# Patient Record
Sex: MALE | Race: WHITE | NOT HISPANIC OR LATINO | Employment: UNEMPLOYED | URBAN - NONMETROPOLITAN AREA
[De-identification: names, ages, dates, MRNs, and addresses within clinical notes are randomized per-mention and may not be internally consistent; named-entity substitution may affect disease eponyms.]

---

## 2019-12-02 ENCOUNTER — APPOINTMENT (OUTPATIENT)
Dept: GENERAL RADIOLOGY | Facility: HOSPITAL | Age: 49
End: 2019-12-02

## 2019-12-02 ENCOUNTER — HOSPITAL ENCOUNTER (EMERGENCY)
Facility: HOSPITAL | Age: 49
Discharge: HOME | End: 2019-12-02
Attending: EMERGENCY MEDICINE | Admitting: EMERGENCY MEDICINE

## 2019-12-02 VITALS
DIASTOLIC BLOOD PRESSURE: 83 MMHG | SYSTOLIC BLOOD PRESSURE: 130 MMHG | TEMPERATURE: 98.6 F | WEIGHT: 235 LBS | HEART RATE: 82 BPM | HEIGHT: 68 IN | RESPIRATION RATE: 18 BRPM | BODY MASS INDEX: 35.61 KG/M2 | OXYGEN SATURATION: 99 %

## 2019-12-02 DIAGNOSIS — B97.89 VIRAL RESPIRATORY ILLNESS: Primary | ICD-10-CM

## 2019-12-02 DIAGNOSIS — J98.8 VIRAL RESPIRATORY ILLNESS: Primary | ICD-10-CM

## 2019-12-02 LAB
ALBUMIN SERPL-MCNC: 4.1 G/DL (ref 3.5–5.2)
ALBUMIN/GLOB SERPL: 1.2 G/DL
ALP SERPL-CCNC: 61 U/L (ref 39–117)
ALT SERPL W P-5'-P-CCNC: 17 U/L (ref 1–41)
ANION GAP SERPL CALCULATED.3IONS-SCNC: 12.4 MMOL/L (ref 5–15)
AST SERPL-CCNC: 32 U/L (ref 1–40)
BASOPHILS # BLD AUTO: 0.09 10*3/MM3 (ref 0–0.2)
BASOPHILS NFR BLD AUTO: 0.7 % (ref 0–1.5)
BILIRUB SERPL-MCNC: 0.4 MG/DL (ref 0.2–1.2)
BUN BLD-MCNC: 10 MG/DL (ref 6–20)
BUN/CREAT SERPL: 10.8 (ref 7–25)
CALCIUM SPEC-SCNC: 9.3 MG/DL (ref 8.6–10.5)
CHLORIDE SERPL-SCNC: 101 MMOL/L (ref 98–107)
CO2 SERPL-SCNC: 24.6 MMOL/L (ref 22–29)
CREAT BLD-MCNC: 0.93 MG/DL (ref 0.76–1.27)
CRP SERPL-MCNC: 0.54 MG/DL (ref 0–0.5)
DEPRECATED RDW RBC AUTO: 39 FL (ref 37–54)
EOSINOPHIL # BLD AUTO: 0.48 10*3/MM3 (ref 0–0.4)
EOSINOPHIL NFR BLD AUTO: 3.7 % (ref 0.3–6.2)
ERYTHROCYTE [DISTWIDTH] IN BLOOD BY AUTOMATED COUNT: 11.9 % (ref 12.3–15.4)
FLUAV AG NPH QL: NEGATIVE
FLUBV AG NPH QL IA: NEGATIVE
GFR SERPL CREATININE-BSD FRML MDRD: 87 ML/MIN/1.73
GLOBULIN UR ELPH-MCNC: 3.4 GM/DL
GLUCOSE BLD-MCNC: 93 MG/DL (ref 65–99)
HCT VFR BLD AUTO: 45.9 % (ref 37.5–51)
HGB BLD-MCNC: 15 G/DL (ref 13–17.7)
IMM GRANULOCYTES # BLD AUTO: 0.05 10*3/MM3 (ref 0–0.05)
IMM GRANULOCYTES NFR BLD AUTO: 0.4 % (ref 0–0.5)
LYMPHOCYTES # BLD AUTO: 2.57 10*3/MM3 (ref 0.7–3.1)
LYMPHOCYTES NFR BLD AUTO: 19.7 % (ref 19.6–45.3)
MCH RBC QN AUTO: 29.4 PG (ref 26.6–33)
MCHC RBC AUTO-ENTMCNC: 32.7 G/DL (ref 31.5–35.7)
MCV RBC AUTO: 89.8 FL (ref 79–97)
MONOCYTES # BLD AUTO: 0.98 10*3/MM3 (ref 0.1–0.9)
MONOCYTES NFR BLD AUTO: 7.5 % (ref 5–12)
NEUTROPHILS # BLD AUTO: 8.89 10*3/MM3 (ref 1.7–7)
NEUTROPHILS NFR BLD AUTO: 68 % (ref 42.7–76)
NRBC BLD AUTO-RTO: 0 /100 WBC (ref 0–0.2)
PLATELET # BLD AUTO: 358 10*3/MM3 (ref 140–450)
PMV BLD AUTO: 9.7 FL (ref 6–12)
POTASSIUM BLD-SCNC: 4.8 MMOL/L (ref 3.5–5.2)
PROT SERPL-MCNC: 7.5 G/DL (ref 6–8.5)
RBC # BLD AUTO: 5.11 10*6/MM3 (ref 4.14–5.8)
SODIUM BLD-SCNC: 138 MMOL/L (ref 136–145)
WBC NRBC COR # BLD: 13.06 10*3/MM3 (ref 3.4–10.8)

## 2019-12-02 PROCEDURE — 96374 THER/PROPH/DIAG INJ IV PUSH: CPT

## 2019-12-02 PROCEDURE — 94640 AIRWAY INHALATION TREATMENT: CPT

## 2019-12-02 PROCEDURE — 71045 X-RAY EXAM CHEST 1 VIEW: CPT | Performed by: RADIOLOGY

## 2019-12-02 PROCEDURE — 80053 COMPREHEN METABOLIC PANEL: CPT | Performed by: PHYSICIAN ASSISTANT

## 2019-12-02 PROCEDURE — 86140 C-REACTIVE PROTEIN: CPT | Performed by: PHYSICIAN ASSISTANT

## 2019-12-02 PROCEDURE — 87804 INFLUENZA ASSAY W/OPTIC: CPT | Performed by: PHYSICIAN ASSISTANT

## 2019-12-02 PROCEDURE — 99284 EMERGENCY DEPT VISIT MOD MDM: CPT

## 2019-12-02 PROCEDURE — 25010000002 METHYLPREDNISOLONE PER 125 MG: Performed by: PHYSICIAN ASSISTANT

## 2019-12-02 PROCEDURE — 71045 X-RAY EXAM CHEST 1 VIEW: CPT

## 2019-12-02 PROCEDURE — 85025 COMPLETE CBC W/AUTO DIFF WBC: CPT | Performed by: PHYSICIAN ASSISTANT

## 2019-12-02 PROCEDURE — 96375 TX/PRO/DX INJ NEW DRUG ADDON: CPT

## 2019-12-02 PROCEDURE — 25010000002 ORPHENADRINE CITRATE PER 60 MG: Performed by: PHYSICIAN ASSISTANT

## 2019-12-02 PROCEDURE — 94799 UNLISTED PULMONARY SVC/PX: CPT

## 2019-12-02 RX ORDER — ALBUTEROL SULFATE 90 UG/1
2 AEROSOL, METERED RESPIRATORY (INHALATION) ONCE
Status: COMPLETED | OUTPATIENT
Start: 2019-12-02 | End: 2019-12-02

## 2019-12-02 RX ORDER — IPRATROPIUM BROMIDE AND ALBUTEROL SULFATE 2.5; .5 MG/3ML; MG/3ML
3 SOLUTION RESPIRATORY (INHALATION) ONCE
Status: COMPLETED | OUTPATIENT
Start: 2019-12-02 | End: 2019-12-02

## 2019-12-02 RX ORDER — ORPHENADRINE CITRATE 100 MG/1
100 TABLET, EXTENDED RELEASE ORAL 2 TIMES DAILY PRN
Qty: 20 TABLET | Refills: 0 | OUTPATIENT
Start: 2019-12-02 | End: 2020-01-11

## 2019-12-02 RX ORDER — ORPHENADRINE CITRATE 30 MG/ML
60 INJECTION INTRAMUSCULAR; INTRAVENOUS ONCE
Status: COMPLETED | OUTPATIENT
Start: 2019-12-02 | End: 2019-12-02

## 2019-12-02 RX ORDER — METHYLPREDNISOLONE 4 MG/1
TABLET ORAL
Qty: 21 TABLET | Refills: 0 | OUTPATIENT
Start: 2019-12-02 | End: 2020-01-11

## 2019-12-02 RX ORDER — SODIUM CHLORIDE 0.9 % (FLUSH) 0.9 %
10 SYRINGE (ML) INJECTION AS NEEDED
Status: DISCONTINUED | OUTPATIENT
Start: 2019-12-02 | End: 2019-12-02 | Stop reason: HOSPADM

## 2019-12-02 RX ORDER — METHYLPREDNISOLONE SODIUM SUCCINATE 125 MG/2ML
125 INJECTION, POWDER, LYOPHILIZED, FOR SOLUTION INTRAMUSCULAR; INTRAVENOUS ONCE
Status: COMPLETED | OUTPATIENT
Start: 2019-12-02 | End: 2019-12-02

## 2019-12-02 RX ADMIN — ORPHENADRINE CITRATE 60 MG: 30 INJECTION INTRAMUSCULAR; INTRAVENOUS at 17:57

## 2019-12-02 RX ADMIN — METHYLPREDNISOLONE SODIUM SUCCINATE 125 MG: 125 INJECTION, POWDER, FOR SOLUTION INTRAMUSCULAR; INTRAVENOUS at 17:56

## 2019-12-02 RX ADMIN — ALBUTEROL SULFATE 2 PUFF: 90 AEROSOL, METERED RESPIRATORY (INHALATION) at 18:47

## 2019-12-02 RX ADMIN — IPRATROPIUM BROMIDE AND ALBUTEROL SULFATE 3 ML: .5; 3 SOLUTION RESPIRATORY (INHALATION) at 17:30

## 2019-12-02 NOTE — ED NOTES
Patient states he has been sick for 3 to 4 days. Patient states he has an infrequent, productive cough with yellow and green sputum. Patient also states he has lower, right sided rib pain. Patient states his girlfriend was diagnosed with rhino virus and he thinks he may have the same thing.      Rolanda Schaefer, RN  12/02/19 5703

## 2019-12-02 NOTE — DISCHARGE INSTRUCTIONS
Call one of the offices below to establish a primary care provider.  If you are unable to get an appointment and feel it is an emergency and need to be seen immediately please return to the Emergency Department.    Call one of the office below to set up a primary care provider.    Dr. Mark Snow                                                                                                       [address] [facility] [address]  [phone]    Dr. Alvarado, Dr. SUSY Bowers, Dr. VERONICA Bowers ([facility])  [address] [facility] [address]  [phone]    Dr. Diop, Dr. Talamantes, Dr. Calderon ([facility])  [address] [facility] [address]  [phone]    Dr. Heard  [address] [facility] [address]  [phone]    Dr. Rodriguez, Dr. Bauman, Dr. Isaacs, Dr. Hicks ([facility])  [address] [place] [address] [address]  [facility] [address]  [phone]    Dr. Sarah Calhoun  [address] [facility] [place] [address]  [phone]    Dr. Keysha Davis  [address]   [address]  [facility] [address]  [phone]    Dr. Snow  [address] [facility] [address]  [phone]    Dr. Anguiano, Dr. Faye  [address] [facility] [place] [address]  [phone]    Dr. Reyez  [address]                                                              [address]  [facility] [address]  [phone]    Dr. Green  [address] [facility] [id number] [phone]    Dr. Saskia Rizzo  [address]  [address]  [facility] [address]  [phone]

## 2019-12-02 NOTE — ED PROVIDER NOTES
"Subjective     History provided by:  Patient  MANISH   Presenting symptoms: congestion, cough, fever, rhinorrhea and sore throat    Severity:  Moderate  Onset quality:  Sudden  Duration:  4 days  Timing:  Constant  Progression:  Worsening  Chronicity:  New  Relieved by:  Nothing  Ineffective treatments: \"i was taking old abx\"  Risk factors: sick contacts        Review of Systems   Constitutional: Positive for fever.   HENT: Positive for congestion, rhinorrhea and sore throat.    Respiratory: Positive for cough.    Cardiovascular: Negative.  Negative for chest pain.   Gastrointestinal: Negative.  Negative for abdominal pain.   Endocrine: Negative.    Genitourinary: Negative.  Negative for dysuria.   Skin: Negative.    Neurological: Negative.    Psychiatric/Behavioral: Negative.    All other systems reviewed and are negative.      No past medical history on file.    No Known Allergies    No past surgical history on file.    No family history on file.    Social History     Socioeconomic History   • Marital status: Single     Spouse name: Not on file   • Number of children: Not on file   • Years of education: Not on file   • Highest education level: Not on file           Objective   Physical Exam   Constitutional: He is oriented to person, place, and time. He appears well-developed and well-nourished. No distress.   HENT:   Head: Normocephalic and atraumatic.   Right Ear: External ear normal.   Left Ear: External ear normal.   Nose: Nose normal.   Eyes: Conjunctivae are normal.   Neck: Normal range of motion. Neck supple. No JVD present. No tracheal deviation present.   Cardiovascular: Normal rate, regular rhythm and normal heart sounds.   No murmur heard.  Pulmonary/Chest: Effort normal. No respiratory distress. He has wheezes.   Mild upper bilateral wheezes.    Abdominal: Soft. Bowel sounds are normal. There is no tenderness.   Musculoskeletal: Normal range of motion. He exhibits no edema or deformity.   Neurological: He " is alert and oriented to person, place, and time. No cranial nerve deficit.   Skin: Skin is warm and dry. No rash noted. He is not diaphoretic. No erythema. No pallor.   Psychiatric: He has a normal mood and affect. His behavior is normal. Thought content normal.   Nursing note and vitals reviewed.      Procedures           ED Course  ED Course as of Dec 02 1828   Mon Dec 02, 2019   1818 CXR rad interpreted:  No radiographic evidence of acute cardiac or pulmonary  disease.  [RB]      ED Course User Index  [RB] César Enciso PA                  MDM  Number of Diagnoses or Management Options  Viral respiratory illness: new and requires workup     Amount and/or Complexity of Data Reviewed  Clinical lab tests: ordered and reviewed  Tests in the radiology section of CPT®: ordered and reviewed    Risk of Complications, Morbidity, and/or Mortality  Presenting problems: moderate  Diagnostic procedures: moderate  Management options: low    Patient Progress  Patient progress: stable      Final diagnoses:   Viral respiratory illness              César Enciso PA  12/02/19 1825

## 2019-12-03 NOTE — ED NOTES
Discharge instructions reviewed with patient, patient instructed to return to ED if symptoms worsen or if any new problems arise. Patient verbalizes understanding of discharge instructions, patient ambulatory out of ED. No acute distress noted.       Rolanda Schaefer RN  12/02/19 9427

## 2020-01-11 ENCOUNTER — HOSPITAL ENCOUNTER (EMERGENCY)
Facility: HOSPITAL | Age: 50
Discharge: HOME | End: 2020-01-11
Attending: EMERGENCY MEDICINE | Admitting: EMERGENCY MEDICINE

## 2020-01-11 VITALS
HEART RATE: 93 BPM | WEIGHT: 235 LBS | TEMPERATURE: 97.7 F | BODY MASS INDEX: 35.61 KG/M2 | HEIGHT: 68 IN | SYSTOLIC BLOOD PRESSURE: 135 MMHG | DIASTOLIC BLOOD PRESSURE: 94 MMHG | OXYGEN SATURATION: 98 % | RESPIRATION RATE: 18 BRPM

## 2020-01-11 DIAGNOSIS — M62.830 MUSCLE SPASM OF BACK: ICD-10-CM

## 2020-01-11 DIAGNOSIS — M54.50 LOW BACK PAIN WITHOUT SCIATICA, UNSPECIFIED BACK PAIN LATERALITY, UNSPECIFIED CHRONICITY: Primary | ICD-10-CM

## 2020-01-11 LAB
BILIRUB UR QL STRIP: NEGATIVE
CLARITY UR: CLEAR
COLOR UR: YELLOW
GLUCOSE UR STRIP-MCNC: NEGATIVE MG/DL
HGB UR QL STRIP.AUTO: NEGATIVE
KETONES UR QL STRIP: NEGATIVE
LEUKOCYTE ESTERASE UR QL STRIP.AUTO: NEGATIVE
NITRITE UR QL STRIP: NEGATIVE
PH UR STRIP.AUTO: 6.5 [PH] (ref 5–8)
PROT UR QL STRIP: NEGATIVE
SP GR UR STRIP: 1.02 (ref 1–1.03)
UROBILINOGEN UR QL STRIP: NORMAL

## 2020-01-11 PROCEDURE — 96372 THER/PROPH/DIAG INJ SC/IM: CPT

## 2020-01-11 PROCEDURE — 81003 URINALYSIS AUTO W/O SCOPE: CPT | Performed by: PHYSICIAN ASSISTANT

## 2020-01-11 PROCEDURE — 25010000002 ORPHENADRINE CITRATE PER 60 MG: Performed by: PHYSICIAN ASSISTANT

## 2020-01-11 PROCEDURE — 25010000002 KETOROLAC TROMETHAMINE PER 15 MG: Performed by: PHYSICIAN ASSISTANT

## 2020-01-11 PROCEDURE — 99284 EMERGENCY DEPT VISIT MOD MDM: CPT

## 2020-01-11 RX ORDER — DICLOFENAC SODIUM 75 MG/1
75 TABLET, DELAYED RELEASE ORAL 2 TIMES DAILY PRN
Qty: 20 TABLET | Refills: 0 | OUTPATIENT
Start: 2020-01-11 | End: 2021-12-07

## 2020-01-11 RX ORDER — KETOROLAC TROMETHAMINE 30 MG/ML
60 INJECTION, SOLUTION INTRAMUSCULAR; INTRAVENOUS ONCE
Status: COMPLETED | OUTPATIENT
Start: 2020-01-11 | End: 2020-01-11

## 2020-01-11 RX ORDER — ORPHENADRINE CITRATE 30 MG/ML
60 INJECTION INTRAMUSCULAR; INTRAVENOUS ONCE
Status: COMPLETED | OUTPATIENT
Start: 2020-01-11 | End: 2020-01-11

## 2020-01-11 RX ORDER — METHOCARBAMOL 500 MG/1
500 TABLET, FILM COATED ORAL 4 TIMES DAILY PRN
Qty: 30 TABLET | Refills: 0 | Status: SHIPPED | OUTPATIENT
Start: 2020-01-11 | End: 2022-06-15

## 2020-01-11 RX ADMIN — KETOROLAC TROMETHAMINE 60 MG: 30 INJECTION, SOLUTION INTRAMUSCULAR at 11:12

## 2020-01-11 RX ADMIN — ORPHENADRINE CITRATE 60 MG: 30 INJECTION INTRAMUSCULAR; INTRAVENOUS at 11:14

## 2020-01-11 NOTE — ED PROVIDER NOTES
Subjective     History provided by:  Patient   used: No    Back Pain   Location:  Lumbar spine  Quality:  Aching, cramping and stabbing  Radiates to:  Does not radiate  Pain severity:  Moderate  Pain is:  Same all the time  Onset quality:  Gradual  Duration:  1 week  Timing:  Intermittent  Progression:  Waxing and waning  Chronicity:  Recurrent  Context: lifting heavy objects and twisting    Relieved by:  Nothing  Worsened by:  Lying down, twisting, ambulation and standing  Ineffective treatments:  Cold packs, heating pad, muscle relaxants, ibuprofen and being still  Associated symptoms: no abdominal pain, no abdominal swelling, no bladder incontinence, no bowel incontinence, no chest pain, no dysuria, no fever, no headaches, no leg pain, no numbness, no paresthesias, no pelvic pain, no perianal numbness, no tingling, no weakness and no weight loss    Risk factors: obesity and steroid use (last month, 2 shots from PCP)    Risk factors: no hx of cancer, no hx of osteoporosis, no lack of exercise, no recent surgery and no vascular disease        Review of Systems   Constitutional: Negative for fever and weight loss.   Cardiovascular: Negative for chest pain.   Gastrointestinal: Negative for abdominal pain and bowel incontinence.   Genitourinary: Negative for bladder incontinence, dysuria and pelvic pain.   Musculoskeletal: Positive for back pain.   Neurological: Negative for tingling, weakness, numbness, headaches and paresthesias.   All other systems reviewed and are negative.      History reviewed. No pertinent past medical history.    No Known Allergies    History reviewed. No pertinent surgical history.    No family history on file.    Social History     Socioeconomic History   • Marital status: Single     Spouse name: Not on file   • Number of children: Not on file   • Years of education: Not on file   • Highest education level: Not on file           Objective   Physical Exam   Constitutional:  He is oriented to person, place, and time. Vital signs are normal. He appears well-developed and well-nourished.   HENT:   Head: Normocephalic and atraumatic.   Right Ear: External ear normal.   Left Ear: External ear normal.   Nose: Nose normal.   Mouth/Throat: Oropharynx is clear and moist.   Eyes: Pupils are equal, round, and reactive to light. Conjunctivae and EOM are normal.   Neck: Normal range of motion. Neck supple.   Cardiovascular: Normal rate and regular rhythm.   Pulmonary/Chest: Effort normal and breath sounds normal.   Abdominal: Soft. Bowel sounds are normal.   Musculoskeletal:        Lumbar back: He exhibits tenderness, pain and spasm.        Arms:  Neurological: He is alert and oriented to person, place, and time. He has normal strength. No sensory deficit. He displays a negative Romberg sign. GCS eye subscore is 4. GCS verbal subscore is 5. GCS motor subscore is 6.   Reflex Scores:       Patellar reflexes are 2+ on the right side and 2+ on the left side.  Skin: Skin is warm and dry. Capillary refill takes less than 2 seconds.   Nursing note and vitals reviewed.      Procedures           ED Course  ED Course as of Jan 11 1213   Sat Jan 11, 2020   1208 Pt reports improvement in pain after medication.     [MC]      ED Course User Index  [MC] Karol Fletcher PA                                               MDM  Number of Diagnoses or Management Options  Low back pain without sciatica, unspecified back pain laterality, unspecified chronicity: new and requires workup  Muscle spasm of back: new and requires workup     Amount and/or Complexity of Data Reviewed  Clinical lab tests: reviewed and ordered  Tests in the medicine section of CPT®: ordered and reviewed    Risk of Complications, Morbidity, and/or Mortality  Presenting problems: moderate  Diagnostic procedures: moderate  Management options: moderate    Patient Progress  Patient progress: improved      Final diagnoses:   Low back pain without  sciatica, unspecified back pain laterality, unspecified chronicity   Muscle spasm of back            Karol Fletcher PA  01/11/20 5349

## 2020-01-11 NOTE — ED NOTES
I tried to reach out to [employer], but was un able to reach them.  I contacted House [employer] to see if they may cover a cab.  I contacted [employer], they stated it would be $55.  [employer] was notified and declined payment.     Rao Edgar  01/11/20 2747

## 2020-05-14 ENCOUNTER — HOSPITAL ENCOUNTER (OUTPATIENT)
Dept: ULTRASOUND IMAGING | Facility: HOSPITAL | Age: 50
Discharge: HOME | End: 2020-05-14
Admitting: PHYSICIAN ASSISTANT

## 2020-05-14 ENCOUNTER — TRANSCRIBE ORDERS (OUTPATIENT)
Dept: ADMINISTRATIVE | Facility: HOSPITAL | Age: 50
End: 2020-05-14

## 2020-05-14 DIAGNOSIS — R10.811 RIGHT UPPER QUADRANT ABDOMINAL TENDERNESS, REBOUND TENDERNESS PRESENCE NOT SPECIFIED: Primary | ICD-10-CM

## 2020-05-14 DIAGNOSIS — R10.811 RIGHT UPPER QUADRANT ABDOMINAL TENDERNESS, REBOUND TENDERNESS PRESENCE NOT SPECIFIED: ICD-10-CM

## 2020-05-14 PROCEDURE — 76705 ECHO EXAM OF ABDOMEN: CPT | Performed by: RADIOLOGY

## 2020-05-14 PROCEDURE — 76705 ECHO EXAM OF ABDOMEN: CPT

## 2020-11-02 ENCOUNTER — TRANSCRIBE ORDERS (OUTPATIENT)
Dept: ADMINISTRATIVE | Facility: HOSPITAL | Age: 50
End: 2020-11-02

## 2020-11-02 ENCOUNTER — HOSPITAL ENCOUNTER (OUTPATIENT)
Dept: GENERAL RADIOLOGY | Facility: HOSPITAL | Age: 50
Discharge: HOME | End: 2020-11-02
Admitting: NURSE PRACTITIONER

## 2020-11-02 DIAGNOSIS — M54.50 LOW BACK PAIN, UNSPECIFIED BACK PAIN LATERALITY, UNSPECIFIED CHRONICITY, UNSPECIFIED WHETHER SCIATICA PRESENT: ICD-10-CM

## 2020-11-02 DIAGNOSIS — M54.50 LOW BACK PAIN, UNSPECIFIED BACK PAIN LATERALITY, UNSPECIFIED CHRONICITY, UNSPECIFIED WHETHER SCIATICA PRESENT: Primary | ICD-10-CM

## 2020-11-02 PROCEDURE — 72110 X-RAY EXAM L-2 SPINE 4/>VWS: CPT

## 2020-11-02 PROCEDURE — 72110 X-RAY EXAM L-2 SPINE 4/>VWS: CPT | Performed by: RADIOLOGY

## 2021-01-26 ENCOUNTER — TRANSCRIBE ORDERS (OUTPATIENT)
Dept: ADMINISTRATIVE | Facility: HOSPITAL | Age: 51
End: 2021-01-26

## 2021-01-26 DIAGNOSIS — M54.50 ACUTE MIDLINE LOW BACK PAIN, UNSPECIFIED WHETHER SCIATICA PRESENT: Primary | ICD-10-CM

## 2021-02-08 ENCOUNTER — APPOINTMENT (OUTPATIENT)
Dept: MRI IMAGING | Facility: HOSPITAL | Age: 51
End: 2021-02-08

## 2021-09-20 ENCOUNTER — NURSE TRIAGE (OUTPATIENT)
Dept: CALL CENTER | Facility: HOSPITAL | Age: 51
End: 2021-09-20

## 2021-09-20 NOTE — TELEPHONE ENCOUNTER
He is calling about Covid symptoms. He is at home, he has been to grocery store. The symptoms he has- aches, ha, sob, cough that is worse, no energy. He has lung problems, asthma and COPD. These symptoms started on 09/18/2021.Advised to be seen at Urgent Care to be evaluated.     Reason for Disposition  • HIGH RISK for severe COVID complications (e.g., age > 64 years, obesity with BMI > 25, pregnant, chronic lung disease or other chronic medical condition)  (Exception: Already seen by PCP and no new or worsening symptoms.)    Additional Information  • Negative: SEVERE difficulty breathing (e.g., struggling for each breath, speaks in single words)  • Negative: Difficult to awaken or acting confused (e.g., disoriented, slurred speech)  • Negative: Bluish (or gray) lips or face now  • Negative: Shock suspected (e.g., cold/pale/clammy skin, too weak to stand, low BP, rapid pulse)  • Negative: Sounds like a life-threatening emergency to the triager  • Negative: [1] COVID-19 exposure AND [2] no symptoms  • Negative: COVID-19 vaccine reaction suspected (e.g., fever, headache, muscle aches) occurring 1 to 3 days after getting vaccine  • Negative: COVID-19 vaccine, questions about  • Negative: [1] Lives with someone known to have influenza (flu test positive) AND [2] flu-like symptoms (e.g., cough, runny nose, sore throat, SOB; with or without fever)  • Negative: [1] Adult with possible COVID-19 symptoms AND [2] triager concerned about severity of symptoms or other causes  • Negative: COVID-19 and breastfeeding, questions about  • Negative: SEVERE or constant chest pain or pressure (Exception: mild central chest pain, present only when coughing)  • Negative: MODERATE difficulty breathing (e.g., speaks in phrases, SOB even at rest, pulse 100-120)  • Negative: [1] Headache AND [2] stiff neck (can't touch chin to chest)  • Negative: MILD difficulty breathing (e.g., minimal/no SOB at rest, SOB with walking, pulse <100)  •  "Negative: Chest pain or pressure  • Negative: Patient sounds very sick or weak to the triager  • Negative: Fever > 103 F (39.4 C)  • Negative: [1] Fever > 101 F (38.3 C) AND [2] age > 60 years  • Negative: [1] Fever > 100.0 F (37.8 C) AND [2] bedridden (e.g., nursing home patient, CVA, chronic illness, recovering from surgery)  • Negative: [1] HIGH RISK patient AND [2] influenza is widespread in the community AND [3] ONE OR MORE respiratory symptoms: cough, sore throat, runny or stuffy nose  • Negative: [1] HIGH RISK patient AND [2] influenza exposure within the last 7 days AND [3] ONE OR MORE respiratory symptoms: cough, sore throat, runny or stuffy nose  • Negative: [1] COVID-19 infection suspected by caller or triager AND [2] mild symptoms (cough, fever, or others) AND [3] negative COVID-19 rapid test    Answer Assessment - Initial Assessment Questions  1. COVID-19 DIAGNOSIS: \"Who made your Coronavirus (COVID-19) diagnosis?\" \"Was it confirmed by a positive lab test?\" If not diagnosed by a HCP, ask \"Are there lots of cases (community spread) where you live?\" (See public health department website, if unsure)      Not confirmed.   2. COVID-19 EXPOSURE: \"Was there any known exposure to COVID before the symptoms began?\" CDC Definition of close contact: within 6 feet (2 meters) for a total of 15 minutes or more over a 24-hour period.       unknown  3. ONSET: \"When did the COVID-19 symptoms start?\"       09/18/2021  4. WORST SYMPTOM: \"What is your worst symptom?\" (e.g., cough, fever, shortness of breath, muscle aches)      Sob and cough   5. COUGH: \"Do you have a cough?\" If Yes, ask: \"How bad is the cough?\"        Yes annoying   6. FEVER: \"Do you have a fever?\" If Yes, ask: \"What is your temperature, how was it measured, and when did it start?\"      no  7. RESPIRATORY STATUS: \"Describe your breathing?\" (e.g., shortness of breath, wheezing, unable to speak)       Sob at times voice is hoarse.   8. BETTER-SAME-WORSE: \"Are " "you getting better, staying the same or getting worse compared to yesterday?\"  If getting worse, ask, \"In what way?\"      Worse   9. HIGH RISK DISEASE: \"Do you have any chronic medical problems?\" (e.g., asthma, heart or lung disease, weak immune system, obesity, etc.)      Yes- COPD and asthma   10. PREGNANCY: \"Is there any chance you are pregnant?\" \"When was your last menstrual period?\"        n  11. OTHER SYMPTOMS: \"Do you have any other symptoms?\"  (e.g., chills, fatigue, headache, loss of smell or taste, muscle pain, sore throat; new loss of smell or taste especially support the diagnosis of COVID-19)        Aches no energy muscle pain ha very tired.    Protocols used: CORONAVIRUS (COVID-19) DIAGNOSED OR SUSPECTED-ADULT-AH      "

## 2021-10-17 ENCOUNTER — APPOINTMENT (OUTPATIENT)
Dept: GENERAL RADIOLOGY | Facility: HOSPITAL | Age: 51
End: 2021-10-17

## 2021-10-17 ENCOUNTER — HOSPITAL ENCOUNTER (EMERGENCY)
Facility: HOSPITAL | Age: 51
Discharge: HOME | End: 2021-10-17
Attending: EMERGENCY MEDICINE | Admitting: EMERGENCY MEDICINE

## 2021-10-17 VITALS
BODY MASS INDEX: 46.07 KG/M2 | SYSTOLIC BLOOD PRESSURE: 137 MMHG | OXYGEN SATURATION: 95 % | HEART RATE: 93 BPM | RESPIRATION RATE: 20 BRPM | TEMPERATURE: 98.7 F | DIASTOLIC BLOOD PRESSURE: 92 MMHG | WEIGHT: 304 LBS | HEIGHT: 68 IN

## 2021-10-17 DIAGNOSIS — R07.9 CHEST PAIN, UNSPECIFIED TYPE: Primary | ICD-10-CM

## 2021-10-17 DIAGNOSIS — M94.0 COSTOCHONDRITIS: ICD-10-CM

## 2021-10-17 LAB
ALBUMIN SERPL-MCNC: 4.44 G/DL (ref 3.5–5.2)
ALBUMIN/GLOB SERPL: 1.4 G/DL
ALP SERPL-CCNC: 78 U/L (ref 39–117)
ALT SERPL W P-5'-P-CCNC: 16 U/L (ref 1–41)
ANION GAP SERPL CALCULATED.3IONS-SCNC: 10.5 MMOL/L (ref 5–15)
AST SERPL-CCNC: 17 U/L (ref 1–40)
BASOPHILS # BLD AUTO: 0.06 10*3/MM3 (ref 0–0.2)
BASOPHILS NFR BLD AUTO: 0.4 % (ref 0–1.5)
BILIRUB SERPL-MCNC: 0.4 MG/DL (ref 0–1.2)
BUN SERPL-MCNC: 14 MG/DL (ref 6–20)
BUN/CREAT SERPL: 13.7 (ref 7–25)
CALCIUM SPEC-SCNC: 9 MG/DL (ref 8.6–10.5)
CHLORIDE SERPL-SCNC: 103 MMOL/L (ref 98–107)
CO2 SERPL-SCNC: 24.5 MMOL/L (ref 22–29)
CREAT SERPL-MCNC: 1.02 MG/DL (ref 0.76–1.27)
CRP SERPL-MCNC: 2.71 MG/DL (ref 0–0.5)
DEPRECATED RDW RBC AUTO: 39.9 FL (ref 37–54)
EOSINOPHIL # BLD AUTO: 0.31 10*3/MM3 (ref 0–0.4)
EOSINOPHIL NFR BLD AUTO: 2.2 % (ref 0.3–6.2)
ERYTHROCYTE [DISTWIDTH] IN BLOOD BY AUTOMATED COUNT: 12.1 % (ref 12.3–15.4)
FLUAV RNA RESP QL NAA+PROBE: NOT DETECTED
FLUBV RNA RESP QL NAA+PROBE: NOT DETECTED
GFR SERPL CREATININE-BSD FRML MDRD: 77 ML/MIN/1.73
GLOBULIN UR ELPH-MCNC: 3.3 GM/DL
GLUCOSE SERPL-MCNC: 100 MG/DL (ref 65–99)
HCT VFR BLD AUTO: 47.7 % (ref 37.5–51)
HGB BLD-MCNC: 15.3 G/DL (ref 13–17.7)
HOLD SPECIMEN: NORMAL
HOLD SPECIMEN: NORMAL
IMM GRANULOCYTES # BLD AUTO: 0.04 10*3/MM3 (ref 0–0.05)
IMM GRANULOCYTES NFR BLD AUTO: 0.3 % (ref 0–0.5)
LYMPHOCYTES # BLD AUTO: 2.25 10*3/MM3 (ref 0.7–3.1)
LYMPHOCYTES NFR BLD AUTO: 16.1 % (ref 19.6–45.3)
MCH RBC QN AUTO: 28.9 PG (ref 26.6–33)
MCHC RBC AUTO-ENTMCNC: 32.1 G/DL (ref 31.5–35.7)
MCV RBC AUTO: 90 FL (ref 79–97)
MONOCYTES # BLD AUTO: 1.13 10*3/MM3 (ref 0.1–0.9)
MONOCYTES NFR BLD AUTO: 8.1 % (ref 5–12)
NEUTROPHILS NFR BLD AUTO: 10.22 10*3/MM3 (ref 1.7–7)
NEUTROPHILS NFR BLD AUTO: 72.9 % (ref 42.7–76)
NRBC BLD AUTO-RTO: 0 /100 WBC (ref 0–0.2)
PLATELET # BLD AUTO: 353 10*3/MM3 (ref 140–450)
PMV BLD AUTO: 9.1 FL (ref 6–12)
POTASSIUM SERPL-SCNC: 4.4 MMOL/L (ref 3.5–5.2)
PROT SERPL-MCNC: 7.7 G/DL (ref 6–8.5)
QT INTERVAL: 346 MS
QTC INTERVAL: 420 MS
RBC # BLD AUTO: 5.3 10*6/MM3 (ref 4.14–5.8)
SARS-COV-2 RNA RESP QL NAA+PROBE: NOT DETECTED
SODIUM SERPL-SCNC: 138 MMOL/L (ref 136–145)
TROPONIN T SERPL-MCNC: <0.01 NG/ML (ref 0–0.03)
TROPONIN T SERPL-MCNC: <0.01 NG/ML (ref 0–0.03)
WBC # BLD AUTO: 14.01 10*3/MM3 (ref 3.4–10.8)
WHOLE BLOOD HOLD SPECIMEN: NORMAL
WHOLE BLOOD HOLD SPECIMEN: NORMAL

## 2021-10-17 PROCEDURE — 93005 ELECTROCARDIOGRAM TRACING: CPT | Performed by: PHYSICIAN ASSISTANT

## 2021-10-17 PROCEDURE — 71101 X-RAY EXAM UNILAT RIBS/CHEST: CPT

## 2021-10-17 PROCEDURE — 93010 ELECTROCARDIOGRAM REPORT: CPT | Performed by: SPECIALIST

## 2021-10-17 PROCEDURE — 96374 THER/PROPH/DIAG INJ IV PUSH: CPT

## 2021-10-17 PROCEDURE — 87636 SARSCOV2 & INF A&B AMP PRB: CPT | Performed by: PHYSICIAN ASSISTANT

## 2021-10-17 PROCEDURE — 80053 COMPREHEN METABOLIC PANEL: CPT | Performed by: PHYSICIAN ASSISTANT

## 2021-10-17 PROCEDURE — 25010000002 MORPHINE PER 10 MG: Performed by: EMERGENCY MEDICINE

## 2021-10-17 PROCEDURE — 85025 COMPLETE CBC W/AUTO DIFF WBC: CPT | Performed by: PHYSICIAN ASSISTANT

## 2021-10-17 PROCEDURE — 99284 EMERGENCY DEPT VISIT MOD MDM: CPT

## 2021-10-17 PROCEDURE — 84484 ASSAY OF TROPONIN QUANT: CPT | Performed by: PHYSICIAN ASSISTANT

## 2021-10-17 PROCEDURE — 86140 C-REACTIVE PROTEIN: CPT | Performed by: PHYSICIAN ASSISTANT

## 2021-10-17 RX ORDER — ASPIRIN 81 MG/1
324 TABLET, CHEWABLE ORAL ONCE
Status: DISCONTINUED | OUTPATIENT
Start: 2021-10-17 | End: 2021-10-18 | Stop reason: HOSPADM

## 2021-10-17 RX ORDER — SODIUM CHLORIDE 0.9 % (FLUSH) 0.9 %
10 SYRINGE (ML) INJECTION AS NEEDED
Status: DISCONTINUED | OUTPATIENT
Start: 2021-10-17 | End: 2021-10-18 | Stop reason: HOSPADM

## 2021-10-17 RX ORDER — HYDROCODONE BITARTRATE AND ACETAMINOPHEN 7.5; 325 MG/1; MG/1
1 TABLET ORAL ONCE
Status: COMPLETED | OUTPATIENT
Start: 2021-10-17 | End: 2021-10-17

## 2021-10-17 RX ORDER — TRAMADOL HYDROCHLORIDE 50 MG/1
50 TABLET ORAL EVERY 6 HOURS PRN
Qty: 12 TABLET | Refills: 0 | Status: SHIPPED | OUTPATIENT
Start: 2021-10-17 | End: 2022-06-15

## 2021-10-17 RX ADMIN — HYDROCODONE BITARTRATE AND ACETAMINOPHEN 1 TABLET: 7.5; 325 TABLET ORAL at 21:46

## 2021-10-17 RX ADMIN — MORPHINE SULFATE 4 MG: 4 INJECTION, SOLUTION INTRAMUSCULAR; INTRAVENOUS at 18:59

## 2021-10-17 NOTE — ED PROVIDER NOTES
Subjective   51-year-old male with no known past medical history presents to the emergency room with chest pain and shortness of breath.  Patient states he has no cardiac history.  He does state that he is also having right rib pain after an injury that occurred to the other day while reaching into a washing machine.  He is unsure if that is the issue or not.  He denies nausea, vomiting, diaphoresis, or back pain.  Evading factors do include movement.  Denies any alleviating factors.  Denies any other complaints or concerns at this time.      History provided by:  Patient   used: No        Review of Systems   Constitutional: Negative.  Negative for fever.   HENT: Negative.    Respiratory: Positive for shortness of breath.    Cardiovascular: Positive for chest pain.   Gastrointestinal: Negative.  Negative for abdominal pain.   Endocrine: Negative.    Genitourinary: Negative.  Negative for dysuria.   Musculoskeletal:        (+) right rib pain   Skin: Negative.    Neurological: Negative.    Psychiatric/Behavioral: Negative.    All other systems reviewed and are negative.      No past medical history on file.    No Known Allergies    No past surgical history on file.    No family history on file.    Social History     Socioeconomic History   • Marital status: Single           Objective   Physical Exam  Vitals and nursing note reviewed.   Constitutional:       General: He is not in acute distress.     Appearance: He is well-developed. He is not diaphoretic.   HENT:      Head: Normocephalic and atraumatic.      Right Ear: External ear normal.      Left Ear: External ear normal.      Nose: Nose normal.   Eyes:      Conjunctiva/sclera: Conjunctivae normal.      Pupils: Pupils are equal, round, and reactive to light.   Neck:      Vascular: No JVD.      Trachea: No tracheal deviation.   Cardiovascular:      Rate and Rhythm: Normal rate and regular rhythm.      Heart sounds: Normal heart sounds. No murmur  heard.      Pulmonary:      Effort: Pulmonary effort is normal. No respiratory distress.      Breath sounds: Normal breath sounds. No wheezing.   Abdominal:      General: Bowel sounds are normal.      Palpations: Abdomen is soft.      Tenderness: There is no abdominal tenderness.   Musculoskeletal:         General: No deformity. Normal range of motion.      Cervical back: Normal range of motion and neck supple.   Skin:     General: Skin is warm and dry.      Coloration: Skin is not pale.      Findings: No erythema or rash.   Neurological:      Mental Status: He is alert and oriented to person, place, and time.      Cranial Nerves: No cranial nerve deficit.   Psychiatric:         Behavior: Behavior normal.         Thought Content: Thought content normal.         Procedures           ED Course  ED Course as of 10/17/21 2139   Sun Oct 17, 2021   1844 XR Ribs Right With PA Chest [TK]      ED Course User Index  [TK] Emil Torres PA-C                                         HEART Score (for prediction of 6-week risk of major adverse cardiac event) reviewed and/or performed as part of the patient evaluation and treatment planning process.  The result associated with this review/performance is: 2       MDM  Number of Diagnoses or Management Options  Chest pain, unspecified type: new and requires workup  Costochondritis: new and requires workup     Amount and/or Complexity of Data Reviewed  Clinical lab tests: reviewed and ordered  Tests in the radiology section of CPT®: reviewed and ordered  Tests in the medicine section of CPT®: reviewed and ordered    Risk of Complications, Morbidity, and/or Mortality  Presenting problems: moderate  Diagnostic procedures: moderate  Management options: moderate    Patient Progress  Patient progress: stable      Final diagnoses:   Chest pain, unspecified type   Costochondritis       ED Disposition  ED Disposition     ED Disposition Condition Comment    Discharge Stable            Ember Chavarria, APRN  [address] [address]  [phone]    In 2 days           Medication List      No changes were made to your prescriptions during this visit.          Emil Torres PA-C  10/17/21 9702

## 2021-12-06 PROCEDURE — 99283 EMERGENCY DEPT VISIT LOW MDM: CPT

## 2021-12-07 ENCOUNTER — APPOINTMENT (OUTPATIENT)
Dept: CT IMAGING | Facility: HOSPITAL | Age: 51
End: 2021-12-07

## 2021-12-07 ENCOUNTER — HOSPITAL ENCOUNTER (EMERGENCY)
Facility: HOSPITAL | Age: 51
Discharge: HOME | End: 2021-12-07
Attending: EMERGENCY MEDICINE | Admitting: EMERGENCY MEDICINE

## 2021-12-07 VITALS
TEMPERATURE: 98.5 F | OXYGEN SATURATION: 95 % | DIASTOLIC BLOOD PRESSURE: 76 MMHG | BODY MASS INDEX: 45.18 KG/M2 | RESPIRATION RATE: 18 BRPM | HEART RATE: 96 BPM | WEIGHT: 305 LBS | HEIGHT: 69 IN | SYSTOLIC BLOOD PRESSURE: 121 MMHG

## 2021-12-07 DIAGNOSIS — R10.13 EPIGASTRIC PAIN: Primary | ICD-10-CM

## 2021-12-07 LAB
ALBUMIN SERPL-MCNC: 3.97 G/DL (ref 3.5–5.2)
ALBUMIN/GLOB SERPL: 1.3 G/DL
ALP SERPL-CCNC: 72 U/L (ref 39–117)
ALT SERPL W P-5'-P-CCNC: 17 U/L (ref 1–41)
ANION GAP SERPL CALCULATED.3IONS-SCNC: 9.4 MMOL/L (ref 5–15)
AST SERPL-CCNC: 18 U/L (ref 1–40)
BASOPHILS # BLD AUTO: 0.11 10*3/MM3 (ref 0–0.2)
BASOPHILS NFR BLD AUTO: 1 % (ref 0–1.5)
BILIRUB SERPL-MCNC: 0.2 MG/DL (ref 0–1.2)
BILIRUB UR QL STRIP: NEGATIVE
BUN SERPL-MCNC: 14 MG/DL (ref 6–20)
BUN/CREAT SERPL: 13.6 (ref 7–25)
CALCIUM SPEC-SCNC: 8.6 MG/DL (ref 8.6–10.5)
CHLORIDE SERPL-SCNC: 104 MMOL/L (ref 98–107)
CLARITY UR: CLEAR
CO2 SERPL-SCNC: 25.6 MMOL/L (ref 22–29)
COLOR UR: YELLOW
CREAT SERPL-MCNC: 1.03 MG/DL (ref 0.76–1.27)
CRP SERPL-MCNC: 1.21 MG/DL (ref 0–0.5)
DEPRECATED RDW RBC AUTO: 41.2 FL (ref 37–54)
EOSINOPHIL # BLD AUTO: 0.75 10*3/MM3 (ref 0–0.4)
EOSINOPHIL NFR BLD AUTO: 6.6 % (ref 0.3–6.2)
ERYTHROCYTE [DISTWIDTH] IN BLOOD BY AUTOMATED COUNT: 12.4 % (ref 12.3–15.4)
GFR SERPL CREATININE-BSD FRML MDRD: 76 ML/MIN/1.73
GLOBULIN UR ELPH-MCNC: 3 GM/DL
GLUCOSE SERPL-MCNC: 107 MG/DL (ref 65–99)
GLUCOSE UR STRIP-MCNC: NEGATIVE MG/DL
HCT VFR BLD AUTO: 44.3 % (ref 37.5–51)
HGB BLD-MCNC: 14 G/DL (ref 13–17.7)
HGB UR QL STRIP.AUTO: NEGATIVE
HOLD SPECIMEN: NORMAL
HOLD SPECIMEN: NORMAL
IMM GRANULOCYTES # BLD AUTO: 0.04 10*3/MM3 (ref 0–0.05)
IMM GRANULOCYTES NFR BLD AUTO: 0.3 % (ref 0–0.5)
KETONES UR QL STRIP: NEGATIVE
LEUKOCYTE ESTERASE UR QL STRIP.AUTO: NEGATIVE
LIPASE SERPL-CCNC: 30 U/L (ref 13–60)
LYMPHOCYTES # BLD AUTO: 2.81 10*3/MM3 (ref 0.7–3.1)
LYMPHOCYTES NFR BLD AUTO: 24.5 % (ref 19.6–45.3)
MCH RBC QN AUTO: 28.8 PG (ref 26.6–33)
MCHC RBC AUTO-ENTMCNC: 31.6 G/DL (ref 31.5–35.7)
MCV RBC AUTO: 91.2 FL (ref 79–97)
MONOCYTES # BLD AUTO: 0.91 10*3/MM3 (ref 0.1–0.9)
MONOCYTES NFR BLD AUTO: 7.9 % (ref 5–12)
NEUTROPHILS NFR BLD AUTO: 59.7 % (ref 42.7–76)
NEUTROPHILS NFR BLD AUTO: 6.83 10*3/MM3 (ref 1.7–7)
NITRITE UR QL STRIP: NEGATIVE
NRBC BLD AUTO-RTO: 0 /100 WBC (ref 0–0.2)
PH UR STRIP.AUTO: 7.5 [PH] (ref 5–8)
PLATELET # BLD AUTO: 407 10*3/MM3 (ref 140–450)
PMV BLD AUTO: 9.5 FL (ref 6–12)
POTASSIUM SERPL-SCNC: 4.4 MMOL/L (ref 3.5–5.2)
PROT SERPL-MCNC: 7 G/DL (ref 6–8.5)
PROT UR QL STRIP: NEGATIVE
RBC # BLD AUTO: 4.86 10*6/MM3 (ref 4.14–5.8)
SODIUM SERPL-SCNC: 139 MMOL/L (ref 136–145)
SP GR UR STRIP: 1.02 (ref 1–1.03)
UROBILINOGEN UR QL STRIP: NORMAL
WBC NRBC COR # BLD: 11.45 10*3/MM3 (ref 3.4–10.8)
WHOLE BLOOD HOLD SPECIMEN: NORMAL
WHOLE BLOOD HOLD SPECIMEN: NORMAL

## 2021-12-07 PROCEDURE — 80053 COMPREHEN METABOLIC PANEL: CPT | Performed by: PHYSICIAN ASSISTANT

## 2021-12-07 PROCEDURE — 86140 C-REACTIVE PROTEIN: CPT | Performed by: PHYSICIAN ASSISTANT

## 2021-12-07 PROCEDURE — 81003 URINALYSIS AUTO W/O SCOPE: CPT | Performed by: PHYSICIAN ASSISTANT

## 2021-12-07 PROCEDURE — 85025 COMPLETE CBC W/AUTO DIFF WBC: CPT | Performed by: PHYSICIAN ASSISTANT

## 2021-12-07 PROCEDURE — 74176 CT ABD & PELVIS W/O CONTRAST: CPT

## 2021-12-07 PROCEDURE — 83690 ASSAY OF LIPASE: CPT | Performed by: PHYSICIAN ASSISTANT

## 2021-12-07 RX ORDER — KETOROLAC TROMETHAMINE 10 MG/1
10 TABLET, FILM COATED ORAL EVERY 6 HOURS PRN
Qty: 12 TABLET | Refills: 0 | Status: SHIPPED | OUTPATIENT
Start: 2021-12-07 | End: 2022-06-15

## 2021-12-07 RX ORDER — KETOROLAC TROMETHAMINE 10 MG/1
20 TABLET, FILM COATED ORAL ONCE
Status: COMPLETED | OUTPATIENT
Start: 2021-12-07 | End: 2021-12-07

## 2021-12-07 RX ORDER — HYDROCODONE BITARTRATE AND ACETAMINOPHEN 10; 325 MG/1; MG/1
1 TABLET ORAL ONCE
Status: COMPLETED | OUTPATIENT
Start: 2021-12-07 | End: 2021-12-07

## 2021-12-07 RX ADMIN — HYDROCODONE BITARTRATE AND ACETAMINOPHEN 1 TABLET: 10; 325 TABLET ORAL at 01:16

## 2021-12-07 RX ADMIN — KETOROLAC TROMETHAMINE 20 MG: 10 TABLET, FILM COATED ORAL at 02:03

## 2021-12-07 NOTE — ED NOTES
MEDICAL SCREENING:    Reason for Visit: abdominal pain    Patient initially seen in triage.  The patient was advised further evaluation and diagnostic testing will be needed, some of the treatment and testing will be initiated in the lobby in order to begin the process.  The patient will be returned to the waiting area for the time being and possibly be re-assessed by a subsequent ED provider.  The patient will be brought back to the treatment area in as timely manner as possible.         César Enciso II, PA  12/07/21 0010

## 2021-12-07 NOTE — ED PROVIDER NOTES
Subjective   Patient presents to ER with abdominal pain of 5 weeks duration      Abdominal Pain  Pain location:  Generalized  Pain quality: aching and cramping    Pain radiates to:  Does not radiate  Onset quality:  Gradual  Duration:  5 weeks  Progression:  Worsening  Chronicity:  New      Review of Systems   Constitutional: Positive for activity change.   HENT: Negative.    Eyes: Negative.    Respiratory: Negative.    Cardiovascular: Negative.    Gastrointestinal: Positive for abdominal pain.   Endocrine: Negative.    Genitourinary: Negative.    Musculoskeletal: Negative.    Skin: Negative.    Allergic/Immunologic: Negative.    Neurological: Negative.    Hematological: Negative.    Psychiatric/Behavioral: Negative.        No past medical history on file.    No Known Allergies    No past surgical history on file.    No family history on file.    Social History     Socioeconomic History   • Marital status: Single           Objective   Physical Exam  Vitals and nursing note reviewed.   Constitutional:       Appearance: He is well-developed.   HENT:      Head: Normocephalic.      Mouth/Throat:      Mouth: Mucous membranes are moist.   Eyes:      Extraocular Movements: Extraocular movements intact.   Cardiovascular:      Rate and Rhythm: Normal rate.      Heart sounds: Normal heart sounds.   Abdominal:      General: Bowel sounds are normal. There is distension.      Tenderness: There is generalized abdominal tenderness.   Neurological:      General: No focal deficit present.      Mental Status: He is alert.   Psychiatric:         Mood and Affect: Mood normal.         Procedures           ED Course                                                 MDM    Final diagnoses:   Epigastric pain       ED Disposition  ED Disposition     ED Disposition Condition Comment    Discharge Stable           No follow-up provider specified.       Medication List      New Prescriptions    ketorolac 10 MG tablet  Commonly known as:  TORADOL  Take 1 tablet by mouth Every 6 (Six) Hours As Needed for Moderate Pain .        Stop    diclofenac 75 MG EC tablet  Commonly known as: VOLTAREN           Where to Get Your Medications      These medications were sent to [facility] - [place], [place] - [address] - [phone]  - [phone] FX  [address], [facility] [address]    Phone: [phone]   · ketorolac 10 MG tablet          Nito Arroyo MD  12/07/21 0133       Nito Arroyo MD  12/07/21 0135

## 2022-06-15 ENCOUNTER — HOSPITAL ENCOUNTER (EMERGENCY)
Facility: HOSPITAL | Age: 52
Discharge: HOME | End: 2022-06-15
Attending: EMERGENCY MEDICINE | Admitting: EMERGENCY MEDICINE

## 2022-06-15 ENCOUNTER — APPOINTMENT (OUTPATIENT)
Dept: CT IMAGING | Facility: HOSPITAL | Age: 52
End: 2022-06-15

## 2022-06-15 VITALS
DIASTOLIC BLOOD PRESSURE: 83 MMHG | HEIGHT: 69 IN | SYSTOLIC BLOOD PRESSURE: 110 MMHG | WEIGHT: 310 LBS | BODY MASS INDEX: 45.91 KG/M2 | RESPIRATION RATE: 18 BRPM | OXYGEN SATURATION: 96 % | TEMPERATURE: 98.3 F | HEART RATE: 87 BPM

## 2022-06-15 DIAGNOSIS — L03.114 CELLULITIS OF LEFT UPPER EXTREMITY: Primary | ICD-10-CM

## 2022-06-15 LAB
ALBUMIN SERPL-MCNC: 3.86 G/DL (ref 3.5–5.2)
ALBUMIN/GLOB SERPL: 1.1 G/DL
ALP SERPL-CCNC: 83 U/L (ref 39–117)
ALT SERPL W P-5'-P-CCNC: 15 U/L (ref 1–41)
ANION GAP SERPL CALCULATED.3IONS-SCNC: 9.8 MMOL/L (ref 5–15)
AST SERPL-CCNC: 25 U/L (ref 1–40)
BASOPHILS # BLD AUTO: 0.09 10*3/MM3 (ref 0–0.2)
BASOPHILS NFR BLD AUTO: 0.8 % (ref 0–1.5)
BILIRUB SERPL-MCNC: 0.7 MG/DL (ref 0–1.2)
BILIRUB UR QL STRIP: NEGATIVE
BUN SERPL-MCNC: 9 MG/DL (ref 6–20)
BUN/CREAT SERPL: 9.6 (ref 7–25)
CALCIUM SPEC-SCNC: 8.9 MG/DL (ref 8.6–10.5)
CHLORIDE SERPL-SCNC: 103 MMOL/L (ref 98–107)
CLARITY UR: CLEAR
CO2 SERPL-SCNC: 24.2 MMOL/L (ref 22–29)
COLOR UR: YELLOW
CREAT SERPL-MCNC: 0.94 MG/DL (ref 0.76–1.27)
CRP SERPL-MCNC: 5.19 MG/DL (ref 0–0.5)
D-LACTATE SERPL-SCNC: 0.9 MMOL/L (ref 0.5–2)
DEPRECATED RDW RBC AUTO: 44 FL (ref 37–54)
EGFRCR SERPLBLD CKD-EPI 2021: 98.8 ML/MIN/1.73
EOSINOPHIL # BLD AUTO: 0.42 10*3/MM3 (ref 0–0.4)
EOSINOPHIL NFR BLD AUTO: 3.8 % (ref 0.3–6.2)
ERYTHROCYTE [DISTWIDTH] IN BLOOD BY AUTOMATED COUNT: 13.3 % (ref 12.3–15.4)
ERYTHROCYTE [SEDIMENTATION RATE] IN BLOOD: 55 MM/HR (ref 0–15)
GLOBULIN UR ELPH-MCNC: 3.4 GM/DL
GLUCOSE SERPL-MCNC: 99 MG/DL (ref 65–99)
GLUCOSE UR STRIP-MCNC: NEGATIVE MG/DL
HCT VFR BLD AUTO: 43.8 % (ref 37.5–51)
HGB BLD-MCNC: 13.9 G/DL (ref 13–17.7)
HGB UR QL STRIP.AUTO: NEGATIVE
IMM GRANULOCYTES # BLD AUTO: 0.04 10*3/MM3 (ref 0–0.05)
IMM GRANULOCYTES NFR BLD AUTO: 0.4 % (ref 0–0.5)
KETONES UR QL STRIP: NEGATIVE
LEUKOCYTE ESTERASE UR QL STRIP.AUTO: NEGATIVE
LYMPHOCYTES # BLD AUTO: 1.75 10*3/MM3 (ref 0.7–3.1)
LYMPHOCYTES NFR BLD AUTO: 16 % (ref 19.6–45.3)
MCH RBC QN AUTO: 28.8 PG (ref 26.6–33)
MCHC RBC AUTO-ENTMCNC: 31.7 G/DL (ref 31.5–35.7)
MCV RBC AUTO: 90.7 FL (ref 79–97)
MONOCYTES # BLD AUTO: 1.05 10*3/MM3 (ref 0.1–0.9)
MONOCYTES NFR BLD AUTO: 9.6 % (ref 5–12)
NEUTROPHILS NFR BLD AUTO: 69.4 % (ref 42.7–76)
NEUTROPHILS NFR BLD AUTO: 7.57 10*3/MM3 (ref 1.7–7)
NITRITE UR QL STRIP: NEGATIVE
NRBC BLD AUTO-RTO: 0 /100 WBC (ref 0–0.2)
PH UR STRIP.AUTO: 6 [PH] (ref 5–8)
PLATELET # BLD AUTO: 350 10*3/MM3 (ref 140–450)
PMV BLD AUTO: 9.5 FL (ref 6–12)
POTASSIUM SERPL-SCNC: 4.5 MMOL/L (ref 3.5–5.2)
PROT SERPL-MCNC: 7.3 G/DL (ref 6–8.5)
PROT UR QL STRIP: NEGATIVE
RBC # BLD AUTO: 4.83 10*6/MM3 (ref 4.14–5.8)
SODIUM SERPL-SCNC: 137 MMOL/L (ref 136–145)
SP GR UR STRIP: >1.03 (ref 1–1.03)
UROBILINOGEN UR QL STRIP: ABNORMAL
WBC NRBC COR # BLD: 10.92 10*3/MM3 (ref 3.4–10.8)

## 2022-06-15 PROCEDURE — 96365 THER/PROPH/DIAG IV INF INIT: CPT

## 2022-06-15 PROCEDURE — 36415 COLL VENOUS BLD VENIPUNCTURE: CPT

## 2022-06-15 PROCEDURE — 73201 CT UPPER EXTREMITY W/DYE: CPT

## 2022-06-15 PROCEDURE — 96375 TX/PRO/DX INJ NEW DRUG ADDON: CPT

## 2022-06-15 PROCEDURE — 83605 ASSAY OF LACTIC ACID: CPT | Performed by: PHYSICIAN ASSISTANT

## 2022-06-15 PROCEDURE — 81003 URINALYSIS AUTO W/O SCOPE: CPT | Performed by: PHYSICIAN ASSISTANT

## 2022-06-15 PROCEDURE — 96376 TX/PRO/DX INJ SAME DRUG ADON: CPT

## 2022-06-15 PROCEDURE — 25010000002 DALBAVANCIN 500 MG RECONSTITUTED SOLUTION 1 EACH VIAL: Performed by: PHYSICIAN ASSISTANT

## 2022-06-15 PROCEDURE — 85025 COMPLETE CBC W/AUTO DIFF WBC: CPT | Performed by: PHYSICIAN ASSISTANT

## 2022-06-15 PROCEDURE — 80053 COMPREHEN METABOLIC PANEL: CPT | Performed by: PHYSICIAN ASSISTANT

## 2022-06-15 PROCEDURE — 25010000002 IOPAMIDOL 61 % SOLUTION: Performed by: PHYSICIAN ASSISTANT

## 2022-06-15 PROCEDURE — 86140 C-REACTIVE PROTEIN: CPT | Performed by: PHYSICIAN ASSISTANT

## 2022-06-15 PROCEDURE — 85652 RBC SED RATE AUTOMATED: CPT | Performed by: PHYSICIAN ASSISTANT

## 2022-06-15 PROCEDURE — 99283 EMERGENCY DEPT VISIT LOW MDM: CPT

## 2022-06-15 PROCEDURE — 25010000002 MORPHINE PER 10 MG: Performed by: STUDENT IN AN ORGANIZED HEALTH CARE EDUCATION/TRAINING PROGRAM

## 2022-06-15 PROCEDURE — 87040 BLOOD CULTURE FOR BACTERIA: CPT | Performed by: PHYSICIAN ASSISTANT

## 2022-06-15 PROCEDURE — 25010000002 MORPHINE PER 10 MG: Performed by: EMERGENCY MEDICINE

## 2022-06-15 RX ORDER — LISINOPRIL 5 MG/1
5 TABLET ORAL DAILY
COMMUNITY

## 2022-06-15 RX ORDER — LEVOTHYROXINE SODIUM 0.03 MG/1
25 TABLET ORAL DAILY
COMMUNITY

## 2022-06-15 RX ORDER — SODIUM CHLORIDE 0.9 % (FLUSH) 0.9 %
10 SYRINGE (ML) INJECTION AS NEEDED
Status: DISCONTINUED | OUTPATIENT
Start: 2022-06-15 | End: 2022-06-15 | Stop reason: HOSPADM

## 2022-06-15 RX ORDER — ALBUTEROL SULFATE 90 UG/1
2 AEROSOL, METERED RESPIRATORY (INHALATION) EVERY 4 HOURS PRN
COMMUNITY

## 2022-06-15 RX ORDER — MONTELUKAST SODIUM 10 MG/1
10 TABLET ORAL NIGHTLY
COMMUNITY

## 2022-06-15 RX ORDER — PAROXETINE HYDROCHLORIDE 20 MG/1
20 TABLET, FILM COATED ORAL EVERY MORNING
COMMUNITY

## 2022-06-15 RX ORDER — HYDROCHLOROTHIAZIDE 12.5 MG/1
12.5 TABLET ORAL DAILY
COMMUNITY

## 2022-06-15 RX ORDER — DEXTROSE MONOHYDRATE 50 MG/ML
10 INJECTION, SOLUTION INTRAVENOUS CONTINUOUS
Status: DISCONTINUED | OUTPATIENT
Start: 2022-06-15 | End: 2022-06-15 | Stop reason: HOSPADM

## 2022-06-15 RX ADMIN — MORPHINE SULFATE 4 MG: 4 INJECTION, SOLUTION INTRAMUSCULAR; INTRAVENOUS at 20:13

## 2022-06-15 RX ADMIN — DALBAVANCIN 1500 MG: 500 INJECTION, POWDER, FOR SOLUTION INTRAVENOUS at 20:13

## 2022-06-15 RX ADMIN — DEXTROSE MONOHYDRATE 10 ML/HR: 5 INJECTION, SOLUTION INTRAVENOUS at 20:13

## 2022-06-15 RX ADMIN — MORPHINE SULFATE 4 MG: 4 INJECTION, SOLUTION INTRAMUSCULAR; INTRAVENOUS at 16:10

## 2022-06-15 RX ADMIN — IOPAMIDOL 84 ML: 612 INJECTION, SOLUTION INTRAVENOUS at 17:56

## 2022-06-15 NOTE — ED PROVIDER NOTES
Subjective   Patient is a 51-year-old male that presents to the ED with complaints of left upper extremity redness and pain.  He states that it initially began several days ago in the elbow.  He states in the last 2 days it is gets getting progressively worse.  He states the redness and swelling is just extending down the forearm and into the hand.  He denies any recent trauma or injury to the elbow.  He states that it started at the elbow and is very tender in this area.  He states his family doctor started him on clindamycin yesterday.  He is only taken 2 doses but not noticed any significant improvement.  He states there is not been any drainage or any open wound.  He denies chest pain, shortness of breath, fever, chills, nausea, vomiting, headache, dizziness, abdominal pain, dysuria, diarrhea, or constipation.      History provided by:  Patient   used: No        Review of Systems   Constitutional: Negative.  Negative for chills, diaphoresis, fatigue and fever.   HENT: Negative.  Negative for congestion, dental problem, drooling, ear discharge, ear pain, facial swelling, postnasal drip, rhinorrhea, sinus pressure, sinus pain, sneezing, sore throat, tinnitus and trouble swallowing.    Eyes: Negative.  Negative for photophobia, pain, discharge, redness and itching.   Respiratory: Negative.  Negative for cough, shortness of breath and wheezing.    Cardiovascular: Negative.  Negative for chest pain, palpitations and leg swelling.   Gastrointestinal: Negative.  Negative for abdominal distention, abdominal pain, constipation, diarrhea, nausea and vomiting.   Endocrine: Negative.    Genitourinary: Negative.  Negative for difficulty urinating, dysuria, flank pain and frequency.   Musculoskeletal: Positive for arthralgias and joint swelling. Negative for back pain, gait problem, myalgias, neck pain and neck stiffness.   Skin: Negative.    Neurological: Negative.  Negative for dizziness, tremors,  seizures, syncope, facial asymmetry, speech difficulty, weakness, light-headedness, numbness and headaches.   Psychiatric/Behavioral: Negative.  Negative for confusion.   All other systems reviewed and are negative.      No past medical history on file.    No Known Allergies    No past surgical history on file.    No family history on file.    Social History     Socioeconomic History   • Marital status: Single           Objective   Physical Exam  Vitals and nursing note reviewed.   Constitutional:       General: He is not in acute distress.     Appearance: He is well-developed. He is not diaphoretic.   HENT:      Head: Normocephalic and atraumatic.      Right Ear: External ear normal.      Left Ear: External ear normal.      Nose: Nose normal.      Mouth/Throat:      Mouth: Mucous membranes are moist.      Pharynx: Oropharynx is clear.   Eyes:      Extraocular Movements: Extraocular movements intact.      Conjunctiva/sclera: Conjunctivae normal.      Pupils: Pupils are equal, round, and reactive to light.   Neck:      Vascular: No JVD.      Trachea: No tracheal deviation.   Cardiovascular:      Rate and Rhythm: Normal rate and regular rhythm.      Pulses: Normal pulses.      Heart sounds: Normal heart sounds. No murmur heard.  Pulmonary:      Effort: Pulmonary effort is normal. No respiratory distress.      Breath sounds: Normal breath sounds. No wheezing.   Abdominal:      General: Bowel sounds are normal. There is no distension.      Palpations: Abdomen is soft.      Tenderness: There is no abdominal tenderness. There is no guarding or rebound.   Musculoskeletal:         General: No deformity. Normal range of motion.      Right forearm: Normal.      Left forearm: Swelling, edema and tenderness present. No deformity, lacerations or bony tenderness.      Cervical back: Normal range of motion and neck supple.   Skin:     General: Skin is warm and dry.      Coloration: Skin is not pale.      Findings: No erythema or  rash.   Neurological:      General: No focal deficit present.      Mental Status: He is alert and oriented to person, place, and time.      Cranial Nerves: No cranial nerve deficit.   Psychiatric:         Mood and Affect: Mood normal.         Behavior: Behavior normal.         Thought Content: Thought content normal.         Procedures           ED Course  ED Course as of 06/15/22 2135   Wed Seven 15, 2022   1926 CT Upper Extremity Left With Contrast  IMPRESSION:  Impression:      CT of the left upper extremity demonstrating prominent soft tissue swelling and skin thickening at the left elbow medially. There is no convincing evidence of abscess formation at this time. Skin thickening is noted. No periosteal reaction. No lytic or  blastic bone lesions. No air is identified within the soft tissues.        Signer Name: Darrell Almonte MD   Signed: 6/15/2022 7:04 PM []   2133 Discussed plan of care with patient.  Advised to continue with the remainder of his clindamycin.  Advised if symptoms worsen return to the ED.  Advised close follow-up with PCP. []      ED Course User Index  [] Heaven Metzger PA-C                                                 [facility]    Final diagnoses:   Cellulitis of left upper extremity       ED Disposition  ED Disposition     ED Disposition   Discharge    Condition   Stable    Comment   --             Ember Chavarria, APRN  [address] [address]  [phone]    Schedule an appointment as soon as possible for a visit in 1 day           Medication List      No changes were made to your prescriptions during this visit.          Heaven Metzger PA-C  06/15/22 2135

## 2022-06-20 LAB
BACTERIA SPEC AEROBE CULT: NORMAL
BACTERIA SPEC AEROBE CULT: NORMAL

## 2022-07-11 ENCOUNTER — HOSPITAL ENCOUNTER (EMERGENCY)
Facility: HOSPITAL | Age: 52
Discharge: HOME | End: 2022-07-11
Attending: EMERGENCY MEDICINE | Admitting: EMERGENCY MEDICINE

## 2022-07-11 ENCOUNTER — APPOINTMENT (OUTPATIENT)
Dept: GENERAL RADIOLOGY | Facility: HOSPITAL | Age: 52
End: 2022-07-11

## 2022-07-11 ENCOUNTER — APPOINTMENT (OUTPATIENT)
Dept: CT IMAGING | Facility: HOSPITAL | Age: 52
End: 2022-07-11

## 2022-07-11 VITALS
WEIGHT: 310 LBS | HEART RATE: 72 BPM | BODY MASS INDEX: 45.91 KG/M2 | DIASTOLIC BLOOD PRESSURE: 80 MMHG | OXYGEN SATURATION: 97 % | TEMPERATURE: 97.3 F | HEIGHT: 69 IN | RESPIRATION RATE: 16 BRPM | SYSTOLIC BLOOD PRESSURE: 122 MMHG

## 2022-07-11 DIAGNOSIS — R07.9 CHEST PAIN, UNSPECIFIED TYPE: Primary | ICD-10-CM

## 2022-07-11 DIAGNOSIS — J44.9 CHRONIC OBSTRUCTIVE PULMONARY DISEASE, UNSPECIFIED COPD TYPE: ICD-10-CM

## 2022-07-11 DIAGNOSIS — R93.89 ABNORMAL CT OF THE CHEST: ICD-10-CM

## 2022-07-11 LAB
ALBUMIN SERPL-MCNC: 4.2 G/DL (ref 3.5–5.2)
ALBUMIN/GLOB SERPL: 1.4 G/DL
ALP SERPL-CCNC: 72 U/L (ref 39–117)
ALT SERPL W P-5'-P-CCNC: 16 U/L (ref 1–41)
ANION GAP SERPL CALCULATED.3IONS-SCNC: 9.8 MMOL/L (ref 5–15)
AST SERPL-CCNC: 19 U/L (ref 1–40)
BASOPHILS # BLD AUTO: 0.06 10*3/MM3 (ref 0–0.2)
BASOPHILS NFR BLD AUTO: 0.6 % (ref 0–1.5)
BILIRUB SERPL-MCNC: 0.5 MG/DL (ref 0–1.2)
BUN SERPL-MCNC: 18 MG/DL (ref 6–20)
BUN/CREAT SERPL: 18.4 (ref 7–25)
CALCIUM SPEC-SCNC: 9.1 MG/DL (ref 8.6–10.5)
CHLORIDE SERPL-SCNC: 99 MMOL/L (ref 98–107)
CO2 SERPL-SCNC: 25.2 MMOL/L (ref 22–29)
CREAT SERPL-MCNC: 0.98 MG/DL (ref 0.76–1.27)
DEPRECATED RDW RBC AUTO: 44.3 FL (ref 37–54)
EGFRCR SERPLBLD CKD-EPI 2021: 93.4 ML/MIN/1.73
EOSINOPHIL # BLD AUTO: 0.38 10*3/MM3 (ref 0–0.4)
EOSINOPHIL NFR BLD AUTO: 4.1 % (ref 0.3–6.2)
ERYTHROCYTE [DISTWIDTH] IN BLOOD BY AUTOMATED COUNT: 13.3 % (ref 12.3–15.4)
GLOBULIN UR ELPH-MCNC: 3.1 GM/DL
GLUCOSE SERPL-MCNC: 99 MG/DL (ref 65–99)
HCT VFR BLD AUTO: 47.5 % (ref 37.5–51)
HGB BLD-MCNC: 15.1 G/DL (ref 13–17.7)
HOLD SPECIMEN: NORMAL
HOLD SPECIMEN: NORMAL
IMM GRANULOCYTES # BLD AUTO: 0.03 10*3/MM3 (ref 0–0.05)
IMM GRANULOCYTES NFR BLD AUTO: 0.3 % (ref 0–0.5)
LYMPHOCYTES # BLD AUTO: 1.87 10*3/MM3 (ref 0.7–3.1)
LYMPHOCYTES NFR BLD AUTO: 20 % (ref 19.6–45.3)
MAGNESIUM SERPL-MCNC: 2.3 MG/DL (ref 1.6–2.6)
MCH RBC QN AUTO: 28.8 PG (ref 26.6–33)
MCHC RBC AUTO-ENTMCNC: 31.8 G/DL (ref 31.5–35.7)
MCV RBC AUTO: 90.6 FL (ref 79–97)
MONOCYTES # BLD AUTO: 0.77 10*3/MM3 (ref 0.1–0.9)
MONOCYTES NFR BLD AUTO: 8.2 % (ref 5–12)
NEUTROPHILS NFR BLD AUTO: 6.26 10*3/MM3 (ref 1.7–7)
NEUTROPHILS NFR BLD AUTO: 66.8 % (ref 42.7–76)
NRBC BLD AUTO-RTO: 0 /100 WBC (ref 0–0.2)
NT-PROBNP SERPL-MCNC: 51.3 PG/ML (ref 0–900)
PLATELET # BLD AUTO: 326 10*3/MM3 (ref 140–450)
PMV BLD AUTO: 9.2 FL (ref 6–12)
POTASSIUM SERPL-SCNC: 4.5 MMOL/L (ref 3.5–5.2)
PROT SERPL-MCNC: 7.3 G/DL (ref 6–8.5)
QT INTERVAL: 350 MS
QTC INTERVAL: 408 MS
RBC # BLD AUTO: 5.24 10*6/MM3 (ref 4.14–5.8)
SODIUM SERPL-SCNC: 134 MMOL/L (ref 136–145)
TROPONIN T SERPL-MCNC: <0.01 NG/ML (ref 0–0.03)
TROPONIN T SERPL-MCNC: <0.01 NG/ML (ref 0–0.03)
WBC NRBC COR # BLD: 9.37 10*3/MM3 (ref 3.4–10.8)
WHOLE BLOOD HOLD COAG: NORMAL
WHOLE BLOOD HOLD SPECIMEN: NORMAL

## 2022-07-11 PROCEDURE — 84484 ASSAY OF TROPONIN QUANT: CPT | Performed by: STUDENT IN AN ORGANIZED HEALTH CARE EDUCATION/TRAINING PROGRAM

## 2022-07-11 PROCEDURE — 71045 X-RAY EXAM CHEST 1 VIEW: CPT

## 2022-07-11 PROCEDURE — 25010000002 KETOROLAC TROMETHAMINE PER 15 MG: Performed by: EMERGENCY MEDICINE

## 2022-07-11 PROCEDURE — 71045 X-RAY EXAM CHEST 1 VIEW: CPT | Performed by: RADIOLOGY

## 2022-07-11 PROCEDURE — 80053 COMPREHEN METABOLIC PANEL: CPT | Performed by: STUDENT IN AN ORGANIZED HEALTH CARE EDUCATION/TRAINING PROGRAM

## 2022-07-11 PROCEDURE — 99284 EMERGENCY DEPT VISIT MOD MDM: CPT

## 2022-07-11 PROCEDURE — 25010000002 CEFTRIAXONE PER 250 MG: Performed by: EMERGENCY MEDICINE

## 2022-07-11 PROCEDURE — 85025 COMPLETE CBC W/AUTO DIFF WBC: CPT | Performed by: STUDENT IN AN ORGANIZED HEALTH CARE EDUCATION/TRAINING PROGRAM

## 2022-07-11 PROCEDURE — 96365 THER/PROPH/DIAG IV INF INIT: CPT

## 2022-07-11 PROCEDURE — 83735 ASSAY OF MAGNESIUM: CPT | Performed by: EMERGENCY MEDICINE

## 2022-07-11 PROCEDURE — 83880 ASSAY OF NATRIURETIC PEPTIDE: CPT | Performed by: EMERGENCY MEDICINE

## 2022-07-11 PROCEDURE — 96375 TX/PRO/DX INJ NEW DRUG ADDON: CPT

## 2022-07-11 PROCEDURE — 36415 COLL VENOUS BLD VENIPUNCTURE: CPT

## 2022-07-11 PROCEDURE — 25010000002 IOPAMIDOL 61 % SOLUTION: Performed by: EMERGENCY MEDICINE

## 2022-07-11 PROCEDURE — 93010 ELECTROCARDIOGRAM REPORT: CPT | Performed by: INTERNAL MEDICINE

## 2022-07-11 PROCEDURE — 71275 CT ANGIOGRAPHY CHEST: CPT

## 2022-07-11 PROCEDURE — 63710000001 PREDNISONE PER 1 MG: Performed by: EMERGENCY MEDICINE

## 2022-07-11 PROCEDURE — 93005 ELECTROCARDIOGRAM TRACING: CPT | Performed by: EMERGENCY MEDICINE

## 2022-07-11 PROCEDURE — 71275 CT ANGIOGRAPHY CHEST: CPT | Performed by: RADIOLOGY

## 2022-07-11 RX ORDER — HYDROCODONE BITARTRATE AND ACETAMINOPHEN 5; 325 MG/1; MG/1
1 TABLET ORAL ONCE
Status: COMPLETED | OUTPATIENT
Start: 2022-07-11 | End: 2022-07-11

## 2022-07-11 RX ORDER — FAMOTIDINE 20 MG/1
20 TABLET, FILM COATED ORAL 2 TIMES DAILY
Qty: 30 TABLET | Refills: 0 | Status: SHIPPED | OUTPATIENT
Start: 2022-07-11

## 2022-07-11 RX ORDER — PREDNISONE 20 MG/1
60 TABLET ORAL ONCE
Status: COMPLETED | OUTPATIENT
Start: 2022-07-11 | End: 2022-07-11

## 2022-07-11 RX ORDER — NITROGLYCERIN 0.4 MG/1
0.4 TABLET SUBLINGUAL
Status: DISCONTINUED | OUTPATIENT
Start: 2022-07-11 | End: 2022-07-11 | Stop reason: HOSPADM

## 2022-07-11 RX ORDER — KETOROLAC TROMETHAMINE 30 MG/ML
15 INJECTION, SOLUTION INTRAMUSCULAR; INTRAVENOUS ONCE
Status: COMPLETED | OUTPATIENT
Start: 2022-07-11 | End: 2022-07-11

## 2022-07-11 RX ORDER — SODIUM CHLORIDE 0.9 % (FLUSH) 0.9 %
10 SYRINGE (ML) INJECTION AS NEEDED
Status: DISCONTINUED | OUTPATIENT
Start: 2022-07-11 | End: 2022-07-11 | Stop reason: HOSPADM

## 2022-07-11 RX ORDER — DOXYCYCLINE 100 MG/1
100 CAPSULE ORAL 2 TIMES DAILY
Qty: 14 CAPSULE | Refills: 0 | Status: SHIPPED | OUTPATIENT
Start: 2022-07-11

## 2022-07-11 RX ORDER — ASPIRIN 81 MG/1
324 TABLET, CHEWABLE ORAL ONCE
Status: DISCONTINUED | OUTPATIENT
Start: 2022-07-11 | End: 2022-07-11 | Stop reason: HOSPADM

## 2022-07-11 RX ORDER — CEFDINIR 300 MG/1
300 CAPSULE ORAL 2 TIMES DAILY
Qty: 14 CAPSULE | Refills: 0 | Status: SHIPPED | OUTPATIENT
Start: 2022-07-11

## 2022-07-11 RX ORDER — FAMOTIDINE 10 MG/ML
20 INJECTION, SOLUTION INTRAVENOUS ONCE
Status: COMPLETED | OUTPATIENT
Start: 2022-07-11 | End: 2022-07-11

## 2022-07-11 RX ORDER — PREDNISONE 50 MG/1
50 TABLET ORAL DAILY
Qty: 5 TABLET | Refills: 0 | Status: SHIPPED | OUTPATIENT
Start: 2022-07-11

## 2022-07-11 RX ORDER — DOXYCYCLINE 100 MG/1
100 CAPSULE ORAL ONCE
Status: COMPLETED | OUTPATIENT
Start: 2022-07-11 | End: 2022-07-11

## 2022-07-11 RX ADMIN — IOPAMIDOL 87 ML: 612 INJECTION, SOLUTION INTRAVENOUS at 15:59

## 2022-07-11 RX ADMIN — CEFTRIAXONE 1 G: 1 INJECTION, POWDER, FOR SOLUTION INTRAMUSCULAR; INTRAVENOUS at 17:05

## 2022-07-11 RX ADMIN — FAMOTIDINE 20 MG: 10 INJECTION, SOLUTION INTRAVENOUS at 15:16

## 2022-07-11 RX ADMIN — NITROGLYCERIN 0.4 MG: 0.4 TABLET, ORALLY DISINTEGRATING SUBLINGUAL at 15:13

## 2022-07-11 RX ADMIN — KETOROLAC TROMETHAMINE 15 MG: 30 INJECTION, SOLUTION INTRAMUSCULAR; INTRAVENOUS at 17:07

## 2022-07-11 RX ADMIN — HYDROCODONE BITARTRATE AND ACETAMINOPHEN 1 TABLET: 5; 325 TABLET ORAL at 17:06

## 2022-07-11 RX ADMIN — DOXYCYCLINE 100 MG: 100 CAPSULE ORAL at 17:06

## 2022-07-11 RX ADMIN — PREDNISONE 60 MG: 20 TABLET ORAL at 17:06

## 2022-07-11 NOTE — ED PROVIDER NOTES
Subjective   52-year-old male presents complaining of chest pain.  Symptoms have been constant for the last 3 to 4 days.  It is described as a heaviness in the middle of his chest.  He has associated shortness of breath.  Symptoms are nonexertional.  He denies any exacerbating alleviating factors.  He denies fever but does report a chronic cough.  He denies prior episodes similar to this.  He has never been diagnosed with any cardiac disease.  He does smoke and has a family history.      History provided by:  Patient   used: No        Review of Systems   Constitutional: Negative.  Negative for fever.   HENT: Negative.    Eyes: Negative.    Respiratory: Positive for cough (chronic) and shortness of breath.    Cardiovascular: Positive for chest pain.   Gastrointestinal: Negative.  Negative for abdominal pain.   Genitourinary: Negative.  Negative for dysuria.   Musculoskeletal: Negative.    Skin: Negative.    Neurological: Negative.    Psychiatric/Behavioral: Negative.    All other systems reviewed and are negative.      No past medical history on file.    No Known Allergies    No past surgical history on file.    No family history on file.    Social History     Socioeconomic History   • Marital status: Single           Objective   Physical Exam  Vitals and nursing note reviewed.   Constitutional:       General: He is not in acute distress.     Appearance: He is well-developed. He is obese. He is not diaphoretic.   HENT:      Head: Normocephalic and atraumatic.      Right Ear: External ear normal.      Left Ear: External ear normal.      Nose: Nose normal.   Eyes:      Conjunctiva/sclera: Conjunctivae normal.      Pupils: Pupils are equal, round, and reactive to light.   Neck:      Vascular: No JVD.      Trachea: No tracheal deviation.   Cardiovascular:      Rate and Rhythm: Normal rate and regular rhythm.      Heart sounds: Normal heart sounds. No murmur heard.  Pulmonary:      Effort: Pulmonary  effort is normal. No respiratory distress.      Breath sounds: Normal breath sounds. No wheezing.   Abdominal:      General: Bowel sounds are normal.      Palpations: Abdomen is soft.      Tenderness: There is no abdominal tenderness.   Musculoskeletal:         General: No deformity. Normal range of motion.      Cervical back: Normal range of motion and neck supple.   Skin:     General: Skin is warm and dry.      Coloration: Skin is not pale.      Findings: No erythema or rash.   Neurological:      General: No focal deficit present.      Mental Status: He is alert and oriented to person, place, and time.      Cranial Nerves: No cranial nerve deficit.   Psychiatric:         Behavior: Behavior normal.         Thought Content: Thought content normal.         Procedures       CT Angiogram Chest Pulmonary Embolism   Final Result   1.  No pulmonary embolism.   2.  Some nonspecific scarring and atelectasis of the right upper lobe.   Right superior hilar region soft tissue density measuring 2.8 cm versus   lymphadenopathy in this region. Follow-up upon resolution of symptoms is   recommended.       This report was finalized on 7/11/2022 3:55 PM by Dr. Héctor Samano MD.          XR Chest 1 View   Final Result     Increase opacity that is nodular in the right upper lobe that may   represent evolving scarring but further evaluation with CT is   recommended.       This report was finalized on 7/11/2022 2:00 PM by Dr. Héctor Samano MD.                ED Course  ED Course as of 07/11/22 1856   Mon Jul 11, 2022   1316 EKG: Normal sinus rhythm, rate 82, , QRS 92, QTc 408, no STEMI. [DH]      ED Course User Index  [DH] Nilesh De La Cruz MD                      HEART Score: 3                      MDM  Number of Diagnoses or Management Options  Abnormal CT of the chest  Chest pain, unspecified type  Chronic obstructive pulmonary disease, unspecified COPD type (HCC)  Diagnosis management comments: 52-year-old male  presenting complaining of nonspecific chest pain.  No emergent conditions identified.  No evidence for ACS or PE.  I discussed the abnormal CT findings and he will follow-up for repeat imaging.  We will trial a course of steroids and antibiotics.  Patient has also been referred to cardiology and scheduled for a stress test.  Strict return precautions discussed.    HEART = 3       Amount and/or Complexity of Data Reviewed  Clinical lab tests: ordered and reviewed  Tests in the radiology section of CPT®: ordered and reviewed  Independent visualization of images, tracings, or specimens: yes        Final diagnoses:   Chest pain, unspecified type   Abnormal CT of the chest   Chronic obstructive pulmonary disease, unspecified COPD type (HCC)       ED Disposition  ED Disposition     ED Disposition   Discharge    Condition   Stable    Comment   --             Ember Chavarria, APRN  [address] [facility] [address]  [place] [id number]  [phone]    Schedule an appointment as soon as possible for a visit       James Smith MD  [address] [facility] [phone]    Schedule an appointment as soon as possible for a visit            Medication List      New Prescriptions    cefdinir 300 MG capsule  Commonly known as: OMNICEF  Take 1 capsule by mouth 2 (Two) Times a Day.     doxycycline 100 MG capsule  Commonly known as: MONODOX  Take 1 capsule by mouth 2 (Two) Times a Day.     famotidine 20 MG tablet  Commonly known as: PEPCID  Take 1 tablet by mouth 2 (Two) Times a Day.     predniSONE 50 MG tablet  Commonly known as: DELTASONE  Take 1 tablet by mouth Daily.           Where to Get Your Medications      These medications were sent to [place], [place] - [address] [facility] [address]  - [phone]  - [phone]   [address] [facility] [address] [place] [id number]    Phone: [phone]   · cefdinir 300 MG capsule  · doxycycline 100 MG capsule  · famotidine 20 MG tablet  · predniSONE 50 MG tablet          Nilesh De La Cruz  MD Ilya  07/11/22 0217

## 2022-07-11 NOTE — DISCHARGE INSTRUCTIONS
Follow-up with your regular doctor and with cardiology as discussed.  A referral has been sent for a stress test.    You will also need a repeat CT scan in 3-6 months to see if the abnormality on today's CT has improved and to rule out lung cancer.

## 2022-07-13 ENCOUNTER — TRANSCRIBE ORDERS (OUTPATIENT)
Dept: ADMINISTRATIVE | Facility: HOSPITAL | Age: 52
End: 2022-07-13

## 2022-07-13 DIAGNOSIS — R07.9 CHEST PAIN, UNSPECIFIED TYPE: Primary | ICD-10-CM

## 2022-09-22 ENCOUNTER — TRANSCRIBE ORDERS (OUTPATIENT)
Dept: ADMINISTRATIVE | Facility: HOSPITAL | Age: 52
End: 2022-09-22

## 2022-09-22 ENCOUNTER — HOSPITAL ENCOUNTER (OUTPATIENT)
Dept: GENERAL RADIOLOGY | Facility: HOSPITAL | Age: 52
Discharge: HOME | End: 2022-09-22
Admitting: ANESTHESIOLOGY

## 2022-09-22 DIAGNOSIS — M54.00 PANNICULITIS AFFECTING BACK: ICD-10-CM

## 2022-09-22 DIAGNOSIS — M54.00 PANNICULITIS AFFECTING BACK: Primary | ICD-10-CM

## 2022-09-22 PROCEDURE — 72100 X-RAY EXAM L-S SPINE 2/3 VWS: CPT

## 2022-09-22 PROCEDURE — 72100 X-RAY EXAM L-S SPINE 2/3 VWS: CPT | Performed by: RADIOLOGY

## 2023-01-17 ENCOUNTER — HOSPITAL ENCOUNTER (OUTPATIENT)
Dept: GENERAL RADIOLOGY | Facility: HOSPITAL | Age: 53
Discharge: HOME | End: 2023-01-17
Payer: COMMERCIAL

## 2023-01-17 ENCOUNTER — TRANSCRIBE ORDERS (OUTPATIENT)
Dept: ADMINISTRATIVE | Facility: HOSPITAL | Age: 53
End: 2023-01-17
Payer: COMMERCIAL

## 2023-01-17 DIAGNOSIS — M54.50 LOW BACK PAIN, UNSPECIFIED BACK PAIN LATERALITY, UNSPECIFIED CHRONICITY, UNSPECIFIED WHETHER SCIATICA PRESENT: ICD-10-CM

## 2023-01-17 DIAGNOSIS — M54.2 CERVICALGIA: Primary | ICD-10-CM

## 2023-01-17 DIAGNOSIS — M54.2 CERVICALGIA: ICD-10-CM

## 2023-01-17 PROCEDURE — 72050 X-RAY EXAM NECK SPINE 4/5VWS: CPT

## 2023-01-17 PROCEDURE — 72050 X-RAY EXAM NECK SPINE 4/5VWS: CPT | Performed by: RADIOLOGY

## 2023-01-17 PROCEDURE — 72100 X-RAY EXAM L-S SPINE 2/3 VWS: CPT | Performed by: RADIOLOGY

## 2023-01-17 PROCEDURE — 72100 X-RAY EXAM L-S SPINE 2/3 VWS: CPT

## 2023-02-20 ENCOUNTER — HOSPITAL ENCOUNTER (EMERGENCY)
Facility: HOSPITAL | Age: 53
Discharge: HOME | End: 2023-02-20
Attending: STUDENT IN AN ORGANIZED HEALTH CARE EDUCATION/TRAINING PROGRAM | Admitting: STUDENT IN AN ORGANIZED HEALTH CARE EDUCATION/TRAINING PROGRAM
Payer: COMMERCIAL

## 2023-02-20 ENCOUNTER — TRANSCRIBE ORDERS (OUTPATIENT)
Dept: ADMINISTRATIVE | Facility: HOSPITAL | Age: 53
End: 2023-02-20
Payer: COMMERCIAL

## 2023-02-20 ENCOUNTER — APPOINTMENT (OUTPATIENT)
Dept: CT IMAGING | Facility: HOSPITAL | Age: 53
End: 2023-02-20
Payer: COMMERCIAL

## 2023-02-20 ENCOUNTER — APPOINTMENT (OUTPATIENT)
Dept: MRI IMAGING | Facility: HOSPITAL | Age: 53
End: 2023-02-20
Payer: COMMERCIAL

## 2023-02-20 VITALS
HEART RATE: 86 BPM | WEIGHT: 300 LBS | OXYGEN SATURATION: 99 % | HEIGHT: 69 IN | SYSTOLIC BLOOD PRESSURE: 176 MMHG | DIASTOLIC BLOOD PRESSURE: 68 MMHG | BODY MASS INDEX: 44.43 KG/M2 | TEMPERATURE: 97.8 F | RESPIRATION RATE: 18 BRPM

## 2023-02-20 DIAGNOSIS — R59.0 LOCALIZED ENLARGED LYMPH NODES: Primary | ICD-10-CM

## 2023-02-20 DIAGNOSIS — M50.20 CERVICAL DISC HERNIATION: Primary | ICD-10-CM

## 2023-02-20 DIAGNOSIS — M47.816 LUMBAR SPONDYLOSIS: ICD-10-CM

## 2023-02-20 LAB
ALBUMIN SERPL-MCNC: 3.7 G/DL (ref 3.5–5.2)
ALBUMIN/GLOB SERPL: 1.1 G/DL
ALP SERPL-CCNC: 71 U/L (ref 39–117)
ALT SERPL W P-5'-P-CCNC: 15 U/L (ref 1–41)
ANION GAP SERPL CALCULATED.3IONS-SCNC: 7.8 MMOL/L (ref 5–15)
AST SERPL-CCNC: 21 U/L (ref 1–40)
BASOPHILS # BLD AUTO: 0.08 10*3/MM3 (ref 0–0.2)
BASOPHILS NFR BLD AUTO: 0.7 % (ref 0–1.5)
BILIRUB SERPL-MCNC: 0.4 MG/DL (ref 0–1.2)
BUN SERPL-MCNC: 9 MG/DL (ref 6–20)
BUN/CREAT SERPL: 8.8 (ref 7–25)
CALCIUM SPEC-SCNC: 9.1 MG/DL (ref 8.6–10.5)
CHLORIDE SERPL-SCNC: 102 MMOL/L (ref 98–107)
CO2 SERPL-SCNC: 27.2 MMOL/L (ref 22–29)
CREAT SERPL-MCNC: 1.02 MG/DL (ref 0.76–1.27)
CRP SERPL-MCNC: 1.13 MG/DL (ref 0–0.5)
DEPRECATED RDW RBC AUTO: 44.1 FL (ref 37–54)
EGFRCR SERPLBLD CKD-EPI 2021: 89 ML/MIN/1.73
EOSINOPHIL # BLD AUTO: 0.37 10*3/MM3 (ref 0–0.4)
EOSINOPHIL NFR BLD AUTO: 3.2 % (ref 0.3–6.2)
ERYTHROCYTE [DISTWIDTH] IN BLOOD BY AUTOMATED COUNT: 13.1 % (ref 12.3–15.4)
GLOBULIN UR ELPH-MCNC: 3.4 GM/DL
GLUCOSE SERPL-MCNC: 91 MG/DL (ref 65–99)
HCT VFR BLD AUTO: 47.4 % (ref 37.5–51)
HGB BLD-MCNC: 15.3 G/DL (ref 13–17.7)
HOLD SPECIMEN: NORMAL
HOLD SPECIMEN: NORMAL
IMM GRANULOCYTES # BLD AUTO: 0.03 10*3/MM3 (ref 0–0.05)
IMM GRANULOCYTES NFR BLD AUTO: 0.3 % (ref 0–0.5)
LYMPHOCYTES # BLD AUTO: 2.76 10*3/MM3 (ref 0.7–3.1)
LYMPHOCYTES NFR BLD AUTO: 23.8 % (ref 19.6–45.3)
MCH RBC QN AUTO: 29.7 PG (ref 26.6–33)
MCHC RBC AUTO-ENTMCNC: 32.3 G/DL (ref 31.5–35.7)
MCV RBC AUTO: 91.9 FL (ref 79–97)
MONOCYTES # BLD AUTO: 0.84 10*3/MM3 (ref 0.1–0.9)
MONOCYTES NFR BLD AUTO: 7.2 % (ref 5–12)
NEUTROPHILS NFR BLD AUTO: 64.8 % (ref 42.7–76)
NEUTROPHILS NFR BLD AUTO: 7.54 10*3/MM3 (ref 1.7–7)
NRBC BLD AUTO-RTO: 0 /100 WBC (ref 0–0.2)
PLATELET # BLD AUTO: 354 10*3/MM3 (ref 140–450)
PMV BLD AUTO: 9.4 FL (ref 6–12)
POTASSIUM SERPL-SCNC: 4.4 MMOL/L (ref 3.5–5.2)
PROT SERPL-MCNC: 7.1 G/DL (ref 6–8.5)
RBC # BLD AUTO: 5.16 10*6/MM3 (ref 4.14–5.8)
SODIUM SERPL-SCNC: 137 MMOL/L (ref 136–145)
WBC NRBC COR # BLD: 11.62 10*3/MM3 (ref 3.4–10.8)
WHOLE BLOOD HOLD COAG: NORMAL
WHOLE BLOOD HOLD SPECIMEN: NORMAL

## 2023-02-20 PROCEDURE — 85025 COMPLETE CBC W/AUTO DIFF WBC: CPT | Performed by: NURSE PRACTITIONER

## 2023-02-20 PROCEDURE — 72125 CT NECK SPINE W/O DYE: CPT

## 2023-02-20 PROCEDURE — 72141 MRI NECK SPINE W/O DYE: CPT

## 2023-02-20 PROCEDURE — 25010000002 HYDROMORPHONE 1 MG/ML SOLUTION: Performed by: NURSE PRACTITIONER

## 2023-02-20 PROCEDURE — 25010000002 ORPHENADRINE CITRATE PER 60 MG: Performed by: NURSE PRACTITIONER

## 2023-02-20 PROCEDURE — 99284 EMERGENCY DEPT VISIT MOD MDM: CPT

## 2023-02-20 PROCEDURE — 96375 TX/PRO/DX INJ NEW DRUG ADDON: CPT

## 2023-02-20 PROCEDURE — 25010000002 ONDANSETRON PER 1 MG: Performed by: NURSE PRACTITIONER

## 2023-02-20 PROCEDURE — 70547 MR ANGIOGRAPHY NECK W/O DYE: CPT

## 2023-02-20 PROCEDURE — 80053 COMPREHEN METABOLIC PANEL: CPT | Performed by: NURSE PRACTITIONER

## 2023-02-20 PROCEDURE — 86140 C-REACTIVE PROTEIN: CPT | Performed by: NURSE PRACTITIONER

## 2023-02-20 PROCEDURE — 96374 THER/PROPH/DIAG INJ IV PUSH: CPT

## 2023-02-20 PROCEDURE — 36415 COLL VENOUS BLD VENIPUNCTURE: CPT

## 2023-02-20 PROCEDURE — 72131 CT LUMBAR SPINE W/O DYE: CPT

## 2023-02-20 PROCEDURE — 0 MEPERIDINE PER 100 MG: Performed by: NURSE PRACTITIONER

## 2023-02-20 PROCEDURE — 72148 MRI LUMBAR SPINE W/O DYE: CPT

## 2023-02-20 RX ORDER — TIZANIDINE HYDROCHLORIDE 4 MG/1
4 CAPSULE, GELATIN COATED ORAL 3 TIMES DAILY
Qty: 15 CAPSULE | Refills: 0 | Status: SHIPPED | OUTPATIENT
Start: 2023-02-20 | End: 2023-02-25

## 2023-02-20 RX ORDER — IBUPROFEN 800 MG/1
800 TABLET ORAL EVERY 8 HOURS PRN
Qty: 15 TABLET | Refills: 0 | Status: SHIPPED | OUTPATIENT
Start: 2023-02-20 | End: 2023-02-25

## 2023-02-20 RX ORDER — MEPERIDINE HYDROCHLORIDE 25 MG/ML
25 INJECTION INTRAMUSCULAR; INTRAVENOUS; SUBCUTANEOUS ONCE
Status: COMPLETED | OUTPATIENT
Start: 2023-02-20 | End: 2023-02-20

## 2023-02-20 RX ORDER — SODIUM CHLORIDE 0.9 % (FLUSH) 0.9 %
10 SYRINGE (ML) INJECTION AS NEEDED
Status: DISCONTINUED | OUTPATIENT
Start: 2023-02-20 | End: 2023-02-21 | Stop reason: HOSPADM

## 2023-02-20 RX ORDER — OXYCODONE HYDROCHLORIDE AND ACETAMINOPHEN 5; 325 MG/1; MG/1
1 TABLET ORAL EVERY 6 HOURS PRN
Qty: 12 TABLET | Refills: 0 | Status: SHIPPED | OUTPATIENT
Start: 2023-02-20 | End: 2023-02-23

## 2023-02-20 RX ORDER — OXYCODONE HYDROCHLORIDE AND ACETAMINOPHEN 5; 325 MG/1; MG/1
1 TABLET ORAL ONCE
Status: COMPLETED | OUTPATIENT
Start: 2023-02-20 | End: 2023-02-20

## 2023-02-20 RX ORDER — DEXAMETHASONE SODIUM PHOSPHATE 10 MG/ML
10 INJECTION, SOLUTION INTRAMUSCULAR; INTRAVENOUS ONCE
Status: DISCONTINUED | OUTPATIENT
Start: 2023-02-20 | End: 2023-02-20

## 2023-02-20 RX ORDER — ORPHENADRINE CITRATE 30 MG/ML
60 INJECTION INTRAMUSCULAR; INTRAVENOUS ONCE
Status: COMPLETED | OUTPATIENT
Start: 2023-02-20 | End: 2023-02-20

## 2023-02-20 RX ORDER — ONDANSETRON 2 MG/ML
4 INJECTION INTRAMUSCULAR; INTRAVENOUS ONCE
Status: COMPLETED | OUTPATIENT
Start: 2023-02-20 | End: 2023-02-20

## 2023-02-20 RX ADMIN — OXYCODONE HYDROCHLORIDE AND ACETAMINOPHEN 1 TABLET: 5; 325 TABLET ORAL at 23:34

## 2023-02-20 RX ADMIN — ONDANSETRON 4 MG: 2 INJECTION INTRAMUSCULAR; INTRAVENOUS at 20:15

## 2023-02-20 RX ADMIN — MEPERIDINE HYDROCHLORIDE 25 MG: 25 INJECTION INTRAMUSCULAR; INTRAVENOUS; SUBCUTANEOUS at 23:34

## 2023-02-20 RX ADMIN — ORPHENADRINE CITRATE 60 MG: 30 INJECTION INTRAMUSCULAR; INTRAVENOUS at 19:57

## 2023-02-20 RX ADMIN — HYDROMORPHONE HYDROCHLORIDE 1 MG: 1 INJECTION, SOLUTION INTRAMUSCULAR; INTRAVENOUS; SUBCUTANEOUS at 20:15

## 2023-03-02 NOTE — ED PROVIDER NOTES
Subjective   History of Present Illness  Patient is a 52-year-old male with no significant past medical history presenting to the ER for evaluation of neck and back pain.  Patient states that this pain is chronic but has worsened over the past 5 days. Patient states that he has had neck pain for 1.5 months and back pain for 5 days. Patient states MRI was declined by insurance when ordered by PCP. Patient has had no injury or trauma. Patient described pain as ache and muscle spasms. Patient denies saddle numbness, loss of bowel or bladder, fever, nausea, vomiting, shortness or breath, CP or any additional symptoms today. Patient denies any alleviating or worsening factors.     History provided by:  Patient   used: No        Review of Systems   Constitutional: Negative.  Negative for fever.   HENT: Negative.    Respiratory: Negative.    Cardiovascular: Negative.  Negative for chest pain.   Gastrointestinal: Negative.  Negative for abdominal pain.   Endocrine: Negative.    Genitourinary: Negative.  Negative for dysuria.   Musculoskeletal: Positive for back pain and neck pain.   Skin: Negative.    Neurological: Negative.    Psychiatric/Behavioral: Negative.    All other systems reviewed and are negative.      No past medical history on file.    No Known Allergies    No past surgical history on file.    No family history on file.    Social History     Socioeconomic History   • Marital status:            Objective   Physical Exam  Vitals and nursing note reviewed.   Constitutional:       General: He is not in acute distress.     Appearance: He is well-developed. He is obese. He is not diaphoretic.   HENT:      Head: Normocephalic and atraumatic.      Right Ear: External ear normal.      Left Ear: External ear normal.      Nose: Nose normal.   Eyes:      Conjunctiva/sclera: Conjunctivae normal.      Pupils: Pupils are equal, round, and reactive to light.   Neck:      Vascular: No JVD.       Trachea: No tracheal deviation.   Cardiovascular:      Rate and Rhythm: Normal rate and regular rhythm.      Heart sounds: Normal heart sounds. No murmur heard.  Pulmonary:      Effort: Pulmonary effort is normal. No respiratory distress.      Breath sounds: Normal breath sounds. No wheezing.   Abdominal:      General: Bowel sounds are normal.      Palpations: Abdomen is soft.      Tenderness: There is no abdominal tenderness.   Musculoskeletal:         General: Tenderness present. No deformity.      Cervical back: Neck supple. Tenderness present. No rigidity. Decreased range of motion.      Lumbar back: Tenderness present. Decreased range of motion.   Skin:     General: Skin is warm and dry.      Capillary Refill: Capillary refill takes 2 to 3 seconds.      Coloration: Skin is not pale.      Findings: No erythema or rash.   Neurological:      Mental Status: He is alert and oriented to person, place, and time.      Cranial Nerves: No cranial nerve deficit.   Psychiatric:         Behavior: Behavior normal.         Thought Content: Thought content normal.         Procedures       Results for orders placed or performed during the hospital encounter of 02/20/23   Comprehensive Metabolic Panel    Specimen: Blood   Result Value Ref Range    Glucose 91 65 - 99 mg/dL    BUN 9 6 - 20 mg/dL    Creatinine 1.02 0.76 - 1.27 mg/dL    Sodium 137 136 - 145 mmol/L    Potassium 4.4 3.5 - 5.2 mmol/L    Chloride 102 98 - 107 mmol/L    CO2 27.2 22.0 - 29.0 mmol/L    Calcium 9.1 8.6 - 10.5 mg/dL    Total Protein 7.1 6.0 - 8.5 g/dL    Albumin 3.7 3.5 - 5.2 g/dL    ALT (SGPT) 15 1 - 41 U/L    AST (SGOT) 21 1 - 40 U/L    Alkaline Phosphatase 71 39 - 117 U/L    Total Bilirubin 0.4 0.0 - 1.2 mg/dL    Globulin 3.4 gm/dL    A/G Ratio 1.1 g/dL    BUN/Creatinine Ratio 8.8 7.0 - 25.0    Anion Gap 7.8 5.0 - 15.0 mmol/L    eGFR 89.0 >60.0 mL/min/1.73   C-reactive Protein    Specimen: Blood   Result Value Ref Range    C-Reactive Protein 1.13 (H) 0.00  - 0.50 mg/dL   CBC Auto Differential    Specimen: Blood   Result Value Ref Range    WBC 11.62 (H) 3.40 - 10.80 10*3/mm3    RBC 5.16 4.14 - 5.80 10*6/mm3    Hemoglobin 15.3 13.0 - 17.7 g/dL    Hematocrit 47.4 37.5 - 51.0 %    MCV 91.9 79.0 - 97.0 fL    MCH 29.7 26.6 - 33.0 pg    MCHC 32.3 31.5 - 35.7 g/dL    RDW 13.1 12.3 - 15.4 %    RDW-SD 44.1 37.0 - 54.0 fl    MPV 9.4 6.0 - 12.0 fL    Platelets 354 140 - 450 10*3/mm3    Neutrophil % 64.8 42.7 - 76.0 %    Lymphocyte % 23.8 19.6 - 45.3 %    Monocyte % 7.2 5.0 - 12.0 %    Eosinophil % 3.2 0.3 - 6.2 %    Basophil % 0.7 0.0 - 1.5 %    Immature Grans % 0.3 0.0 - 0.5 %    Neutrophils, Absolute 7.54 (H) 1.70 - 7.00 10*3/mm3    Lymphocytes, Absolute 2.76 0.70 - 3.10 10*3/mm3    Monocytes, Absolute 0.84 0.10 - 0.90 10*3/mm3    Eosinophils, Absolute 0.37 0.00 - 0.40 10*3/mm3    Basophils, Absolute 0.08 0.00 - 0.20 10*3/mm3    Immature Grans, Absolute 0.03 0.00 - 0.05 10*3/mm3    nRBC 0.0 0.0 - 0.2 /100 WBC   Green Top (Gel)   Result Value Ref Range    Extra Tube Hold for add-ons.    Lavender Top   Result Value Ref Range    Extra Tube hold for add-on    Gold Top - SST   Result Value Ref Range    Extra Tube Hold for add-ons.    Light Blue Top   Result Value Ref Range    Extra Tube Hold for add-ons.      MRI Lumbar Spine Without Contrast   Final Result   1.  There is a compression fracture involving the superior endplate of T12. This is a nonacute finding. There is no significant compromise of the spinal canal.      2.  Mild lumbar spondylosis as described. The patient exhibits developmentally shortened pedicles which predisposes to spinal canal stenosis. Please see the above report for a complete description of the individual levels.      Signer Name: Darrell Almonte MD    Signed: 2/20/2023 9:54 PM    Workstation [facility]: [facility]     [facility]      MRI Cervical Spine Without Contrast   Final Result   Suboptimal exam due to prominent motion artifact.  This significantly lowers the sensitivity of this study.      Multilevel cervical spondylosis as described. Please see the above report for a description of the individual levels.      Signer Name: Darrell Almonte MD    Signed: 2/20/2023 10:04 PM    Workstation Name: [facility]      MRI Angiogram Neck Without Contrast   Final Result   No evidence of significant plaque formation or a flow-limiting stenosis. No aneurysm formation.      Focal ectasia of the vertebral artery at the C5 level.      Signer Name: Darrell Almonte MD    Signed: 2/20/2023 9:48 PM    Workstation Name: [facility]      CT Lumbar Spine Without Contrast   Final Result   Compression deformity of the superior endplate of T12 which appears acute/subacute. Clinical aspects of the case will determine if elective MR of the lumbar spine could provide additional information.      Lumbar spondylosis as described. Bony structures are diffusely demineralized.      Signer Name: Darrell Almonte MD    Signed: 2/20/2023 7:36 PM    Workstation Name: [facility]      CT Cervical Spine Without Contrast   Final Result   Moderately advanced multilevel degenerative disc disease and facet arthropathy with spinal and foraminal stenosis as detailed above.      Asymmetric enlargement of the left C5 transverse foramen with some chronic appearing deformity of the lateral margin of the C5 vertebral body. This may be related to underlying vertebral artery pathology such as aneurysm or dolichoectasia. Further   evaluation with MRI C-spine and MRA of the vertebral vessels may be of benefit.      Left mastoid effusion and middle ear cavity fluid. Correlate for otomastoiditis.      Signer Name: GEOFF Dorman MD    Signed: 2/20/2023 7:34 PM    Workstation Name: [facility]          ED Course  ED Course as of 03/01/23  2143 Mon Feb 20, 2023 1949 CT Lumbar Spine Without Contrast  IMPRESSION:  Compression deformity of the superior endplate of T12 which appears acute/subacute. Clinical aspects of the case will determine if elective MR of the lumbar spine could provide additional information.     Lumbar spondylosis as described. Bony structures are diffusely demineralized.     Signer Name: Darrell Almonte MD   Signed: 2/20/2023 7:36 PM   Workstation Name: [facility]    [facility] []   1949 CT Cervical Spine Without Contrast  IMPRESSION:  Moderately advanced multilevel degenerative disc disease and facet arthropathy with spinal and foraminal stenosis as detailed above.     Asymmetric enlargement of the left C5 transverse foramen with some chronic appearing deformity of the lateral margin of the C5 vertebral body. This may be related to underlying vertebral artery pathology such as aneurysm or dolichoectasia. Further  evaluation with MRI C-spine and MRA of the vertebral vessels may be of benefit.     Left mastoid effusion and middle ear cavity fluid. Correlate for otomastoiditis.     Signer Name: GEOFF Dorman MD   Signed: 2/20/2023 7:34 PM   Workstation Name: [facility]    [facility] [SM]   2208 MRI Angiogram Neck Without Contrast [SM]   2208 MRI Lumbar Spine Without Contrast [SM]   2208 MRI Cervical Spine Without Contrast []   2249 Discussed case with Dr. Wright neurogsurgery at [facility]. Reviewed patients images and agrees patient can be seen outpatient.  [SM]   2254 Work up and results were discussed throughly with the patient.  The patient will be discharged for further monitoring and management with their PCP and neurosurgery.  Red flags, warning signs, worsening symptoms, and when to return to the ER discussed with and understood by the patient.  Patient will follow up with their PCP and neurosurgery in a timely manner.  Vitals stable at discharge.   [SM]   1866 The patient is counseled regarding opioid pain medication and has been informed of the high risk factors which include but not limited to dependency, addiction, respiratory depression, and death. The patient understands and accepts these risks.   [SM]      ED Course User Index  [SM] Gricelda aCstellanos APRN                                           Medical Decision Making  Patient is a 52-year-old male with no significant past medical history presenting to the ER for evaluation of neck and back pain.  Patient states that this pain is chronic but has worsened over the past 5 days. Patient states that he has had neck pain for 1.5 months and back pain for 5 days. Patient states MRI was declined by insurance when ordered by PCP. Patient has had no injury or trauma. Patient described pain as ache and muscle spasms. Patient denies saddle numbness, loss of bowel or bladder, fever, nausea, vomiting, shortness or breath, CP or any additional symptoms today. Patient denies any alleviating or worsening factors. Discussed findings with patient. Discussed risk of opioid use. Discussed follow up with neurosurgery in a timely manner. Patient verbalized understanding and agrees.     Cervical disc herniation: complicated acute illness or injury  Lumbar spondylosis: complicated acute illness or injury  Amount and/or Complexity of Data Reviewed  Labs: ordered.  Radiology: ordered. Decision-making details documented in ED Course.      Risk  Prescription drug management.          Final diagnoses:   Cervical disc herniation   Lumbar spondylosis       ED Disposition  ED Disposition     ED Disposition   Discharge    Condition   Stable    Comment   --             Jose F Reynolds  [address] [place] [address]  [address]  [facility] [address]  [phone]    Schedule an appointment as soon as possible for a visit in 2 days      Adin Wright MD  [address] [place] [id number]  [phone]    Schedule an appointment as soon as  possible for a visit in 2 days           Medication List      ASK your doctor about these medications    ibuprofen 800 MG tablet  Commonly known as: ADVIL,MOTRIN  Take 1 tablet by mouth Every 8 (Eight) Hours As Needed for Moderate Pain for up to 5 days.  Ask about: Should I take this medication?     oxyCODONE-acetaminophen 5-325 MG per tablet  Commonly known as: PERCOCET  Take 1 tablet by mouth Every 6 (Six) Hours As Needed for Severe Pain for up to 3 days.  Ask about: Should I take this medication?     TiZANidine 4 MG capsule  Commonly known as: Zanaflex  Take 1 capsule by mouth 3 (Three) Times a Day for 5 days.  Ask about: Should I take this medication?           Where to Get Your Medications      These medications were sent to [facility] - [place], [place] - [address] [facility] [address] - [phone]  - [phone]   [address] [facility] [address]    Phone: [phone]   · ibuprofen 800 MG tablet  · oxyCODONE-acetaminophen 5-325 MG per tablet  · TiZANidine 4 MG capsule          Gricelda Castellanos, APRN  03/01/23 2140

## 2023-04-13 ENCOUNTER — OFFICE VISIT (OUTPATIENT)
Dept: NEUROSURGERY | Facility: CLINIC | Age: 53
End: 2023-04-13
Payer: COMMERCIAL

## 2023-04-13 VITALS
TEMPERATURE: 97 F | WEIGHT: 303.2 LBS | OXYGEN SATURATION: 96 % | SYSTOLIC BLOOD PRESSURE: 145 MMHG | HEIGHT: 68 IN | DIASTOLIC BLOOD PRESSURE: 85 MMHG | HEART RATE: 89 BPM | BODY MASS INDEX: 45.95 KG/M2

## 2023-04-13 DIAGNOSIS — M54.12 CERVICAL RADICULOPATHY: Primary | ICD-10-CM

## 2023-04-13 DIAGNOSIS — M79.601 RIGHT ARM PAIN: ICD-10-CM

## 2023-04-13 PROCEDURE — 99204 OFFICE O/P NEW MOD 45 MIN: CPT | Performed by: STUDENT IN AN ORGANIZED HEALTH CARE EDUCATION/TRAINING PROGRAM

## 2023-04-13 RX ORDER — GABAPENTIN 600 MG/1
600 TABLET ORAL 3 TIMES DAILY
COMMUNITY

## 2023-04-13 RX ORDER — METHYLPREDNISOLONE 4 MG/1
4 TABLET ORAL DAILY
COMMUNITY

## 2023-04-13 RX ORDER — PHENTERMINE HYDROCHLORIDE 30 MG/1
CAPSULE ORAL EVERY MORNING
COMMUNITY

## 2023-04-13 RX ORDER — DIAZEPAM 5 MG/1
5 TABLET ORAL
Qty: 2 TABLET | Refills: 0 | Status: SHIPPED | OUTPATIENT
Start: 2023-04-13

## 2023-04-13 RX ORDER — OMEPRAZOLE 20 MG/1
20 CAPSULE, DELAYED RELEASE ORAL DAILY
COMMUNITY

## 2023-04-13 RX ORDER — ERGOCALCIFEROL (VITAMIN D2) 10 MCG
400 TABLET ORAL DAILY
COMMUNITY

## 2023-04-13 RX ORDER — SUMATRIPTAN 100 MG/1
100 TABLET, FILM COATED ORAL
COMMUNITY

## 2023-04-13 RX ORDER — TRAZODONE HYDROCHLORIDE 100 MG/1
100 TABLET ORAL NIGHTLY
COMMUNITY

## 2023-04-13 RX ORDER — BUDESONIDE AND FORMOTEROL FUMARATE DIHYDRATE 160; 4.5 UG/1; UG/1
2 AEROSOL RESPIRATORY (INHALATION)
COMMUNITY

## 2023-04-13 NOTE — PROGRESS NOTES
Patient: Dexter Flores  : 1970    Primary Care Provider: Jose F Reynolds    Requesting Provider: As above      Chief Complaint: Back Pain, Arm Pain, Neck Pain, Hand Pain, and Numbness      History of Present Illness: This is a 52 y.o. male who presents with several months of neck and right arm pain.  Patient scribes pain that starts in his neck and radiates down his arm.  Its been quite severe and is bringing him from sleeping at night.  He has not had any physical therapy or injections in his neck.  He takes Neurontin at baseline and states he does not think that this is helped much.    PMHX  Allergies:  No Known Allergies  Medications    Current Outpatient Medications:   •  albuterol sulfate  (90 Base) MCG/ACT inhaler, Inhale 2 puffs Every 4 (Four) Hours As Needed for Wheezing., Disp: , Rfl:   •  budesonide-formoterol (SYMBICORT) 160-4.5 MCG/ACT inhaler, Inhale 2 puffs 2 (Two) Times a Day., Disp: , Rfl:   •  gabapentin (NEURONTIN) 600 MG tablet, Take 1 tablet by mouth 3 (Three) Times a Day., Disp: , Rfl:   •  hydroCHLOROthiazide (HYDRODIURIL) 12.5 MG tablet, Take 1 tablet by mouth Daily., Disp: , Rfl:   •  levothyroxine (SYNTHROID, LEVOTHROID) 25 MCG tablet, Take 1 tablet by mouth Daily., Disp: , Rfl:   •  lisinopril (PRINIVIL,ZESTRIL) 5 MG tablet, Take 1 tablet by mouth Daily., Disp: , Rfl:   •  methylPREDNISolone (MEDROL) 4 MG tablet, Take 1 tablet by mouth Daily., Disp: , Rfl:   •  montelukast (SINGULAIR) 10 MG tablet, Take 1 tablet by mouth Every Night., Disp: , Rfl:   •  omeprazole (priLOSEC) 20 MG capsule, Take 1 capsule by mouth Daily., Disp: , Rfl:   •  phentermine 30 MG capsule, Take  by mouth Every Morning. 37.5 mg, Disp: , Rfl:   •  predniSONE (DELTASONE) 50 MG tablet, Take 1 tablet by mouth Daily. (Patient taking differently: Take  by mouth Daily. 40 mg), Disp: 5 tablet, Rfl: 0  •  sertraline (ZOLOFT) 50 MG tablet, Take 1 tablet by mouth Daily., Disp: , Rfl:   •  SUMAtriptan (IMITREX) 100 MG  Progress Notes by Nuno Sanders Nuno Sanders MD (Physician)            SUBJECTIVE:  Simran Silva is a 73 past medical history on file.     Past Surgical History:   No past surgical history on file.    No Known Allergies     Medications:   Current outpatient prescriptions       Medication  Sig Dispense Refill   • pravastatin (PRAVACHOL) 40 MG  "tablet, Take 1 tablet by mouth Every 2 (Two) Hours As Needed for Migraine. Take one tablet at onset of headache. May repeat dose one time in 2 hours if headache not relieved., Disp: , Rfl:   •  traZODone (DESYREL) 100 MG tablet, Take 1 tablet by mouth Every Night., Disp: , Rfl:   •  Vitamin D, Cholecalciferol, (CHOLECALCIFEROL) 10 MCG (400 UNIT) tablet, Take 1 tablet by mouth Daily., Disp: , Rfl:   •  cefdinir (OMNICEF) 300 MG capsule, Take 1 capsule by mouth 2 (Two) Times a Day. (Patient not taking: Reported on 4/13/2023), Disp: 14 capsule, Rfl: 0  •  doxycycline (MONODOX) 100 MG capsule, Take 1 capsule by mouth 2 (Two) Times a Day. (Patient not taking: Reported on 4/13/2023), Disp: 14 capsule, Rfl: 0  •  famotidine (PEPCID) 20 MG tablet, Take 1 tablet by mouth 2 (Two) Times a Day. (Patient not taking: Reported on 4/13/2023), Disp: 30 tablet, Rfl: 0  •  PARoxetine (PAXIL) 20 MG tablet, Take 20 mg by mouth Every Morning., Disp: , Rfl:   Past Medical History:  No past medical history on file.  Past Surgical History:  No past surgical history on file.  Social Hx:  Social History     Tobacco Use   • Smoking status: Former     Types: Cigarettes     Family Hx:  No family history on file.  Review of Systems:        Review of Systems   Cardiovascular: Positive for leg swelling.   Neurological: Positive for dizziness, tremors, weakness, light-headedness, numbness and headaches.        Physical Exam:   /85 (BP Location: Right arm, Patient Position: Sitting, Cuff Size: Adult)   Pulse 89   Temp 97 °F (36.1 °C) (Temporal)   Ht 172.7 cm (68\")   Wt (!) 138 kg (303 lb 3.2 oz)   SpO2 96%   BMI 46.10 kg/m²   Awake, alert and oriented x 3  Speech f/c  Opens eyes spont  Pupils 3 mm rx bilaterally  Extraocular muscles intact bilaterally  Normal sensation to light touch in all 3 distributions of CN V bilaterally  Face symmetric bilaterally  Tongue midline  5/5 in all 4 ext with exception of right bicep which is 4+ and right " hand  which is 4 out of 5  No Hartman's    Diagnostic Studies:  All neurological imaging studies were independently reviewed unless stated otherwise    Assessment/Plan:  This is a 52 y.o. male presenting with several months of neck and right arm pain.  Unfortunately, the patient's cervical MRI has significant motion artifact.  He clearly has symptoms consistent with cervical radiculopathy but I am unable to completely evaluate which levels are causing the issue.  We are going to try and repeat the MRI and give him Valium prior to the exam so that we can get a better picture.  In the meantime, he is already plugged into a pain clinic and he is going to follow-up with them to get a cervical injection.  Additionally, I am going to prescribe physical therapy.  I had a long discussion with the patient and explained that currently his weight puts him at too high risk for cervical spine surgery.  He is continuing to work on weight loss. I am going to have him follow-up with me in 3 months.  If he is still having significant symptoms and has lost weight, he may benefit from an ACDF.    Diagnoses and all orders for this visit:    1. Right arm pain (Primary)  -     MRI Cervical Spine Without Contrast; Future  -     Ambulatory Referral to Physical Therapy Evaluate and treat, Neuro    2. Cervical radiculopathy  -     MRI Cervical Spine Without Contrast; Future  -     Ambulatory Referral to Physical Therapy Evaluate and treat, Neuro        Adin Wright MD  04/13/23  12:29 EDT

## 2023-05-15 ENCOUNTER — TELEPHONE (OUTPATIENT)
Dept: NEUROSURGERY | Facility: CLINIC | Age: 53
End: 2023-05-15
Payer: COMMERCIAL

## 2023-05-15 NOTE — TELEPHONE ENCOUNTER
Caller: SHANON    Relationship: [employer]    Best call back number:[phone]     What form or medical record are you requesting:DEMO AND INSURANCE PAGE    Who is requesting this form or medical record from you:[employer]    How would you like to receive the form or medical records (pick-up, mail, fax):FAX  If fax, what is the fax number: [phone]  If mail, what is the address:   If pick-up, provide patient with address and location details    Timeframe paperwork needed:ASAP    Additional notes:SHANON FROM [employer] CALLED AND STATES SHE IS MISSING A DEMO AND INSURANCE FOR THE PATIENT-SHE HAS SET THE PT FOR AN APPT. BUT IS NEEDING BOTH SHEETS FAXED-MAINLY FOR INSURANCE SHE STATED-THANK YOU

## 2023-05-15 NOTE — TELEPHONE ENCOUNTER
Facesheet with demographic info and insurance info faxed. (Do not have copy of insurance card. [facility] did not get copy of insurance card at patient's visit in April.)

## 2023-07-27 ENCOUNTER — DOCUMENTATION (OUTPATIENT)
Dept: NEUROSURGERY | Facility: CLINIC | Age: 53
End: 2023-07-27
Payer: COMMERCIAL

## 2023-07-27 NOTE — PROGRESS NOTES
I called the patient to discuss the results of his cervical MRI.  He has diffuse degenerative changes throughout the cervical spine, with a disc herniation to the right causing severe neuroforaminal stenosis at C3-4.  I think this is likely the source of the patient's right shoulder pain.  He continues to have significant pain.  I discussed with the patient that I think he may benefit from an ACDF, but I would like for him to continue to lose more weight prior to surgery.  We are going to schedule follow-up appointment with me in 2 months to see how he is doing.  If he is continuing to lose weight, we can look at scheduling a C3-4 ACDF.

## 2023-08-19 ENCOUNTER — HOSPITAL ENCOUNTER (INPATIENT)
Facility: HOSPITAL | Age: 53
LOS: 5 days | Discharge: HOME | DRG: 853 | End: 2023-08-24
Attending: EMERGENCY MEDICINE | Admitting: INTERNAL MEDICINE
Payer: COMMERCIAL

## 2023-08-19 ENCOUNTER — APPOINTMENT (OUTPATIENT)
Dept: GENERAL RADIOLOGY | Facility: HOSPITAL | Age: 53
DRG: 853 | End: 2023-08-19
Payer: COMMERCIAL

## 2023-08-19 ENCOUNTER — APPOINTMENT (OUTPATIENT)
Dept: CT IMAGING | Facility: HOSPITAL | Age: 53
DRG: 853 | End: 2023-08-19
Payer: COMMERCIAL

## 2023-08-19 DIAGNOSIS — R91.8 MASS OF RIGHT LUNG: ICD-10-CM

## 2023-08-19 DIAGNOSIS — J44.1 COPD EXACERBATION: ICD-10-CM

## 2023-08-19 DIAGNOSIS — J18.9 PNEUMONIA OF RIGHT UPPER LOBE DUE TO INFECTIOUS ORGANISM: Primary | ICD-10-CM

## 2023-08-19 PROBLEM — A41.9 SEPSIS: Status: ACTIVE | Noted: 2023-08-19

## 2023-08-19 LAB
A-A DO2: 33.1 MMHG (ref 0–300)
ALBUMIN SERPL-MCNC: 3.3 G/DL (ref 3.5–5.2)
ALBUMIN/GLOB SERPL: 0.7 G/DL
ALP SERPL-CCNC: 167 U/L (ref 39–117)
ALT SERPL W P-5'-P-CCNC: 23 U/L (ref 1–41)
AMPHET+METHAMPHET UR QL: POSITIVE
AMPHETAMINES UR QL: NEGATIVE
ANION GAP SERPL CALCULATED.3IONS-SCNC: 12.6 MMOL/L (ref 5–15)
ARTERIAL PATENCY WRIST A: POSITIVE
AST SERPL-CCNC: 20 U/L (ref 1–40)
ATMOSPHERIC PRESS: 729 MMHG
BARBITURATES UR QL SCN: NEGATIVE
BASE EXCESS BLDA CALC-SCNC: 5.2 MMOL/L (ref 0–2)
BASOPHILS # BLD AUTO: 0.13 10*3/MM3 (ref 0–0.2)
BASOPHILS NFR BLD AUTO: 0.5 % (ref 0–1.5)
BDY SITE: ABNORMAL
BENZODIAZ UR QL SCN: NEGATIVE
BILIRUB SERPL-MCNC: 0.5 MG/DL (ref 0–1.2)
BILIRUB UR QL STRIP: ABNORMAL
BUN SERPL-MCNC: 13 MG/DL (ref 6–20)
BUN/CREAT SERPL: 14 (ref 7–25)
BUPRENORPHINE SERPL-MCNC: NEGATIVE NG/ML
CALCIUM SPEC-SCNC: 9.5 MG/DL (ref 8.6–10.5)
CANNABINOIDS SERPL QL: POSITIVE
CHLORIDE SERPL-SCNC: 98 MMOL/L (ref 98–107)
CLARITY UR: CLEAR
CO2 BLDA-SCNC: 30.5 MMOL/L (ref 22–33)
CO2 SERPL-SCNC: 25.4 MMOL/L (ref 22–29)
COCAINE UR QL: NEGATIVE
COHGB MFR BLD: 1.8 % (ref 0–5)
COLOR UR: ABNORMAL
CREAT SERPL-MCNC: 0.93 MG/DL (ref 0.76–1.27)
CRP SERPL-MCNC: 17.62 MG/DL (ref 0–0.5)
D-LACTATE SERPL-SCNC: 1.5 MMOL/L (ref 0.5–2)
DEPRECATED RDW RBC AUTO: 38.7 FL (ref 37–54)
EGFRCR SERPLBLD CKD-EPI 2021: 98.8 ML/MIN/1.73
EOSINOPHIL # BLD AUTO: 0.35 10*3/MM3 (ref 0–0.4)
EOSINOPHIL NFR BLD AUTO: 1.5 % (ref 0.3–6.2)
ERYTHROCYTE [DISTWIDTH] IN BLOOD BY AUTOMATED COUNT: 12 % (ref 12.3–15.4)
FENTANYL UR-MCNC: NEGATIVE NG/ML
FLUAV RNA RESP QL NAA+PROBE: NOT DETECTED
FLUBV RNA RESP QL NAA+PROBE: NOT DETECTED
GEN 5 2HR TROPONIN T REFLEX: 8 NG/L
GLOBULIN UR ELPH-MCNC: 4.5 GM/DL
GLUCOSE SERPL-MCNC: 138 MG/DL (ref 65–99)
GLUCOSE UR STRIP-MCNC: NEGATIVE MG/DL
HCO3 BLDA-SCNC: 29.2 MMOL/L (ref 20–26)
HCT VFR BLD AUTO: 43 % (ref 37.5–51)
HCT VFR BLD CALC: 41.4 % (ref 38–51)
HGB BLD-MCNC: 13.6 G/DL (ref 13–17.7)
HGB BLDA-MCNC: 13.5 G/DL (ref 14–18)
HGB UR QL STRIP.AUTO: NEGATIVE
HOLD SPECIMEN: NORMAL
HOLD SPECIMEN: NORMAL
IMM GRANULOCYTES # BLD AUTO: 0.15 10*3/MM3 (ref 0–0.05)
IMM GRANULOCYTES NFR BLD AUTO: 0.6 % (ref 0–0.5)
INHALED O2 CONCENTRATION: 21 %
KETONES UR QL STRIP: NEGATIVE
LEUKOCYTE ESTERASE UR QL STRIP.AUTO: NEGATIVE
LYMPHOCYTES # BLD AUTO: 1.85 10*3/MM3 (ref 0.7–3.1)
LYMPHOCYTES NFR BLD AUTO: 7.8 % (ref 19.6–45.3)
Lab: ABNORMAL
MCH RBC QN AUTO: 28 PG (ref 26.6–33)
MCHC RBC AUTO-ENTMCNC: 31.6 G/DL (ref 31.5–35.7)
MCV RBC AUTO: 88.7 FL (ref 79–97)
METHADONE UR QL SCN: NEGATIVE
METHGB BLD QL: <-0.1 % (ref 0–3)
MODALITY: ABNORMAL
MONOCYTES # BLD AUTO: 1.79 10*3/MM3 (ref 0.1–0.9)
MONOCYTES NFR BLD AUTO: 7.5 % (ref 5–12)
NEUTROPHILS NFR BLD AUTO: 19.46 10*3/MM3 (ref 1.7–7)
NEUTROPHILS NFR BLD AUTO: 82.1 % (ref 42.7–76)
NITRITE UR QL STRIP: NEGATIVE
NRBC BLD AUTO-RTO: 0 /100 WBC (ref 0–0.2)
NT-PROBNP SERPL-MCNC: 217.8 PG/ML (ref 0–900)
OPIATES UR QL: POSITIVE
OXYCODONE UR QL SCN: NEGATIVE
OXYHGB MFR BLDV: 93 % (ref 94–99)
PCO2 BLDA: 40 MM HG (ref 35–45)
PCO2 TEMP ADJ BLD: ABNORMAL MM[HG]
PCP UR QL SCN: NEGATIVE
PH BLDA: 7.47 PH UNITS (ref 7.35–7.45)
PH UR STRIP.AUTO: 6.5 [PH] (ref 5–8)
PH, TEMP CORRECTED: ABNORMAL
PLATELET # BLD AUTO: 589 10*3/MM3 (ref 140–450)
PMV BLD AUTO: 9.3 FL (ref 6–12)
PO2 BLDA: 65 MM HG (ref 83–108)
PO2 TEMP ADJ BLD: ABNORMAL MM[HG]
POTASSIUM SERPL-SCNC: 4.9 MMOL/L (ref 3.5–5.2)
PROCALCITONIN SERPL-MCNC: 0.22 NG/ML (ref 0–0.25)
PROPOXYPH UR QL: NEGATIVE
PROT SERPL-MCNC: 7.8 G/DL (ref 6–8.5)
PROT UR QL STRIP: ABNORMAL
RBC # BLD AUTO: 4.85 10*6/MM3 (ref 4.14–5.8)
SAO2 % BLDCOA: 94.5 % (ref 94–99)
SARS-COV-2 RNA RESP QL NAA+PROBE: NOT DETECTED
SODIUM SERPL-SCNC: 136 MMOL/L (ref 136–145)
SP GR UR STRIP: >1.03 (ref 1–1.03)
TRICYCLICS UR QL SCN: NEGATIVE
TROPONIN T DELTA: -1 NG/L
TROPONIN T SERPL HS-MCNC: 9 NG/L
UROBILINOGEN UR QL STRIP: ABNORMAL
VENTILATOR MODE: ABNORMAL
WBC NRBC COR # BLD: 23.73 10*3/MM3 (ref 3.4–10.8)
WHOLE BLOOD HOLD COAG: NORMAL
WHOLE BLOOD HOLD SPECIMEN: NORMAL

## 2023-08-19 PROCEDURE — 25510000001 IOPAMIDOL PER 1 ML: Performed by: EMERGENCY MEDICINE

## 2023-08-19 PROCEDURE — 81003 URINALYSIS AUTO W/O SCOPE: CPT | Performed by: INTERNAL MEDICINE

## 2023-08-19 PROCEDURE — 85025 COMPLETE CBC W/AUTO DIFF WBC: CPT | Performed by: PHYSICIAN ASSISTANT

## 2023-08-19 PROCEDURE — 94799 UNLISTED PULMONARY SVC/PX: CPT

## 2023-08-19 PROCEDURE — 86140 C-REACTIVE PROTEIN: CPT | Performed by: PHYSICIAN ASSISTANT

## 2023-08-19 PROCEDURE — 84484 ASSAY OF TROPONIN QUANT: CPT | Performed by: PHYSICIAN ASSISTANT

## 2023-08-19 PROCEDURE — 87899 AGENT NOS ASSAY W/OPTIC: CPT | Performed by: INTERNAL MEDICINE

## 2023-08-19 PROCEDURE — 87150 DNA/RNA AMPLIFIED PROBE: CPT | Performed by: PHYSICIAN ASSISTANT

## 2023-08-19 PROCEDURE — 87186 SC STD MICRODIL/AGAR DIL: CPT | Performed by: PHYSICIAN ASSISTANT

## 2023-08-19 PROCEDURE — 36415 COLL VENOUS BLD VENIPUNCTURE: CPT

## 2023-08-19 PROCEDURE — 87077 CULTURE AEROBIC IDENTIFY: CPT | Performed by: PHYSICIAN ASSISTANT

## 2023-08-19 PROCEDURE — 71275 CT ANGIOGRAPHY CHEST: CPT | Performed by: RADIOLOGY

## 2023-08-19 PROCEDURE — 25010000002 ENOXAPARIN PER 10 MG: Performed by: INTERNAL MEDICINE

## 2023-08-19 PROCEDURE — 80307 DRUG TEST PRSMV CHEM ANLYZR: CPT | Performed by: INTERNAL MEDICINE

## 2023-08-19 PROCEDURE — 82805 BLOOD GASES W/O2 SATURATION: CPT

## 2023-08-19 PROCEDURE — 25010000002 CEFTRIAXONE PER 250 MG: Performed by: PHYSICIAN ASSISTANT

## 2023-08-19 PROCEDURE — 71046 X-RAY EXAM CHEST 2 VIEWS: CPT

## 2023-08-19 PROCEDURE — 80053 COMPREHEN METABOLIC PANEL: CPT | Performed by: PHYSICIAN ASSISTANT

## 2023-08-19 PROCEDURE — 93005 ELECTROCARDIOGRAM TRACING: CPT | Performed by: EMERGENCY MEDICINE

## 2023-08-19 PROCEDURE — 87636 SARSCOV2 & INF A&B AMP PRB: CPT | Performed by: PHYSICIAN ASSISTANT

## 2023-08-19 PROCEDURE — 99285 EMERGENCY DEPT VISIT HI MDM: CPT

## 2023-08-19 PROCEDURE — 87040 BLOOD CULTURE FOR BACTERIA: CPT | Performed by: PHYSICIAN ASSISTANT

## 2023-08-19 PROCEDURE — 99223 1ST HOSP IP/OBS HIGH 75: CPT

## 2023-08-19 PROCEDURE — 94640 AIRWAY INHALATION TREATMENT: CPT

## 2023-08-19 PROCEDURE — 83050 HGB METHEMOGLOBIN QUAN: CPT

## 2023-08-19 PROCEDURE — 25010000002 ONDANSETRON PER 1 MG: Performed by: PHYSICIAN ASSISTANT

## 2023-08-19 PROCEDURE — 84145 PROCALCITONIN (PCT): CPT | Performed by: INTERNAL MEDICINE

## 2023-08-19 PROCEDURE — 25010000002 METHYLPREDNISOLONE PER 125 MG: Performed by: PHYSICIAN ASSISTANT

## 2023-08-19 PROCEDURE — 83605 ASSAY OF LACTIC ACID: CPT | Performed by: PHYSICIAN ASSISTANT

## 2023-08-19 PROCEDURE — 93010 ELECTROCARDIOGRAM REPORT: CPT | Performed by: INTERNAL MEDICINE

## 2023-08-19 PROCEDURE — 71275 CT ANGIOGRAPHY CHEST: CPT

## 2023-08-19 PROCEDURE — 83880 ASSAY OF NATRIURETIC PEPTIDE: CPT | Performed by: PHYSICIAN ASSISTANT

## 2023-08-19 PROCEDURE — 36600 WITHDRAWAL OF ARTERIAL BLOOD: CPT

## 2023-08-19 PROCEDURE — 82375 ASSAY CARBOXYHB QUANT: CPT

## 2023-08-19 PROCEDURE — 94761 N-INVAS EAR/PLS OXIMETRY MLT: CPT

## 2023-08-19 RX ORDER — BENZONATATE 100 MG/1
200 CAPSULE ORAL 3 TIMES DAILY PRN
Status: DISCONTINUED | OUTPATIENT
Start: 2023-08-19 | End: 2023-08-21

## 2023-08-19 RX ORDER — TRAZODONE HYDROCHLORIDE 50 MG/1
100 TABLET ORAL NIGHTLY
Status: DISCONTINUED | OUTPATIENT
Start: 2023-08-19 | End: 2023-08-24 | Stop reason: HOSPADM

## 2023-08-19 RX ORDER — ACETYLCYSTEINE 200 MG/ML
3 SOLUTION ORAL; RESPIRATORY (INHALATION)
Status: DISCONTINUED | OUTPATIENT
Start: 2023-08-19 | End: 2023-08-19 | Stop reason: SDUPTHER

## 2023-08-19 RX ORDER — BISACODYL 5 MG/1
5 TABLET, DELAYED RELEASE ORAL DAILY PRN
Status: DISCONTINUED | OUTPATIENT
Start: 2023-08-19 | End: 2023-08-24 | Stop reason: HOSPADM

## 2023-08-19 RX ORDER — SODIUM CHLORIDE 0.9 % (FLUSH) 0.9 %
10 SYRINGE (ML) INJECTION AS NEEDED
Status: DISCONTINUED | OUTPATIENT
Start: 2023-08-19 | End: 2023-08-24 | Stop reason: HOSPADM

## 2023-08-19 RX ORDER — SODIUM CHLORIDE 9 MG/ML
40 INJECTION, SOLUTION INTRAVENOUS AS NEEDED
Status: DISCONTINUED | OUTPATIENT
Start: 2023-08-19 | End: 2023-08-24 | Stop reason: HOSPADM

## 2023-08-19 RX ORDER — SODIUM CHLORIDE 0.9 % (FLUSH) 0.9 %
10 SYRINGE (ML) INJECTION EVERY 12 HOURS SCHEDULED
Status: DISCONTINUED | OUTPATIENT
Start: 2023-08-19 | End: 2023-08-24 | Stop reason: HOSPADM

## 2023-08-19 RX ORDER — HYDROCODONE BITARTRATE AND ACETAMINOPHEN 10; 325 MG/1; MG/1
1 TABLET ORAL EVERY 8 HOURS PRN
Status: DISCONTINUED | OUTPATIENT
Start: 2023-08-19 | End: 2023-08-21

## 2023-08-19 RX ORDER — ONDANSETRON 2 MG/ML
4 INJECTION INTRAMUSCULAR; INTRAVENOUS ONCE
Status: COMPLETED | OUTPATIENT
Start: 2023-08-19 | End: 2023-08-19

## 2023-08-19 RX ORDER — IPRATROPIUM BROMIDE AND ALBUTEROL SULFATE 2.5; .5 MG/3ML; MG/3ML
3 SOLUTION RESPIRATORY (INHALATION) ONCE
Status: COMPLETED | OUTPATIENT
Start: 2023-08-19 | End: 2023-08-19

## 2023-08-19 RX ORDER — IPRATROPIUM BROMIDE AND ALBUTEROL SULFATE 2.5; .5 MG/3ML; MG/3ML
3 SOLUTION RESPIRATORY (INHALATION)
Status: DISCONTINUED | OUTPATIENT
Start: 2023-08-19 | End: 2023-08-24 | Stop reason: HOSPADM

## 2023-08-19 RX ORDER — BISACODYL 10 MG
10 SUPPOSITORY, RECTAL RECTAL DAILY PRN
Status: DISCONTINUED | OUTPATIENT
Start: 2023-08-19 | End: 2023-08-24 | Stop reason: HOSPADM

## 2023-08-19 RX ORDER — LISINOPRIL 10 MG/1
10 TABLET ORAL DAILY
Status: DISCONTINUED | OUTPATIENT
Start: 2023-08-20 | End: 2023-08-24 | Stop reason: HOSPADM

## 2023-08-19 RX ORDER — LEVOTHYROXINE SODIUM 0.05 MG/1
50 TABLET ORAL DAILY
COMMUNITY

## 2023-08-19 RX ORDER — HYDROCODONE BITARTRATE AND ACETAMINOPHEN 5; 325 MG/1; MG/1
1 TABLET ORAL ONCE
Status: COMPLETED | OUTPATIENT
Start: 2023-08-19 | End: 2023-08-19

## 2023-08-19 RX ORDER — METHYLPREDNISOLONE SODIUM SUCCINATE 40 MG/ML
40 INJECTION, POWDER, LYOPHILIZED, FOR SOLUTION INTRAMUSCULAR; INTRAVENOUS EVERY 12 HOURS
Status: DISCONTINUED | OUTPATIENT
Start: 2023-08-20 | End: 2023-08-23

## 2023-08-19 RX ORDER — METHYLPREDNISOLONE SODIUM SUCCINATE 125 MG/2ML
125 INJECTION, POWDER, LYOPHILIZED, FOR SOLUTION INTRAMUSCULAR; INTRAVENOUS ONCE
Status: COMPLETED | OUTPATIENT
Start: 2023-08-19 | End: 2023-08-19

## 2023-08-19 RX ORDER — GABAPENTIN 300 MG/1
600 CAPSULE ORAL EVERY 8 HOURS SCHEDULED
Status: DISCONTINUED | OUTPATIENT
Start: 2023-08-19 | End: 2023-08-24 | Stop reason: HOSPADM

## 2023-08-19 RX ORDER — ACETYLCYSTEINE 200 MG/ML
3 SOLUTION ORAL; RESPIRATORY (INHALATION)
Status: DISCONTINUED | OUTPATIENT
Start: 2023-08-19 | End: 2023-08-24 | Stop reason: HOSPADM

## 2023-08-19 RX ORDER — PANTOPRAZOLE SODIUM 40 MG/1
40 TABLET, DELAYED RELEASE ORAL DAILY
COMMUNITY

## 2023-08-19 RX ORDER — ACETAMINOPHEN 325 MG/1
650 TABLET ORAL EVERY 4 HOURS PRN
Status: DISCONTINUED | OUTPATIENT
Start: 2023-08-19 | End: 2023-08-24 | Stop reason: HOSPADM

## 2023-08-19 RX ORDER — POLYETHYLENE GLYCOL 3350 17 G/17G
17 POWDER, FOR SOLUTION ORAL DAILY PRN
Status: DISCONTINUED | OUTPATIENT
Start: 2023-08-19 | End: 2023-08-24 | Stop reason: HOSPADM

## 2023-08-19 RX ORDER — ERGOCALCIFEROL 1.25 MG/1
50000 CAPSULE ORAL WEEKLY
COMMUNITY

## 2023-08-19 RX ORDER — ALBUTEROL SULFATE 2.5 MG/3ML
2.5 SOLUTION RESPIRATORY (INHALATION) EVERY 6 HOURS PRN
Status: CANCELLED | OUTPATIENT
Start: 2023-08-19

## 2023-08-19 RX ORDER — PANTOPRAZOLE SODIUM 40 MG/1
40 TABLET, DELAYED RELEASE ORAL DAILY
Status: DISCONTINUED | OUTPATIENT
Start: 2023-08-20 | End: 2023-08-24 | Stop reason: HOSPADM

## 2023-08-19 RX ORDER — LISINOPRIL 10 MG/1
10 TABLET ORAL DAILY
COMMUNITY

## 2023-08-19 RX ORDER — PHENTERMINE HYDROCHLORIDE 37.5 MG/1
37.5 TABLET ORAL
COMMUNITY
End: 2023-08-31

## 2023-08-19 RX ORDER — ALBUTEROL SULFATE 2.5 MG/3ML
2.5 SOLUTION RESPIRATORY (INHALATION) EVERY 6 HOURS PRN
COMMUNITY

## 2023-08-19 RX ORDER — ENOXAPARIN SODIUM 100 MG/ML
40 INJECTION SUBCUTANEOUS NIGHTLY
Status: DISCONTINUED | OUTPATIENT
Start: 2023-08-19 | End: 2023-08-24 | Stop reason: HOSPADM

## 2023-08-19 RX ORDER — MONTELUKAST SODIUM 10 MG/1
10 TABLET ORAL NIGHTLY
Status: DISCONTINUED | OUTPATIENT
Start: 2023-08-19 | End: 2023-08-24 | Stop reason: HOSPADM

## 2023-08-19 RX ORDER — AMOXICILLIN 250 MG
2 CAPSULE ORAL 2 TIMES DAILY
Status: DISCONTINUED | OUTPATIENT
Start: 2023-08-19 | End: 2023-08-24 | Stop reason: HOSPADM

## 2023-08-19 RX ORDER — LEVOTHYROXINE SODIUM 0.05 MG/1
50 TABLET ORAL DAILY
Status: DISCONTINUED | OUTPATIENT
Start: 2023-08-20 | End: 2023-08-24 | Stop reason: HOSPADM

## 2023-08-19 RX ORDER — BUDESONIDE AND FORMOTEROL FUMARATE DIHYDRATE 160; 4.5 UG/1; UG/1
2 AEROSOL RESPIRATORY (INHALATION)
Status: DISCONTINUED | OUTPATIENT
Start: 2023-08-19 | End: 2023-08-24 | Stop reason: HOSPADM

## 2023-08-19 RX ORDER — CALCIUM CARBONATE 500 MG/1
2 TABLET, CHEWABLE ORAL 2 TIMES DAILY PRN
Status: DISCONTINUED | OUTPATIENT
Start: 2023-08-19 | End: 2023-08-24 | Stop reason: HOSPADM

## 2023-08-19 RX ADMIN — SODIUM CHLORIDE 2000 MG: 9 INJECTION, SOLUTION INTRAVENOUS at 15:35

## 2023-08-19 RX ADMIN — Medication 10 ML: at 20:05

## 2023-08-19 RX ADMIN — IOPAMIDOL 73 ML: 755 INJECTION, SOLUTION INTRAVENOUS at 15:46

## 2023-08-19 RX ADMIN — GABAPENTIN 600 MG: 300 CAPSULE ORAL at 23:02

## 2023-08-19 RX ADMIN — HYDROCODONE BITARTRATE AND ACETAMINOPHEN 1 TABLET: 10; 325 TABLET ORAL at 23:02

## 2023-08-19 RX ADMIN — DOCUSATE SODIUM 50 MG AND SENNOSIDES 8.6 MG 2 TABLET: 8.6; 5 TABLET, FILM COATED ORAL at 20:06

## 2023-08-19 RX ADMIN — ONDANSETRON 4 MG: 2 INJECTION INTRAMUSCULAR; INTRAVENOUS at 16:21

## 2023-08-19 RX ADMIN — HYDROCODONE BITARTRATE AND ACETAMINOPHEN 1 TABLET: 5; 325 TABLET ORAL at 16:20

## 2023-08-19 RX ADMIN — IPRATROPIUM BROMIDE AND ALBUTEROL SULFATE 3 ML: 2.5; .5 SOLUTION RESPIRATORY (INHALATION) at 16:27

## 2023-08-19 RX ADMIN — SODIUM CHLORIDE 1000 ML: 9 INJECTION, SOLUTION INTRAVENOUS at 15:33

## 2023-08-19 RX ADMIN — ENOXAPARIN SODIUM 40 MG: 40 INJECTION SUBCUTANEOUS at 20:05

## 2023-08-19 RX ADMIN — IPRATROPIUM BROMIDE AND ALBUTEROL SULFATE 3 ML: .5; 2.5 SOLUTION RESPIRATORY (INHALATION) at 21:28

## 2023-08-19 RX ADMIN — DOXYCYCLINE 100 MG: 100 INJECTION, POWDER, LYOPHILIZED, FOR SOLUTION INTRAVENOUS at 16:26

## 2023-08-19 RX ADMIN — METHYLPREDNISOLONE SODIUM SUCCINATE 125 MG: 125 INJECTION, POWDER, FOR SOLUTION INTRAMUSCULAR; INTRAVENOUS at 16:24

## 2023-08-19 NOTE — ED NOTES
Pt provided urine collection supplies and advised the need for sample. Pt verbalized understanding. Pt unable to provide at this time

## 2023-08-19 NOTE — H&P
"    [facility] Medicine Services  History & Physical    Patient Identification:  Name:  Dexter Flores  Age:  53 y.o.  Sex:  male  :  1970  MRN:  [id number]   Visit Number:  [id number]  Admit Date: 2023   Primary Care Physician:  Jose F Reynolds    Subjective     Chief complaint: Shortness of breath    History of presenting illness:      Dexter Flores is a 53 y.o. male who presented for further evaluation of shortness of breath and cough. On exam he is diaphoretic and ill appearing. He reports onset of cough initially about 1 month ago. He states starting about 2 weeks ago he has become increasingly short of breath as well. He does have a history of COPD and uses inhalers daily at home. He states his cough has become productive and he has had subjective fever and chills as well with associated diaphoresis. He has not seen his PCP for this but has taken a steroid dose pack without significant relief. He is also reporting chest pain which he describes as a \"deep, sharp\" pain that is only present with cough. He denies any further associated symptoms, see ROS below. He denies significant heart history. He thinks he may have had a stress test in the past but is not sure. Does confirm hx HTN, GERD, and hypothyroidism as well as DDD.  He does also note an approximately 40-year smoking history though states he quit about 3 months ago.    Past medical history is significant for COPD, HTN, GERD, hypothyroidism, degenerative disc disease    Upon arrival to the ED, vital signs were temp 98.6, heart rate 100, respirations 20, /72, SPO2 97% on room air.  Notable laboratory findings include glucose 138, alk phos 167.  CRP is 17.62.  CBC with WBC count 23.73 and neutrophil percentage 82.1.  XR and CT imaging of the chest note right upper lobe consolidative, masslike opacity-differential includes pneumonia or mass.    Known Emergency Department medications received prior to my evaluation included " Rocephin, doxycycline, Norco, DuoNeb, Solu-Medrol 125, Zofran, saline bolus 1 L.   Emergency Department Room location at the time of my evaluation was 406.     ---------------------------------------------------------------------------------------------------------------------   Review of Systems   Constitutional:  Positive for chills, diaphoresis and fever.   HENT:  Negative for congestion and rhinorrhea.    Respiratory:  Positive for cough and shortness of breath.    Cardiovascular:  Positive for chest pain. Negative for palpitations.   Gastrointestinal:  Negative for abdominal pain, constipation, diarrhea, nausea and vomiting.   Genitourinary:  Negative for difficulty urinating and dysuria.   Musculoskeletal:  Negative for arthralgias and myalgias.   Skin:  Negative for rash and wound.   Neurological:  Negative for dizziness and light-headedness.      ---------------------------------------------------------------------------------------------------------------------   No past medical history on file.  No past surgical history on file.  No family history on file.  Social History     Socioeconomic History    Marital status:    Tobacco Use    Smoking status: Former     Types: Cigarettes     ---------------------------------------------------------------------------------------------------------------------   Allergies:  Patient has no known allergies.  ---------------------------------------------------------------------------------------------------------------------   Home medications:    Medications below are reported home medications pulling from within the system; at this time, these medications have not been reconciled unless otherwise specified and are in the verification process for further verifcation as current home medications.  (Not in a hospital admission)      Hospital Scheduled Meds:  doxycycline, 100 mg, Intravenous, Once           Current listed hospital scheduled medications may not yet  reflect those currently placed in orders that are signed and held awaiting patient's arrival to floor.   ---------------------------------------------------------------------------------------------------------------------     Objective     Vital Signs:  Temp:  [98.6 øF (37 øC)] 98.6 øF (37 øC)  Heart Rate:  [100-113] 100  Resp:  [20] 20  BP: ()/(53-73) 106/61      08/19/23  1403   Weight: 127 kg (280 lb)     Body mass index is 42.57 kg/mý.  ---------------------------------------------------------------------------------------------------------------------       Physical Exam  Vitals and nursing note reviewed.   Constitutional:       General: He is not in acute distress.     Appearance: He is ill-appearing and diaphoretic.   HENT:      Head: Normocephalic and atraumatic.   Eyes:      Extraocular Movements: Extraocular movements intact.      Conjunctiva/sclera: Conjunctivae normal.   Cardiovascular:      Rate and Rhythm: Normal rate and regular rhythm.   Pulmonary:      Effort: Pulmonary effort is normal. No respiratory distress.      Breath sounds: Wheezing and rhonchi present.   Abdominal:      Palpations: Abdomen is soft.      Tenderness: There is no abdominal tenderness.   Musculoskeletal:      Right lower leg: No edema.      Left lower leg: No edema.   Skin:     General: Skin is warm.   Neurological:      Mental Status: He is alert. Mental status is at baseline.   Psychiatric:         Mood and Affect: Mood normal.         Behavior: Behavior normal.             ---------------------------------------------------------------------------------------------------------------------  EKG:      ---------------------------------------------------------------------------------------------------------------------   Results from last 7 days   Lab Units 08/19/23  1453 08/19/23  1416   CRP mg/dL  --  17.62*   LACTATE mmol/L 1.5  --    WBC 10*3/mm3  --  23.73*   HEMOGLOBIN g/dL  --  13.6   HEMATOCRIT %  --  43.0   MCV  fL  --  88.7   MCHC g/dL  --  31.6   PLATELETS 10*3/mm3  --  589*     Results from last 7 days   Lab Units 08/19/23  1506   PH, ARTERIAL pH units 7.472*   PO2 ART mm Hg 65.0*   PCO2, ARTERIAL mm Hg 40.0   HCO3 ART mmol/L 29.2*     Results from last 7 days   Lab Units 08/19/23  1416   SODIUM mmol/L 136   POTASSIUM mmol/L 4.9   CHLORIDE mmol/L 98   CO2 mmol/L 25.4   BUN mg/dL 13   CREATININE mg/dL 0.93   CALCIUM mg/dL 9.5   GLUCOSE mg/dL 138*   ALBUMIN g/dL 3.3*   BILIRUBIN mg/dL 0.5   ALK PHOS U/L 167*   AST (SGOT) U/L 20   ALT (SGPT) U/L 23   Estimated Creatinine Clearance: 120.6 mL/min (by C-G formula based on SCr of 0.93 mg/dL).  No results found for: AMMONIA  Results from last 7 days   Lab Units 08/19/23  1638 08/19/23  1416   HSTROP T ng/L 8 9     Results from last 7 days   Lab Units 08/19/23  1416   PROBNP pg/mL 217.8     Lab Results   Component Value Date    HGBA1C 5.3 04/23/2014     No results found for: TSH, FREET4  No results found for: PREGTESTUR, PREGSERUM, HCG, HCGQUANT  Pain Management Panel           No data to display              No results found for: BLOODCX  No results found for: URINECX  No results found for: WOUNDCX  No results found for: STOOLCX      ---------------------------------------------------------------------------------------------------------------------  Imaging Results (Last 7 Days)       Procedure Component Value Units Date/Time    CT Angiogram Chest Pulmonary Embolism [[id number]] Collected: 08/19/23 1641     Updated: 08/19/23 1643    Narrative:      Comparison: None     Modality:  CTA chest W Contrast     Technique: CT angiography of the chest was obtained after uneventful IV  contrast injection. Coronal, axial and sagittal reformats were provided.        Findings:     No evidence of pulmonary emboli.  The pulmonary artery is normal in  diameter.     Right upper lobe consolidative masslike radiopacity seen without  dilation of the fissures.  There is also right paratracheal lymph  node  measuring up to 4.2 cm.     No pneumothorax or pleural effusion.     The heart size is normal.  No pericardial effusion.     The thoracic aorta is unremarkable.     The visualized portions of the upper abdomen are within normal limits.     Soft tissue and osseous structures are unremarkable.  Left eighth and ninth rib fractures are visualized       Impression:      Impression:     Right upper lobe consolidative and masslike airspace opacity without  violating the fissures.  Findings are concerning for infection.   However, considering the presence of an enlarged right paratracheal  lymph node measuring 4.2 cm.  Underlying mass cannot be ruled out.   Short-term follow-up after appropriate treatment is recommended.     Comparison: None     Modality:  CTA chest W Contrast     Technique: CT angiography of the chest was obtained after uneventful IV  contrast injection. Coronal, axial and sagittal reformats were provided.        Findings:     No evidence of pulmonary emboli.  The pulmonary artery is normal in  diameter.     Right upper lobe consolidative masslike radiopacity seen without  dilation of the fissures.  There is also right paratracheal lymph node  measuring up to 4.2 cm.     No pneumothorax or pleural effusion.     The heart size is normal.  No pericardial effusion.     The thoracic aorta is unremarkable.     The visualized portions of the upper abdomen are within normal limits.     Soft tissue and osseous structures are unremarkable.  Left eighth and ninth rib fractures are visualized     Impression:     Right upper lobe consolidative and masslike airspace opacity without  violating the fissures.  Findings are concerning for infection.   However, considering the presence of an enlarged right paratracheal  lymph node measuring 4.2 cm.  Underlying mass cannot be ruled out.   Short-term follow-up after appropriate treatment is recommended.     This report was finalized on 8/19/2023 4:41 PM by Tabby  MD Memo.       XR Chest 2 View [[id number]] Collected: 08/19/23 1557     Updated: 08/19/23 1601    Narrative:            PA and lateral view chest.  No priors.     Findings: Near total opacification of the right upper lobe is present  with subtle air bronchograms noted.  Left lung unremarkable.   Cardiomediastinal silhouette within normal limits.     Osseous structures intact  With degenerative change throughout the  thoracic spine.       Impression:         Near total opacification of the right upper lobe with the differential  diagnosis including pneumonia, mass, postsurgical change, please  correlate clinically.     This report was finalized on 8/19/2023 3:59 PM by Stacy Mills MD.               Cultures:  No results found for: BLOODCX, URINECX, WOUNDCX, MRSACX, RESPCX, STOOLCX    Last echocardiogram:          I have personally reviewed the above radiology images and read the final radiology report on 08/19/23  ---------------------------------------------------------------------------------------------------------------------  Assessment / Plan     Active Hospital Problems    Diagnosis  POA    **Sepsis [A41.9]  Yes       ASSESSMENT/PLAN:    Sepsis, POA, 2/2 right upper lobe pneumonia  In the setting of COPD in acute exacerbation  Hyperglycemia, without history of DM, possibly 2/2 recent steroid use  Patient presents secondary to shortness of breath and cough present for 1 month, worsening over the past 2 weeks.  Work-up revealed dense, masslike pneumonia in the right upper lobe.  Patient meets SIRS/sepsis criteria on arrival with tachycardia, leukocytosis.  CRP is 17.62.  He received Rocephin and doxycycline in the ED as well as Solu-Medrol 125 mg IV x1  He will be admitted to [facility] for further work-up and management  We will continue Rocephin, doxycycline  Solu-Medrol 40 mg twice daily, Mucomyst nebs, DuoNebs and Tessalon as needed  We will consult pulmonology given masslike appearance and possible  need for bronchoscopy  Repeat labs in the a.m. we will obtain sputum culture if able.  Follow-up blood culture result  Monitor I's and O's  Provide COPD education  Encourage incentive spirometry    Chronic:  HTN  GERD  Hypothyroidism  Hx tobacco use  Degenerative disc disease  Plan to restart home meds as indicated per med rec  Monitor vital signs per protocol  Encourage continued tobacco cessation  ----------  -DVT prophylaxis: Lovenox  -Activity: Ad kim.  -Expected length of stay: INPATIENT status due to the need for care which can only be reasonably provided in an hospital setting such as aggressive/expedited ancillary services and/or consultation services, the necessity for IV medications, close physician monitoring and/or the possible need for procedures.  In such, I feel patient's risk for adverse outcomes and need for care warrant INPATIENT evaluation and predict the patient's care encounter to likely last beyond 2 midnights.   -Disposition pending course and further work-up    High risk secondary to sepsis secondary to pneumonia in the setting of COPD in acute exacerbation    There are no questions and answers to display.       Jaspal Martinez PA-C   08/19/23  17:24 EDT

## 2023-08-19 NOTE — ED PROVIDER NOTES
Subjective   History of Present Illness  54 yo male patient with hx of HTN, GERD, chronic neck and back pain, and COPD presents to the ED with complaints of right anterior chest pain, coughing, SOB, and wheezing. Pt states he has been symptomatic for 3 weeks but has continued to worsen. Pt states that he has had increased fatigue. Subjective fever.  Pt reports exertion worsens his symptoms. Denies any alleviating factors.  Pt states that he thinks he has pneumonia.     History provided by:  Patient   used: No      Review of Systems   Constitutional:  Positive for chills, fatigue and fever.   HENT: Negative.     Eyes: Negative.    Respiratory:  Positive for cough, chest tightness, shortness of breath and wheezing.    Cardiovascular:  Positive for chest pain.   Gastrointestinal: Negative.    Endocrine: Negative.    Genitourinary: Negative.    Musculoskeletal: Negative.    Skin: Negative.    Allergic/Immunologic: Negative.    Neurological: Negative.    Hematological: Negative.    Psychiatric/Behavioral: Negative.     All other systems reviewed and are negative.    Past Medical History:   Diagnosis Date    COPD (chronic obstructive pulmonary disease)     GERD (gastroesophageal reflux disease)     Hypertension     Thyroid disease        No Known Allergies    No past surgical history on file.    No family history on file.    Social History     Socioeconomic History    Marital status:    Tobacco Use    Smoking status: Former     Types: Cigarettes           Objective   Physical Exam  Vitals and nursing note reviewed.   Constitutional:       Appearance: He is well-developed and normal weight. He is ill-appearing.   HENT:      Head: Normocephalic and atraumatic.      Mouth/Throat:      Mouth: Mucous membranes are moist.      Pharynx: Oropharynx is clear.   Eyes:      Extraocular Movements: Extraocular movements intact.      Pupils: Pupils are equal, round, and reactive to light.   Cardiovascular:       Rate and Rhythm: Normal rate and regular rhythm.      Pulses: Normal pulses.      Heart sounds: Normal heart sounds.   Pulmonary:      Effort: Pulmonary effort is normal.      Breath sounds: Wheezing present.   Abdominal:      General: Bowel sounds are normal.      Palpations: Abdomen is soft.   Musculoskeletal:         General: Normal range of motion.      Cervical back: Normal range of motion and neck supple.   Skin:     General: Skin is warm and dry.      Capillary Refill: Capillary refill takes less than 2 seconds.   Neurological:      General: No focal deficit present.      Mental Status: He is alert and oriented to person, place, and time.   Psychiatric:         Mood and Affect: Mood normal.         Behavior: Behavior normal.       Procedures           ED Course  ED Course as of 08/19/23 1727   Sat Aug 19, 2023   1411 ECG 12 Lead Dyspnea  Sinus tachycardia, rate 119, QTc 427, no acute ST or T wave changes [CW]   1612 XR Chest 2 View  IMPRESSION:     Near total opacification of the right upper lobe with the differential  diagnosis including pneumonia, mass, postsurgical change, please  correlate clinically.     This report was finalized on 8/19/2023 3:59 PM by Stacy Mills MD.           Specimen Collected: 08/19/23 15:57 EDT Last Resulted: 08/19/23 15:59 EDT            [ML]   1656 CT Angiogram Chest Pulmonary Embolism  Impression:     Right upper lobe consolidative and masslike airspace opacity without  violating the fissures.  Findings are concerning for infection.   However, considering the presence of an enlarged right paratracheal  lymph node measuring 4.2 cm.  Underlying mass cannot be ruled out.   Short-term follow-up after appropriate treatment is recommended.     This report was finalized on 8/19/2023 4:41 PM by Tabby Hampton MD.           Specimen Collected: 08/19/23 16:41 EDT Last Resulted: 08/19/23 16:41 EDT         [ML]   [id number] Paged hospitalist  [ML]   1716 Dr. Mendiola will admit.  [ML]       ED Course User Index  [CW] Clement Green DO  [ML] Hope Orourke PA           Results for orders placed or performed during the hospital encounter of 08/19/23   COVID-19 and FLU A/B PCR - Swab, Nasopharynx    Specimen: Nasopharynx; Swab   Result Value Ref Range    COVID19 Not Detected Not Detected - Ref. Range    Influenza A PCR Not Detected Not Detected    Influenza B PCR Not Detected Not Detected   Comprehensive Metabolic Panel    Specimen: Blood   Result Value Ref Range    Glucose 138 (H) 65 - 99 mg/dL    BUN 13 6 - 20 mg/dL    Creatinine 0.93 0.76 - 1.27 mg/dL    Sodium 136 136 - 145 mmol/L    Potassium 4.9 3.5 - 5.2 mmol/L    Chloride 98 98 - 107 mmol/L    CO2 25.4 22.0 - 29.0 mmol/L    Calcium 9.5 8.6 - 10.5 mg/dL    Total Protein 7.8 6.0 - 8.5 g/dL    Albumin 3.3 (L) 3.5 - 5.2 g/dL    ALT (SGPT) 23 1 - 41 U/L    AST (SGOT) 20 1 - 40 U/L    Alkaline Phosphatase 167 (H) 39 - 117 U/L    Total Bilirubin 0.5 0.0 - 1.2 mg/dL    Globulin 4.5 gm/dL    A/G Ratio 0.7 g/dL    BUN/Creatinine Ratio 14.0 7.0 - 25.0    Anion Gap 12.6 5.0 - 15.0 mmol/L    eGFR 98.8 >60.0 mL/min/1.73   High Sensitivity Troponin T    Specimen: Blood   Result Value Ref Range    HS Troponin T 9 <15 ng/L   C-reactive Protein    Specimen: Blood   Result Value Ref Range    C-Reactive Protein 17.62 (H) 0.00 - 0.50 mg/dL   CBC Auto Differential    Specimen: Blood   Result Value Ref Range    WBC 23.73 (H) 3.40 - 10.80 10*3/mm3    RBC 4.85 4.14 - 5.80 10*6/mm3    Hemoglobin 13.6 13.0 - 17.7 g/dL    Hematocrit 43.0 37.5 - 51.0 %    MCV 88.7 79.0 - 97.0 fL    MCH 28.0 26.6 - 33.0 pg    MCHC 31.6 31.5 - 35.7 g/dL    RDW 12.0 (L) 12.3 - 15.4 %    RDW-SD 38.7 37.0 - 54.0 fl    MPV 9.3 6.0 - 12.0 fL    Platelets 589 (H) 140 - 450 10*3/mm3    Neutrophil % 82.1 (H) 42.7 - 76.0 %    Lymphocyte % 7.8 (L) 19.6 - 45.3 %    Monocyte % 7.5 5.0 - 12.0 %    Eosinophil % 1.5 0.3 - 6.2 %    Basophil % 0.5 0.0 - 1.5 %    Immature Grans % 0.6 (H)  0.0 - 0.5 %    Neutrophils, Absolute 19.46 (H) 1.70 - 7.00 10*3/mm3    Lymphocytes, Absolute 1.85 0.70 - 3.10 10*3/mm3    Monocytes, Absolute 1.79 (H) 0.10 - 0.90 10*3/mm3    Eosinophils, Absolute 0.35 0.00 - 0.40 10*3/mm3    Basophils, Absolute 0.13 0.00 - 0.20 10*3/mm3    Immature Grans, Absolute 0.15 (H) 0.00 - 0.05 10*3/mm3    nRBC 0.0 0.0 - 0.2 /100 WBC   Lactic Acid, Plasma    Specimen: Arm, Right; Blood   Result Value Ref Range    Lactate 1.5 0.5 - 2.0 mmol/L   High Sensitivity Troponin T 2Hr    Specimen: Blood   Result Value Ref Range    HS Troponin T 8 <15 ng/L    Troponin T Delta -1 >=-4 - <+4 ng/L   Blood Gas, Arterial With Co-Ox    Specimen: Arterial Blood   Result Value Ref Range    Site Left Radial     Marty's Test Positive     pH, Arterial 7.472 (H) 7.350 - 7.450 pH units    pCO2, Arterial 40.0 35.0 - 45.0 mm Hg    pO2, Arterial 65.0 (L) 83.0 - 108.0 mm Hg    HCO3, Arterial 29.2 (H) 20.0 - 26.0 mmol/L    Base Excess, Arterial 5.2 (H) 0.0 - 2.0 mmol/L    O2 Saturation, Arterial 94.5 94.0 - 99.0 %    Hemoglobin, Blood Gas 13.5 (L) 14 - 18 g/dL    Hematocrit, Blood Gas 41.4 38.0 - 51.0 %    Oxyhemoglobin 93.0 (L) 94 - 99 %    Methemoglobin <-0.10 (L) 0.00 - 3.00 %    Carboxyhemoglobin 1.8 0 - 5 %    A-a DO2 33.1 0.0 - 300.0 mmHg    CO2 Content 30.5 22 - 33 mmol/L    Barometric Pressure for Blood Gas 729 mmHg    Modality Room Air     FIO2 21 %    Ventilator Mode NA     Collected by [id number]     pH, Temp Corrected      pCO2, Temperature Corrected      pO2, Temperature Corrected     BNP    Specimen: Blood   Result Value Ref Range    proBNP 217.8 0.0 - 900.0 pg/mL   Green Top (Gel)   Result Value Ref Range    Extra Tube Hold for add-ons.    Lavender Top   Result Value Ref Range    Extra Tube hold for add-on    Gold Top - SST   Result Value Ref Range    Extra Tube Hold for add-ons.    Light Blue Top   Result Value Ref Range    Extra Tube Hold for add-ons.                                      Medical Decision  Making  52 yo male patient with hx of HTN, GERD, chronic neck and back pain, and COPD presents to the ED with complaints of right anterior chest pain, coughing, SOB, and wheezing. Pt states he has been symptomatic for 3 weeks but has continued to worsen. Pt states that he has had increased fatigue. Subjective fever.  Pt reports exertion worsens his symptoms. Denies any alleviating factors.  Pt states that he thinks he has pneumonia. PT will be admitted to the hospitalist service.     Problems Addressed:  Mass of right lung: complicated acute illness or injury  Pneumonia of right upper lobe due to infectious organism: complicated acute illness or injury    Amount and/or Complexity of Data Reviewed  Labs: ordered.  Radiology: ordered. Decision-making details documented in ED Course.  ECG/medicine tests: ordered.    Risk  Prescription drug management.  Decision regarding hospitalization.        Final diagnoses:   Pneumonia of right upper lobe due to infectious organism   Mass of right lung       ED Disposition  ED Disposition       ED Disposition   Decision to Admit    Condition   --    Comment   Level of Care: Med/Surg [1]   Diagnosis: Sepsis [8035825]   Admitting Physician: MACEY AKBAR [[id number]]   Certification: I Certify That Inpatient Hospital Services Are Medically Necessary For Greater Than 2 Midnights                 No follow-up provider specified.       Medication List      No changes were made to your prescriptions during this visit.            Hope Orourke PA  08/19/23 1727       Hope Orourke PA  08/19/23 1727

## 2023-08-20 LAB
ALBUMIN SERPL-MCNC: 2.6 G/DL (ref 3.5–5.2)
ALBUMIN/GLOB SERPL: 0.5 G/DL
ALP SERPL-CCNC: 149 U/L (ref 39–117)
ALT SERPL W P-5'-P-CCNC: 17 U/L (ref 1–41)
ANION GAP SERPL CALCULATED.3IONS-SCNC: 13.1 MMOL/L (ref 5–15)
AST SERPL-CCNC: 14 U/L (ref 1–40)
BACTERIA BLD CULT: ABNORMAL
BACTERIA ID TEST ISLT QL CULT: ABNORMAL
BASOPHILS # BLD AUTO: 0.02 10*3/MM3 (ref 0–0.2)
BASOPHILS NFR BLD AUTO: 0.1 % (ref 0–1.5)
BILIRUB SERPL-MCNC: 0.3 MG/DL (ref 0–1.2)
BOTTLE TYPE: ABNORMAL
BUN SERPL-MCNC: 16 MG/DL (ref 6–20)
BUN/CREAT SERPL: 20 (ref 7–25)
CALCIUM SPEC-SCNC: 9.4 MG/DL (ref 8.6–10.5)
CHLORIDE SERPL-SCNC: 100 MMOL/L (ref 98–107)
CO2 SERPL-SCNC: 23.9 MMOL/L (ref 22–29)
CREAT SERPL-MCNC: 0.8 MG/DL (ref 0.76–1.27)
DEPRECATED RDW RBC AUTO: 39.9 FL (ref 37–54)
EGFRCR SERPLBLD CKD-EPI 2021: 106.5 ML/MIN/1.73
EOSINOPHIL # BLD AUTO: 0 10*3/MM3 (ref 0–0.4)
EOSINOPHIL NFR BLD AUTO: 0 % (ref 0.3–6.2)
ERYTHROCYTE [DISTWIDTH] IN BLOOD BY AUTOMATED COUNT: 11.9 % (ref 12.3–15.4)
GLOBULIN UR ELPH-MCNC: 4.9 GM/DL
GLUCOSE SERPL-MCNC: 156 MG/DL (ref 65–99)
HCT VFR BLD AUTO: 42.7 % (ref 37.5–51)
HGB BLD-MCNC: 13.3 G/DL (ref 13–17.7)
IMM GRANULOCYTES # BLD AUTO: 0.16 10*3/MM3 (ref 0–0.05)
IMM GRANULOCYTES NFR BLD AUTO: 0.9 % (ref 0–0.5)
LYMPHOCYTES # BLD AUTO: 1.07 10*3/MM3 (ref 0.7–3.1)
LYMPHOCYTES NFR BLD AUTO: 5.9 % (ref 19.6–45.3)
MCH RBC QN AUTO: 28.7 PG (ref 26.6–33)
MCHC RBC AUTO-ENTMCNC: 31.1 G/DL (ref 31.5–35.7)
MCV RBC AUTO: 92 FL (ref 79–97)
MONOCYTES # BLD AUTO: 0.3 10*3/MM3 (ref 0.1–0.9)
MONOCYTES NFR BLD AUTO: 1.7 % (ref 5–12)
NEUTROPHILS NFR BLD AUTO: 16.52 10*3/MM3 (ref 1.7–7)
NEUTROPHILS NFR BLD AUTO: 91.4 % (ref 42.7–76)
NRBC BLD AUTO-RTO: 0 /100 WBC (ref 0–0.2)
PLATELET # BLD AUTO: 507 10*3/MM3 (ref 140–450)
PMV BLD AUTO: 10.6 FL (ref 6–12)
POTASSIUM SERPL-SCNC: 4.8 MMOL/L (ref 3.5–5.2)
PROT SERPL-MCNC: 7.5 G/DL (ref 6–8.5)
QT INTERVAL: 304 MS
QTC INTERVAL: 427 MS
RBC # BLD AUTO: 4.64 10*6/MM3 (ref 4.14–5.8)
S PNEUM AG SPEC QL LA: NEGATIVE
SODIUM SERPL-SCNC: 137 MMOL/L (ref 136–145)
WBC NRBC COR # BLD: 18.07 10*3/MM3 (ref 3.4–10.8)

## 2023-08-20 PROCEDURE — 94760 N-INVAS EAR/PLS OXIMETRY 1: CPT

## 2023-08-20 PROCEDURE — 25010000002 VANCOMYCIN 5 G RECONSTITUTED SOLUTION: Performed by: INTERNAL MEDICINE

## 2023-08-20 PROCEDURE — 87040 BLOOD CULTURE FOR BACTERIA: CPT | Performed by: NURSE PRACTITIONER

## 2023-08-20 PROCEDURE — 99222 1ST HOSP IP/OBS MODERATE 55: CPT | Performed by: NURSE PRACTITIONER

## 2023-08-20 PROCEDURE — 94799 UNLISTED PULMONARY SVC/PX: CPT

## 2023-08-20 PROCEDURE — 99232 SBSQ HOSP IP/OBS MODERATE 35: CPT

## 2023-08-20 PROCEDURE — 25010000002 ENOXAPARIN PER 10 MG: Performed by: INTERNAL MEDICINE

## 2023-08-20 PROCEDURE — 25010000002 PIPERACILLIN SOD-TAZOBACTAM PER 1 G: Performed by: INTERNAL MEDICINE

## 2023-08-20 PROCEDURE — 25010000002 MORPHINE PER 10 MG: Performed by: INTERNAL MEDICINE

## 2023-08-20 PROCEDURE — 25010000002 METHYLPREDNISOLONE PER 40 MG: Performed by: INTERNAL MEDICINE

## 2023-08-20 PROCEDURE — 94664 DEMO&/EVAL PT USE INHALER: CPT

## 2023-08-20 PROCEDURE — 80053 COMPREHEN METABOLIC PANEL: CPT | Performed by: INTERNAL MEDICINE

## 2023-08-20 PROCEDURE — 25010000002 KETOROLAC TROMETHAMINE PER 15 MG

## 2023-08-20 PROCEDURE — 85025 COMPLETE CBC W/AUTO DIFF WBC: CPT | Performed by: INTERNAL MEDICINE

## 2023-08-20 RX ORDER — VANCOMYCIN/0.9 % SOD CHLORIDE 1.5G/250ML
1500 PLASTIC BAG, INJECTION (ML) INTRAVENOUS EVERY 12 HOURS
Status: DISCONTINUED | OUTPATIENT
Start: 2023-08-20 | End: 2023-08-20

## 2023-08-20 RX ORDER — KETOROLAC TROMETHAMINE 30 MG/ML
30 INJECTION, SOLUTION INTRAMUSCULAR; INTRAVENOUS ONCE
Status: COMPLETED | OUTPATIENT
Start: 2023-08-20 | End: 2023-08-20

## 2023-08-20 RX ORDER — GLYCOPYRROLATE AND FORMOTEROL FUMARATE 9; 4.8 UG/1; UG/1
2 AEROSOL, METERED RESPIRATORY (INHALATION) 2 TIMES DAILY
Qty: 10.7 G | Refills: 0 | Status: SHIPPED | OUTPATIENT
Start: 2023-08-20 | End: 2023-09-01

## 2023-08-20 RX ADMIN — PIPERACILLIN SODIUM AND TAZOBACTAM SODIUM 4.5 G: 4; .5 INJECTION, POWDER, LYOPHILIZED, FOR SOLUTION INTRAVENOUS at 16:44

## 2023-08-20 RX ADMIN — METHYLPREDNISOLONE SODIUM SUCCINATE 40 MG: 40 INJECTION, POWDER, FOR SOLUTION INTRAMUSCULAR; INTRAVENOUS at 16:44

## 2023-08-20 RX ADMIN — TRAZODONE HYDROCHLORIDE 100 MG: 50 TABLET ORAL at 01:20

## 2023-08-20 RX ADMIN — KETOROLAC TROMETHAMINE 30 MG: 30 INJECTION, SOLUTION INTRAMUSCULAR; INTRAVENOUS at 22:04

## 2023-08-20 RX ADMIN — GABAPENTIN 600 MG: 300 CAPSULE ORAL at 21:20

## 2023-08-20 RX ADMIN — LISINOPRIL 10 MG: 10 TABLET ORAL at 08:23

## 2023-08-20 RX ADMIN — METHYLPREDNISOLONE SODIUM SUCCINATE 40 MG: 40 INJECTION, POWDER, FOR SOLUTION INTRAMUSCULAR; INTRAVENOUS at 05:33

## 2023-08-20 RX ADMIN — GABAPENTIN 600 MG: 300 CAPSULE ORAL at 05:34

## 2023-08-20 RX ADMIN — DOXYCYCLINE 100 MG: 100 INJECTION, POWDER, LYOPHILIZED, FOR SOLUTION INTRAVENOUS at 14:49

## 2023-08-20 RX ADMIN — GABAPENTIN 600 MG: 300 CAPSULE ORAL at 13:23

## 2023-08-20 RX ADMIN — ENOXAPARIN SODIUM 40 MG: 40 INJECTION SUBCUTANEOUS at 21:20

## 2023-08-20 RX ADMIN — HYDROCODONE BITARTRATE AND ACETAMINOPHEN 1 TABLET: 10; 325 TABLET ORAL at 16:44

## 2023-08-20 RX ADMIN — DOCUSATE SODIUM 50 MG AND SENNOSIDES 8.6 MG 2 TABLET: 8.6; 5 TABLET, FILM COATED ORAL at 08:23

## 2023-08-20 RX ADMIN — BUDESONIDE AND FORMOTEROL FUMARATE DIHYDRATE 2 PUFF: 160; 4.5 AEROSOL RESPIRATORY (INHALATION) at 06:53

## 2023-08-20 RX ADMIN — HYDROCODONE BITARTRATE AND ACETAMINOPHEN 1 TABLET: 10; 325 TABLET ORAL at 08:25

## 2023-08-20 RX ADMIN — LEVOTHYROXINE SODIUM 50 MCG: 50 TABLET ORAL at 08:23

## 2023-08-20 RX ADMIN — BUDESONIDE AND FORMOTEROL FUMARATE DIHYDRATE 2 PUFF: 160; 4.5 AEROSOL RESPIRATORY (INHALATION) at 19:14

## 2023-08-20 RX ADMIN — VANCOMYCIN HYDROCHLORIDE 2500 MG: 5 INJECTION, POWDER, LYOPHILIZED, FOR SOLUTION INTRAVENOUS at 11:26

## 2023-08-20 RX ADMIN — IPRATROPIUM BROMIDE AND ALBUTEROL SULFATE 3 ML: .5; 2.5 SOLUTION RESPIRATORY (INHALATION) at 19:14

## 2023-08-20 RX ADMIN — Medication 10 ML: at 21:22

## 2023-08-20 RX ADMIN — PIPERACILLIN SODIUM AND TAZOBACTAM SODIUM 4.5 G: 4; .5 INJECTION, POWDER, LYOPHILIZED, FOR SOLUTION INTRAVENOUS at 11:26

## 2023-08-20 RX ADMIN — MONTELUKAST SODIUM 10 MG: 10 TABLET, COATED ORAL at 21:20

## 2023-08-20 RX ADMIN — PANTOPRAZOLE SODIUM 40 MG: 40 TABLET, DELAYED RELEASE ORAL at 08:23

## 2023-08-20 RX ADMIN — MORPHINE SULFATE 4 MG: 4 INJECTION, SOLUTION INTRAMUSCULAR; INTRAVENOUS at 13:20

## 2023-08-20 RX ADMIN — TRAZODONE HYDROCHLORIDE 100 MG: 50 TABLET ORAL at 21:20

## 2023-08-20 RX ADMIN — DOXYCYCLINE 100 MG: 100 INJECTION, POWDER, LYOPHILIZED, FOR SOLUTION INTRAVENOUS at 05:34

## 2023-08-20 RX ADMIN — SODIUM CHLORIDE 40 ML: 9 INJECTION, SOLUTION INTRAVENOUS at 05:34

## 2023-08-20 RX ADMIN — IPRATROPIUM BROMIDE AND ALBUTEROL SULFATE 3 ML: .5; 2.5 SOLUTION RESPIRATORY (INHALATION) at 12:32

## 2023-08-20 RX ADMIN — MONTELUKAST SODIUM 10 MG: 10 TABLET, COATED ORAL at 01:20

## 2023-08-20 NOTE — PROGRESS NOTES
Kinetics :    vancomycin   day 1    The patient has been evaluated for vancomycin therapy for bacteremia / pneumonia.    We will load the patient with vancomycin 2500mg x 1 and follow with 1500mg q 12 hrs to maximize the auc / trough level projections and monitor with you.        The zosyn regimen shall be 4.5 gm q 8 hrs as the extended infusion protocol.

## 2023-08-20 NOTE — PAYOR COMM NOTE
"[facility]  [facility]  PHONE  [phone]  FAX:  [phone]  NPI:  [id number]    REQUEST FOR INPATIENT AUTH    Dexter Flores (53 y.o. Male)       Date of Birth   1970    Social Security Number       Address   [address] [facility] [address]    Home Phone   [phone]    MRN   [id number]       Amish   None    Marital Status                               Admission Date   8/19/23    Admission Type   Emergency    Admitting Provider   Ilya Mendiola DO    Attending Provider   Leonardo Jackson DO    Department, Room/Bed   [address], 3336/1P       Discharge Date       Discharge Disposition       Discharge Destination                                 Attending Provider: Leonardo Jackson DO    Allergies: No Known Allergies    Isolation: None   Infection: COVID (rule out) (08/19/23)   Code Status: CPR    Ht: 172.7 cm (68\")   Wt: 131 kg (289 lb 3.9 oz)    Admission Cmt: None   Principal Problem: Sepsis [A41.9]                   Active Insurance as of 8/19/2023       Primary Coverage       Payor Plan Insurance Group Employer/Plan Group    [employer] [place] [employer] [place]ICA[place]        Payor Plan Address Payor Plan Phone Number Payor Plan Fax Number Effective Dates    [address] [address] [phone]  12/2/2019 - None Entered    [facility] [id number]         Subscriber Name Subscriber Birth Date Member ID       DEXTER FLORES 1970 [id number]                     Emergency Contacts        (Rel.) Home Phone Work Phone Mobile Phone    LLOYD FLORES (Brother) [phone] -- [phone]    Tara Flores (Relative) -- -- [phone]                 History & Physical        Jaspal Martinez PA-C at 08/19/23 1724       Attestation signed by Ilya Mendiola DO at 08/19/23 1819    I have reviewed this documentation and agree. Pt seen and examined in the ED. Treatment plan discussed with patient and PAWARD.                 " "      [facility] Medicine Services  History & Physical    Patient Identification:  Name:  Dexter Flores  Age:  53 y.o.  Sex:  male  :  1970  MRN:  [id number]   Visit Number:  [id number]  Admit Date: 2023   Primary Care Physician:  Jose F Reynolds    Subjective     Chief complaint: Shortness of breath    History of presenting illness:      Dexter Flores is a 53 y.o. male who presented for further evaluation of shortness of breath and cough. On exam he is diaphoretic and ill appearing. He reports onset of cough initially about 1 month ago. He states starting about 2 weeks ago he has become increasingly short of breath as well. He does have a history of COPD and uses inhalers daily at home. He states his cough has become productive and he has had subjective fever and chills as well with associated diaphoresis. He has not seen his PCP for this but has taken a steroid dose pack without significant relief. He is also reporting chest pain which he describes as a \"deep, sharp\" pain that is only present with cough. He denies any further associated symptoms, see ROS below. He denies significant heart history. He thinks he may have had a stress test in the past but is not sure. Does confirm hx HTN, GERD, and hypothyroidism as well as DDD.  He does also note an approximately 40-year smoking history though states he quit about 3 months ago.    Past medical history is significant for COPD, HTN, GERD, hypothyroidism, degenerative disc disease    Upon arrival to the ED, vital signs were temp 98.6, heart rate 100, respirations 20, /72, SPO2 97% on room air.  Notable laboratory findings include glucose 138, alk phos 167.  CRP is 17.62.  CBC with WBC count 23.73 and neutrophil percentage 82.1.  XR and CT imaging of the chest note right upper lobe consolidative, masslike opacity-differential includes pneumonia or mass.    Known Emergency Department medications received prior to my evaluation included " Rocephin, doxycycline, Norco, DuoNeb, Solu-Medrol 125, Zofran, saline bolus 1 L.   Emergency Department Room location at the time of my evaluation was 406.     ---------------------------------------------------------------------------------------------------------------------   Review of Systems   Constitutional:  Positive for chills, diaphoresis and fever.   HENT:  Negative for congestion and rhinorrhea.    Respiratory:  Positive for cough and shortness of breath.    Cardiovascular:  Positive for chest pain. Negative for palpitations.   Gastrointestinal:  Negative for abdominal pain, constipation, diarrhea, nausea and vomiting.   Genitourinary:  Negative for difficulty urinating and dysuria.   Musculoskeletal:  Negative for arthralgias and myalgias.   Skin:  Negative for rash and wound.   Neurological:  Negative for dizziness and light-headedness.      ---------------------------------------------------------------------------------------------------------------------   No past medical history on file.  No past surgical history on file.  No family history on file.  Social History     Socioeconomic History    Marital status:    Tobacco Use    Smoking status: Former     Types: Cigarettes     ---------------------------------------------------------------------------------------------------------------------   Allergies:  Patient has no known allergies.  ---------------------------------------------------------------------------------------------------------------------   Home medications:    Medications below are reported home medications pulling from within the system; at this time, these medications have not been reconciled unless otherwise specified and are in the verification process for further verifcation as current home medications.  (Not in a hospital admission)      Hospital Scheduled Meds:  doxycycline, 100 mg, Intravenous, Once           Current listed hospital scheduled medications may not yet  reflect those currently placed in orders that are signed and held awaiting patient's arrival to floor.   ---------------------------------------------------------------------------------------------------------------------     Objective     Vital Signs:  Temp:  [98.6 øF (37 øC)] 98.6 øF (37 øC)  Heart Rate:  [100-113] 100  Resp:  [20] 20  BP: ()/(53-73) 106/61      08/19/23  1403   Weight: 127 kg (280 lb)     Body mass index is 42.57 kg/mý.  ---------------------------------------------------------------------------------------------------------------------       Physical Exam  Vitals and nursing note reviewed.   Constitutional:       General: He is not in acute distress.     Appearance: He is ill-appearing and diaphoretic.   HENT:      Head: Normocephalic and atraumatic.   Eyes:      Extraocular Movements: Extraocular movements intact.      Conjunctiva/sclera: Conjunctivae normal.   Cardiovascular:      Rate and Rhythm: Normal rate and regular rhythm.   Pulmonary:      Effort: Pulmonary effort is normal. No respiratory distress.      Breath sounds: Wheezing and rhonchi present.   Abdominal:      Palpations: Abdomen is soft.      Tenderness: There is no abdominal tenderness.   Musculoskeletal:      Right lower leg: No edema.      Left lower leg: No edema.   Skin:     General: Skin is warm.   Neurological:      Mental Status: He is alert. Mental status is at baseline.   Psychiatric:         Mood and Affect: Mood normal.         Behavior: Behavior normal.             ---------------------------------------------------------------------------------------------------------------------  EKG:      ---------------------------------------------------------------------------------------------------------------------   Results from last 7 days   Lab Units 08/19/23  1453 08/19/23  1416   CRP mg/dL  --  17.62*   LACTATE mmol/L 1.5  --    WBC 10*3/mm3  --  23.73*   HEMOGLOBIN g/dL  --  13.6   HEMATOCRIT %  --  43.0   MCV  fL  --  88.7   MCHC g/dL  --  31.6   PLATELETS 10*3/mm3  --  589*     Results from last 7 days   Lab Units 08/19/23  1506   PH, ARTERIAL pH units 7.472*   PO2 ART mm Hg 65.0*   PCO2, ARTERIAL mm Hg 40.0   HCO3 ART mmol/L 29.2*     Results from last 7 days   Lab Units 08/19/23  1416   SODIUM mmol/L 136   POTASSIUM mmol/L 4.9   CHLORIDE mmol/L 98   CO2 mmol/L 25.4   BUN mg/dL 13   CREATININE mg/dL 0.93   CALCIUM mg/dL 9.5   GLUCOSE mg/dL 138*   ALBUMIN g/dL 3.3*   BILIRUBIN mg/dL 0.5   ALK PHOS U/L 167*   AST (SGOT) U/L 20   ALT (SGPT) U/L 23   Estimated Creatinine Clearance: 120.6 mL/min (by C-G formula based on SCr of 0.93 mg/dL).  No results found for: AMMONIA  Results from last 7 days   Lab Units 08/19/23  1638 08/19/23  1416   HSTROP T ng/L 8 9     Results from last 7 days   Lab Units 08/19/23  1416   PROBNP pg/mL 217.8     Lab Results   Component Value Date    HGBA1C 5.3 04/23/2014     No results found for: TSH, FREET4  No results found for: PREGTESTUR, PREGSERUM, HCG, HCGQUANT  Pain Management Panel           No data to display              No results found for: BLOODCX  No results found for: URINECX  No results found for: WOUNDCX  No results found for: STOOLCX      ---------------------------------------------------------------------------------------------------------------------  Imaging Results (Last 7 Days)       Procedure Component Value Units Date/Time    CT Angiogram Chest Pulmonary Embolism [[id number]] Collected: 08/19/23 1641     Updated: 08/19/23 1643    Narrative:      Comparison: None     Modality:  CTA chest W Contrast     Technique: CT angiography of the chest was obtained after uneventful IV  contrast injection. Coronal, axial and sagittal reformats were provided.        Findings:     No evidence of pulmonary emboli.  The pulmonary artery is normal in  diameter.     Right upper lobe consolidative masslike radiopacity seen without  dilation of the fissures.  There is also right paratracheal lymph  node  measuring up to 4.2 cm.     No pneumothorax or pleural effusion.     The heart size is normal.  No pericardial effusion.     The thoracic aorta is unremarkable.     The visualized portions of the upper abdomen are within normal limits.     Soft tissue and osseous structures are unremarkable.  Left eighth and ninth rib fractures are visualized       Impression:      Impression:     Right upper lobe consolidative and masslike airspace opacity without  violating the fissures.  Findings are concerning for infection.   However, considering the presence of an enlarged right paratracheal  lymph node measuring 4.2 cm.  Underlying mass cannot be ruled out.   Short-term follow-up after appropriate treatment is recommended.     Comparison: None     Modality:  CTA chest W Contrast     Technique: CT angiography of the chest was obtained after uneventful IV  contrast injection. Coronal, axial and sagittal reformats were provided.        Findings:     No evidence of pulmonary emboli.  The pulmonary artery is normal in  diameter.     Right upper lobe consolidative masslike radiopacity seen without  dilation of the fissures.  There is also right paratracheal lymph node  measuring up to 4.2 cm.     No pneumothorax or pleural effusion.     The heart size is normal.  No pericardial effusion.     The thoracic aorta is unremarkable.     The visualized portions of the upper abdomen are within normal limits.     Soft tissue and osseous structures are unremarkable.  Left eighth and ninth rib fractures are visualized     Impression:     Right upper lobe consolidative and masslike airspace opacity without  violating the fissures.  Findings are concerning for infection.   However, considering the presence of an enlarged right paratracheal  lymph node measuring 4.2 cm.  Underlying mass cannot be ruled out.   Short-term follow-up after appropriate treatment is recommended.     This report was finalized on 8/19/2023 4:41 PM by Tabby  MD Memo.       XR Chest 2 View [[id number]] Collected: 08/19/23 1557     Updated: 08/19/23 1601    Narrative:            PA and lateral view chest.  No priors.     Findings: Near total opacification of the right upper lobe is present  with subtle air bronchograms noted.  Left lung unremarkable.   Cardiomediastinal silhouette within normal limits.     Osseous structures intact  With degenerative change throughout the  thoracic spine.       Impression:         Near total opacification of the right upper lobe with the differential  diagnosis including pneumonia, mass, postsurgical change, please  correlate clinically.     This report was finalized on 8/19/2023 3:59 PM by Stacy Mills MD.               Cultures:  No results found for: BLOODCX, URINECX, WOUNDCX, MRSACX, RESPCX, STOOLCX    Last echocardiogram:          I have personally reviewed the above radiology images and read the final radiology report on 08/19/23  ---------------------------------------------------------------------------------------------------------------------  Assessment / Plan     Active Hospital Problems    Diagnosis  POA    **Sepsis [A41.9]  Yes       ASSESSMENT/PLAN:    Sepsis, POA, 2/2 right upper lobe pneumonia  In the setting of COPD in acute exacerbation  Hyperglycemia, without history of DM, possibly 2/2 recent steroid use  Patient presents secondary to shortness of breath and cough present for 1 month, worsening over the past 2 weeks.  Work-up revealed dense, masslike pneumonia in the right upper lobe.  Patient meets SIRS/sepsis criteria on arrival with tachycardia, leukocytosis.  CRP is 17.62.  He received Rocephin and doxycycline in the ED as well as Solu-Medrol 125 mg IV x1  He will be admitted to [facility] for further work-up and management  We will continue Rocephin, doxycycline  Solu-Medrol 40 mg twice daily, Mucomyst nebs, DuoNebs and Tessalon as needed  We will consult pulmonology given masslike appearance and possible  need for bronchoscopy  Repeat labs in the a.m. we will obtain sputum culture if able.  Follow-up blood culture result  Monitor I's and O's  Provide COPD education  Encourage incentive spirometry    Chronic:  HTN  GERD  Hypothyroidism  Hx tobacco use  Degenerative disc disease  Plan to restart home meds as indicated per med rec  Monitor vital signs per protocol  Encourage continued tobacco cessation  ----------  -DVT prophylaxis: Lovenox  -Activity: Ad kim.  -Expected length of stay: INPATIENT status due to the need for care which can only be reasonably provided in an hospital setting such as aggressive/expedited ancillary services and/or consultation services, the necessity for IV medications, close physician monitoring and/or the possible need for procedures.  In such, I feel patient's risk for adverse outcomes and need for care warrant INPATIENT evaluation and predict the patient's care encounter to likely last beyond 2 midnights.   -Disposition pending course and further work-up    High risk secondary to sepsis secondary to pneumonia in the setting of COPD in acute exacerbation    There are no questions and answers to display.       Jaspal Martinez PA-C   08/19/23  17:24 EDT     Electronically signed by Ilya Mendiola DO at 08/19/23 1819          Emergency Department Notes        Hope Orourke PA at 08/19/23 1706       Attestation signed by Gil Randall MD at 08/19/23 1826        SUPERVISE: For this patient encounter, I reviewed the APC's documentation, treatment plan, and medical decision making.  Gil Randall MD 8/19/2023 18:26 EDT                         Subjective   History of Present Illness  54 yo male patient with hx of HTN, GERD, chronic neck and back pain, and COPD presents to the ED with complaints of right anterior chest pain, coughing, SOB, and wheezing. Pt states he has been symptomatic for 3 weeks but has continued to worsen. Pt states that he has had increased fatigue.  Subjective fever.  Pt reports exertion worsens his symptoms. Denies any alleviating factors.  Pt states that he thinks he has pneumonia.     History provided by:  Patient   used: No      Review of Systems   Constitutional:  Positive for chills, fatigue and fever.   HENT: Negative.     Eyes: Negative.    Respiratory:  Positive for cough, chest tightness, shortness of breath and wheezing.    Cardiovascular:  Positive for chest pain.   Gastrointestinal: Negative.    Endocrine: Negative.    Genitourinary: Negative.    Musculoskeletal: Negative.    Skin: Negative.    Allergic/Immunologic: Negative.    Neurological: Negative.    Hematological: Negative.    Psychiatric/Behavioral: Negative.     All other systems reviewed and are negative.    Past Medical History:   Diagnosis Date    COPD (chronic obstructive pulmonary disease)     GERD (gastroesophageal reflux disease)     Hypertension     Thyroid disease        No Known Allergies    No past surgical history on file.    No family history on file.    Social History     Socioeconomic History    Marital status:    Tobacco Use    Smoking status: Former     Types: Cigarettes           Objective   Physical Exam  Vitals and nursing note reviewed.   Constitutional:       Appearance: He is well-developed and normal weight. He is ill-appearing.   HENT:      Head: Normocephalic and atraumatic.      Mouth/Throat:      Mouth: Mucous membranes are moist.      Pharynx: Oropharynx is clear.   Eyes:      Extraocular Movements: Extraocular movements intact.      Pupils: Pupils are equal, round, and reactive to light.   Cardiovascular:      Rate and Rhythm: Normal rate and regular rhythm.      Pulses: Normal pulses.      Heart sounds: Normal heart sounds.   Pulmonary:      Effort: Pulmonary effort is normal.      Breath sounds: Wheezing present.   Abdominal:      General: Bowel sounds are normal.      Palpations: Abdomen is soft.   Musculoskeletal:          General: Normal range of motion.      Cervical back: Normal range of motion and neck supple.   Skin:     General: Skin is warm and dry.      Capillary Refill: Capillary refill takes less than 2 seconds.   Neurological:      General: No focal deficit present.      Mental Status: He is alert and oriented to person, place, and time.   Psychiatric:         Mood and Affect: Mood normal.         Behavior: Behavior normal.       Procedures          ED Course  ED Course as of 08/19/23 1727   Sat Aug 19, 2023   1411 ECG 12 Lead Dyspnea  Sinus tachycardia, rate 119, QTc 427, no acute ST or T wave changes [CW]   1612 XR Chest 2 View  IMPRESSION:     Near total opacification of the right upper lobe with the differential  diagnosis including pneumonia, mass, postsurgical change, please  correlate clinically.     This report was finalized on 8/19/2023 3:59 PM by Stacy Mills MD.           Specimen Collected: 08/19/23 15:57 EDT Last Resulted: 08/19/23 15:59 EDT            [ML]   1656 CT Angiogram Chest Pulmonary Embolism  Impression:     Right upper lobe consolidative and masslike airspace opacity without  violating the fissures.  Findings are concerning for infection.   However, considering the presence of an enlarged right paratracheal  lymph node measuring 4.2 cm.  Underlying mass cannot be ruled out.   Short-term follow-up after appropriate treatment is recommended.     This report was finalized on 8/19/2023 4:41 PM by Tabyb Hampton MD.           Specimen Collected: 08/19/23 16:41 EDT Last Resulted: 08/19/23 16:41 EDT         [ML]   1715 Paged hospitalist  [ML]   1719 Dr. Mendiola will admit.  [ML]      ED Course User Index  [CW] Clement Green DO  [ML] Hope Orourke PA           Results for orders placed or performed during the hospital encounter of 08/19/23   COVID-19 and FLU A/B PCR - Swab, Nasopharynx    Specimen: Nasopharynx; Swab   Result Value Ref Range    COVID19 Not Detected Not Detected -  Ref. Range    Influenza A PCR Not Detected Not Detected    Influenza B PCR Not Detected Not Detected   Comprehensive Metabolic Panel    Specimen: Blood   Result Value Ref Range    Glucose 138 (H) 65 - 99 mg/dL    BUN 13 6 - 20 mg/dL    Creatinine 0.93 0.76 - 1.27 mg/dL    Sodium 136 136 - 145 mmol/L    Potassium 4.9 3.5 - 5.2 mmol/L    Chloride 98 98 - 107 mmol/L    CO2 25.4 22.0 - 29.0 mmol/L    Calcium 9.5 8.6 - 10.5 mg/dL    Total Protein 7.8 6.0 - 8.5 g/dL    Albumin 3.3 (L) 3.5 - 5.2 g/dL    ALT (SGPT) 23 1 - 41 U/L    AST (SGOT) 20 1 - 40 U/L    Alkaline Phosphatase 167 (H) 39 - 117 U/L    Total Bilirubin 0.5 0.0 - 1.2 mg/dL    Globulin 4.5 gm/dL    A/G Ratio 0.7 g/dL    BUN/Creatinine Ratio 14.0 7.0 - 25.0    Anion Gap 12.6 5.0 - 15.0 mmol/L    eGFR 98.8 >60.0 mL/min/1.73   High Sensitivity Troponin T    Specimen: Blood   Result Value Ref Range    HS Troponin T 9 <15 ng/L   C-reactive Protein    Specimen: Blood   Result Value Ref Range    C-Reactive Protein 17.62 (H) 0.00 - 0.50 mg/dL   CBC Auto Differential    Specimen: Blood   Result Value Ref Range    WBC 23.73 (H) 3.40 - 10.80 10*3/mm3    RBC 4.85 4.14 - 5.80 10*6/mm3    Hemoglobin 13.6 13.0 - 17.7 g/dL    Hematocrit 43.0 37.5 - 51.0 %    MCV 88.7 79.0 - 97.0 fL    MCH 28.0 26.6 - 33.0 pg    MCHC 31.6 31.5 - 35.7 g/dL    RDW 12.0 (L) 12.3 - 15.4 %    RDW-SD 38.7 37.0 - 54.0 fl    MPV 9.3 6.0 - 12.0 fL    Platelets 589 (H) 140 - 450 10*3/mm3    Neutrophil % 82.1 (H) 42.7 - 76.0 %    Lymphocyte % 7.8 (L) 19.6 - 45.3 %    Monocyte % 7.5 5.0 - 12.0 %    Eosinophil % 1.5 0.3 - 6.2 %    Basophil % 0.5 0.0 - 1.5 %    Immature Grans % 0.6 (H) 0.0 - 0.5 %    Neutrophils, Absolute 19.46 (H) 1.70 - 7.00 10*3/mm3    Lymphocytes, Absolute 1.85 0.70 - 3.10 10*3/mm3    Monocytes, Absolute 1.79 (H) 0.10 - 0.90 10*3/mm3    Eosinophils, Absolute 0.35 0.00 - 0.40 10*3/mm3    Basophils, Absolute 0.13 0.00 - 0.20 10*3/mm3    Immature Grans, Absolute 0.15 (H) 0.00 - 0.05  10*3/mm3    nRBC 0.0 0.0 - 0.2 /100 WBC   Lactic Acid, Plasma    Specimen: Arm, Right; Blood   Result Value Ref Range    Lactate 1.5 0.5 - 2.0 mmol/L   High Sensitivity Troponin T 2Hr    Specimen: Blood   Result Value Ref Range    HS Troponin T 8 <15 ng/L    Troponin T Delta -1 >=-4 - <+4 ng/L   Blood Gas, Arterial With Co-Ox    Specimen: Arterial Blood   Result Value Ref Range    Site Left Radial     Marty's Test Positive     pH, Arterial 7.472 (H) 7.350 - 7.450 pH units    pCO2, Arterial 40.0 35.0 - 45.0 mm Hg    pO2, Arterial 65.0 (L) 83.0 - 108.0 mm Hg    HCO3, Arterial 29.2 (H) 20.0 - 26.0 mmol/L    Base Excess, Arterial 5.2 (H) 0.0 - 2.0 mmol/L    O2 Saturation, Arterial 94.5 94.0 - 99.0 %    Hemoglobin, Blood Gas 13.5 (L) 14 - 18 g/dL    Hematocrit, Blood Gas 41.4 38.0 - 51.0 %    Oxyhemoglobin 93.0 (L) 94 - 99 %    Methemoglobin <-0.10 (L) 0.00 - 3.00 %    Carboxyhemoglobin 1.8 0 - 5 %    A-a DO2 33.1 0.0 - 300.0 mmHg    CO2 Content 30.5 22 - 33 mmol/L    Barometric Pressure for Blood Gas 729 mmHg    Modality Room Air     FIO2 21 %    Ventilator Mode NA     Collected by [id number]     pH, Temp Corrected      pCO2, Temperature Corrected      pO2, Temperature Corrected     BNP    Specimen: Blood   Result Value Ref Range    proBNP 217.8 0.0 - 900.0 pg/mL   Green Top (Gel)   Result Value Ref Range    Extra Tube Hold for add-ons.    Lavender Top   Result Value Ref Range    Extra Tube hold for add-on    Gold Top - SST   Result Value Ref Range    Extra Tube Hold for add-ons.    Light Blue Top   Result Value Ref Range    Extra Tube Hold for add-ons.                                      Medical Decision Making  52 yo male patient with hx of HTN, GERD, chronic neck and back pain, and COPD presents to the ED with complaints of right anterior chest pain, coughing, SOB, and wheezing. Pt states he has been symptomatic for 3 weeks but has continued to worsen. Pt states that he has had increased fatigue. Subjective fever.  Pt  reports exertion worsens his symptoms. Denies any alleviating factors.  Pt states that he thinks he has pneumonia. PT will be admitted to the hospitalist service.     Problems Addressed:  Mass of right lung: complicated acute illness or injury  Pneumonia of right upper lobe due to infectious organism: complicated acute illness or injury    Amount and/or Complexity of Data Reviewed  Labs: ordered.  Radiology: ordered. Decision-making details documented in ED Course.  ECG/medicine tests: ordered.    Risk  Prescription drug management.  Decision regarding hospitalization.        Final diagnoses:   Pneumonia of right upper lobe due to infectious organism   Mass of right lung       ED Disposition  ED Disposition       ED Disposition   Decision to Admit    Condition   --    Comment   Level of Care: Med/Surg [1]   Diagnosis: Sepsis [[id number]]   Admitting Physician: ILYA AKBAR [[id number]]   Certification: I Certify That Inpatient Hospital Services Are Medically Necessary For Greater Than 2 Midnights                 No follow-up provider specified.       Medication List      No changes were made to your prescriptions during this visit.            Hope Orourke PA  08/19/23 1727       Hope Orourke PA  08/19/23 1727      Electronically signed by Gil Randall MD at 08/19/23 1826       Dale Peterson at 08/19/23 1428          Pt provided urine collection supplies and advised the need for sample. Pt verbalized understanding. Pt unable to provide at this time      Electronically signed by Dale Peterson at 08/19/23 1428       Solomon Hamlin at 08/19/23 1408          EKG completed @ 1406 and given to Dr. Green    Electronically signed by Solomon Hamlin at 08/19/23 1418       Current Facility-Administered Medications   Medication Dose Route Frequency Provider Last Rate Last Admin    acetaminophen (TYLENOL) tablet 650 mg  650 mg Oral Q4H PRN Ilya Akbar,         acetylcysteine (MUCOMYST)  20 % nebulizer solution 3 mL  3 mL Nebulization BID - RT Ilya Mendiola DO        benzonatate (TESSALON) capsule 200 mg  200 mg Oral TID PRN Ilya Mendiola DO        sennosides-docusate (PERICOLACE) 8.6-50 MG per tablet 2 tablet  2 tablet Oral BID Ilya Mendiola DO   2 tablet at 08/20/23 0823    And    polyethylene glycol (MIRALAX) packet 17 g  17 g Oral Daily PRN Ilya Mendiola DO        And    bisacodyl (DULCOLAX) EC tablet 5 mg  5 mg Oral Daily PRN Ilya Mendiola DO        And    bisacodyl (DULCOLAX) suppository 10 mg  10 mg Rectal Daily PRN Ilya Mendiola DO        budesonide-formoterol (SYMBICORT) 160-4.5 MCG/ACT inhaler 2 puff  2 puff Inhalation BID - RT Yonathan Brown MD   2 puff at 08/20/23 0653    calcium carbonate (TUMS) chewable tablet 500 mg (200 mg elemental)  2 tablet Oral BID PRN Ilya Mendiola DO        cefTRIAXone (ROCEPHIN) 2,000 mg in sodium chloride 0.9 % 100 mL IVPB-VTB  2,000 mg Intravenous Q24H Ilya Mendiola DO        [START ON 8/25/2023] cholecalciferol (VITAMIN D3) capsule 50,000 Units  50,000 Units Oral Weekly Yonathan Brown MD        doxycycline (VIBRAMYCIN) 100 mg in sodium chloride 0.9 % 100 mL IVPB-VTB  100 mg Intravenous Q12H Ilya Mendiola DO   100 mg at 08/20/23 0534    Enoxaparin Sodium (LOVENOX) syringe 40 mg  40 mg Subcutaneous Nightly Ilya Mendiola DO   40 mg at 08/19/23 2005    gabapentin (NEURONTIN) capsule 600 mg  600 mg Oral Q8H Yonathan Brown MD   600 mg at 08/20/23 0534    HYDROcodone-acetaminophen (NORCO)  MG per tablet 1 tablet  1 tablet Oral Q8H PRN Yonathan Brown MD   1 tablet at 08/20/23 0825    ipratropium-albuterol (DUO-NEB) nebulizer solution 3 mL  3 mL Nebulization 4x Daily - RT Ilya Mendiola DO   3 mL at 08/19/23 2128    levothyroxine (SYNTHROID, LEVOTHROID) tablet 50 mcg  50 mcg Oral Daily Yonathan Brown MD   50 mcg  at 08/20/23 0823    lisinopril (PRINIVIL,ZESTRIL) tablet 10 mg  10 mg Oral Daily Yonathan Brown MD   10 mg at 08/20/23 0823    methylPREDNISolone sodium succinate (SOLU-Medrol) injection 40 mg  40 mg Intravenous Q12H Ilya Mendiola, DO   40 mg at 08/20/23 0533    montelukast (SINGULAIR) tablet 10 mg  10 mg Oral Nightly Yonathan Brown MD   10 mg at 08/20/23 0120    pantoprazole (PROTONIX) EC tablet 40 mg  40 mg Oral Daily Yonathan Brown MD   40 mg at 08/20/23 0823    Pharmacy Consult   Does not apply Continuous PRN Ilya Mendiola, DO        sodium chloride 0.9 % flush 10 mL  10 mL Intravenous PRN MauryIlya aguilar, DO        sodium chloride 0.9 % flush 10 mL  10 mL Intravenous Q12H Ilya Mendiola DO   10 mL at 08/19/23 2005    sodium chloride 0.9 % flush 10 mL  10 mL Intravenous PRN Ilya Mendiola, DO        sodium chloride 0.9 % infusion 40 mL  40 mL Intravenous PRN Ilya Mendiola, DO   40 mL at 08/20/23 0534    traZODone (DESYREL) tablet 100 mg  100 mg Oral Nightly Yonathan Brown MD   100 mg at 08/20/23 0120     Physician Progress Notes (last 24 hours)  Notes from 08/19/23 0906 through 08/20/23 0906   No notes of this type exist for this encounter.       Consult Notes (last 24 hours)  Notes from 08/19/23 0906 through 08/20/23 0906   No notes of this type exist for this encounter.

## 2023-08-20 NOTE — PLAN OF CARE
Goal Outcome Evaluation:                 Pt resting in bed at this time. Pt has complained of increasing pain which has been conveyed to MD. Awaiting orders at this time. Pt ambulates independently in room and hallway. No other complaints at this time.

## 2023-08-20 NOTE — PROGRESS NOTES
Patient Identification:  Name:  Dexter Flores  Age:  53 y.o.  Sex:  male  :  1970  MRN:  [id number]  Visit Number:  [id number]  Primary Care Provider:  Jose F Reynolds    Length of stay:  1    Subjective/Interval History/Consultants/Procedures     Chief Complaint:   Chief Complaint   Patient presents with    Shortness of Breath    Chest Pain       Subjective/Interval History:    53 y.o. male who was admitted on 2023 with right upper lobe pneumonia    PMH is significant for COPD, HTN, GERD, hypothyroidism, DDD  For complete admission information, please see history and physical.     Consultations:  Pulmonology    Procedures/Scans:  CT PE protocol  CXR    Today, the patient reported some subjective improvement from time of admission. He remains stable on RA. No longer diaphoretic appearing on exam. He states cough seems some better today as well. Still some increased work of breathing. No new complaints noted.      Room location at the time of evaluation was [facility].    ----------------------------------------------------------------------------------------------------------------------  Current Hospital Meds:  acetylcysteine, 3 mL, Nebulization, BID - RT  budesonide-formoterol, 2 puff, Inhalation, BID - RT  cefTRIAXone, 2,000 mg, Intravenous, Q24H  [START ON 2023] cholecalciferol, 50,000 Units, Oral, Weekly  doxycycline, 100 mg, Intravenous, Q12H  enoxaparin, 40 mg, Subcutaneous, Nightly  gabapentin, 600 mg, Oral, Q8H  ipratropium-albuterol, 3 mL, Nebulization, 4x Daily - RT  levothyroxine, 50 mcg, Oral, Daily  lisinopril, 10 mg, Oral, Daily  methylPREDNISolone sodium succinate, 40 mg, Intravenous, Q12H  montelukast, 10 mg, Oral, Nightly  pantoprazole, 40 mg, Oral, Daily  senna-docusate sodium, 2 tablet, Oral, BID  sodium chloride, 10 mL, Intravenous, Q12H  traZODone, 100 mg, Oral, Nightly      Pharmacy Consult,        ----------------------------------------------------------------------------------------------------------------------      Objective     Vital Signs:  Temp:  [97.2 øF (36.2 øC)-98.6 øF (37 øC)] 98 øF (36.7 øC)  Heart Rate:  [] 95  Resp:  [16-20] 16  BP: ()/(53-80) 121/78      08/19/23  1403 08/19/23  1855   Weight: 127 kg (280 lb) 131 kg (289 lb 3.9 oz)     Body mass index is 43.98 kg/mý.    Intake/Output Summary (Last 24 hours) at 8/20/2023 0837  Last data filed at 8/20/2023 0500  Gross per 24 hour   Intake 2000 ml   Output --   Net 2000 ml     No intake/output data recorded.  Diet: Regular/House Diet; Texture: Regular Texture (IDDSI 7); Fluid Consistency: Thin (IDDSI 0)  ----------------------------------------------------------------------------------------------------------------------    Physical Exam  Vitals and nursing note reviewed.   Constitutional:       General: He is not in acute distress.     Appearance: He is ill-appearing.   HENT:      Head: Normocephalic and atraumatic.   Eyes:      Extraocular Movements: Extraocular movements intact.      Conjunctiva/sclera: Conjunctivae normal.   Cardiovascular:      Rate and Rhythm: Normal rate and regular rhythm.   Pulmonary:      Effort: Pulmonary effort is normal.      Breath sounds: Wheezing and rhonchi present.   Musculoskeletal:      Right lower leg: No edema.      Left lower leg: No edema.   Skin:     General: Skin is warm and dry.   Neurological:      Mental Status: He is alert. Mental status is at baseline.   Psychiatric:         Mood and Affect: Mood normal.         Behavior: Behavior normal.              ----------------------------------------------------------------------------------------------------------------------  Tele:      ----------------------------------------------------------------------------------------------------------------------  Results from last 7 days   Lab Units 08/19/23  1638 08/19/23  1416   HSTROP T ng/L 8  9   PROBNP pg/mL  --  217.8     Results from last 7 days   Lab Units 08/19/23  1453 08/19/23  1416   CRP mg/dL  --  17.62*   LACTATE mmol/L 1.5  --    WBC 10*3/mm3  --  23.73*   HEMOGLOBIN g/dL  --  13.6   HEMATOCRIT %  --  43.0   MCV fL  --  88.7   MCHC g/dL  --  31.6   PLATELETS 10*3/mm3  --  589*     Results from last 7 days   Lab Units 08/19/23  1506   PH, ARTERIAL pH units 7.472*   PO2 ART mm Hg 65.0*   PCO2, ARTERIAL mm Hg 40.0   HCO3 ART mmol/L 29.2*     Results from last 7 days   Lab Units 08/19/23  1416   SODIUM mmol/L 136   POTASSIUM mmol/L 4.9   CHLORIDE mmol/L 98   CO2 mmol/L 25.4   BUN mg/dL 13   CREATININE mg/dL 0.93   CALCIUM mg/dL 9.5   GLUCOSE mg/dL 138*   ALBUMIN g/dL 3.3*   BILIRUBIN mg/dL 0.5   ALK PHOS U/L 167*   AST (SGOT) U/L 20   ALT (SGPT) U/L 23   Estimated Creatinine Clearance: 122.7 mL/min (by C-G formula based on SCr of 0.93 mg/dL).  No results found for: AMMONIA      Blood Culture   Date Value Ref Range Status   08/19/2023 Abnormal Stain (C)  Preliminary   08/19/2023 Abnormal Stain (C)  Preliminary     No results found for: URINECX  No results found for: WOUNDCX  No results found for: STOOLCX  ----------------------------------------------------------------------------------------------------------------------  Imaging Results (Last 24 Hours)       Procedure Component Value Units Date/Time    CT Angiogram Chest Pulmonary Embolism [[id number]] Collected: 08/19/23 1641     Updated: 08/19/23 1643    Narrative:      Comparison: None     Modality:  CTA chest W Contrast     Technique: CT angiography of the chest was obtained after uneventful IV  contrast injection. Coronal, axial and sagittal reformats were provided.        Findings:     No evidence of pulmonary emboli.  The pulmonary artery is normal in  diameter.     Right upper lobe consolidative masslike radiopacity seen without  dilation of the fissures.  There is also right paratracheal lymph node  measuring up to 4.2 cm.     No  pneumothorax or pleural effusion.     The heart size is normal.  No pericardial effusion.     The thoracic aorta is unremarkable.     The visualized portions of the upper abdomen are within normal limits.     Soft tissue and osseous structures are unremarkable.  Left eighth and ninth rib fractures are visualized       Impression:      Impression:     Right upper lobe consolidative and masslike airspace opacity without  violating the fissures.  Findings are concerning for infection.   However, considering the presence of an enlarged right paratracheal  lymph node measuring 4.2 cm.  Underlying mass cannot be ruled out.   Short-term follow-up after appropriate treatment is recommended.     Comparison: None     Modality:  CTA chest W Contrast     Technique: CT angiography of the chest was obtained after uneventful IV  contrast injection. Coronal, axial and sagittal reformats were provided.        Findings:     No evidence of pulmonary emboli.  The pulmonary artery is normal in  diameter.     Right upper lobe consolidative masslike radiopacity seen without  dilation of the fissures.  There is also right paratracheal lymph node  measuring up to 4.2 cm.     No pneumothorax or pleural effusion.     The heart size is normal.  No pericardial effusion.     The thoracic aorta is unremarkable.     The visualized portions of the upper abdomen are within normal limits.     Soft tissue and osseous structures are unremarkable.  Left eighth and ninth rib fractures are visualized     Impression:     Right upper lobe consolidative and masslike airspace opacity without  violating the fissures.  Findings are concerning for infection.   However, considering the presence of an enlarged right paratracheal  lymph node measuring 4.2 cm.  Underlying mass cannot be ruled out.   Short-term follow-up after appropriate treatment is recommended.     This report was finalized on 8/19/2023 4:41 PM by Tabby Hampton MD.       XR Chest 2 View  [[id number]] Collected: 08/19/23 1557     Updated: 08/19/23 1601    Narrative:            PA and lateral view chest.  No priors.     Findings: Near total opacification of the right upper lobe is present  with subtle air bronchograms noted.  Left lung unremarkable.   Cardiomediastinal silhouette within normal limits.     Osseous structures intact  With degenerative change throughout the  thoracic spine.       Impression:         Near total opacification of the right upper lobe with the differential  diagnosis including pneumonia, mass, postsurgical change, please  correlate clinically.     This report was finalized on 8/19/2023 3:59 PM by Stacy Mills MD.             ----------------------------------------------------------------------------------------------------------------------   I have reviewed the above laboratory values for 08/20/23    Assessment/Plan     Active Hospital Problems    Diagnosis  POA    **Sepsis [A41.9]  Yes    COPD exacerbation [J44.1]  Yes         ASSESSMENT/PLAN:    Sepsis, POA, 2/2 right upper lobe pneumonia  In the setting of COPD in acute exacerbation  Hyperglycemia, without history of DM, possibly 2/2 recent steroid use  Patient presents secondary to shortness of breath and cough present for 1 month, worsening over the past 2 weeks.  Work-up revealed dense, masslike pneumonia in the right upper lobe.  Patient meets SIRS/sepsis criteria on arrival with tachycardia, leukocytosis.  CRP is 17.62.  He received Rocephin and doxycycline in the ED as well as Solu-Medrol 125 mg IV x1  He will be admitted to [facility] for further work-up and management  Solu-Medrol 40 mg twice daily, Mucomyst nebs, DuoNebs and Tessalon as needed  We will consult pulmonology given masslike appearance and possible need for bronchoscopy  Blood cultures positive preliminarily for gram-positive cocci in clusters.  BC ID 1 of 2 staph not aureus or lugdunensis, 1 of 2 staph lugdunensis methicillin resistance gene  detected.   Have consulted ID for further assistance. Appreciated.   Discontinued rocephin, doxy and escalated coverage to vanc and zosyn pending further ID recommendations.   Monitor I's and O's  Provide COPD education  Encourage incentive spirometry  Remaining intermittently tachycardic overnight though improved trend.  Stable on room air.  WBC count did improve from 23-18K overnight.      Chronic:  HTN  GERD  Hypothyroidism  Hx tobacco use  Degenerative disc disease  Plan to restart home meds as indicated per med rec  Monitor vital signs per protocol  Encourage continued tobacco cessation      -----------  -DVT prophylaxis: Lovenox   -Disposition plans/anticipated needs:Pending course, likely return home following clinical improvement         The patient is high risk due to the following diagnoses/reasons:  pneumonia, copde, sepsis         Jaspal Martinez PA-C  08/20/23  08:37 EDT

## 2023-08-20 NOTE — PROGRESS NOTES
Pharmacy COPD Maintenance Inhaler Assessment     Pharmacy was consulted for dosing and coverage assessment of COPD maintenance medications for discharge. The patient is currently hospitalized and being treated for COPD Exacerbation/Sepsis.     The patient's prior to admission medication list includes: Symbicort and as needed albuterol.  The patient reports is taking her inhaled medications as prescribed.  The patient does not report issues with inhaler insurance coverage.     Based on the most recent [facility] literature, the patient should be using an inhaled medication(s) containing the following drug classes: LABA/LAMA.  Will order bevespi based on literature and insurance compatibility.  The patient will be counseled on the use of maintenance inhaler including: dosage, administration, cleaning instructions, and beyond use dating.     Thank you,   Sindhu Koch RPH  08/20/23  17:26 EDT

## 2023-08-20 NOTE — CONSULTS
INFECTIOUS DISEASE CONSULTATION REPORT        Patient Identification:  Name:  Dexter Flores  Age:  53 y.o.  Sex:  male  :  1970  MRN:  [id number]   Visit Number:  [id number]  Primary Care Physician:  Jose F Reynolds        Referring Provider: Dr. Jackson    Reason for consult: Bacteremia       LOS: 1 day        Subjective       Subjective     History of present illness:      Thank you Dr. Jackson for allowing us to participate in the care of your patient.  As you well know, Mr. Dexter Flores is a 53 y.o. male with past medical history significant for COPD, hypertension, GERD, hypothyroidism, degenerative disc disease, who presented to [facility] on 2023 for shortness of breath and cough.  WBC improving at 18.07.  CRP elevated 17.62.  Procalcitonin normal at 0.22.  Urinalysis unremarkable.  Strep pneumo antigen in process.  Lactic acid normal at 1.5 on admission.  COVID-19 and influenza PCR negative.  Blood cultures from 2023 2 out of 2 sets positive with BC ID is detecting staph species not aureus or lugdunensis and Staphylococcus lugdunensis.  Chest x-ray from 2023 reports near total opacification of the right upper lobe with a differential diagnosis including pneumonia, mass, postsurgical change.  CT of the chest from 2023 reports right upper lobe consolidative masslike opacity without violating the fissures findings concerning for infection however the presence of enlarged right paratracheal lymph node measuring 4.2 cm, underlying mass cannot be ruled out.    Patient sitting up in chair this morning.  Currently on room air with no apparent distress.  Lung sounds diminished bilaterally.  Patient reports persistent cough this morning.  Patient reports chest discomfort when coughing.  Abdomen soft, nontender.  Afebrile, no reported diarrhea.      Infectious Disease consultation was requested for antimicrobial  management.      ---------------------------------------------------------------------------------------------------------------------     Review Of Systems:    Constitutional: no fever, chills and night sweats. No appetite change or unexpected weight change. No fatigue.  Eyes: no eye drainage, itching or redness.  HEENT: no mouth sores, dysphagia or nose bleed.  Respiratory: no for shortness of breath, cough or production of sputum.  Cardiovascular: no chest pain, no palpitations, no orthopnea.  Gastrointestinal: no nausea, vomiting or diarrhea. No abdominal pain, hematemesis or rectal bleeding.  Genitourinary: no dysuria or polyuria.  Hematologic/lymphatic: no lymph node abnormalities, no easy bruising or easy bleeding.  Musculoskeletal: no muscle or joint pain.  Skin: No rash and no itching.  Neurological: no loss of consciousness, no seizure, no headache.  Behavioral/Psych: no depression or suicidal ideation.  Endocrine: no hot flashes.  Immunologic: negative.    ---------------------------------------------------------------------------------------------------------------------     Past Medical History    Past Medical History:   Diagnosis Date    Bulging of cervical intervertebral disc     COPD (chronic obstructive pulmonary disease)     GERD (gastroesophageal reflux disease)     Hypertension     Neck pain     Thyroid disease        Past Surgical History    No past surgical history on file.    Family History    History reviewed. No pertinent family history.    Social History    Social History     Tobacco Use    Smoking status: Former     Packs/day: 1.00     Years: 30.     Pack years: 30.00     Types: Cigarettes     Start date:      Quit date: 2023     Years since quittin.3    Smokeless tobacco: Never   Vaping Use    Vaping Use: Never used   Substance Use Topics    Alcohol use: Not Currently    Drug use: Not Currently       Allergies    Patient has no known  allergies.  ---------------------------------------------------------------------------------------------------------------------     Home Medications:    Prior to Admission Medications       Prescriptions Last Dose Informant Patient Reported? Taking?    budesonide-formoterol (SYMBICORT) 160-4.5 MCG/ACT inhaler 8/18/2023 Family Member Yes Yes    Inhale 2 puffs 2 (Two) Times a Day.    gabapentin (NEURONTIN) 600 MG tablet 8/18/2023 Family Member Yes Yes    Take 1 tablet by mouth 3 (Three) Times a Day.    levothyroxine (SYNTHROID, LEVOTHROID) 50 MCG tablet 8/18/2023 Family Member Yes Yes    Take 1 tablet by mouth Daily.    lisinopril (PRINIVIL,ZESTRIL) 10 MG tablet 8/18/2023 Family Member Yes Yes    Take 1 tablet by mouth Daily.    montelukast (SINGULAIR) 10 MG tablet 8/18/2023 Family Member Yes Yes    Take 1 tablet by mouth Every Night.    pantoprazole (PROTONIX) 40 MG EC tablet 8/18/2023 Family Member Yes Yes    Take 1 tablet by mouth Daily.    traZODone (DESYREL) 100 MG tablet 8/18/2023 Family Member Yes Yes    Take 1 tablet by mouth Every Night.    vitamin D (ERGOCALCIFEROL) 1.25 MG (40654 UT) capsule capsule 8/18/2023 Family Member Yes Yes    Take 1 capsule by mouth 1 (One) Time Per Week.    albuterol (PROVENTIL) (2.5 MG/3ML) 0.083% nebulizer solution Unknown Family Member Yes No    Take 2.5 mg by nebulization Every 6 (Six) Hours As Needed for Wheezing.    albuterol sulfate  (90 Base) MCG/ACT inhaler Unknown Family Member Yes No    Inhale 2 puffs Every 4 (Four) Hours As Needed for Wheezing.    HYDROcodone-acetaminophen (NORCO)  MG per tablet Unknown Family Member Yes No    Take 1 tablet by mouth Every 8 (Eight) Hours As Needed for Moderate Pain.    phentermine (ADIPEX-P) 37.5 MG tablet More than a month Family Member Yes No    Take 1 tablet by mouth Every Morning Before Breakfast.           ---------------------------------------------------------------------------------------------------------------------    Objective       Objective     Hospital Scheduled Meds:  acetylcysteine, 3 mL, Nebulization, BID - RT  budesonide-formoterol, 2 puff, Inhalation, BID - RT  [START ON 8/25/2023] cholecalciferol, 50,000 Units, Oral, Weekly  doxycycline, 100 mg, Intravenous, Q12H  enoxaparin, 40 mg, Subcutaneous, Nightly  gabapentin, 600 mg, Oral, Q8H  ipratropium-albuterol, 3 mL, Nebulization, 4x Daily - RT  levothyroxine, 50 mcg, Oral, Daily  lisinopril, 10 mg, Oral, Daily  methylPREDNISolone sodium succinate, 40 mg, Intravenous, Q12H  montelukast, 10 mg, Oral, Nightly  pantoprazole, 40 mg, Oral, Daily  piperacillin-tazobactam, 4.5 g, Intravenous, Q8H  senna-docusate sodium, 2 tablet, Oral, BID  sodium chloride, 10 mL, Intravenous, Q12H  traZODone, 100 mg, Oral, Nightly      Pharmacy Consult,       ---------------------------------------------------------------------------------------------------------------------   Vital Signs:  Temp:  [97.2 øF (36.2 øC)-98.6 øF (37 øC)] 98 øF (36.7 øC)  Heart Rate:  [] 96  Resp:  [16-20] 18  BP: ()/(53-80) 111/72  Mean Arterial Pressure (Non-Invasive) for the past 24 hrs (Last 3 readings):   Noninvasive MAP (mmHg)   08/19/23 1820 86   08/19/23 1750 86   08/19/23 1705 73     SpO2 Percentage    08/19/23 1818 08/19/23 2128 08/20/23 0653   SpO2: 92% 92% 95%     SpO2:  [91 %-99 %] 95 %  on   ;   Device (Oxygen Therapy): room air    Body mass index is 43.98 kg/mý.  Wt Readings from Last 3 Encounters:   08/19/23 131 kg (289 lb 3.9 oz)   07/20/23 135 kg (298 lb 6.4 oz)   04/13/23 (!) 138 kg (303 lb 3.2 oz)     ---------------------------------------------------------------------------------------------------------------------     Physical Exam:    Constitutional:  Well-developed and well-nourished.  No respiratory distress.  Currently on room air.  Sitting up in the chair      HENT:  Head: Normocephalic and atraumatic.  Mouth:  Moist mucous membranes.    Eyes:  Conjunctivae and EOM are normal.  No scleral icterus.  Neck:  Neck supple.  No JVD present.    Cardiovascular:  Normal rate, regular rhythm and normal heart sounds with no murmur. No edema.  Pulmonary/Chest: Diminished lung sounds bilaterally.  Currently on room air.  Abdominal:  Soft.  Bowel sounds are normal.  No distension and no tenderness.   Musculoskeletal:  No edema, no tenderness, and no deformity.  No swelling or redness of joints.  Neurological:  Alert and oriented to person, place, and time.  No facial droop.  No slurred speech.   Skin:  Skin is warm and dry.  No rash noted.  No pallor.   Psychiatric:  Normal mood and affect.  Behavior is normal.    ---------------------------------------------------------------------------------------------------------------------    Results from last 7 days   Lab Units 08/19/23  1638 08/19/23  1416   HSTROP T ng/L 8 9     Results from last 7 days   Lab Units 08/19/23  1416   PROBNP pg/mL 217.8       Results from last 7 days   Lab Units 08/19/23  1506   PH, ARTERIAL pH units 7.472*   PO2 ART mm Hg 65.0*   PCO2, ARTERIAL mm Hg 40.0   HCO3 ART mmol/L 29.2*     Results from last 7 days   Lab Units 08/20/23  0734 08/19/23  1453 08/19/23  1416   CRP mg/dL  --   --  17.62*   LACTATE mmol/L  --  1.5  --    WBC 10*3/mm3 18.07*  --  23.73*   HEMOGLOBIN g/dL 13.3  --  13.6   HEMATOCRIT % 42.7  --  43.0   MCV fL 92.0  --  88.7   MCHC g/dL 31.1*  --  31.6   PLATELETS 10*3/mm3 507*  --  589*     Results from last 7 days   Lab Units 08/20/23  0734 08/19/23  1416   SODIUM mmol/L 137 136   POTASSIUM mmol/L 4.8 4.9   CHLORIDE mmol/L 100 98   CO2 mmol/L 23.9 25.4   BUN mg/dL 16 13   CREATININE mg/dL 0.80 0.93   CALCIUM mg/dL 9.4 9.5   GLUCOSE mg/dL 156* 138*   ALBUMIN g/dL 2.6* 3.3*   BILIRUBIN mg/dL 0.3 0.5   ALK PHOS U/L 149* 167*   AST (SGOT) U/L 14 20   ALT (SGPT) U/L 17 23   Estimated Creatinine  Clearance: 142.7 mL/min (by C-G formula based on SCr of 0.8 mg/dL).  No results found for: AMMONIA    No results found for: HGBA1C, POCGLU  Lab Results   Component Value Date    HGBA1C 5.3 04/23/2014     No results found for: TSH, FREET4    Blood Culture   Date Value Ref Range Status   08/19/2023 Abnormal Stain (C)  Preliminary   08/19/2023 Abnormal Stain (C)  Preliminary     No results found for: URINECX  No results found for: WOUNDCX  No results found for: STOOLCX  No results found for: RESPCX  Pain Management Panel          Latest Ref Rng & Units 8/19/2023   Pain Management Panel   Amphetamine, Urine Qual Negative Positive    Barbiturates Screen, Urine Negative Negative    Benzodiazepine Screen, Urine Negative Negative    Buprenorphine, Screen, Urine Negative Negative    Cocaine Screen, Urine Negative Negative    Fentanyl, Urine Negative Negative    Methadone Screen , Urine Negative Negative    Methamphetamine, Ur Negative Negative      I have personally reviewed the above laboratory results.   ---------------------------------------------------------------------------------------------------------------------  Imaging Results (Last 7 Days)       Procedure Component Value Units Date/Time    CT Angiogram Chest Pulmonary Embolism [[id number]] Collected: 08/19/23 1641     Updated: 08/19/23 1643    Narrative:      Comparison: None     Modality:  CTA chest W Contrast     Technique: CT angiography of the chest was obtained after uneventful IV  contrast injection. Coronal, axial and sagittal reformats were provided.        Findings:     No evidence of pulmonary emboli.  The pulmonary artery is normal in  diameter.     Right upper lobe consolidative masslike radiopacity seen without  dilation of the fissures.  There is also right paratracheal lymph node  measuring up to 4.2 cm.     No pneumothorax or pleural effusion.     The heart size is normal.  No pericardial effusion.     The thoracic aorta is unremarkable.     The  visualized portions of the upper abdomen are within normal limits.     Soft tissue and osseous structures are unremarkable.  Left eighth and ninth rib fractures are visualized       Impression:      Impression:     Right upper lobe consolidative and masslike airspace opacity without  violating the fissures.  Findings are concerning for infection.   However, considering the presence of an enlarged right paratracheal  lymph node measuring 4.2 cm.  Underlying mass cannot be ruled out.   Short-term follow-up after appropriate treatment is recommended.     Comparison: None     Modality:  CTA chest W Contrast     Technique: CT angiography of the chest was obtained after uneventful IV  contrast injection. Coronal, axial and sagittal reformats were provided.        Findings:     No evidence of pulmonary emboli.  The pulmonary artery is normal in  diameter.     Right upper lobe consolidative masslike radiopacity seen without  dilation of the fissures.  There is also right paratracheal lymph node  measuring up to 4.2 cm.     No pneumothorax or pleural effusion.     The heart size is normal.  No pericardial effusion.     The thoracic aorta is unremarkable.     The visualized portions of the upper abdomen are within normal limits.     Soft tissue and osseous structures are unremarkable.  Left eighth and ninth rib fractures are visualized     Impression:     Right upper lobe consolidative and masslike airspace opacity without  violating the fissures.  Findings are concerning for infection.   However, considering the presence of an enlarged right paratracheal  lymph node measuring 4.2 cm.  Underlying mass cannot be ruled out.   Short-term follow-up after appropriate treatment is recommended.     This report was finalized on 8/19/2023 4:41 PM by Tabby Hampton MD.       XR Chest 2 View [[id number]] Collected: 08/19/23 1557     Updated: 08/19/23 1601    Narrative:            PA and lateral view chest.  No priors.     Findings:  Near total opacification of the right upper lobe is present  with subtle air bronchograms noted.  Left lung unremarkable.   Cardiomediastinal silhouette within normal limits.     Osseous structures intact  With degenerative change throughout the  thoracic spine.       Impression:         Near total opacification of the right upper lobe with the differential  diagnosis including pneumonia, mass, postsurgical change, please  correlate clinically.     This report was finalized on 8/19/2023 3:59 PM by Stacy Mills MD.             I have personally reviewed the above radiology results.   ---------------------------------------------------------------------------------------------------------------------      Pertinent Infectious Disease Results          Assessment & Plan      Assessment        Right upper lobe pneumonia  Bacteremia versus contaminant  COPD      Plan      Patient presented to [facility] on 8/19/2023 for shortness of breath and cough.  WBC improving at 18.07.  CRP elevated 17.62.  Procalcitonin normal at 0.22.  Urinalysis unremarkable.  Strep pneumo antigen in process.  Lactic acid normal at 1.5 on admission.  COVID-19 and influenza PCR negative.  Blood cultures from 8/19/2023 2 out of 2 sets positive with BC ID is detecting staph species not aureus or lugdunensis and Staphylococcus lugdunensis.  Chest x-ray from 8/19/2023 reports near total opacification of the right upper lobe with a differential diagnosis including pneumonia, mass, postsurgical change.  CT of the chest from 8/19/2023 reports right upper lobe consolidative masslike opacity without violating the fissures findings concerning for infection however the presence of enlarged right paratracheal lymph node measuring 4.2 cm, underlying mass cannot be ruled out.    Patient sitting up in chair this morning.  Currently on room air with no apparent distress.  Lung sounds diminished bilaterally.  Patient reports  persistent cough this morning.  Patient reports chest discomfort when coughing.  Abdomen soft, nontender.  Afebrile, no reported diarrhea.    At this time we believe bacteremia to be contaminant as both blood cultures were drawn at the same time and are detecting 2 different organisms.  Repeat blood cultures x2 ordered for today.  Vancomycin was discontinued for now.  For treatment of pneumonia doxycycline 100 mg IV every 12 hours was initiated to continue with Zosyn.  We will continue to follow closely and adjust antibiotic therapy as needed.    ANTIMICROBIAL THERAPY    doxycycline (VIBRAMYCIN) 100MG IVPB in 100ml NS VTB  piperacillin-tazobactam (ZOSYN) IVPB 4.5 g in 100 mL NS VTB       Again, thank you Dr. Jackson for allowing us to participate in the care of your patient and please feel free to call for any questions you may have.        Code Status:     Code Status and Medical Interventions:   Ordered at: 08/19/23 1728     Code Status (Patient has no pulse and is not breathing):    CPR (Attempt to Resuscitate)     Medical Interventions (Patient has pulse or is breathing):    Full Support         ABHIJEET Plata  08/20/23  12:19 EDT

## 2023-08-20 NOTE — CASE MANAGEMENT/SOCIAL WORK
Discharge Planning Assessment  [facility]     Patient Name: Dexter Flores  MRN: [id number]  Today's Date: 8/20/2023    Admit Date: 8/19/2023    Plan: Pt lives at home and plans to return home at discharge pt drives self or family will provide transportation. Pcp is Jose F Huang, he uses [facility] and has [facility]. Pt does not use home o2 or home health. Pt has a bp cuff and nebulizer  at home.   Discharge Needs Assessment       Row Name 08/20/23 1139       Living Environment    Current Living Arrangements home    Potentially Unsafe Housing Conditions none    Primary Care Provided by [facility]    Quality of Family Relationships helpful;involved;supportive       Resource/Environmental Concerns    Resource/Environmental Concerns none       Transition Planning    Patient/Family Anticipates Transition to home with family    Patient/Family Anticipated Services at Transition none    Transportation Anticipated family or friend will provide;car, drives self       Discharge Needs Assessment    Readmission Within the Last 30 Days no previous admission in last 30 days    Equipment Currently Used at Home bp cuff;nebulizer                   Discharge Plan       Row Name 08/20/23 1139       Plan    Plan Pt lives at home and plans to return home at discharge pt drives self or family will provide transportation. Pcp is Jose F Huang, he uses [facility] and has [facility]. Pt does not use home o2 or home health. Pt has a bp cuff and nebulizer  at home.    Patient/Family in Agreement with Plan yes                  Continued Care and Services - Admitted Since 8/19/2023    Coordination has not been started for this encounter.       Expected Discharge Date and Time       Expected Discharge Date Expected Discharge Time    Aug 21, 2023            Demographic Summary       Row Name 08/20/23 1138       General Information    Admission Type inpatient    Arrived From home    Referral Source emergency department    Reason for  Consult discharge planning                    Luna Turner RN

## 2023-08-20 NOTE — PLAN OF CARE
Goal Outcome Evaluation:  Plan of Care Reviewed With: patient        Progress: no change  Outcome Evaluation: Pt admitted from ED. Pt is resting in bed at this time. Pt has had complaints of pain this shift, PRN medication given per MAR. Pt has ambulated in room this shift. Pt has had no other complaints or requests at this time. No acute changes noted. VSS. Will continue POC.

## 2023-08-21 PROBLEM — R91.8 MASS OF RIGHT LUNG: Status: ACTIVE | Noted: 2023-08-19

## 2023-08-21 LAB
BASOPHILS # BLD AUTO: 0.02 10*3/MM3 (ref 0–0.2)
BASOPHILS NFR BLD AUTO: 0.1 % (ref 0–1.5)
DEPRECATED RDW RBC AUTO: 40.7 FL (ref 37–54)
EOSINOPHIL # BLD AUTO: 0 10*3/MM3 (ref 0–0.4)
EOSINOPHIL NFR BLD AUTO: 0 % (ref 0.3–6.2)
ERYTHROCYTE [DISTWIDTH] IN BLOOD BY AUTOMATED COUNT: 11.9 % (ref 12.3–15.4)
HCT VFR BLD AUTO: 40.2 % (ref 37.5–51)
HGB BLD-MCNC: 12.3 G/DL (ref 13–17.7)
IMM GRANULOCYTES # BLD AUTO: 0.19 10*3/MM3 (ref 0–0.05)
IMM GRANULOCYTES NFR BLD AUTO: 1 % (ref 0–0.5)
LYMPHOCYTES # BLD AUTO: 1.23 10*3/MM3 (ref 0.7–3.1)
LYMPHOCYTES NFR BLD AUTO: 6.2 % (ref 19.6–45.3)
MCH RBC QN AUTO: 28.3 PG (ref 26.6–33)
MCHC RBC AUTO-ENTMCNC: 30.6 G/DL (ref 31.5–35.7)
MCV RBC AUTO: 92.4 FL (ref 79–97)
MONOCYTES # BLD AUTO: 0.83 10*3/MM3 (ref 0.1–0.9)
MONOCYTES NFR BLD AUTO: 4.2 % (ref 5–12)
NEUTROPHILS NFR BLD AUTO: 17.59 10*3/MM3 (ref 1.7–7)
NEUTROPHILS NFR BLD AUTO: 88.5 % (ref 42.7–76)
NRBC BLD AUTO-RTO: 0 /100 WBC (ref 0–0.2)
PLATELET # BLD AUTO: 545 10*3/MM3 (ref 140–450)
PMV BLD AUTO: 9.4 FL (ref 6–12)
RBC # BLD AUTO: 4.35 10*6/MM3 (ref 4.14–5.8)
WBC NRBC COR # BLD: 19.86 10*3/MM3 (ref 3.4–10.8)

## 2023-08-21 PROCEDURE — 94664 DEMO&/EVAL PT USE INHALER: CPT

## 2023-08-21 PROCEDURE — 99232 SBSQ HOSP IP/OBS MODERATE 35: CPT | Performed by: INTERNAL MEDICINE

## 2023-08-21 PROCEDURE — 25010000002 ENOXAPARIN PER 10 MG: Performed by: INTERNAL MEDICINE

## 2023-08-21 PROCEDURE — 99222 1ST HOSP IP/OBS MODERATE 55: CPT | Performed by: INTERNAL MEDICINE

## 2023-08-21 PROCEDURE — 99232 SBSQ HOSP IP/OBS MODERATE 35: CPT

## 2023-08-21 PROCEDURE — 85025 COMPLETE CBC W/AUTO DIFF WBC: CPT | Performed by: NURSE PRACTITIONER

## 2023-08-21 PROCEDURE — 25010000002 PIPERACILLIN SOD-TAZOBACTAM PER 1 G: Performed by: INTERNAL MEDICINE

## 2023-08-21 PROCEDURE — 94761 N-INVAS EAR/PLS OXIMETRY MLT: CPT

## 2023-08-21 PROCEDURE — 63710000001 ONDANSETRON ODT 4 MG TABLET DISPERSIBLE

## 2023-08-21 PROCEDURE — 94799 UNLISTED PULMONARY SVC/PX: CPT

## 2023-08-21 PROCEDURE — 25010000002 METHYLPREDNISOLONE PER 40 MG: Performed by: INTERNAL MEDICINE

## 2023-08-21 RX ORDER — OXYCODONE AND ACETAMINOPHEN 7.5; 325 MG/1; MG/1
1 TABLET ORAL EVERY 6 HOURS PRN
Status: DISCONTINUED | OUTPATIENT
Start: 2023-08-21 | End: 2023-08-23

## 2023-08-21 RX ORDER — ONDANSETRON 4 MG/1
4 TABLET, ORALLY DISINTEGRATING ORAL EVERY 6 HOURS PRN
Status: DISCONTINUED | OUTPATIENT
Start: 2023-08-21 | End: 2023-08-24 | Stop reason: HOSPADM

## 2023-08-21 RX ORDER — BENZONATATE 100 MG/1
200 CAPSULE ORAL 3 TIMES DAILY
Status: DISCONTINUED | OUTPATIENT
Start: 2023-08-21 | End: 2023-08-24 | Stop reason: HOSPADM

## 2023-08-21 RX ADMIN — DOXYCYCLINE 100 MG: 100 INJECTION, POWDER, LYOPHILIZED, FOR SOLUTION INTRAVENOUS at 12:46

## 2023-08-21 RX ADMIN — BUDESONIDE AND FORMOTEROL FUMARATE DIHYDRATE 2 PUFF: 160; 4.5 AEROSOL RESPIRATORY (INHALATION) at 06:57

## 2023-08-21 RX ADMIN — BENZONATATE 200 MG: 100 CAPSULE ORAL at 11:51

## 2023-08-21 RX ADMIN — DOXYCYCLINE 100 MG: 100 INJECTION, POWDER, LYOPHILIZED, FOR SOLUTION INTRAVENOUS at 23:35

## 2023-08-21 RX ADMIN — TRAZODONE HYDROCHLORIDE 100 MG: 50 TABLET ORAL at 21:10

## 2023-08-21 RX ADMIN — METHYLPREDNISOLONE SODIUM SUCCINATE 40 MG: 40 INJECTION, POWDER, FOR SOLUTION INTRAMUSCULAR; INTRAVENOUS at 16:26

## 2023-08-21 RX ADMIN — LEVOTHYROXINE SODIUM 50 MCG: 50 TABLET ORAL at 08:04

## 2023-08-21 RX ADMIN — GABAPENTIN 600 MG: 300 CAPSULE ORAL at 13:14

## 2023-08-21 RX ADMIN — LISINOPRIL 10 MG: 10 TABLET ORAL at 08:04

## 2023-08-21 RX ADMIN — PANTOPRAZOLE SODIUM 40 MG: 40 TABLET, DELAYED RELEASE ORAL at 08:04

## 2023-08-21 RX ADMIN — GABAPENTIN 600 MG: 300 CAPSULE ORAL at 05:21

## 2023-08-21 RX ADMIN — ONDANSETRON 4 MG: 4 TABLET, ORALLY DISINTEGRATING ORAL at 03:03

## 2023-08-21 RX ADMIN — OXYCODONE AND ACETAMINOPHEN 1 TABLET: 7.5; 325 TABLET ORAL at 20:28

## 2023-08-21 RX ADMIN — IPRATROPIUM BROMIDE AND ALBUTEROL SULFATE 3 ML: .5; 2.5 SOLUTION RESPIRATORY (INHALATION) at 06:57

## 2023-08-21 RX ADMIN — ENOXAPARIN SODIUM 40 MG: 40 INJECTION SUBCUTANEOUS at 21:10

## 2023-08-21 RX ADMIN — OXYCODONE AND ACETAMINOPHEN 1 TABLET: 7.5; 325 TABLET ORAL at 12:45

## 2023-08-21 RX ADMIN — PIPERACILLIN SODIUM AND TAZOBACTAM SODIUM 4.5 G: 4; .5 INJECTION, POWDER, LYOPHILIZED, FOR SOLUTION INTRAVENOUS at 17:25

## 2023-08-21 RX ADMIN — BENZONATATE 200 MG: 100 CAPSULE ORAL at 16:26

## 2023-08-21 RX ADMIN — Medication 10 ML: at 21:11

## 2023-08-21 RX ADMIN — IPRATROPIUM BROMIDE AND ALBUTEROL SULFATE 3 ML: .5; 2.5 SOLUTION RESPIRATORY (INHALATION) at 18:50

## 2023-08-21 RX ADMIN — MONTELUKAST SODIUM 10 MG: 10 TABLET, COATED ORAL at 21:10

## 2023-08-21 RX ADMIN — BENZONATATE 200 MG: 100 CAPSULE ORAL at 21:09

## 2023-08-21 RX ADMIN — IPRATROPIUM BROMIDE AND ALBUTEROL SULFATE 3 ML: .5; 2.5 SOLUTION RESPIRATORY (INHALATION) at 12:34

## 2023-08-21 RX ADMIN — HYDROCODONE BITARTRATE AND ACETAMINOPHEN 1 TABLET: 10; 325 TABLET ORAL at 09:43

## 2023-08-21 RX ADMIN — PIPERACILLIN SODIUM AND TAZOBACTAM SODIUM 4.5 G: 4; .5 INJECTION, POWDER, LYOPHILIZED, FOR SOLUTION INTRAVENOUS at 11:51

## 2023-08-21 RX ADMIN — Medication 10 ML: at 08:04

## 2023-08-21 RX ADMIN — BUDESONIDE AND FORMOTEROL FUMARATE DIHYDRATE 2 PUFF: 160; 4.5 AEROSOL RESPIRATORY (INHALATION) at 18:50

## 2023-08-21 RX ADMIN — HYDROCODONE BITARTRATE AND ACETAMINOPHEN 1 TABLET: 10; 325 TABLET ORAL at 01:03

## 2023-08-21 RX ADMIN — GABAPENTIN 600 MG: 300 CAPSULE ORAL at 21:10

## 2023-08-21 RX ADMIN — DOXYCYCLINE 100 MG: 100 INJECTION, POWDER, LYOPHILIZED, FOR SOLUTION INTRAVENOUS at 00:08

## 2023-08-21 RX ADMIN — PIPERACILLIN SODIUM AND TAZOBACTAM SODIUM 4.5 G: 4; .5 INJECTION, POWDER, LYOPHILIZED, FOR SOLUTION INTRAVENOUS at 03:02

## 2023-08-21 RX ADMIN — METHYLPREDNISOLONE SODIUM SUCCINATE 40 MG: 40 INJECTION, POWDER, FOR SOLUTION INTRAMUSCULAR; INTRAVENOUS at 05:21

## 2023-08-21 NOTE — CONSULTS
Referring Provider: Hospitalist  Reason for Consultation: Right upper lung mass      Chief complaint -shortness of breath      History of present illness: Patient is a 53-year-old male presented to ER with complaints of shortness of breath and cough which has been going on from weeks.  Patient quit smoking 2 and half months back.  Does carry a history of COPD and uses inhalers at home.  Started with a productive cough which slowly got worse.  Treatment given so far in the hospital reviewed.  Has a past medical history positive for COPD GERD hypertension neck pain thyroid disease.  CT chest was reviewed and discussed with patient.  Images were shown and discussed.  Treatment plan discussed.  All the labs images and the notes and vitals were reviewed.    Review of Systems  History obtained from chart review and the patient  General ROS: negative for - chills, fatigue or fever  Psychological ROS: negative for - anxiety or depression  ENT ROS: negative for - headaches, visual changes or vocal changes  Respiratory ROS: positive for - cough and shortness of breath  Cardiovascular ROS: no chest pain or dyspnea on exertion  Gastrointestinal ROS: no abdominal pain, change in bowel habits, or black or bloody stools  Musculoskeletal ROS: negative for - joint pain, joint stiffness or joint swelling  Neurological ROS: no TIA or stroke symptoms  Hematological: no bleeding  Skin: no bruises, no rash        History  Past Medical History:   Diagnosis Date    Bulging of cervical intervertebral disc     COPD (chronic obstructive pulmonary disease)     GERD (gastroesophageal reflux disease)     Hypertension     Neck pain     Thyroid disease    , No past surgical history on file., History reviewed. No pertinent family history.,   Social History     Tobacco Use    Smoking status: Former     Packs/day: 1.00     Years: 30.00     Pack years: 30.00     Types: Cigarettes     Start date: 1986     Quit date: 05/2023     Years since  quittin.3    Smokeless tobacco: Never   Vaping Use    Vaping Use: Never used   Substance Use Topics    Alcohol use: Not Currently    Drug use: Not Currently   ,   Medications Prior to Admission   Medication Sig Dispense Refill Last Dose    budesonide-formoterol (SYMBICORT) 160-4.5 MCG/ACT inhaler Inhale 2 puffs 2 (Two) Times a Day.   2023    gabapentin (NEURONTIN) 600 MG tablet Take 1 tablet by mouth 3 (Three) Times a Day.   2023    levothyroxine (SYNTHROID, LEVOTHROID) 50 MCG tablet Take 1 tablet by mouth Daily.   2023    lisinopril (PRINIVIL,ZESTRIL) 10 MG tablet Take 1 tablet by mouth Daily.   2023    montelukast (SINGULAIR) 10 MG tablet Take 1 tablet by mouth Every Night.   2023    pantoprazole (PROTONIX) 40 MG EC tablet Take 1 tablet by mouth Daily.   2023    traZODone (DESYREL) 100 MG tablet Take 1 tablet by mouth Every Night.   2023    vitamin D (ERGOCALCIFEROL) 1.25 MG (73801 UT) capsule capsule Take 1 capsule by mouth 1 (One) Time Per Week.   2023    albuterol (PROVENTIL) (2.5 MG/3ML) 0.083% nebulizer solution Take 2.5 mg by nebulization Every 6 (Six) Hours As Needed for Wheezing.   Unknown    albuterol sulfate  (90 Base) MCG/ACT inhaler Inhale 2 puffs Every 4 (Four) Hours As Needed for Wheezing.   Unknown    HYDROcodone-acetaminophen (NORCO)  MG per tablet Take 1 tablet by mouth Every 8 (Eight) Hours As Needed for Moderate Pain.   Unknown    phentermine (ADIPEX-P) 37.5 MG tablet Take 1 tablet by mouth Every Morning Before Breakfast.   More than a month   , Scheduled Meds:  acetylcysteine, 3 mL, Nebulization, BID - RT  benzonatate, 200 mg, Oral, TID  budesonide-formoterol, 2 puff, Inhalation, BID - RT  [START ON 2023] cholecalciferol, 50,000 Units, Oral, Weekly  doxycycline, 100 mg, Intravenous, Q12H  enoxaparin, 40 mg, Subcutaneous, Nightly  gabapentin, 600 mg, Oral, Q8H  ipratropium-albuterol, 3 mL, Nebulization, 4x Daily - RT  levothyroxine,  50 mcg, Oral, Daily  lisinopril, 10 mg, Oral, Daily  methylPREDNISolone sodium succinate, 40 mg, Intravenous, Q12H  montelukast, 10 mg, Oral, Nightly  pantoprazole, 40 mg, Oral, Daily  piperacillin-tazobactam, 4.5 g, Intravenous, Q8H  senna-docusate sodium, 2 tablet, Oral, BID  sodium chloride, 10 mL, Intravenous, Q12H  traZODone, 100 mg, Oral, Nightly    , Continuous Infusions:  Pharmacy Consult,      and Allergies:  Patient has no known allergies.    Family history-reviewed and is nonsignificant.    Objective     Vital Signs   Temp:  [98.2 øF (36.8 øC)-98.4 øF (36.9 øC)] 98.4 øF (36.9 øC)  Heart Rate:  [78-96] 78  Resp:  [18-22] 22  BP: (100-113)/(56-77) 106/62    Physical Exam:             General-obese in appearance, not in any acute distress    HEENT- pupils equally reactive to light, normal in size, no scleral icterus    Neck-supple    Respiratory-respirations normal-on auscultation no wheezing no crackles,     Cardiovascular-  Normal S1 and S2. No S3, S4 or murmurs. No JVD, no carotid bruit and no edema, pulses normal bilaterally     GI-nontender nondistended bowel sounds positive    CNS-nonfocal    Musculoskeletal -no edema  Extremities- no obvious deformity noticed     Psychiatric-mood good, good eye contact, alert awake oriented  Skin- no visible rash                                                                   Results Review:    LABS:    Lab Results   Component Value Date    GLUCOSE 156 (H) 08/20/2023    BUN 16 08/20/2023    CREATININE 0.80 08/20/2023    EGFRIFNONA 76 12/07/2021    EGFRIFAFRI 87 01/02/2018    BCR 20.0 08/20/2023    CO2 23.9 08/20/2023    CALCIUM 9.4 08/20/2023    ALBUMIN 2.6 (L) 08/20/2023    LABIL2 1.4 (L) 04/11/2015    AST 14 08/20/2023    ALT 17 08/20/2023    WBC 19.86 (H) 08/21/2023    HGB 12.3 (L) 08/21/2023    HCT 40.2 08/21/2023    MCV 92.4 08/21/2023     (H) 08/21/2023     08/20/2023    K 4.8 08/20/2023     08/20/2023    ANIONGAP 13.1 08/20/2023       No  results found for: INR, PROTIME             I reviewed the patient's new clinical results.  I reviewed the patient's new imaging results and agree with the interpretation.      Assessment & Plan     Abnormal CT chest-with right upper lobe lung mass-images were shown to the patient.  Discussed the procedure-endobronchial ultrasound with transbronchial needle aspiration.  Patient agreed for the procedure  Answered patient's questions to his satisfaction  We will keep n.p.o. past midnight  Case request ordered.  We will get PT/INR in the morning      Postobstructive pneumonia-continue Zosyn.  We will get bronchoalveolar lavage just tomorrow and accordingly we will de-escalate the antibiotics.    COPD exacerbation-continue steroids   continue nebs   continue as needed oxygen to maintain saturation 88 to 92%      CYNTHIA-patient gives a positive history of daytime somnolence and tiredness especially with patient's BMI of 43.98 he should be screened for sleep apnea.  Can be scheduled on the outpatient basis.  For now can be started on a CPAP or BiPAP.  CPAP settings could be 12 cm of water or BiPAP of 14 x 8.    DVT prophylaxis-as per primary team    Bedside rounds were done with RT and patient's nurse. All the lab and clinical findings were discussed with them and plan was also discussed in great detail.    Family member present-none                  Sepsis    COPD exacerbation          Savage Mcmanus MD  08/21/23  13:32 EDT

## 2023-08-21 NOTE — PLAN OF CARE
Goal Outcome Evaluation:           Progress: no change  Outcome Evaluation: pt has been resting in bed. pt ambulated indep in room. pt sat in chair. prn meds given per orders for c/o pain. no other changes to note at this time; will continue to monitor

## 2023-08-21 NOTE — CONSULTS
"  COPD Education        Referring Provider: Dr. Mendiola  Reason for Consultation: COPD Exacerbation  Pulmonologist: None  Last outpatient pulmonary visit: None  Length of Diagnosis: \"years\" per patient  Last Hospital stay for COPD? > 1 year    Subjective .     Age: 53 y.o.  Sex: male  What stage have you been told you are in? [place]  Do you use a CPAP or BIPAP? no   Have you had any recent weight loss? no  How many pillows do you use? 2    Last PFT/when/where? none  FEV1: n/a    Classification of Airflow Limitation Severity in COPD (Based on Post-Bronchodilator FEV1)  Gold 1: Mild FEV1 ? 80% predicted   Gold 2:  Moderate 50% ? FEV1 < 80% predicted   Gold 3: Severe 30% ? FEV1 < 50% predicted   Gold 4: Very Severe FEV1 < 30% predicted     FEV1/FVC: n/a    Do you have dyspnea? yes Dyspnea on exertion  Home O2: n/a    Social History:  Social History     Socioeconomic History    Marital status:    Tobacco Use    Smoking status: Former     Packs/day: 1.00     Years: 30.00     Pack years: 30.00     Types: Cigarettes     Start date:      Quit date: 2023     Years since quittin.3    Smokeless tobacco: Never   Vaping Use    Vaping Use: Never used   Substance and Sexual Activity    Alcohol use: Not Currently    Drug use: Not Currently    Sexual activity: Defer       Smoking Cessation: No    Airway Clearance methods utilized: Aerobika, Aerobika ordered during this encounter    History of Sleep Apnea: yes. He has never been formally diagnosed but has been told by medical professionals he may have CYNTHIA.   Patient's Last ABG:  Site   Date Value Ref Range Status   2023 Left Radial  Final     Marty's Test   Date Value Ref Range Status   2023 Positive  Final     pH, Arterial   Date Value Ref Range Status   2023 7.472 (H) 7.350 - 7.450 pH units Final     Comment:     83 Value above reference range     pCO2, Arterial   Date Value Ref Range Status   2023 40.0 35.0 - 45.0 mm Hg Final     pO2, " Arterial   Date Value Ref Range Status   08/19/2023 65.0 (L) 83.0 - 108.0 mm Hg Final     Comment:     84 Value below reference range     HCO3, Arterial   Date Value Ref Range Status   08/19/2023 29.2 (H) 20.0 - 26.0 mmol/L Final     Comment:     83 Value above reference range     Base Excess, Arterial   Date Value Ref Range Status   08/19/2023 5.2 (H) 0.0 - 2.0 mmol/L Final     O2 Saturation, Arterial   Date Value Ref Range Status   08/19/2023 94.5 94.0 - 99.0 % Final     Hemoglobin, Blood Gas   Date Value Ref Range Status   08/19/2023 13.5 (L) 14 - 18 g/dL Final     Hematocrit, Blood Gas   Date Value Ref Range Status   08/19/2023 41.4 38.0 - 51.0 % Final     Oxyhemoglobin   Date Value Ref Range Status   08/19/2023 93.0 (L) 94 - 99 % Final     Comment:     84 Value below reference range     Methemoglobin   Date Value Ref Range Status   08/19/2023 <-0.10 (L) 0.00 - 3.00 % Final     Comment:     94 Value below reportable range < _0.1     Carboxyhemoglobin   Date Value Ref Range Status   08/19/2023 1.8 0 - 5 % Final     CO2 Content   Date Value Ref Range Status   08/19/2023 30.5 22 - 33 mmol/L Final     Barometric Pressure for Blood Gas   Date Value Ref Range Status   08/19/2023 729 mmHg Final     Modality   Date Value Ref Range Status   08/19/2023 Room Air  Final     FIO2   Date Value Ref Range Status   08/19/2023 21 % Final        Objective     SpO2 SpO2: 94 % (08/21/23 0657)  Device Device (Oxygen Therapy): room air (08/21/23 0801)  Breath Sounds: diminished breath sounds- anterior  CAT Assessment: (COPD Assessment Test)   I never cough. 0[] 1[] 2[] 3[x] 4[] 5[] I cough all the time.  I have no phlegm (mucus) in my chest. 0[] 1[x] 2[] 3[] 4[] 5[] My chest is completely full of phlegm (mucus).  My chest does not feel tight at all. 0[] 1[] 2[x] 3[] 4[] 5[] My chest feels very tight.  When I walk up a hill or one flight of stairs I am not breathless. 0[] 1[] 2[] 3[] 4[x] 5[] When I walk up a hill or one flight of  stairs I am very breathless.  I am not limited doing any activities at home 0[] 1[] 2[] 3[x] 4[] 5[] I am very limited doing activities at home.  I am confident leaving my home despite my lung condition. 0[] 1[] 2[] 3[x] 4[] 5[] I am not at all confident leaving my home because of my lung condition.  I sleep soundly. 0[] 1[] 2[] 3[] 4[] 5[x] I don't sleep soundly because of my lung condition.  I have lots of energy. 0[] 1[] 2[] 3[] 4[x] 5[]  I have no energy at all.  CAT Score(COPD Assessment Test): 25  Score  Impact Guidance    0-9 Low If smoke, encourage to stop smoking  Flu, Pneumonia, and Covid need to be up to date  Avoid COPD Triggers    10-20 Medium Encouraged all guidance for low impact score  Consider additional medications    21-40 High Encouraged all medium impact scores  Recommend referral to pulmonologist      STOP BANG Screening Questionnaire:   Snoring?   Do you snore loudly (loud enough to be heard through closed doors or that your bed partner elbows you for snoring at night)? {yes     Tired?   Do you often feel tired, fatigued, or sleepy during the daytime (such as falling asleep during driving or talking to someone)? yes               Observed?   Has anyone observed you stop breathing or choking/gasping during your sleep? yes             Pressure?   Do you have or are you being treated for high blood pressure? yes             Body mass index (BMI) more than 35 kg/m2?  Body mass index is 43.98 kg/mý. yes    Age older than 50? yes       Neck size large (measured around Steve's apple)? Is your shirt collar 16 inches (40 cm) or larger? yes  Gender (biologic sex): male? yes    STOP BANG Score Total: 8    STOP BANG Interpretation    Risk category Risk factors   Low risk Yes to 0 to 2 of the questions   Intermediate risk Yes to 3 to 4 questions   High risk Yes to 5 to 8 questions   High risk Yes to 2 or more of 4 STOP questions and BMI >35 kg/m2   High risk Yes to 2 or more of 4 STOP questions and neck  "circumference ?16 inches (?40 cm)   High risk Yes to 2 or more of 4 STOP questions and male gender (biologic sex)              Home Medications:  Medications Prior to Admission   Medication Sig Dispense Refill Last Dose    budesonide-formoterol (SYMBICORT) 160-4.5 MCG/ACT inhaler Inhale 2 puffs 2 (Two) Times a Day.   8/18/2023    gabapentin (NEURONTIN) 600 MG tablet Take 1 tablet by mouth 3 (Three) Times a Day.   8/18/2023    levothyroxine (SYNTHROID, LEVOTHROID) 50 MCG tablet Take 1 tablet by mouth Daily.   8/18/2023    lisinopril (PRINIVIL,ZESTRIL) 10 MG tablet Take 1 tablet by mouth Daily.   8/18/2023    montelukast (SINGULAIR) 10 MG tablet Take 1 tablet by mouth Every Night.   8/18/2023    pantoprazole (PROTONIX) 40 MG EC tablet Take 1 tablet by mouth Daily.   8/18/2023    traZODone (DESYREL) 100 MG tablet Take 1 tablet by mouth Every Night.   8/18/2023    vitamin D (ERGOCALCIFEROL) 1.25 MG (98063 UT) capsule capsule Take 1 capsule by mouth 1 (One) Time Per Week.   8/18/2023    albuterol (PROVENTIL) (2.5 MG/3ML) 0.083% nebulizer solution Take 2.5 mg by nebulization Every 6 (Six) Hours As Needed for Wheezing.   Unknown    albuterol sulfate  (90 Base) MCG/ACT inhaler Inhale 2 puffs Every 4 (Four) Hours As Needed for Wheezing.   Unknown    HYDROcodone-acetaminophen (NORCO)  MG per tablet Take 1 tablet by mouth Every 8 (Eight) Hours As Needed for Moderate Pain.   Unknown    phentermine (ADIPEX-P) 37.5 MG tablet Take 1 tablet by mouth Every Morning Before Breakfast.   More than a month     Barriers to Learning? No    Discussion: COPD education was given to Mr. Flores via the booklet , \"A Patient's Guide to COPD\". Discussion of the COPD zones (Green/Yellow/Red) was competed with emphasizes on what to do in the yellow and red zones. He was in his room and pleasantly interested in COPD education.      Proper Inhaler technique was illustrated with and without the given Aero Chamber spacer I provided to the " patient. Instruction on rescue and maintenance medications, the function of each and the importance of using them as prescribed.      Pursed Lip breathing was instructed as well as when to utilize the breathing technique.      He is having trouble mobilizing secretions, therefore, I ordered a Flutter valve to assist him with sputum production.      We discussed the COPD Clinic. Mr. Flores is interested in participating in the clinic. We discussed the benefits of the clinic and how his participation would help him manage his COPD symptoms at home. Per his permission, I placed the ambulatory referral to the COPD Clinic and have him scheduled for 8/31/23 at 3PM.      His is high risk for Obstructive Sleep Apnea. I spoke to Dr. Mcmanus, he stated he may order a home sleep study at discharge for Mr. Flores to complete, if not we Dr. Mcmanus stated we could address this in the COPD Clinic.          Discussed with Dr. Jackson/ANNAMARIA Dailey, RRT  COPD Navigator  08/21/23  12:24 EDT

## 2023-08-21 NOTE — PROGRESS NOTES
PROGRESS NOTE         Patient Identification:  Name:  Dexter Flores  Age:  53 y.o.  Sex:  male  :  1970  MRN:  [id number]  Visit Number:  [id number]  Primary Care Provider:  Jose F Reynolds         LOS: 2 days       ----------------------------------------------------------------------------------------------------------------------  Subjective       Chief Complaints:    Shortness of Breath and Chest Pain        Interval History:      Patient overall feels some better today.  Currently on room air without apparent distress.  Lungs clear to auscultation bilaterally.  Abdomen soft, nontender.  Patient reports aggressive, strong nonproductive cough.  WBC elevated 19.86, likely related to steroid therapy.  Blood cultures from 2023 remain in process.    Review of Systems:    Constitutional: no fever, chills and night sweats.    Eyes: no eye drainage, itching or redness.  HEENT: no mouth sores, dysphagia or nose bleed.  Respiratory: no for shortness of breath, Positive dry cough. No production of sputum.  Cardiovascular: no chest pain, no palpitations, no orthopnea.  Gastrointestinal: no nausea, vomiting or diarrhea. No abdominal pain, hematemesis or rectal bleeding.  Genitourinary: no dysuria or polyuria.  Hematologic/lymphatic: no lymph node abnormalities, no easy bruising or easy bleeding.  Musculoskeletal: no muscle or joint pain.  Skin: No rash and no itching.  Neurological: no loss of consciousness, no seizure, no headache.  Behavioral/Psych: no depression or suicidal ideation.  Endocrine: no hot flashes.  Immunologic: negative.    ----------------------------------------------------------------------------------------------------------------------      Objective       [facility] Meds:  acetylcysteine, 3 mL, Nebulization, BID - RT  benzonatate, 200 mg, Oral, TID  budesonide-formoterol, 2 puff, Inhalation, BID - RT  [START ON 2023] cholecalciferol, 50,000 Units, Oral,  Weekly  doxycycline, 100 mg, Intravenous, Q12H  enoxaparin, 40 mg, Subcutaneous, Nightly  gabapentin, 600 mg, Oral, Q8H  ipratropium-albuterol, 3 mL, Nebulization, 4x Daily - RT  levothyroxine, 50 mcg, Oral, Daily  lisinopril, 10 mg, Oral, Daily  methylPREDNISolone sodium succinate, 40 mg, Intravenous, Q12H  montelukast, 10 mg, Oral, Nightly  pantoprazole, 40 mg, Oral, Daily  piperacillin-tazobactam, 4.5 g, Intravenous, Q8H  senna-docusate sodium, 2 tablet, Oral, BID  sodium chloride, 10 mL, Intravenous, Q12H  traZODone, 100 mg, Oral, Nightly      Pharmacy Consult,       ----------------------------------------------------------------------------------------------------------------------    Vital Signs:  Temp:  [98.2 øF (36.8 øC)-98.4 øF (36.9 øC)] 98.4 øF (36.9 øC)  Heart Rate:  [78-96] 78  Resp:  [18-22] 22  BP: (100-113)/(56-77) 106/62  No data found.  SpO2 Percentage    08/20/23 1914 08/21/23 0657 08/21/23 1234   SpO2: 95% 94% 99%     SpO2:  [94 %-99 %] 99 %  on   ;   Device (Oxygen Therapy): room air    Body mass index is 43.98 kg/mý.  Wt Readings from Last 3 Encounters:   08/19/23 131 kg (289 lb 3.9 oz)   07/20/23 135 kg (298 lb 6.4 oz)   04/13/23 (!) 138 kg (303 lb 3.2 oz)        Intake/Output Summary (Last 24 hours) at 8/21/2023 1241  Last data filed at 8/21/2023 0622  Gross per 24 hour   Intake 1691.39 ml   Output --   Net 1691.39 ml     Diet: Regular/House Diet; Texture: Regular Texture (IDDSI 7); Fluid Consistency: Thin (IDDSI 0)  ----------------------------------------------------------------------------------------------------------------------      Physical Exam:    Constitutional:  Well-developed and well-nourished.  No respiratory distress.  Sitting up in chair this morning.  HENT:  Head: Normocephalic and atraumatic.  Mouth:  Moist mucous membranes.    Eyes:  Conjunctivae and EOM are normal.  No scleral icterus.  Neck:  Neck supple.  No JVD present.    Cardiovascular:  Normal rate, regular rhythm  and normal heart sounds with no murmur. No edema.  Pulmonary/Chest:  No respiratory distress, no wheezes, no crackles, with normal breath sounds and good air movement.  Persistent dry cough  Abdominal:  Soft.  Bowel sounds are normal.  No distension and no tenderness.   Musculoskeletal:  No edema, no tenderness, and no deformity.  No swelling or redness of joints.  Neurological:  Alert and oriented to person, place, and time.  No facial droop.  No slurred speech.   Skin:  Skin is warm and dry.  No rash noted.  No pallor.   Psychiatric:  Normal mood and affect.  Behavior is normal.        ----------------------------------------------------------------------------------------------------------------------  Results from last 7 days   Lab Units 08/19/23  1638 08/19/23  1416   HSTROP T ng/L 8 9     Results from last 7 days   Lab Units 08/19/23  1416   PROBNP pg/mL 217.8       Results from last 7 days   Lab Units 08/19/23  1506   PH, ARTERIAL pH units 7.472*   PO2 ART mm Hg 65.0*   PCO2, ARTERIAL mm Hg 40.0   HCO3 ART mmol/L 29.2*     Results from last 7 days   Lab Units 08/21/23  0734 08/20/23  0734 08/19/23  1453 08/19/23  1416   CRP mg/dL  --   --   --  17.62*   LACTATE mmol/L  --   --  1.5  --    WBC 10*3/mm3 19.86* 18.07*  --  23.73*   HEMOGLOBIN g/dL 12.3* 13.3  --  13.6   HEMATOCRIT % 40.2 42.7  --  43.0   MCV fL 92.4 92.0  --  88.7   MCHC g/dL 30.6* 31.1*  --  31.6   PLATELETS 10*3/mm3 545* 507*  --  589*     Results from last 7 days   Lab Units 08/20/23  0734 08/19/23  1416   SODIUM mmol/L 137 136   POTASSIUM mmol/L 4.8 4.9   CHLORIDE mmol/L 100 98   CO2 mmol/L 23.9 25.4   BUN mg/dL 16 13   CREATININE mg/dL 0.80 0.93   CALCIUM mg/dL 9.4 9.5   GLUCOSE mg/dL 156* 138*   ALBUMIN g/dL 2.6* 3.3*   BILIRUBIN mg/dL 0.3 0.5   ALK PHOS U/L 149* 167*   AST (SGOT) U/L 14 20   ALT (SGPT) U/L 17 23   Estimated Creatinine Clearance: 142.7 mL/min (by C-G formula based on SCr of 0.8 mg/dL).  No results found for: AMMONIA    No  results found for: HGBA1C, POCGLU  Lab Results   Component Value Date    HGBA1C 5.3 04/23/2014     No results found for: TSH, FREET4    Blood Culture   Date Value Ref Range Status   08/19/2023 Culture in progress  Preliminary   08/19/2023 Culture in progress  Preliminary     No results found for: URINECX  No results found for: WOUNDCX  No results found for: STOOLCX  No results found for: RESPCX  Pain Management Panel          Latest Ref Rng & Units 8/19/2023   Pain Management Panel   Amphetamine, Urine Qual Negative Positive    Barbiturates Screen, Urine Negative Negative    Benzodiazepine Screen, Urine Negative Negative    Buprenorphine, Screen, Urine Negative Negative    Cocaine Screen, Urine Negative Negative    Fentanyl, Urine Negative Negative    Methadone Screen , Urine Negative Negative    Methamphetamine, Ur Negative Negative          ----------------------------------------------------------------------------------------------------------------------  Imaging Results (Last 24 Hours)       ** No results found for the last 24 hours. **            ----------------------------------------------------------------------------------------------------------------------    Pertinent Infectious Disease Results      Patient presented to [facility] Emergency Department on 8/19/2023 for shortness of breath and cough.  WBC improving at 18.07.  CRP elevated 17.62.  Procalcitonin normal at 0.22.  Urinalysis unremarkable.  Strep pneumo antigen in process.  Lactic acid normal at 1.5 on admission.  COVID-19 and influenza PCR negative.  Blood cultures from 8/19/2023 2 out of 2 sets positive with BC ID is detecting staph species not aureus or lugdunensis and Staphylococcus lugdunensis.  Chest x-ray from 8/19/2023 reports near total opacification of the right upper lobe with a differential diagnosis including pneumonia, mass, postsurgical change.  CT of the chest from 8/19/2023 reports right upper lobe consolidative  masslike opacity without violating the fissures findings concerning for infection however the presence of enlarged right paratracheal lymph node measuring 4.2 cm, underlying mass cannot be ruled out.     Assessment/Plan       Assessment       Right upper lobe pneumonia  Bacteremia versus contaminant  COPD         Plan      I saw and examined the patient myself this morning with ABHIJEET Plata and discussed the findings and plan of care with her and got report from primary RN and here are my findings:    Today, the patient reports some improvement overall. They are currently breathing comfortably on room air and show no signs of distress. Lung sounds are clear upon auscultation bilaterally. The abdominal examination reveals softness and non-tenderness.    The patient reports experiencing an aggressive, strong nonproductive cough.    Laboratory Findings:  The patient's white blood cell count is elevated at 19.86. This elevation is likely attributed to steroid therapy. Blood cultures from 8/20/2023 are still pending results.    In light of the ongoing evaluation and the patient's reported symptoms, the current antibiotic regimen of piperacillin-tazobactam and doxycycline will be maintained. This decision is made with the intention of closely monitoring the patient and making necessary adjustments to the antibiotic therapy as needed.        ANTIMICROBIAL THERAPY    doxycycline (VIBRAMYCIN) 100MG IVPB in 100ml NS VTB  piperacillin-tazobactam (ZOSYN) IVPB 4.5 g in 100 mL NS VTB     Code Status:   Code Status and Medical Interventions:   Ordered at: 08/19/23 1728     Code Status (Patient has no pulse and is not breathing):    CPR (Attempt to Resuscitate)     Medical Interventions (Patient has pulse or is breathing):    Full Support       ABHIJEET Plata  08/21/23  12:41 EDT    Electronically signed by ABHIJEET Plata, 08/21/23, 12:43 PM EDT.    Electronically signed by Los Vega MD, 08/21/23, 12:49  PM EDT.

## 2023-08-21 NOTE — PLAN OF CARE
Goal Outcome Evaluation:              Outcome Evaluation: Patient resting in bed at this time. VSS. Patient c/o pain, see MAR. Patient ambulated in room independently. C/o nausea, see MAR. No acute changes. Will continue plan of care.

## 2023-08-21 NOTE — PROGRESS NOTES
Patient Identification:  Name:  Dexter Flores  Age:  53 y.o.  Sex:  male  :  1970  MRN:  [id number]  Visit Number:  [id number]  Primary Care Provider:  Jose F Reynolds    Length of stay:  2    Subjective/Interval History/Consultants/Procedures     Chief Complaint:   Chief Complaint   Patient presents with    Shortness of Breath    Chest Pain       Subjective/Interval History:    53 y.o. male who was admitted on 2023 with  right upper lobe pneumonia     PMH is significant for COPD, HTN, GERD, hypothyroidism, DDD   For complete admission information, please see history and physical.     Consultations:  Pulmonology   Infectious disease    Procedures/Scans:  CT PE protocol  CXR    Today, the patient states overall feeling about the same. Work of breathing unchanged. He states cough becoming more bothersome and requests something for relief. He also notes headache, he thinks related to cough. He is complaining of increased pain, stating his home pain regimen doesn't seem to be helping as much as usual.      Room location at the time of evaluation was [facility].    ----------------------------------------------------------------------------------------------------------------------  Current Hospital Meds:  acetylcysteine, 3 mL, Nebulization, BID - RT  budesonide-formoterol, 2 puff, Inhalation, BID - RT  [START ON 2023] cholecalciferol, 50,000 Units, Oral, Weekly  doxycycline, 100 mg, Intravenous, Q12H  enoxaparin, 40 mg, Subcutaneous, Nightly  gabapentin, 600 mg, Oral, Q8H  ipratropium-albuterol, 3 mL, Nebulization, 4x Daily - RT  levothyroxine, 50 mcg, Oral, Daily  lisinopril, 10 mg, Oral, Daily  methylPREDNISolone sodium succinate, 40 mg, Intravenous, Q12H  montelukast, 10 mg, Oral, Nightly  pantoprazole, 40 mg, Oral, Daily  piperacillin-tazobactam, 4.5 g, Intravenous, Q8H  senna-docusate sodium, 2 tablet, Oral, BID  sodium chloride, 10 mL, Intravenous, Q12H  traZODone, 100 mg, Oral, Nightly      Pharmacy  Consult,       ----------------------------------------------------------------------------------------------------------------------      Objective     Vital Signs:  Temp:  [98.2 øF (36.8 øC)-98.4 øF (36.9 øC)] 98.4 øF (36.9 øC)  Heart Rate:  [80-97] 90  Resp:  [18-20] 20  BP: (100-113)/(56-77) 100/57      08/19/23  1403 08/19/23  1855   Weight: 127 kg (280 lb) 131 kg (289 lb 3.9 oz)     Body mass index is 43.98 kg/mý.    Intake/Output Summary (Last 24 hours) at 8/21/2023 0842  Last data filed at 8/21/2023 0622  Gross per 24 hour   Intake 2411.39 ml   Output --   Net 2411.39 ml     No intake/output data recorded.  Diet: Regular/House Diet; Texture: Regular Texture (IDDSI 7); Fluid Consistency: Thin (IDDSI 0)  ----------------------------------------------------------------------------------------------------------------------    Physical Exam  Vitals and nursing note reviewed.   Constitutional:       General: He is not in acute distress.     Appearance: He is ill-appearing.   HENT:      Head: Normocephalic and atraumatic.   Eyes:      Extraocular Movements: Extraocular movements intact.      Conjunctiva/sclera: Conjunctivae normal.   Cardiovascular:      Rate and Rhythm: Normal rate and regular rhythm.   Pulmonary:      Effort: Pulmonary effort is normal.      Breath sounds: Wheezing present.   Musculoskeletal:      Right lower leg: No edema.      Left lower leg: No edema.   Skin:     General: Skin is warm and dry.   Neurological:      Mental Status: He is alert. Mental status is at baseline.   Psychiatric:         Mood and Affect: Mood normal.         Behavior: Behavior normal.              ----------------------------------------------------------------------------------------------------------------------  Tele:      ----------------------------------------------------------------------------------------------------------------------  Results from last 7 days   Lab Units 08/19/23  1638 08/19/23  1416   [facility]   PROBNP pg/mL  --  217.8     Results from last 7 days   Lab Units 08/21/23  0734 08/20/23  0734 08/19/23  1453 08/19/23  1416   CRP mg/dL  --   --   --  17.62*   LACTATE mmol/L  --   --  1.5  --    WBC 10*3/mm3 19.86* 18.07*  --  23.73*   HEMOGLOBIN g/dL 12.3* 13.3  --  13.6   HEMATOCRIT % 40.2 42.7  --  43.0   MCV fL 92.4 92.0  --  88.7   MCHC g/dL 30.6* 31.1*  --  31.6   PLATELETS 10*3/mm3 545* 507*  --  589*     Results from last 7 days   Lab Units 08/19/23  1506   PH, ARTERIAL pH units 7.472*   PO2 ART mm Hg 65.0*   PCO2, ARTERIAL mm Hg 40.0   HCO3 ART mmol/L 29.2*     Results from last 7 days   Lab Units 08/20/23  0734 08/19/23  1416   SODIUM mmol/L 137 136   POTASSIUM mmol/L 4.8 4.9   CHLORIDE mmol/L 100 98   CO2 mmol/L 23.9 25.4   BUN mg/dL 16 13   CREATININE mg/dL 0.80 0.93   CALCIUM mg/dL 9.4 9.5   GLUCOSE mg/dL 156* 138*   ALBUMIN g/dL 2.6* 3.3*   BILIRUBIN mg/dL 0.3 0.5   ALK PHOS U/L 149* 167*   AST (SGOT) U/L 14 20   ALT (SGPT) U/L 17 23   Estimated Creatinine Clearance: 142.7 mL/min (by C-G formula based on SCr of 0.8 mg/dL).  No results found for: AMMONIA      Blood Culture   Date Value Ref Range Status   08/19/2023 Culture in progress  Preliminary   08/19/2023 Culture in progress  Preliminary     No results found for: URINECX  No results found for: WOUNDCX  No results found for: STOOLCX  ----------------------------------------------------------------------------------------------------------------------  Imaging Results (Last 24 Hours)       ** No results found for the last 24 hours. **          ----------------------------------------------------------------------------------------------------------------------   I have reviewed the above laboratory values for 08/21/23    Assessment/Plan     Active Hospital Problems    Diagnosis  POA    **Sepsis [A41.9]  Yes    COPD exacerbation [J44.1]  Yes         ASSESSMENT/PLAN:    Sepsis, POA, 2/2 right upper lobe pneumonia  In the setting of COPD  "in acute exacerbation  Hyperglycemia, without history of DM, possibly 2/2 recent steroid use  Patient presents secondary to shortness of breath and cough present for 1 month, worsening over the past 2 weeks.  Work-up revealed dense, masslike pneumonia in the right upper lobe.  Patient meets SIRS/sepsis criteria on arrival with tachycardia, leukocytosis.  CRP is 17.62.  He received Rocephin and doxycycline in the ED as well as Solu-Medrol 125 mg IV x1  He will be admitted to [facility] for further work-up and management  Solu-Medrol 40 mg twice daily, Mucomyst nebs, DuoNebs and Tessalon as needed  Blood cultures positive preliminarily for gram-positive cocci in clusters.  BC ID 1 of 2 staph not aureus or lugdunensis, 1 of 2 staph lugdunensis methicillin resistance gene detected.   Have consulted ID for further assistance. Appreciated.   ID evaluated the patient and feel cultures likely contaminant. Have deescalated to doxycycline and zosyn.   Pulmonology consulted given appearance of \"mass-like\" pneumonia on imaging, pending formal recommendations.   Pharmacy consulted to assist in optimization of COPD regimen, recommendations appreciated.   Monitor I's and O's  Provide COPD education  Encourage incentive spirometry  WBC count remains elevated overnight at 19k. CRP ordered and pending for this AM.   VSS, remains on RA.     Chronic:  HTN  GERD  Hypothyroidism  Hx tobacco use  Degenerative disc disease  Plan to restart home meds as indicated per med rec  Monitor vital signs per protocol  Encourage continued tobacco cessation      -----------  -DVT prophylaxis: Lovenox   -Disposition plans/anticipated needs: Pending course and further work up         The patient is high risk due to the following diagnoses/reasons:  sepsis, pneumonia, copde        Jaspal Martinez PA-C  08/21/23  08:42 EDT   "

## 2023-08-22 ENCOUNTER — ANESTHESIA (OUTPATIENT)
Dept: PERIOP | Facility: HOSPITAL | Age: 53
DRG: 853 | End: 2023-08-22
Payer: COMMERCIAL

## 2023-08-22 ENCOUNTER — ANESTHESIA EVENT (OUTPATIENT)
Dept: PERIOP | Facility: HOSPITAL | Age: 53
DRG: 853 | End: 2023-08-22
Payer: COMMERCIAL

## 2023-08-22 LAB
BASOPHILS # BLD AUTO: 0.03 10*3/MM3 (ref 0–0.2)
BASOPHILS NFR BLD AUTO: 0.2 % (ref 0–1.5)
CRP SERPL-MCNC: 3.75 MG/DL (ref 0–0.5)
DEPRECATED RDW RBC AUTO: 41.9 FL (ref 37–54)
EOSINOPHIL # BLD AUTO: 0 10*3/MM3 (ref 0–0.4)
EOSINOPHIL NFR BLD AUTO: 0 % (ref 0.3–6.2)
ERYTHROCYTE [DISTWIDTH] IN BLOOD BY AUTOMATED COUNT: 12 % (ref 12.3–15.4)
HCT VFR BLD AUTO: 40 % (ref 37.5–51)
HGB BLD-MCNC: 12 G/DL (ref 13–17.7)
IMM GRANULOCYTES # BLD AUTO: 0.4 10*3/MM3 (ref 0–0.05)
IMM GRANULOCYTES NFR BLD AUTO: 2.3 % (ref 0–0.5)
INR PPP: 0.97 (ref 0.9–1.1)
LYMPHOCYTES # BLD AUTO: 1.27 10*3/MM3 (ref 0.7–3.1)
LYMPHOCYTES NFR BLD AUTO: 7.4 % (ref 19.6–45.3)
MCH RBC QN AUTO: 28.4 PG (ref 26.6–33)
MCHC RBC AUTO-ENTMCNC: 30 G/DL (ref 31.5–35.7)
MCV RBC AUTO: 94.6 FL (ref 79–97)
MONOCYTES # BLD AUTO: 1.15 10*3/MM3 (ref 0.1–0.9)
MONOCYTES NFR BLD AUTO: 6.7 % (ref 5–12)
NEUTROPHILS NFR BLD AUTO: 14.26 10*3/MM3 (ref 1.7–7)
NEUTROPHILS NFR BLD AUTO: 83.4 % (ref 42.7–76)
NRBC BLD AUTO-RTO: 0 /100 WBC (ref 0–0.2)
PLATELET # BLD AUTO: 488 10*3/MM3 (ref 140–450)
PMV BLD AUTO: 9.4 FL (ref 6–12)
PROTHROMBIN TIME: 13.4 SECONDS (ref 12.1–14.7)
RBC # BLD AUTO: 4.23 10*6/MM3 (ref 4.14–5.8)
WBC NRBC COR # BLD: 17.11 10*3/MM3 (ref 3.4–10.8)

## 2023-08-22 PROCEDURE — 87206 SMEAR FLUORESCENT/ACID STAI: CPT | Performed by: INTERNAL MEDICINE

## 2023-08-22 PROCEDURE — 87102 FUNGUS ISOLATION CULTURE: CPT | Performed by: INTERNAL MEDICINE

## 2023-08-22 PROCEDURE — 25010000002 ONDANSETRON PER 1 MG: Performed by: NURSE ANESTHETIST, CERTIFIED REGISTERED

## 2023-08-22 PROCEDURE — 99232 SBSQ HOSP IP/OBS MODERATE 35: CPT | Performed by: NURSE PRACTITIONER

## 2023-08-22 PROCEDURE — 88112 CYTOPATH CELL ENHANCE TECH: CPT

## 2023-08-22 PROCEDURE — 94799 UNLISTED PULMONARY SVC/PX: CPT

## 2023-08-22 PROCEDURE — 31623 DX BRONCHOSCOPE/BRUSH: CPT | Performed by: INTERNAL MEDICINE

## 2023-08-22 PROCEDURE — 25010000002 PIPERACILLIN SOD-TAZOBACTAM PER 1 G: Performed by: INTERNAL MEDICINE

## 2023-08-22 PROCEDURE — 31624 DX BRONCHOSCOPE/LAVAGE: CPT | Performed by: INTERNAL MEDICINE

## 2023-08-22 PROCEDURE — 87116 MYCOBACTERIA CULTURE: CPT | Performed by: INTERNAL MEDICINE

## 2023-08-22 PROCEDURE — 25010000002 ENOXAPARIN PER 10 MG: Performed by: INTERNAL MEDICINE

## 2023-08-22 PROCEDURE — 0BD58ZX EXTRACTION OF RIGHT MIDDLE LOBE BRONCHUS, VIA NATURAL OR ARTIFICIAL OPENING ENDOSCOPIC, DIAGNOSTIC: ICD-10-PCS | Performed by: INTERNAL MEDICINE

## 2023-08-22 PROCEDURE — 25010000002 METHYLPREDNISOLONE PER 40 MG: Performed by: INTERNAL MEDICINE

## 2023-08-22 PROCEDURE — 0BDC8ZX EXTRACTION OF RIGHT UPPER LUNG LOBE, VIA NATURAL OR ARTIFICIAL OPENING ENDOSCOPIC, DIAGNOSTIC: ICD-10-PCS | Performed by: INTERNAL MEDICINE

## 2023-08-22 PROCEDURE — 25010000002 MORPHINE PER 10 MG: Performed by: INTERNAL MEDICINE

## 2023-08-22 PROCEDURE — 85610 PROTHROMBIN TIME: CPT | Performed by: INTERNAL MEDICINE

## 2023-08-22 PROCEDURE — 87205 SMEAR GRAM STAIN: CPT | Performed by: INTERNAL MEDICINE

## 2023-08-22 PROCEDURE — 85025 COMPLETE CBC W/AUTO DIFF WBC: CPT

## 2023-08-22 PROCEDURE — 94664 DEMO&/EVAL PT USE INHALER: CPT

## 2023-08-22 PROCEDURE — 88342 IMHCHEM/IMCYTCHM 1ST ANTB: CPT

## 2023-08-22 PROCEDURE — 88173 CYTOPATH EVAL FNA REPORT: CPT

## 2023-08-22 PROCEDURE — 88341 IMHCHEM/IMCYTCHM EA ADD ANTB: CPT

## 2023-08-22 PROCEDURE — 63710000001 ONDANSETRON ODT 4 MG TABLET DISPERSIBLE: Performed by: INTERNAL MEDICINE

## 2023-08-22 PROCEDURE — 87071 CULTURE AEROBIC QUANT OTHER: CPT | Performed by: INTERNAL MEDICINE

## 2023-08-22 PROCEDURE — 31625 BRONCHOSCOPY W/BIOPSY(S): CPT | Performed by: INTERNAL MEDICINE

## 2023-08-22 PROCEDURE — 88360 TUMOR IMMUNOHISTOCHEM/MANUAL: CPT

## 2023-08-22 PROCEDURE — 0B9C8ZX DRAINAGE OF RIGHT UPPER LUNG LOBE, VIA NATURAL OR ARTIFICIAL OPENING ENDOSCOPIC, DIAGNOSTIC: ICD-10-PCS | Performed by: INTERNAL MEDICINE

## 2023-08-22 PROCEDURE — 94761 N-INVAS EAR/PLS OXIMETRY MLT: CPT

## 2023-08-22 PROCEDURE — 0BBC8ZX EXCISION OF RIGHT UPPER LUNG LOBE, VIA NATURAL OR ARTIFICIAL OPENING ENDOSCOPIC, DIAGNOSTIC: ICD-10-PCS | Performed by: INTERNAL MEDICINE

## 2023-08-22 PROCEDURE — 86140 C-REACTIVE PROTEIN: CPT | Performed by: NURSE PRACTITIONER

## 2023-08-22 PROCEDURE — 99232 SBSQ HOSP IP/OBS MODERATE 35: CPT

## 2023-08-22 PROCEDURE — 31652 BRONCH EBUS SAMPLNG 1/2 NODE: CPT | Performed by: INTERNAL MEDICINE

## 2023-08-22 PROCEDURE — 99232 SBSQ HOSP IP/OBS MODERATE 35: CPT | Performed by: INTERNAL MEDICINE

## 2023-08-22 PROCEDURE — 25010000002 PROPOFOL 200 MG/20ML EMULSION: Performed by: NURSE ANESTHETIST, CERTIFIED REGISTERED

## 2023-08-22 PROCEDURE — 88305 TISSUE EXAM BY PATHOLOGIST: CPT

## 2023-08-22 PROCEDURE — 0B9D8ZX DRAINAGE OF RIGHT MIDDLE LUNG LOBE, VIA NATURAL OR ARTIFICIAL OPENING ENDOSCOPIC, DIAGNOSTIC: ICD-10-PCS | Performed by: INTERNAL MEDICINE

## 2023-08-22 RX ORDER — ONDANSETRON 2 MG/ML
4 INJECTION INTRAMUSCULAR; INTRAVENOUS AS NEEDED
Status: DISCONTINUED | OUTPATIENT
Start: 2023-08-22 | End: 2023-08-22 | Stop reason: HOSPADM

## 2023-08-22 RX ORDER — MIDAZOLAM HYDROCHLORIDE 1 MG/ML
1 INJECTION INTRAMUSCULAR; INTRAVENOUS
Status: DISCONTINUED | OUTPATIENT
Start: 2023-08-22 | End: 2023-08-22 | Stop reason: HOSPADM

## 2023-08-22 RX ORDER — OXYCODONE HYDROCHLORIDE AND ACETAMINOPHEN 5; 325 MG/1; MG/1
1 TABLET ORAL ONCE AS NEEDED
Status: DISCONTINUED | OUTPATIENT
Start: 2023-08-22 | End: 2023-08-22 | Stop reason: HOSPADM

## 2023-08-22 RX ORDER — LIDOCAINE HYDROCHLORIDE 40 MG/ML
3 INJECTION, SOLUTION RETROBULBAR; TOPICAL ONCE
Status: COMPLETED | OUTPATIENT
Start: 2023-08-22 | End: 2023-08-22

## 2023-08-22 RX ORDER — LIDOCAINE HYDROCHLORIDE AND EPINEPHRINE 10; 10 MG/ML; UG/ML
INJECTION, SOLUTION INFILTRATION; PERINEURAL AS NEEDED
Status: DISCONTINUED | OUTPATIENT
Start: 2023-08-22 | End: 2023-08-22 | Stop reason: HOSPADM

## 2023-08-22 RX ORDER — SODIUM CHLORIDE 9 MG/ML
40 INJECTION, SOLUTION INTRAVENOUS AS NEEDED
Status: DISCONTINUED | OUTPATIENT
Start: 2023-08-22 | End: 2023-08-22 | Stop reason: HOSPADM

## 2023-08-22 RX ORDER — ONDANSETRON 2 MG/ML
INJECTION INTRAMUSCULAR; INTRAVENOUS AS NEEDED
Status: DISCONTINUED | OUTPATIENT
Start: 2023-08-22 | End: 2023-08-22 | Stop reason: SURG

## 2023-08-22 RX ORDER — MORPHINE SULFATE 2 MG/ML
1 INJECTION, SOLUTION INTRAMUSCULAR; INTRAVENOUS ONCE
Status: COMPLETED | OUTPATIENT
Start: 2023-08-22 | End: 2023-08-22

## 2023-08-22 RX ORDER — SODIUM CHLORIDE 9 MG/ML
INJECTION, SOLUTION INTRAVENOUS AS NEEDED
Status: DISCONTINUED | OUTPATIENT
Start: 2023-08-22 | End: 2023-08-22 | Stop reason: HOSPADM

## 2023-08-22 RX ORDER — IPRATROPIUM BROMIDE AND ALBUTEROL SULFATE 2.5; .5 MG/3ML; MG/3ML
3 SOLUTION RESPIRATORY (INHALATION) ONCE AS NEEDED
Status: COMPLETED | OUTPATIENT
Start: 2023-08-22 | End: 2023-08-22

## 2023-08-22 RX ORDER — METOPROLOL TARTRATE 5 MG/5ML
INJECTION INTRAVENOUS AS NEEDED
Status: DISCONTINUED | OUTPATIENT
Start: 2023-08-22 | End: 2023-08-22 | Stop reason: SURG

## 2023-08-22 RX ORDER — SODIUM CHLORIDE, SODIUM LACTATE, POTASSIUM CHLORIDE, CALCIUM CHLORIDE 600; 310; 30; 20 MG/100ML; MG/100ML; MG/100ML; MG/100ML
100 INJECTION, SOLUTION INTRAVENOUS ONCE AS NEEDED
Status: DISCONTINUED | OUTPATIENT
Start: 2023-08-22 | End: 2023-08-22 | Stop reason: HOSPADM

## 2023-08-22 RX ORDER — PROPOFOL 10 MG/ML
INJECTION, EMULSION INTRAVENOUS AS NEEDED
Status: DISCONTINUED | OUTPATIENT
Start: 2023-08-22 | End: 2023-08-22 | Stop reason: SURG

## 2023-08-22 RX ORDER — SODIUM CHLORIDE, SODIUM LACTATE, POTASSIUM CHLORIDE, CALCIUM CHLORIDE 600; 310; 30; 20 MG/100ML; MG/100ML; MG/100ML; MG/100ML
125 INJECTION, SOLUTION INTRAVENOUS ONCE
Status: DISCONTINUED | OUTPATIENT
Start: 2023-08-22 | End: 2023-08-22 | Stop reason: HOSPADM

## 2023-08-22 RX ORDER — ALBUTEROL SULFATE 2.5 MG/3ML
2.5 SOLUTION RESPIRATORY (INHALATION) ONCE
Status: COMPLETED | OUTPATIENT
Start: 2023-08-22 | End: 2023-08-22

## 2023-08-22 RX ORDER — FAMOTIDINE 10 MG/ML
INJECTION, SOLUTION INTRAVENOUS AS NEEDED
Status: DISCONTINUED | OUTPATIENT
Start: 2023-08-22 | End: 2023-08-22 | Stop reason: SURG

## 2023-08-22 RX ORDER — SODIUM CHLORIDE 0.9 % (FLUSH) 0.9 %
10 SYRINGE (ML) INJECTION EVERY 12 HOURS SCHEDULED
Status: DISCONTINUED | OUTPATIENT
Start: 2023-08-22 | End: 2023-08-22 | Stop reason: HOSPADM

## 2023-08-22 RX ORDER — LIDOCAINE HYDROCHLORIDE 20 MG/ML
INJECTION, SOLUTION EPIDURAL; INFILTRATION; INTRACAUDAL; PERINEURAL AS NEEDED
Status: DISCONTINUED | OUTPATIENT
Start: 2023-08-22 | End: 2023-08-22 | Stop reason: SURG

## 2023-08-22 RX ORDER — SODIUM CHLORIDE 0.9 % (FLUSH) 0.9 %
10 SYRINGE (ML) INJECTION AS NEEDED
Status: DISCONTINUED | OUTPATIENT
Start: 2023-08-22 | End: 2023-08-22 | Stop reason: HOSPADM

## 2023-08-22 RX ORDER — LIDOCAINE HYDROCHLORIDE 10 MG/ML
INJECTION, SOLUTION EPIDURAL; INFILTRATION; INTRACAUDAL; PERINEURAL AS NEEDED
Status: DISCONTINUED | OUTPATIENT
Start: 2023-08-22 | End: 2023-08-22 | Stop reason: HOSPADM

## 2023-08-22 RX ORDER — SODIUM CHLORIDE, SODIUM LACTATE, POTASSIUM CHLORIDE, CALCIUM CHLORIDE 600; 310; 30; 20 MG/100ML; MG/100ML; MG/100ML; MG/100ML
INJECTION, SOLUTION INTRAVENOUS CONTINUOUS PRN
Status: DISCONTINUED | OUTPATIENT
Start: 2023-08-22 | End: 2023-08-22 | Stop reason: SURG

## 2023-08-22 RX ADMIN — GABAPENTIN 600 MG: 300 CAPSULE ORAL at 13:52

## 2023-08-22 RX ADMIN — MORPHINE SULFATE 1 MG: 2 INJECTION, SOLUTION INTRAMUSCULAR; INTRAVENOUS at 02:43

## 2023-08-22 RX ADMIN — ALBUTEROL SULFATE 2.5 MG: 0.83 SOLUTION RESPIRATORY (INHALATION) at 08:43

## 2023-08-22 RX ADMIN — PIPERACILLIN SODIUM AND TAZOBACTAM SODIUM 4.5 G: 4; .5 INJECTION, POWDER, LYOPHILIZED, FOR SOLUTION INTRAVENOUS at 11:50

## 2023-08-22 RX ADMIN — ENOXAPARIN SODIUM 40 MG: 40 INJECTION SUBCUTANEOUS at 21:36

## 2023-08-22 RX ADMIN — OXYCODONE AND ACETAMINOPHEN 1 TABLET: 7.5; 325 TABLET ORAL at 18:06

## 2023-08-22 RX ADMIN — Medication 10 ML: at 11:51

## 2023-08-22 RX ADMIN — Medication 10 ML: at 21:37

## 2023-08-22 RX ADMIN — IPRATROPIUM BROMIDE AND ALBUTEROL SULFATE 3 ML: .5; 2.5 SOLUTION RESPIRATORY (INHALATION) at 13:00

## 2023-08-22 RX ADMIN — IPRATROPIUM BROMIDE AND ALBUTEROL SULFATE 3 ML: .5; 2.5 SOLUTION RESPIRATORY (INHALATION) at 09:53

## 2023-08-22 RX ADMIN — OXYCODONE AND ACETAMINOPHEN 1 TABLET: 7.5; 325 TABLET ORAL at 11:50

## 2023-08-22 RX ADMIN — LIDOCAINE HYDROCHLORIDE 3 ML: 40 INJECTION, SOLUTION RETROBULBAR; TOPICAL at 08:50

## 2023-08-22 RX ADMIN — LEVOTHYROXINE SODIUM 50 MCG: 50 TABLET ORAL at 11:50

## 2023-08-22 RX ADMIN — GABAPENTIN 600 MG: 300 CAPSULE ORAL at 21:37

## 2023-08-22 RX ADMIN — PIPERACILLIN SODIUM AND TAZOBACTAM SODIUM 4.5 G: 4; .5 INJECTION, POWDER, LYOPHILIZED, FOR SOLUTION INTRAVENOUS at 02:23

## 2023-08-22 RX ADMIN — ONDANSETRON 4 MG: 2 INJECTION INTRAMUSCULAR; INTRAVENOUS at 09:09

## 2023-08-22 RX ADMIN — BENZONATATE 200 MG: 100 CAPSULE ORAL at 21:37

## 2023-08-22 RX ADMIN — BUDESONIDE AND FORMOTEROL FUMARATE DIHYDRATE 2 PUFF: 160; 4.5 AEROSOL RESPIRATORY (INHALATION) at 07:05

## 2023-08-22 RX ADMIN — TRAZODONE HYDROCHLORIDE 100 MG: 50 TABLET ORAL at 21:37

## 2023-08-22 RX ADMIN — PROPOFOL 150 MG: 10 INJECTION, EMULSION INTRAVENOUS at 09:09

## 2023-08-22 RX ADMIN — METHYLPREDNISOLONE SODIUM SUCCINATE 40 MG: 40 INJECTION, POWDER, FOR SOLUTION INTRAMUSCULAR; INTRAVENOUS at 05:09

## 2023-08-22 RX ADMIN — IPRATROPIUM BROMIDE AND ALBUTEROL SULFATE 3 ML: .5; 2.5 SOLUTION RESPIRATORY (INHALATION) at 00:13

## 2023-08-22 RX ADMIN — BUDESONIDE AND FORMOTEROL FUMARATE DIHYDRATE 2 PUFF: 160; 4.5 AEROSOL RESPIRATORY (INHALATION) at 18:50

## 2023-08-22 RX ADMIN — SODIUM CHLORIDE, POTASSIUM CHLORIDE, SODIUM LACTATE AND CALCIUM CHLORIDE: 600; 310; 30; 20 INJECTION, SOLUTION INTRAVENOUS at 09:04

## 2023-08-22 RX ADMIN — IPRATROPIUM BROMIDE AND ALBUTEROL SULFATE 3 ML: .5; 2.5 SOLUTION RESPIRATORY (INHALATION) at 07:05

## 2023-08-22 RX ADMIN — METOPROLOL TARTRATE 3 MG: 1 INJECTION, SOLUTION INTRAVENOUS at 09:17

## 2023-08-22 RX ADMIN — BENZONATATE 200 MG: 100 CAPSULE ORAL at 17:58

## 2023-08-22 RX ADMIN — LISINOPRIL 10 MG: 10 TABLET ORAL at 11:50

## 2023-08-22 RX ADMIN — FAMOTIDINE 20 MG: 10 INJECTION, SOLUTION INTRAVENOUS at 09:09

## 2023-08-22 RX ADMIN — DOXYCYCLINE 100 MG: 100 INJECTION, POWDER, LYOPHILIZED, FOR SOLUTION INTRAVENOUS at 13:31

## 2023-08-22 RX ADMIN — METHYLPREDNISOLONE SODIUM SUCCINATE 40 MG: 40 INJECTION, POWDER, FOR SOLUTION INTRAMUSCULAR; INTRAVENOUS at 17:59

## 2023-08-22 RX ADMIN — PANTOPRAZOLE SODIUM 40 MG: 40 TABLET, DELAYED RELEASE ORAL at 11:50

## 2023-08-22 RX ADMIN — PIPERACILLIN SODIUM AND TAZOBACTAM SODIUM 4.5 G: 4; .5 INJECTION, POWDER, LYOPHILIZED, FOR SOLUTION INTRAVENOUS at 17:58

## 2023-08-22 RX ADMIN — ONDANSETRON 4 MG: 4 TABLET, ORALLY DISINTEGRATING ORAL at 21:43

## 2023-08-22 RX ADMIN — LIDOCAINE HYDROCHLORIDE 60 MG: 20 INJECTION, SOLUTION EPIDURAL; INFILTRATION; INTRACAUDAL; PERINEURAL at 09:09

## 2023-08-22 RX ADMIN — MONTELUKAST SODIUM 10 MG: 10 TABLET, COATED ORAL at 21:37

## 2023-08-22 RX ADMIN — IPRATROPIUM BROMIDE AND ALBUTEROL SULFATE 3 ML: .5; 2.5 SOLUTION RESPIRATORY (INHALATION) at 18:50

## 2023-08-22 NOTE — ANESTHESIA POSTPROCEDURE EVALUATION
Patient: Dexter Flores    Procedure Summary       Date: 08/22/23 Room / Location:  [facility] OR 09 /  [facility] OR    Anesthesia Start: 0904 Anesthesia Stop: 0939    Procedure: BRONCHOSCOPY WITH ENDOBRONCHIAL ULTRASOUND (Bilateral: Bronchus) Diagnosis:       Mass of right lung      (Mass of right lung [R91.8])    Surgeons: Savage Mcmanus MD Provider: Gavin Curran MD    Anesthesia Type: general ASA Status: 4            Anesthesia Type: general    Vitals  Vitals Value Taken Time   /79 08/22/23 1000   Temp 98 øF (36.7 øC) 08/22/23 0940   Pulse 96 08/22/23 1000   Resp 24 08/22/23 1000   SpO2 91 % 08/22/23 1000           Post Anesthesia Care and Evaluation    Patient location during evaluation: PACU  Patient participation: complete - patient participated  Level of consciousness: awake  Pain score: 0  Pain management: satisfactory to patient    Airway patency: patent  Anesthetic complications: No anesthetic complications    Cardiovascular status: hemodynamically stable  Respiratory status: nasal cannula  Hydration status: acceptable    Comments: Has continued coughing as He had prior to surgery.

## 2023-08-22 NOTE — PROGRESS NOTES
Referring Provider: Hospitalist  Reason for Consultation: Right upper lung mass      Chief complaint -shortness of breath  Subjective-all the labs medications ins and outs and vitals reviewed.  We will take the patient for bronchoscopy.  Before the procedure seen in the endoscopy suite  Review of Systems  Positive for shortness of breath on exertion otherwise negative        History  Past Medical History:   Diagnosis Date    Bulging of cervical intervertebral disc     COPD (chronic obstructive pulmonary disease)     GERD (gastroesophageal reflux disease)     Hypertension     Neck pain     Thyroid disease    , History reviewed. No pertinent surgical history., History reviewed. No pertinent family history.,   Social History     Tobacco Use    Smoking status: Former     Packs/day: 1.00     Years: 30.00     Pack years: 30.00     Types: Cigarettes     Start date:      Quit date: 2023     Years since quittin.3    Smokeless tobacco: Never   Vaping Use    Vaping Use: Never used   Substance Use Topics    Alcohol use: Not Currently    Drug use: Not Currently   ,   Medications Prior to Admission   Medication Sig Dispense Refill Last Dose    budesonide-formoterol (SYMBICORT) 160-4.5 MCG/ACT inhaler Inhale 2 puffs 2 (Two) Times a Day.   2023    gabapentin (NEURONTIN) 600 MG tablet Take 1 tablet by mouth 3 (Three) Times a Day.   2023    levothyroxine (SYNTHROID, LEVOTHROID) 50 MCG tablet Take 1 tablet by mouth Daily.   2023    lisinopril (PRINIVIL,ZESTRIL) 10 MG tablet Take 1 tablet by mouth Daily.   2023    montelukast (SINGULAIR) 10 MG tablet Take 1 tablet by mouth Every Night.   2023    pantoprazole (PROTONIX) 40 MG EC tablet Take 1 tablet by mouth Daily.   2023    traZODone (DESYREL) 100 MG tablet Take 1 tablet by mouth Every Night.   2023    vitamin D (ERGOCALCIFEROL) 1.25 MG (15799 UT) capsule capsule Take 1 capsule by mouth 1 (One) Time Per Week.   2023    albuterol  (PROVENTIL) (2.5 MG/3ML) 0.083% nebulizer solution Take 2.5 mg by nebulization Every 6 (Six) Hours As Needed for Wheezing.   Unknown    albuterol sulfate  (90 Base) MCG/ACT inhaler Inhale 2 puffs Every 4 (Four) Hours As Needed for Wheezing.   Unknown    HYDROcodone-acetaminophen (NORCO)  MG per tablet Take 1 tablet by mouth Every 8 (Eight) Hours As Needed for Moderate Pain.   Unknown    phentermine (ADIPEX-P) 37.5 MG tablet Take 1 tablet by mouth Every Morning Before Breakfast.   More than a month   , Scheduled Meds:  acetylcysteine, 3 mL, Nebulization, BID - RT  benzonatate, 200 mg, Oral, TID  budesonide-formoterol, 2 puff, Inhalation, BID - RT  [START ON 8/25/2023] cholecalciferol, 50,000 Units, Oral, Weekly  doxycycline, 100 mg, Intravenous, Q12H  enoxaparin, 40 mg, Subcutaneous, Nightly  gabapentin, 600 mg, Oral, Q8H  ipratropium-albuterol, 3 mL, Nebulization, 4x Daily - RT  lactated ringers, 125 mL/hr, Intravenous, Once  levothyroxine, 50 mcg, Oral, Daily  lisinopril, 10 mg, Oral, Daily  methylPREDNISolone sodium succinate, 40 mg, Intravenous, Q12H  montelukast, 10 mg, Oral, Nightly  pantoprazole, 40 mg, Oral, Daily  piperacillin-tazobactam, 4.5 g, Intravenous, Q8H  senna-docusate sodium, 2 tablet, Oral, BID  sodium chloride, 10 mL, Intravenous, Q12H  sodium chloride, 10 mL, Intravenous, Q12H  traZODone, 100 mg, Oral, Nightly    , Continuous Infusions:  Pharmacy Consult,      and Allergies:  Patient has no known allergies.    Family history-reviewed and is nonsignificant.    Objective     Vital Signs   Temp:  [97.6 øF (36.4 øC)-98.6 øF (37 øC)] 97.9 øF (36.6 øC)  Heart Rate:  [] 100  Resp:  [18-22] 20  BP: (106-165)/() 151/100    Physical Exam:             General-obese in appearance, not in any acute distress    HEENT-PERRLA  Neck-supple    Respiratory-respirations normal-on auscultation no wheezing no crackles,     Cardiovascular-NSR  GI-nontender nondistended bowel sounds  positive    CNS-nonfocal    Musculoskeletal -no edema  Extremities- no obvious deformity noticed     Psychiatric-mood good, good eye contact, alert awake oriented  Skin- no visible rash                                                                   Results Review:    LABS:    Lab Results   Component Value Date    GLUCOSE 156 (H) 08/20/2023    BUN 16 08/20/2023    CREATININE 0.80 08/20/2023    EGFRIFNONA 76 12/07/2021    EGFRIFAFRI 87 01/02/2018    BCR 20.0 08/20/2023    CO2 23.9 08/20/2023    CALCIUM 9.4 08/20/2023    ALBUMIN 2.6 (L) 08/20/2023    LABIL2 1.4 (L) 04/11/2015    AST 14 08/20/2023    ALT 17 08/20/2023    WBC 17.11 (H) 08/22/2023    HGB 12.0 (L) 08/22/2023    HCT 40.0 08/22/2023    MCV 94.6 08/22/2023     (H) 08/22/2023     08/20/2023    K 4.8 08/20/2023     08/20/2023    ANIONGAP 13.1 08/20/2023       Lab Results   Component Value Date    INR 0.97 08/22/2023    PROTIME 13.4 08/22/2023       Results from last 7 days   Lab Units 08/22/23  0131   INR  0.97          I reviewed the patient's new clinical results.  I reviewed the patient's new imaging results and agree with the interpretation.      Assessment & Plan     Abnormal CT chest-right upper lobe mass.  We will do endobronchial ultrasound-guided transbronchial needle aspiration and bronchoalveolar lavage is not hiram.  Procedure was again explained to the patient and answered his questions to his satisfaction.  Latest labs reviewed      Postobstructive pneumonia-White count increased.  Continue Zosyn.  Will get appropriate cultures.      COPD exacerbation-continue steroids continue nebs    continue as needed oxygen to maintain saturation 88 to 92%      CYNTHIA-patient gives a positive history of daytime somnolence and tiredness especially with patient's BMI of 43.98 he should be screened for sleep apnea.  Can be scheduled on the outpatient basis.  For now can be started on a CPAP or BiPAP.  CPAP settings could be 12 cm of water or  BiPAP of 14 x 8.    DVT prophylaxis-as per primary team    Bedside rounds were done with RT and patient's nurse. All the lab and clinical findings were discussed with them and plan was also discussed in great detail.    Family member present-none                  Sepsis    COPD exacerbation    Mass of right lung          Savage Mcmanus MD  08/22/23  09:00 EDT

## 2023-08-22 NOTE — OP NOTE
Bronchoscopy Procedure Note    Date of Operation: 8/22/2023    Pre-op Diagnosis: Right upper lobe mass    Post-op Diagnosis: Right upper lobe mass    Surgeon: Savage Mcmanus MD    Assistants: None    Anesthesia: Please see anesthesia report for details    Operation: Flexible fiberoptic bronchoscopy, diagnostic     Findings: Right upper lobe opening was completely occupied by a mass  Right upper lobe-bronchial mucosa was abnormal  Endobronchial ultrasound-right upper lobe mass and lymphadenopathy was identified  Specimen:   Bronchoalveolar lavage of right upper lobe  Bronchial washings  Bronchoalveolar lavage of right middle lobe  Bronchial brushings of right upper lobe  Bronchial brushings of right middle lobe  Endobronchial forceps biopsy of right upper lobe mass and abnormal bronchial mucosa  Endobronchial ultrasound-guided transbronchial needle aspiration of right upper lobe mass    Estimated Blood Loss: Minimal    Complications:   Mild bleeding after bronchoalveolar lavage and brushings which was controlled with epinephrine with lidocaine.  Mild bleeding after endobronchial forceps biopsy which was controlled with thrombin spray.  Indications and History:  The patient is a 53 y.o. male with abnormal CT chest with right upper lobe mass.  LMA the risks, benefits, complications, treatment options and expected outcomes were discussed with the patient.  The possibilities of reaction to medication, pulmonary aspiration, perforation of a viscus, bleeding, failure to diagnose a condition and creating a complication requiring transfusion or operation were discussed with the patient who freely signed the consent.      Description of Procedure:  The patient was seen in the Holding Room and the site of surgery properly noted/marked.  The patient was taken to endoscopy suite, identified as Dexter Flores and the procedure verified as Flexible Fiberoptic Bronchoscopy.  A Time Out was held and the above information confirmed.      The patient was positioned  and the bronchoscope was passed through the nares . The vocal cords were visualized and  2 ml 1 % lidocaine was topically placed onto the cords. The cords were normal. The scope was then passed into the trachea.      2 ml 1 % lidocaine was used topically on the smita.  Careful inspection of the tracheal lumen was accomplished. The scope was sequentially passed into the left main and then left upper and lower bronchi and segmental bronchi.       The scope was then withdrawn and advanced into the right main bronchus and then into the RUL, RML, and RLL bronchi and segmental bronchi.     Right upper lobe opening was occupied by a mass.  Abnormal bronchial mucosa of right upper lobe  Samples-  Bronchial washings  Then the bronchoscope was wedged into the right upper lobe for a bronchoalveolar lavage and close to 40 cc of saline was given once in close to 12 cc of bloody secretions were collected back.    The bronchoscope was wedged into the right middle lobe for a bronchoalveolar lavage and close to 40 cc of saline was given once in close to 14 cc was aspirated back.    Bronchial brushings of right upper lobe were done.  Bronchial brushings of right middle lobe were done.  Endobronchial ultrasound-guided transbronchial needle aspiration of right upper lobe mass was done 3 times with multiple passes each time.    Endobronchial forceps biopsy of right upper lobe abnormal mucosa and mass were done-3 in total    Patient had mild bleeding just controlled with local epinephrine and lidocaine spray and thrombin spray.  This aliens are taking over              Samples were sent for cytology and microbiology.  Please follow up the results  The Patient was taken to the Endoscopy Recovery area in satisfactory condition.        Savage Mcmanus MD

## 2023-08-22 NOTE — PLAN OF CARE
Goal Outcome Evaluation:                      No acute changes at this time will continue to follow current plan of care and monitor.

## 2023-08-22 NOTE — ANESTHESIA PREPROCEDURE EVALUATION
Anesthesia Evaluation     Patient summary reviewed and Nursing notes reviewed   no history of anesthetic complications:   NPO Solid Status: > 8 hours  NPO Liquid Status: > 8 hours           Airway   Mallampati: II  TM distance: >3 FB  Neck ROM: full  Large neck circumference  Dental    (+) poor dentition        Pulmonary    (+) a smoker (quit 2 months ago) Former, COPD,  Cardiovascular   Exercise tolerance: good (4-7 METS)    ECG reviewed  Rhythm: regular  Rate: normal    (+) hypertension      Neuro/Psych- negative ROS  GI/Hepatic/Renal/Endo    (+) obesity, morbid obesity, GERD, thyroid problem     Musculoskeletal     (+) neck pain  Abdominal   (+) obese    Abdomen: soft.   Substance History      OB/GYN          Other                        Anesthesia Plan    ASA 4     general     intravenous induction     Anesthetic plan, risks, benefits, and alternatives have been provided, discussed and informed consent has been obtained with: patient.  Pre-procedure education provided  Use of blood products discussed with  Consented to blood products.    Plan discussed with CRNA.      CODE STATUS:    Code Status (Patient has no pulse and is not breathing): CPR (Attempt to Resuscitate)  Medical Interventions (Patient has pulse or is breathing): Full Support

## 2023-08-22 NOTE — ANESTHESIA PROCEDURE NOTES
Airway  Urgency: elective    Date/Time: 8/22/2023 9:10 AM  Airway not difficult    General Information and Staff    Patient location during procedure: OR  Anesthesiologist: Gavin Curran MD  CRNA/CAA: Rodriguez Nixon CRNA    Indications and Patient Condition  Indications for airway management: airway protection    Preoxygenated: yes  MILS maintained throughout  Mask difficulty assessment: 0 - not attempted    Final Airway Details  Final airway type: supraglottic airway      Successful airway: unique  Size 4     Number of attempts at approach: 1  Assessment: lips, teeth, and gum same as pre-op and atraumatic intubation    Additional Comments  Dentition & lips unchanged from preop

## 2023-08-22 NOTE — PROGRESS NOTES
PROGRESS NOTE         Patient Identification:  Name:  Dexter Flores  Age:  53 y.o.  Sex:  male  :  1970  MRN:  [id number]  Visit Number:  [id number]  Primary Care Provider:  Jose F Reynolds         LOS: 3 days       ----------------------------------------------------------------------------------------------------------------------  Subjective       Chief Complaints:    Shortness of Breath and Chest Pain        Interval History:      Patient sitting up in bed this morning.  Overall feels well today.  Currently on room air with no apparent distress.  Lungs clear to auscultation bilaterally.  Complains of persistent dry cough this morning.  Plans for bronchoscopy with needle aspiration this morning.  Afebrile, no reported diarrhea.  WBC improved at 17.11.  CRP improved at 3.75.  Blood cultures from 2023 show no growth thus far.    Review of Systems:    Constitutional: no fever, chills and night sweats.    Eyes: no eye drainage, itching or redness.  HEENT: no mouth sores, dysphagia or nose bleed.  Respiratory: no for shortness of breath, Positive dry cough. No production of sputum.  Cardiovascular: no chest pain, no palpitations, no orthopnea.  Gastrointestinal: no nausea, vomiting or diarrhea. No abdominal pain, hematemesis or rectal bleeding.  Genitourinary: no dysuria or polyuria.  Hematologic/lymphatic: no lymph node abnormalities, no easy bruising or easy bleeding.  Musculoskeletal: no muscle or joint pain.  Skin: No rash and no itching.  Neurological: no loss of consciousness, no seizure, no headache.  Behavioral/Psych: no depression or suicidal ideation.  Endocrine: no hot flashes.  Immunologic: negative.    ----------------------------------------------------------------------------------------------------------------------      Objective       \[facility] [place] [facility]\"" Meds:  acetylcysteine, 3 mL, Nebulization, BID - RT  benzonatate, 200 mg, Oral, TID  budesonide-formoterol, 2 puff, Inhalation,  BID - RT  [START ON 8/25/2023] cholecalciferol, 50,000 Units, Oral, Weekly  doxycycline, 100 mg, Intravenous, Q12H  enoxaparin, 40 mg, Subcutaneous, Nightly  gabapentin, 600 mg, Oral, Q8H  ipratropium-albuterol, 3 mL, Nebulization, 4x Daily - RT  levothyroxine, 50 mcg, Oral, Daily  lisinopril, 10 mg, Oral, Daily  methylPREDNISolone sodium succinate, 40 mg, Intravenous, Q12H  montelukast, 10 mg, Oral, Nightly  pantoprazole, 40 mg, Oral, Daily  piperacillin-tazobactam, 4.5 g, Intravenous, Q8H  senna-docusate sodium, 2 tablet, Oral, BID  sodium chloride, 10 mL, Intravenous, Q12H  traZODone, 100 mg, Oral, Nightly      Pharmacy Consult,       ----------------------------------------------------------------------------------------------------------------------    Vital Signs:  Temp:  [97.6 øF (36.4 øC)-98.6 øF (37 øC)] 98 øF (36.7 øC)  Heart Rate:  [] 95  Resp:  [18-24] 20  BP: (107-165)/() 115/82  No data found.  SpO2 Percentage    08/22/23 1000 08/22/23 1010 08/22/23 1035   SpO2: 91% 92% 93%     SpO2:  [91 %-99 %] 93 %  on  Flow (L/min):  [4-4.5] 4;   Device (Oxygen Therapy): nasal cannula    Body mass index is 43.98 kg/mý.  Wt Readings from Last 3 Encounters:   08/19/23 131 kg (289 lb 3.9 oz)   07/20/23 135 kg (298 lb 6.4 oz)   04/13/23 (!) 138 kg (303 lb 3.2 oz)        Intake/Output Summary (Last 24 hours) at 8/22/2023 1146  Last data filed at 8/22/2023 0935  Gross per 24 hour   Intake 1650 ml   Output --   Net 1650 ml       Diet: Regular/House Diet; Texture: Regular Texture (IDDSI 7); Fluid Consistency: Thin (IDDSI 0)  ----------------------------------------------------------------------------------------------------------------------      Physical Exam:    Constitutional:  Well-developed and well-nourished.  No respiratory distress.  On room air.   HENT:  Head: Normocephalic and atraumatic.  Mouth:  Moist mucous membranes.    Eyes:  Conjunctivae and EOM are normal.  No scleral icterus.  Neck:  Neck  supple.  No JVD present.    Cardiovascular:  Normal rate, regular rhythm and normal heart sounds with no murmur. No edema.  Pulmonary/Chest:  No respiratory distress, no wheezes, no crackles, with normal breath sounds and good air movement.  Persistent dry cough.   Abdominal:  Soft.  Bowel sounds are normal.  No distension and no tenderness.   Musculoskeletal:  No edema, no tenderness, and no deformity.  No swelling or redness of joints.  Neurological:  Alert and oriented to person, place, and time.  No facial droop.  No slurred speech.   Skin:  Skin is warm and dry.  No rash noted.  No pallor.   Psychiatric:  Normal mood and affect.  Behavior is normal.        ----------------------------------------------------------------------------------------------------------------------  Results from last 7 days   Lab Units 08/19/23  1638 08/19/23  1416   HSTROP T ng/L 8 9       Results from last 7 days   Lab Units 08/19/23  1416   PROBNP pg/mL 217.8         Results from last 7 days   Lab Units 08/19/23  1506   PH, ARTERIAL pH units 7.472*   PO2 ART mm Hg 65.0*   PCO2, ARTERIAL mm Hg 40.0   HCO3 ART mmol/L 29.2*       Results from last 7 days   Lab Units 08/22/23  0131 08/21/23  0734 08/20/23  0734 08/19/23  1453 08/19/23  1416   CRP mg/dL 3.75*  --   --   --  17.62*   LACTATE mmol/L  --   --   --  1.5  --    WBC 10*3/mm3 17.11* 19.86* 18.07*  --  23.73*   HEMOGLOBIN g/dL 12.0* 12.3* 13.3  --  13.6   HEMATOCRIT % 40.0 40.2 42.7  --  43.0   MCV fL 94.6 92.4 92.0  --  88.7   MCHC g/dL 30.0* 30.6* 31.1*  --  31.6   PLATELETS 10*3/mm3 488* 545* 507*  --  589*   INR  0.97  --   --   --   --        Results from last 7 days   Lab Units 08/20/23  0734 08/19/23  1416   SODIUM mmol/L 137 136   POTASSIUM mmol/L 4.8 4.9   CHLORIDE mmol/L 100 98   CO2 mmol/L 23.9 25.4   BUN mg/dL 16 13   CREATININE mg/dL 0.80 0.93   CALCIUM mg/dL 9.4 9.5   GLUCOSE mg/dL 156* 138*   ALBUMIN g/dL 2.6* 3.3*   BILIRUBIN mg/dL 0.3 0.5   ALK PHOS U/L 149* 167*    AST (SGOT) U/L 14 20   ALT (SGPT) U/L 17 23     Estimated Creatinine Clearance: 142.7 mL/min (by C-G formula based on SCr of 0.8 mg/dL).  No results found for: AMMONIA    No results found for: HGBA1C, POCGLU  Lab Results   Component Value Date    HGBA1C 5.3 04/23/2014     No results found for: TSH, FREET4    Blood Culture   Date Value Ref Range Status   08/19/2023 Culture in progress  Preliminary   08/19/2023 Culture in progress  Preliminary     No results found for: URINECX  No results found for: WOUNDCX  No results found for: STOOLCX  No results found for: RESPCX  Pain Management Panel          Latest Ref Rng & Units 8/19/2023   Pain Management Panel   Amphetamine, Urine Qual Negative Positive    Barbiturates Screen, Urine Negative Negative    Benzodiazepine Screen, Urine Negative Negative    Buprenorphine, Screen, Urine Negative Negative    Cocaine Screen, Urine Negative Negative    Fentanyl, Urine Negative Negative    Methadone Screen , Urine Negative Negative    Methamphetamine, Ur Negative Negative        ----------------------------------------------------------------------------------------------------------------------  Imaging Results (Last 24 Hours)       ** No results found for the last 24 hours. **            ----------------------------------------------------------------------------------------------------------------------    Pertinent Infectious Disease Results      Patient presented to [facility] on 8/19/2023 for shortness of breath and cough.  WBC improving at 18.07.  CRP elevated 17.62.  Procalcitonin normal at 0.22.  Urinalysis unremarkable.  Strep pneumo antigen in process.  Lactic acid normal at 1.5 on admission.  COVID-19 and influenza PCR negative.  Blood cultures from 8/19/2023 2 out of 2 sets positive with BC ID is detecting staph species not aureus or lugdunensis and Staphylococcus lugdunensis.  Chest x-ray from 8/19/2023 reports near total opacification  of the right upper lobe with a differential diagnosis including pneumonia, mass, postsurgical change.  CT of the chest from 8/19/2023 reports right upper lobe consolidative masslike opacity without violating the fissures findings concerning for infection however the presence of enlarged right paratracheal lymph node measuring 4.2 cm, underlying mass cannot be ruled out.     Assessment/Plan       Assessment       Right upper lobe pneumonia  Bacteremia versus contaminant  COPD         Plan      Patient sitting up in bed this morning.  Overall feels well today.  Currently on room air with no apparent distress.  Lungs clear to auscultation bilaterally.  Complains of persistent dry cough this morning.  Plans for bronchoscopy with needle aspiration this morning.  Afebrile, no reported diarrhea.  WBC improved at 17.11.  CRP improved at 3.75.  Blood cultures from 8/20/2023 show no growth thus far.    For now we will continue piperacillin tazobactam and doxycycline pending finalization of bronchoscopy cultures.  We will continue to follow closely and adjust antibiotic therapy as needed.      ANTIMICROBIAL THERAPY    doxycycline (VIBRAMYCIN) 100MG IVPB in 100ml NS VTB  piperacillin-tazobactam (ZOSYN) IVPB 4.5 g in 100 mL NS VTB     Code Status:   Code Status and Medical Interventions:   Ordered at: 08/19/23 1728     Code Status (Patient has no pulse and is not breathing):    CPR (Attempt to Resuscitate)     Medical Interventions (Patient has pulse or is breathing):    Full Support       ABHIJEET Plata  08/22/23  11:46 EDT

## 2023-08-22 NOTE — PROGRESS NOTES
Patient Identification:  Name:  Dexter Flores  Age:  53 y.o.  Sex:  male  :  1970  MRN:  [id number]  Visit Number:  [id number]  Primary Care Provider:  Jose F Reynolds    Length of stay:  3    Subjective/Interval History/Consultants/Procedures     Chief Complaint:   Chief Complaint   Patient presents with    Shortness of Breath    Chest Pain       Subjective/Interval History:    53 y.o. male who was admitted on 2023 with RUL pneumonia     PMH is significant for COPD, HTN, GERD, hypothyroidism, DDD   For complete admission information, please see history and physical.     Consultations:  Pulmonology   Infectious disease    Procedures/Scans:  CT PE protocol  CXR  BAL, transbronchial needle aspiration- 23    Today, the patient underwent bronchoscopy and states he tolerated well. Overall states he feels the same today. Appears comfortable on RA. He states cough does seem to be less frequent, less bothersome. No new complaints noted.     Room location at the time of evaluation was [facility].    ----------------------------------------------------------------------------------------------------------------------  Current Hospital Meds:  acetylcysteine, 3 mL, Nebulization, BID - RT  benzonatate, 200 mg, Oral, TID  budesonide-formoterol, 2 puff, Inhalation, BID - RT  [START ON 2023] cholecalciferol, 50,000 Units, Oral, Weekly  doxycycline, 100 mg, Intravenous, Q12H  enoxaparin, 40 mg, Subcutaneous, Nightly  gabapentin, 600 mg, Oral, Q8H  ipratropium-albuterol, 3 mL, Nebulization, 4x Daily - RT  levothyroxine, 50 mcg, Oral, Daily  lisinopril, 10 mg, Oral, Daily  methylPREDNISolone sodium succinate, 40 mg, Intravenous, Q12H  montelukast, 10 mg, Oral, Nightly  pantoprazole, 40 mg, Oral, Daily  piperacillin-tazobactam, 4.5 g, Intravenous, Q8H  senna-docusate sodium, 2 tablet, Oral, BID  sodium chloride, 10 mL, Intravenous, Q12H  traZODone, 100 mg, Oral, Nightly      Pharmacy Consult,        ----------------------------------------------------------------------------------------------------------------------      Objective     Vital Signs:  Temp:  [97.6 øF (36.4 øC)-98.6 øF (37 øC)] 97.6 øF (36.4 øC)  Heart Rate:  [67-98] 88  Resp:  [18-22] 18  BP: (106-136)/(62-78) 118/68      08/19/23  1403 08/19/23  1855   Weight: 127 kg (280 lb) 131 kg (289 lb 3.9 oz)     Body mass index is 43.98 kg/mý.    Intake/Output Summary (Last 24 hours) at 8/22/2023 0814  Last data filed at 8/22/2023 0436  Gross per 24 hour   Intake 1400 ml   Output --   Net 1400 ml     No intake/output data recorded.  NPO Diet NPO Type: Strict NPO  ----------------------------------------------------------------------------------------------------------------------    Physical Exam  Vitals and nursing note reviewed.   Constitutional:       General: He is not in acute distress.  HENT:      Head: Normocephalic and atraumatic.   Eyes:      Extraocular Movements: Extraocular movements intact.      Conjunctiva/sclera: Conjunctivae normal.   Cardiovascular:      Rate and Rhythm: Normal rate and regular rhythm.   Pulmonary:      Effort: Pulmonary effort is normal.      Breath sounds: Wheezing and rhonchi present.   Abdominal:      Palpations: Abdomen is soft.      Tenderness: There is no abdominal tenderness.   Musculoskeletal:      Right lower leg: No edema.      Left lower leg: No edema.   Skin:     General: Skin is warm and dry.   Neurological:      Mental Status: He is alert. Mental status is at baseline.   Psychiatric:         Mood and Affect: Mood normal.         Behavior: Behavior normal.              ----------------------------------------------------------------------------------------------------------------------  Tele:      ----------------------------------------------------------------------------------------------------------------------  Results from last 7 days   Lab Units 08/19/23  1638 08/19/23  1416   HSTROP T ng/L 8 9    PROBNP pg/mL  --  217.8     Results from last 7 days   Lab Units 08/22/23  0131 08/21/23  0734 08/20/23  0734 08/19/23  1453 08/19/23  1416   CRP mg/dL 3.75*  --   --   --  17.62*   LACTATE mmol/L  --   --   --  1.5  --    WBC 10*3/mm3 17.11* 19.86* 18.07*  --  23.73*   HEMOGLOBIN g/dL 12.0* 12.3* 13.3  --  13.6   HEMATOCRIT % 40.0 40.2 42.7  --  43.0   MCV fL 94.6 92.4 92.0  --  88.7   MCHC g/dL 30.0* 30.6* 31.1*  --  31.6   PLATELETS 10*3/mm3 488* 545* 507*  --  589*   INR  0.97  --   --   --   --      Results from last 7 days   Lab Units 08/19/23  1506   PH, ARTERIAL pH units 7.472*   PO2 ART mm Hg 65.0*   PCO2, ARTERIAL mm Hg 40.0   HCO3 ART mmol/L 29.2*     Results from last 7 days   Lab Units 08/20/23  0734 08/19/23  1416   SODIUM mmol/L 137 136   POTASSIUM mmol/L 4.8 4.9   CHLORIDE mmol/L 100 98   CO2 mmol/L 23.9 25.4   BUN mg/dL 16 13   CREATININE mg/dL 0.80 0.93   CALCIUM mg/dL 9.4 9.5   GLUCOSE mg/dL 156* 138*   ALBUMIN g/dL 2.6* 3.3*   BILIRUBIN mg/dL 0.3 0.5   ALK PHOS U/L 149* 167*   AST (SGOT) U/L 14 20   ALT (SGPT) U/L 17 23   Estimated Creatinine Clearance: 142.7 mL/min (by C-G formula based on SCr of 0.8 mg/dL).  No results found for: AMMONIA      Blood Culture   Date Value Ref Range Status   08/20/2023 No growth at 24 hours  Preliminary   08/20/2023 No growth at 24 hours  Preliminary   08/19/2023 Gram Positive Cocci (C)  Preliminary   08/19/2023 Gram Positive Cocci (C)  Preliminary     No results found for: URINECX  No results found for: WOUNDCX  No results found for: STOOLCX  ----------------------------------------------------------------------------------------------------------------------  Imaging Results (Last 24 Hours)       ** No results found for the last 24 hours. **          ----------------------------------------------------------------------------------------------------------------------   I have reviewed the above laboratory values for 08/22/23    Assessment/Plan     Active  "Hospital Problems    Diagnosis  POA    **Sepsis [A41.9]  Yes    COPD exacerbation [J44.1]  Yes    Mass of right lung [R91.8]  Unknown     Added automatically from request for surgery [id number]           ASSESSMENT/PLAN:    Sepsis, POA, 2/2 right upper lobe pneumonia, likely postobstructive   RUL lung mass  In the setting of COPD in acute exacerbation  Hyperglycemia, without history of DM, possibly 2/2 recent steroid use  Suspected CYNTHIA  Patient presents secondary to shortness of breath and cough present for 1 month, worsening over the past 2 weeks.  Work-up revealed dense, masslike pneumonia in the right upper lobe.  Patient meets SIRS/sepsis criteria on arrival with tachycardia, leukocytosis.  CRP was 17.62.  He received Rocephin and doxycycline in the ED as well as Solu-Medrol 125 mg IV x1  He will be admitted to [facility] for further work-up and management  Solu-Medrol 40 mg twice daily, Mucomyst nebs, DuoNebs and Tessalon as needed  Blood cultures positive preliminarily for gram-positive cocci in clusters.  BC ID 1 of 2 staph not aureus or lugdunensis, 1 of 2 staph lugdunensis methicillin resistance gene detected.   Have consulted ID for further assistance. Appreciated.   ID evaluated the patient and feel cultures likely contaminant. Have deescalated to doxycycline and zosyn. Repeat cultures pend- NGTD  Pulmonology consulted given appearance of \"mass-like\" pneumonia on imaging, underwent BAL and transbronchial needle aspiration of RUL mass today. Pending further pulmonology recommendations.   Also concerned for likely underlying CYNTHIA, recommend CPAP or BiPAP nightly and outpatient screening.   Pharmacy consulted to assist in optimization of COPD regimen, recommendations appreciated.   Monitor I's and O's  Provide COPD education  Encourage incentive spirometry  WBC count remains elevated but is on prednisone. CRP downtrend to 3.75. VSS remains on RA     Chronic:  HTN  GERD  Hypothyroidism  Hx tobacco " use  Degenerative disc disease  Plan to restart home meds as indicated per med rec  Monitor vital signs per protocol  Encourage continued tobacco cessation      -----------  -DVT prophylaxis: Lovenox   -Disposition plans/anticipated needs: Pending further work up, likely return home following clinical improvement.        The patient is high risk due to the following diagnoses/reasons:  sepsis, pneumonia, RUL mass        Jaspal Martinez PA-C  08/22/23  08:14 EDT

## 2023-08-22 NOTE — PLAN OF CARE
Goal Outcome Evaluation:           Progress: improving  Outcome Evaluation: pt got bronch per orders. pt satting in the 90's on 4L. pt ambulated indep in room. pt up in chair today. pt had c/o pain; prn meds given per orders. no other  changes to note at this  time. plan of care ongoing

## 2023-08-22 NOTE — PROGRESS NOTES
Antimicrobial Length of Therapy:    Day 3 of 7 Zosyn  Day 3 of 7 doxycycline    Thank you.  Dianna Alcazar, Pharm.D.  8/22/2023  15:47 EDT

## 2023-08-23 LAB
ACID FAST STN SPEC: NEGATIVE
CRP SERPL-MCNC: 2.06 MG/DL (ref 0–0.5)
SPECIMEN PREPARATION: NORMAL

## 2023-08-23 PROCEDURE — 25010000002 METHYLPREDNISOLONE PER 40 MG: Performed by: INTERNAL MEDICINE

## 2023-08-23 PROCEDURE — 99232 SBSQ HOSP IP/OBS MODERATE 35: CPT | Performed by: INTERNAL MEDICINE

## 2023-08-23 PROCEDURE — 99233 SBSQ HOSP IP/OBS HIGH 50: CPT | Performed by: INTERNAL MEDICINE

## 2023-08-23 PROCEDURE — 25010000002 ENOXAPARIN PER 10 MG: Performed by: INTERNAL MEDICINE

## 2023-08-23 PROCEDURE — 86140 C-REACTIVE PROTEIN: CPT | Performed by: INTERNAL MEDICINE

## 2023-08-23 PROCEDURE — 94799 UNLISTED PULMONARY SVC/PX: CPT

## 2023-08-23 PROCEDURE — 94761 N-INVAS EAR/PLS OXIMETRY MLT: CPT

## 2023-08-23 PROCEDURE — 25010000002 PIPERACILLIN SOD-TAZOBACTAM PER 1 G: Performed by: INTERNAL MEDICINE

## 2023-08-23 RX ORDER — OXYCODONE AND ACETAMINOPHEN 10; 325 MG/1; MG/1
1 TABLET ORAL EVERY 6 HOURS PRN
Status: DISCONTINUED | OUTPATIENT
Start: 2023-08-23 | End: 2023-08-24 | Stop reason: HOSPADM

## 2023-08-23 RX ORDER — METHYLPREDNISOLONE SODIUM SUCCINATE 40 MG/ML
20 INJECTION, POWDER, LYOPHILIZED, FOR SOLUTION INTRAMUSCULAR; INTRAVENOUS EVERY 12 HOURS
Status: DISCONTINUED | OUTPATIENT
Start: 2023-08-23 | End: 2023-08-24 | Stop reason: HOSPADM

## 2023-08-23 RX ORDER — DOXYCYCLINE 100 MG/1
100 CAPSULE ORAL EVERY 12 HOURS SCHEDULED
Status: DISCONTINUED | OUTPATIENT
Start: 2023-08-23 | End: 2023-08-24 | Stop reason: HOSPADM

## 2023-08-23 RX ORDER — CEFDINIR 300 MG/1
300 CAPSULE ORAL EVERY 12 HOURS SCHEDULED
Status: DISCONTINUED | OUTPATIENT
Start: 2023-08-23 | End: 2023-08-24 | Stop reason: HOSPADM

## 2023-08-23 RX ADMIN — OXYCODONE AND ACETAMINOPHEN 1 TABLET: 7.5; 325 TABLET ORAL at 00:17

## 2023-08-23 RX ADMIN — CEFDINIR 300 MG: 300 CAPSULE ORAL at 21:40

## 2023-08-23 RX ADMIN — OXYCODONE AND ACETAMINOPHEN 1 TABLET: 10; 325 TABLET ORAL at 11:47

## 2023-08-23 RX ADMIN — Medication 10 ML: at 21:43

## 2023-08-23 RX ADMIN — IPRATROPIUM BROMIDE AND ALBUTEROL SULFATE 3 ML: .5; 2.5 SOLUTION RESPIRATORY (INHALATION) at 06:54

## 2023-08-23 RX ADMIN — DOXYCYCLINE 100 MG: 100 CAPSULE ORAL at 11:48

## 2023-08-23 RX ADMIN — BUDESONIDE AND FORMOTEROL FUMARATE DIHYDRATE 2 PUFF: 160; 4.5 AEROSOL RESPIRATORY (INHALATION) at 06:54

## 2023-08-23 RX ADMIN — MONTELUKAST SODIUM 10 MG: 10 TABLET, COATED ORAL at 21:41

## 2023-08-23 RX ADMIN — BENZONATATE 200 MG: 100 CAPSULE ORAL at 21:40

## 2023-08-23 RX ADMIN — OXYCODONE AND ACETAMINOPHEN 1 TABLET: 10; 325 TABLET ORAL at 18:27

## 2023-08-23 RX ADMIN — PANTOPRAZOLE SODIUM 40 MG: 40 TABLET, DELAYED RELEASE ORAL at 08:25

## 2023-08-23 RX ADMIN — DOXYCYCLINE 100 MG: 100 INJECTION, POWDER, LYOPHILIZED, FOR SOLUTION INTRAVENOUS at 00:17

## 2023-08-23 RX ADMIN — CEFDINIR 300 MG: 300 CAPSULE ORAL at 11:48

## 2023-08-23 RX ADMIN — Medication 10 ML: at 08:26

## 2023-08-23 RX ADMIN — PIPERACILLIN SODIUM AND TAZOBACTAM SODIUM 4.5 G: 4; .5 INJECTION, POWDER, LYOPHILIZED, FOR SOLUTION INTRAVENOUS at 02:56

## 2023-08-23 RX ADMIN — LISINOPRIL 10 MG: 10 TABLET ORAL at 08:26

## 2023-08-23 RX ADMIN — METHYLPREDNISOLONE SODIUM SUCCINATE 20 MG: 40 INJECTION, POWDER, FOR SOLUTION INTRAMUSCULAR; INTRAVENOUS at 16:49

## 2023-08-23 RX ADMIN — DOXYCYCLINE 100 MG: 100 CAPSULE ORAL at 21:40

## 2023-08-23 RX ADMIN — LEVOTHYROXINE SODIUM 50 MCG: 50 TABLET ORAL at 08:25

## 2023-08-23 RX ADMIN — BENZONATATE 200 MG: 100 CAPSULE ORAL at 16:49

## 2023-08-23 RX ADMIN — METHYLPREDNISOLONE SODIUM SUCCINATE 40 MG: 40 INJECTION, POWDER, FOR SOLUTION INTRAMUSCULAR; INTRAVENOUS at 05:03

## 2023-08-23 RX ADMIN — ENOXAPARIN SODIUM 40 MG: 40 INJECTION SUBCUTANEOUS at 21:42

## 2023-08-23 RX ADMIN — OXYCODONE AND ACETAMINOPHEN 1 TABLET: 7.5; 325 TABLET ORAL at 06:34

## 2023-08-23 RX ADMIN — GABAPENTIN 600 MG: 300 CAPSULE ORAL at 05:03

## 2023-08-23 RX ADMIN — TRAZODONE HYDROCHLORIDE 100 MG: 50 TABLET ORAL at 21:40

## 2023-08-23 RX ADMIN — GABAPENTIN 600 MG: 300 CAPSULE ORAL at 13:11

## 2023-08-23 RX ADMIN — IPRATROPIUM BROMIDE AND ALBUTEROL SULFATE 3 ML: .5; 2.5 SOLUTION RESPIRATORY (INHALATION) at 12:43

## 2023-08-23 RX ADMIN — BENZONATATE 200 MG: 100 CAPSULE ORAL at 08:26

## 2023-08-23 RX ADMIN — GABAPENTIN 600 MG: 300 CAPSULE ORAL at 21:39

## 2023-08-23 NOTE — PLAN OF CARE
Goal Outcome Evaluation:           Progress: no change  Outcome Evaluation: pt has been resting in. pt walks indep. prn pain meds given per orders. no other changes to note at this time. plan of care ongoing

## 2023-08-23 NOTE — PROGRESS NOTES
Referring Provider: Hospitalist  Reason for Consultation: Right upper lung mass      Chief complaint -shortness of breath  Subjective-all the labs medications and sounds and vitals reviewed.  Bronchoscopy completed yesterday.  Patient is alert awake oriented resting in bed comfortably.  Not in any respiratory distress.  Review of Systems  Negative.  Reviewed        History  Past Medical History:   Diagnosis Date    Bulging of cervical intervertebral disc     COPD (chronic obstructive pulmonary disease)     GERD (gastroesophageal reflux disease)     Hypertension     Neck pain     Thyroid disease    , History reviewed. No pertinent surgical history., History reviewed. No pertinent family history.,   Social History     Tobacco Use    Smoking status: Former     Packs/day: 1.00     Years: 30.00     Pack years: 30.00     Types: Cigarettes     Start date:      Quit date: 2023     Years since quittin.3    Smokeless tobacco: Never   Vaping Use    Vaping Use: Never used   Substance Use Topics    Alcohol use: Not Currently    Drug use: Not Currently   ,   Medications Prior to Admission   Medication Sig Dispense Refill Last Dose    budesonide-formoterol (SYMBICORT) 160-4.5 MCG/ACT inhaler Inhale 2 puffs 2 (Two) Times a Day.   2023    gabapentin (NEURONTIN) 600 MG tablet Take 1 tablet by mouth 3 (Three) Times a Day.   2023    levothyroxine (SYNTHROID, LEVOTHROID) 50 MCG tablet Take 1 tablet by mouth Daily.   2023    lisinopril (PRINIVIL,ZESTRIL) 10 MG tablet Take 1 tablet by mouth Daily.   2023    montelukast (SINGULAIR) 10 MG tablet Take 1 tablet by mouth Every Night.   2023    pantoprazole (PROTONIX) 40 MG EC tablet Take 1 tablet by mouth Daily.   2023    traZODone (DESYREL) 100 MG tablet Take 1 tablet by mouth Every Night.   2023    vitamin D (ERGOCALCIFEROL) 1.25 MG (15234 UT) capsule capsule Take 1 capsule by mouth 1 (One) Time Per Week.   2023    albuterol (PROVENTIL)  (2.5 MG/3ML) 0.083% nebulizer solution Take 2.5 mg by nebulization Every 6 (Six) Hours As Needed for Wheezing.   Unknown    albuterol sulfate  (90 Base) MCG/ACT inhaler Inhale 2 puffs Every 4 (Four) Hours As Needed for Wheezing.   Unknown    HYDROcodone-acetaminophen (NORCO)  MG per tablet Take 1 tablet by mouth Every 8 (Eight) Hours As Needed for Moderate Pain.   Unknown    phentermine (ADIPEX-P) 37.5 MG tablet Take 1 tablet by mouth Every Morning Before Breakfast.   More than a month   , Scheduled Meds:  acetylcysteine, 3 mL, Nebulization, BID - RT  benzonatate, 200 mg, Oral, TID  budesonide-formoterol, 2 puff, Inhalation, BID - RT  cefdinir, 300 mg, Oral, Q12H  [START ON 8/25/2023] cholecalciferol, 50,000 Units, Oral, Weekly  doxycycline, 100 mg, Oral, Q12H  enoxaparin, 40 mg, Subcutaneous, Nightly  gabapentin, 600 mg, Oral, Q8H  ipratropium-albuterol, 3 mL, Nebulization, 4x Daily - RT  levothyroxine, 50 mcg, Oral, Daily  lisinopril, 10 mg, Oral, Daily  methylPREDNISolone sodium succinate, 40 mg, Intravenous, Q12H  montelukast, 10 mg, Oral, Nightly  pantoprazole, 40 mg, Oral, Daily  senna-docusate sodium, 2 tablet, Oral, BID  sodium chloride, 10 mL, Intravenous, Q12H  traZODone, 100 mg, Oral, Nightly    , Continuous Infusions:  Pharmacy Consult,      and Allergies:  Patient has no known allergies.    Family history-reviewed and is nonsignificant.    Objective     Vital Signs   Temp:  [97.5 øF (36.4 øC)-98.9 øF (37.2 øC)] 98.7 øF (37.1 øC)  Heart Rate:  [80-99] 88  Resp:  [17-20] 20  BP: (104-141)/(63-88) 141/88    Physical Exam:             General-obese in appearance, not in any distress.    HEENT-PERRLA  Neck-supple    Respiratory-respirations normal-on auscultation no wheezing no crackles, not in any respiratory distress    Cardiovascular-NSR  GI-NTND    CNS-nonfocal    Musculoskeletal -no edema  Extremities- no obvious deformity noticed     Psychiatric-mood good, good eye contact, alert awake  oriented  Skin-no visible rash                                                                  Results Review:    LABS:    Lab Results   Component Value Date    GLUCOSE 156 (H) 08/20/2023    BUN 16 08/20/2023    CREATININE 0.80 08/20/2023    EGFRIFNONA 76 12/07/2021    EGFRIFAFRI 87 01/02/2018    BCR 20.0 08/20/2023    CO2 23.9 08/20/2023    CALCIUM 9.4 08/20/2023    ALBUMIN 2.6 (L) 08/20/2023    LABIL2 1.4 (L) 04/11/2015    AST 14 08/20/2023    ALT 17 08/20/2023    WBC 17.11 (H) 08/22/2023    HGB 12.0 (L) 08/22/2023    HCT 40.0 08/22/2023    MCV 94.6 08/22/2023     (H) 08/22/2023     08/20/2023    K 4.8 08/20/2023     08/20/2023    ANIONGAP 13.1 08/20/2023       Lab Results   Component Value Date    INR 0.97 08/22/2023    PROTIME 13.4 08/22/2023       Results from last 7 days   Lab Units 08/22/23  0131   INR  0.97            I reviewed the patient's new clinical results.  I reviewed the patient's new imaging results and agree with the interpretation.  Microbiology Results (last 10 days)       Procedure Component Value - Date/Time    BAL Culture, Quantitative - Lavage, Lung, Right Upper Lobe [[id number]] Collected: 08/22/23 0913    Lab Status: Preliminary result Specimen: Lavage from Lung, Right Upper Lobe Updated: 08/23/23 1138     BAL Culture No growth     Gram Stain WBCs seen      No organisms seen    BAL Culture, Quantitative - Lavage, Lung, Right Middle Lobe [[id number]] Collected: 08/22/23 0913    Lab Status: Preliminary result Specimen: Lavage from Lung, Right Middle Lobe Updated: 08/23/23 1137     BAL Culture <25,000 CFU/mL The culture consists of normal respiratory tramaine. This is a preliminary report; final report to follow.     Gram Stain WBCs seen      No organisms seen    Blood Culture - Blood, Hand, Right [[id number]]  (Normal) Collected: 08/20/23 1452    Lab Status: Preliminary result Specimen: Blood from Hand, Right Updated: 08/22/23 1531     Blood Culture No growth at 2 days    Blood  Culture - Blood, Hand, Left [[id number]]  (Normal) Collected: 08/20/23 1442    Lab Status: Preliminary result Specimen: Blood from Hand, Left Updated: 08/22/23 1531     Blood Culture No growth at 2 days    S. Pneumo Ag Urine or CSF - Urine, Urine, Clean Catch [[id number]]  (Normal) Collected: 08/19/23 1836    Lab Status: Final result Specimen: Urine, Clean Catch Updated: 08/20/23 1352     Strep Pneumo Ag Negative    Blood Culture - Blood, Arm, Right [[id number]]  (Abnormal)  (Susceptibility) Collected: 08/19/23 1453    Lab Status: Preliminary result Specimen: Blood from Arm, Right Updated: 08/23/23 0713     Blood Culture Staphylococcus epidermidis     Isolated from Aerobic and Anaerobic Bottles     Blood Culture Staphylococcus lugdunensis     Isolated from Aerobic and Anaerobic Bottles     Blood Culture Staphylococcus hominis ssp hominis     Comment: MICs to follow        Isolated from --     Gram Stain Aerobic Bottle Gram positive cocci in clusters      Anaerobic Bottle Gram positive cocci in clusters    Susceptibility        Staphylococcus epidermidis      FELICITAS      Oxacillin Resistant      Vancomycin Susceptible                       Susceptibility        Staphylococcus lugdunensis      FELICITAS      Gentamicin Susceptible      Oxacillin Susceptible      Rifampin Susceptible      Vancomycin Susceptible                       Susceptibility Comments       Staphylococcus lugdunensis    This isolate does not demonstrate inducible clindamycin resistance in vitro.                 Blood Culture - Blood, Arm, Left [[id number]]  (Abnormal) Collected: 08/19/23 1453    Lab Status: Preliminary result Specimen: Blood from Arm, Left Updated: 08/23/23 0712     Blood Culture Staphylococcus epidermidis     Isolated from Aerobic and Anaerobic Bottles     Blood Culture Staphylococcus hominis ssp hominis     Isolated from Aerobic and Anaerobic Bottles     Blood Culture Staphylococcus lugdunensis     Isolated from Aerobic and Anaerobic Bottles      Gram Stain Aerobic Bottle Gram positive cocci in clusters      Anaerobic Bottle Gram positive cocci in clusters    Narrative:      Refer to previous blood culture collected on 08/19/2023 1453 for MICs      Blood Culture ID, PCR - Blood, Arm, Right [[id number]]  (Abnormal) Collected: 08/19/23 1453    Lab Status: Final result Specimen: Blood from Arm, Right Updated: 08/20/23 0739     BCID, PCR Staph spp, not aureus or lugdunensis. Identification by BCID2 PCR.     BCID, PCR 2 Staphylococcus lugdunensis. mecA/C (methicillin resistance gene) detected.  Identification by BCID2 PCR.     BOTTLE TYPE Aerobic Bottle    Narrative:      Infectious disease consultation is highly recommended to rule out distant foci of infection.    COVID-19 and FLU A/B PCR - Swab, Nasopharynx [[id number]]  (Normal) Collected: 08/19/23 1426    Lab Status: Final result Specimen: Swab from Nasopharynx Updated: 08/19/23 1451     COVID19 Not Detected     Influenza A PCR Not Detected     Influenza B PCR Not Detected    Narrative:      Fact sheet for providers: https://www.fda.gov/media/905119/download    Fact sheet for patients: https://www.fda.gov/media/504552/download    Test performed by PCR.        Dr. Short her okay thank you so much.     Assessment & Plan     Abnormal CT chest-right upper lobe mass.  Bronchoscopy completed yesterday.  Spoke with pathologist.  Prelim looks positive for squamous cell carcinoma.  Discussed with Dr. Jackson.  Consult hematology oncology.      Postobstructive pneumonia-White count better today..  Continue Zosyn.  Microbiology from the [facility] is negative so far.  CRP is better.  White count could be high due to steroids.  We will decrease the dose.      COPD exacerbation-continue steroids continue nebs    continue as needed oxygen to maintain saturation 88 to 92%      CYNTHIA-patient gives a positive history of daytime somnolence and tiredness especially with patient's BMI of 43.98 he should be screened for sleep apnea.   Can be scheduled on the outpatient basis.  For now can be started on a CPAP or BiPAP.  CPAP settings could be 12 cm of water or BiPAP of 14 x 8.    DVT prophylaxis-as per primary team    Bedside rounds were done with RT and patient's nurse. All the lab and clinical findings were discussed with them and plan was also discussed in great detail.    Family member present-none                  Sepsis    COPD exacerbation    Mass of right lung          Savage Mcmanus MD  08/23/23  11:52 EDT

## 2023-08-23 NOTE — PROGRESS NOTES
[facility] HOSPITALIST PROGRESS NOTE     Patient Identification:  Name:  Dexter Flores  Age:  53 y.o.  Sex:  male  :  1970  MRN:  [id number]  Visit Number:  [id number]  Primary Care Provider:  Jose F Reynolds    Length of stay:  4    Chief complaint: Neck pain and shoulder pain    Subjective:    Patient seen and examined at bedside with no nursing staff present.  Did discuss with patient recent bronchoscopy findings concerning for endobronchial mass.  Did discuss pathology is currently pending but high degree of suspicion for underlying malignancy.  Patient expressed his understanding and we went on to further discuss potential treatment options depending on pathology results.  This was very generic as it is impossible to know what treatment regimens he would be a candidate for until pathology has returned.  However, patient did state he would be willing to undergo radiation therapy, chemotherapy or immunotherapy if warranted.  As such, I did discuss arranging for outpatient hematology/oncology follow-up to review pathology results and to formulate outpatient treatment plan for underlying suspected lung cancer.  Patient expressed his understanding and his desire to proceed with this plan.  I did discuss this plan with hematology/oncology as well who have scheduled patient an appointment to be seen by one of their providers on 2023 at 2:30 PM.  We will plan to discharge patient from the hospital on 2023 where he can then walk over to Heme/Onc clinic for his appointment.  ----------------------------------------------------------------------------------------------------------------------  Current Hospital Meds:  acetylcysteine, 3 mL, Nebulization, BID - RT  benzonatate, 200 mg, Oral, TID  budesonide-formoterol, 2 puff, Inhalation, BID - RT  cefdinir, 300 mg, Oral, Q12H  [START ON 2023] cholecalciferol, 50,000 Units, Oral, Weekly  doxycycline, 100 mg, Oral, Q12H  enoxaparin, 40 mg,  Subcutaneous, Nightly  gabapentin, 600 mg, Oral, Q8H  ipratropium-albuterol, 3 mL, Nebulization, 4x Daily - RT  levothyroxine, 50 mcg, Oral, Daily  lisinopril, 10 mg, Oral, Daily  methylPREDNISolone sodium succinate, 20 mg, Intravenous, Q12H  montelukast, 10 mg, Oral, Nightly  pantoprazole, 40 mg, Oral, Daily  senna-docusate sodium, 2 tablet, Oral, BID  sodium chloride, 10 mL, Intravenous, Q12H  traZODone, 100 mg, Oral, Nightly      Pharmacy Consult,       ----------------------------------------------------------------------------------------------------------------------  Vital Signs:  Temp:  [97.6 øF (36.4 øC)-98.9 øF (37.2 øC)] 98.7 øF (37.1 øC)  Heart Rate:  [84-99] 92  Resp:  [18-20] 20  BP: (116-141)/(68-88) 141/88      08/19/23  1403 08/19/23  1855   Weight: 127 kg (280 lb) 131 kg (289 lb 3.9 oz)     Body mass index is 43.98 kg/mý.    Intake/Output Summary (Last 24 hours) at 8/23/2023 1426  Last data filed at 8/23/2023 0312  Gross per 24 hour   Intake 440 ml   Output --   Net 440 ml     Diet: Regular/House Diet; Texture: Regular Texture (IDDSI 7); Fluid Consistency: Thin (IDDSI 0)  ----------------------------------------------------------------------------------------------------------------------  Physical exam:  Constitutional: Well-nourished  male in no apparent distress.     HENT:  Head:  Normocephalic and atraumatic.  Mouth:  Moist mucous membranes.    Eyes:  Conjunctivae and EOM are normal.  Pupils are equal, round, and reactive to light.  No scleral icterus.    Neck:  Neck supple. No thyromegaly.  No JVD present.    Cardiovascular:  Regular rate and rhythm with no murmurs, rubs, clicks or gallops appreciated.  Pulmonary/Chest:  Clear to auscultation bilaterally with no crackles, wheezes or rhonchi appreciated.  Abdominal:  Soft. Nontender. Nondistended  Bowel sounds are normal in all four quadrants. No organomegally appreciated.   Musculoskeletal:  No edema, no tenderness, and no deformity.   No red or swollen joints anywhere.    Neurological:  Alert, Cranial nerves II-XII intact with no focal deficits.  No facial droop.  No slurred speech.   Skin:  Warm and dry to palpation with no rashes or lesions appreciated.  Peripheral vascular:  2+ radial and pedal pulses in bilateral upper and lower extremities.  Psychiatric:  Alert and oriented x3, demonstrates appropriate judgment and insight.  -------------------------------------------------------------------------------------------------  ----------------------------------------------------------------------------------------------------------------------  Results from last 7 days   Lab Units 08/19/23  1638 08/19/23  1416   HSTROP T ng/L 8 9     Results from last 7 days   Lab Units 08/23/23  0141 08/22/23  0131 08/21/23  0734 08/20/23  0734 08/19/23  1453 08/19/23  1416   CRP mg/dL 2.06* 3.75*  --   --   --  17.62*   LACTATE mmol/L  --   --   --   --  1.5  --    WBC 10*3/mm3  --  17.11* 19.86* 18.07*  --  23.73*   HEMOGLOBIN g/dL  --  12.0* 12.3* 13.3  --  13.6   HEMATOCRIT %  --  40.0 40.2 42.7  --  43.0   MCV fL  --  94.6 92.4 92.0  --  88.7   MCHC g/dL  --  30.0* 30.6* 31.1*  --  31.6   PLATELETS 10*3/mm3  --  488* 545* 507*  --  589*   INR   --  0.97  --   --   --   --      Results from last 7 days   Lab Units 08/19/23  1506   PH, ARTERIAL pH units 7.472*   PO2 ART mm Hg 65.0*   PCO2, ARTERIAL mm Hg 40.0   HCO3 ART mmol/L 29.2*     Results from last 7 days   Lab Units 08/20/23  0734 08/19/23  1416   SODIUM mmol/L 137 136   POTASSIUM mmol/L 4.8 4.9   CHLORIDE mmol/L 100 98   CO2 mmol/L 23.9 25.4   BUN mg/dL 16 13   CREATININE mg/dL 0.80 0.93   CALCIUM mg/dL 9.4 9.5   GLUCOSE mg/dL 156* 138*   ALBUMIN g/dL 2.6* 3.3*   BILIRUBIN mg/dL 0.3 0.5   ALK PHOS U/L 149* 167*   AST (SGOT) U/L 14 20   ALT (SGPT) U/L 17 23   Estimated Creatinine Clearance: 142.7 mL/min (by C-G formula based on SCr of 0.8 mg/dL).    No results found for: AMMONIA      Blood Culture    Date Value Ref Range Status   08/20/2023 No growth at 2 days  Preliminary   08/20/2023 No growth at 2 days  Preliminary   08/19/2023 Staphylococcus epidermidis (C)  Preliminary   08/19/2023 Staphylococcus lugdunensis (C)  Preliminary   08/19/2023 Staphylococcus hominis ssp hominis (C)  Preliminary     Comment:     MICs to follow   08/19/2023 Staphylococcus epidermidis (C)  Preliminary   08/19/2023 Staphylococcus hominis ssp hominis (C)  Preliminary   08/19/2023 Staphylococcus lugdunensis (C)  Preliminary     No results found for: URINECX  No results found for: WOUNDCX  No results found for: STOOLCX    I have personally looked at the labs and they are summarized above.  ----------------------------------------------------------------------------------------------------------------------  Imaging Results (Last 24 Hours)       ** No results found for the last 24 hours. **          ----------------------------------------------------------------------------------------------------------------------  Assessment and Plan:    Right upper lobe pulmonary mass -bronchoscopy performed yesterday demonstrates obvious near occlusive right upper lobe mass.  Preliminary results from pathology suspect squamous cell carcinoma of the lung.  Final pathology currently pending.  We will plan for patient to follow-up with hematology/oncology in their clinic tomorrow.    2.  Postobstructive pneumonia -overall, patient appears to be slowly improving.  Have de-escalated antibiotic therapy from doxycycline and Zosyn to doxycycline and Omnicef.    3.  Sepsis -present on admission, now resolved.  Secondary to postobstructive pneumonia    4.  Essential hypertension -decently controlled    5.  Hypothyroidism -Synthroid    Disposition plan for discharge tomorrow    Leonardo Jackson DO   08/23/23   14:26 EDT

## 2023-08-23 NOTE — PLAN OF CARE
Goal Outcome Evaluation:              Outcome Evaluation: Patient is resting in bed at this time. PRN pain and nausea medication given per order. No complaints or acute changes at this time. Will continue POC.

## 2023-08-23 NOTE — PROGRESS NOTES
PROGRESS NOTE         Patient Identification:  Name:  Dexter Flores  Age:  53 y.o.  Sex:  male  :  1970  MRN:  [id number]  Visit Number:  [id number]  Primary Care Provider:  Jose F Reynolds         LOS: 4 days       ----------------------------------------------------------------------------------------------------------------------  Subjective       Chief Complaints:    Shortness of Breath and Chest Pain        Interval History:      Patient requesting pain medications regularly through the night.  Currently pain is under control.  Labs are improving overall feels better and sounds better.  Room air.    Review of Systems:    Constitutional: no fever, chills and night sweats.    Eyes: no eye drainage, itching or redness.  HEENT: no mouth sores, dysphagia or nose bleed.  Respiratory: Overall improving respiratory status with persistent mild dry cough.  Cardiovascular: no chest pain, no palpitations, no orthopnea.  Gastrointestinal: no nausea, vomiting or diarrhea. No abdominal pain, hematemesis or rectal bleeding.  Genitourinary: no dysuria or polyuria.  Hematologic/lymphatic: no lymph node abnormalities, no easy bruising or easy bleeding.  Musculoskeletal: no muscle or joint pain.  Skin: No rash and no itching.  Neurological: no loss of consciousness, no seizure, no headache.  Behavioral/Psych: no depression or suicidal ideation.  Endocrine: no hot flashes.  Immunologic: negative.    ----------------------------------------------------------------------------------------------------------------------      Objective       Current Hospital Meds:  acetylcysteine, 3 mL, Nebulization, BID - RT  benzonatate, 200 mg, Oral, TID  budesonide-formoterol, 2 puff, Inhalation, BID - RT  [START ON 2023] cholecalciferol, 50,000 Units, Oral, Weekly  doxycycline, 100 mg, Intravenous, Q12H  enoxaparin, 40 mg, Subcutaneous, Nightly  gabapentin, 600 mg, Oral, Q8H  ipratropium-albuterol, 3 mL, Nebulization, 4x Daily  - RT  levothyroxine, 50 mcg, Oral, Daily  lisinopril, 10 mg, Oral, Daily  methylPREDNISolone sodium succinate, 40 mg, Intravenous, Q12H  montelukast, 10 mg, Oral, Nightly  pantoprazole, 40 mg, Oral, Daily  piperacillin-tazobactam, 4.5 g, Intravenous, Q8H  senna-docusate sodium, 2 tablet, Oral, BID  sodium chloride, 10 mL, Intravenous, Q12H  traZODone, 100 mg, Oral, Nightly      Pharmacy Consult,       ----------------------------------------------------------------------------------------------------------------------    Vital Signs:  Temp:  [97.5 øF (36.4 øC)-98.9 øF (37.2 øC)] 98.7 øF (37.1 øC)  Heart Rate:  [] 93  Resp:  [17-24] 18  BP: (104-141)/() 141/88  No data found.  SpO2 Percentage    08/22/23 1416 08/22/23 1850 08/22/23 1905   SpO2: 95% 94% 96%     SpO2:  [91 %-99 %] 96 %  on  Flow (L/min):  [4-4.5] 4;   Device (Oxygen Therapy): room air    Body mass index is 43.98 kg/mý.  Wt Readings from Last 3 Encounters:   08/19/23 131 kg (289 lb 3.9 oz)   07/20/23 135 kg (298 lb 6.4 oz)   04/13/23 (!) 138 kg (303 lb 3.2 oz)        Intake/Output Summary (Last 24 hours) at 8/23/2023 0836  Last data filed at 8/23/2023 0312  Gross per 24 hour   Intake 1170 ml   Output --   Net 1170 ml       Diet: Regular/House Diet; Texture: Regular Texture (IDDSI 7); Fluid Consistency: Thin (IDDSI 0)  ----------------------------------------------------------------------------------------------------------------------      Physical Exam:    Constitutional: Overall patient is showing improvement from infectious disease and respiratory standpoint currently on room air with no acute distress.  No fever or diarrhea reported overnight.  HENT:  Head: Normocephalic and atraumatic.  Mouth:  Moist mucous membranes.    Eyes:  Conjunctivae and EOM are normal.  No scleral icterus.  Neck:  Neck supple.  No JVD present.    Cardiovascular:  Normal rate, regular rhythm and normal heart sounds with no murmur. No edema.  Pulmonary/Chest:  Currently on room air with no crackles wheezing or rhonchi overall significantly improved.  Abdominal:  Soft.  Bowel sounds are normal.  No distension and no tenderness.   Musculoskeletal:  No edema, no tenderness, and no deformity.  No swelling or redness of joints.  Neurological:  Alert and oriented to person, place, and time.  No facial droop.  No slurred speech.   Skin:  Skin is warm and dry.  No rash noted.  No pallor.   Psychiatric:  Normal mood and affect.  Behavior is normal.        ----------------------------------------------------------------------------------------------------------------------  Results from last 7 days   Lab Units 08/19/23  1638 08/19/23  1416   HSTROP T ng/L 8 9       Results from last 7 days   Lab Units 08/19/23  1416   PROBNP pg/mL 217.8         Results from last 7 days   Lab Units 08/19/23  1506   PH, ARTERIAL pH units 7.472*   PO2 ART mm Hg 65.0*   PCO2, ARTERIAL mm Hg 40.0   HCO3 ART mmol/L 29.2*       Results from last 7 days   Lab Units 08/23/23  0141 08/22/23  0131 08/21/23  0734 08/20/23  0734 08/19/23  1453 08/19/23  1416   CRP mg/dL 2.06* 3.75*  --   --   --  17.62*   LACTATE mmol/L  --   --   --   --  1.5  --    WBC 10*3/mm3  --  17.11* 19.86* 18.07*  --  23.73*   HEMOGLOBIN g/dL  --  12.0* 12.3* 13.3  --  13.6   HEMATOCRIT %  --  40.0 40.2 42.7  --  43.0   MCV fL  --  94.6 92.4 92.0  --  88.7   MCHC g/dL  --  30.0* 30.6* 31.1*  --  31.6   PLATELETS 10*3/mm3  --  488* 545* 507*  --  589*   INR   --  0.97  --   --   --   --        Results from last 7 days   Lab Units 08/20/23  0734 08/19/23  1416   SODIUM mmol/L 137 136   POTASSIUM mmol/L 4.8 4.9   CHLORIDE mmol/L 100 98   CO2 mmol/L 23.9 25.4   BUN mg/dL 16 13   CREATININE mg/dL 0.80 0.93   CALCIUM mg/dL 9.4 9.5   GLUCOSE mg/dL 156* 138*   ALBUMIN g/dL 2.6* 3.3*   BILIRUBIN mg/dL 0.3 0.5   ALK PHOS U/L 149* 167*   AST (SGOT) U/L 14 20   ALT (SGPT) U/L 17 23     Estimated Creatinine Clearance: 142.7 mL/min (by C-G formula  based on SCr of 0.8 mg/dL).  No results found for: AMMONIA    No results found for: HGBA1C, POCGLU  Lab Results   Component Value Date    HGBA1C 5.3 04/23/2014     No results found for: TSH, FREET4    Blood Culture   Date Value Ref Range Status   08/19/2023 Culture in progress  Preliminary   08/19/2023 Culture in progress  Preliminary     No results found for: URINECX  No results found for: WOUNDCX  No results found for: STOOLCX  No results found for: RESPCX  Pain Management Panel          Latest Ref Rng & Units 8/19/2023   Pain Management Panel   Amphetamine, Urine Qual Negative Positive    Barbiturates Screen, Urine Negative Negative    Benzodiazepine Screen, Urine Negative Negative    Buprenorphine, Screen, Urine Negative Negative    Cocaine Screen, Urine Negative Negative    Fentanyl, Urine Negative Negative    Methadone Screen , Urine Negative Negative    Methamphetamine, Ur Negative Negative      ----------------------------------------------------------------------------------------------------------------------  Imaging Results (Last 24 Hours)       ** No results found for the last 24 hours. **            ----------------------------------------------------------------------------------------------------------------------    Pertinent Infectious Disease Results      Patient presented to [facility] Emergency Department on 8/19/2023 for shortness of breath and cough.  WBC improving at 18.07.  CRP elevated 17.62.  Procalcitonin normal at 0.22.  Urinalysis unremarkable.  Strep pneumo antigen in process.  Lactic acid normal at 1.5 on admission.  COVID-19 and influenza PCR negative.  Blood cultures from 8/19/2023 2 out of 2 sets positive with BC ID is detecting staph species not aureus or lugdunensis and Staphylococcus lugdunensis.  Chest x-ray from 8/19/2023 reports near total opacification of the right upper lobe with a differential diagnosis including pneumonia, mass, postsurgical change.  CT of the  chest from 8/19/2023 reports right upper lobe consolidative masslike opacity without violating the fissures findings concerning for infection however the presence of enlarged right paratracheal lymph node measuring 4.2 cm, underlying mass cannot be ruled out.     Assessment/Plan       Assessment       Right upper lobe pneumonia  Bacteremia versus contaminant  COPD         Plan      Overall patient is showing significant clinical improvement with improving respiratory status as well is improving laboratory values.  Blood cultures repeated on 8/20/2023 are so far showing no growth at 2 days and bronchoscopy from yesterday shows WBCs and no organisms seen.    In the setting of significant improvement, I went ahead and de-escalated antibiotic coverage to oral doxycycline and oral cefdinir while awaiting for BAL culture results.  Patient is currently on room air with no fever or diarrhea reported with much improved lung exam.      ANTIMICROBIAL THERAPY    doxycycline (VIBRAMYCIN) 100MG IVPB in 100ml NS VTB  piperacillin-tazobactam (ZOSYN) IVPB 4.5 g in 100 mL NS VTB     Code Status:   Code Status and Medical Interventions:   Ordered at: 08/19/23 1728     Code Status (Patient has no pulse and is not breathing):    CPR (Attempt to Resuscitate)     Medical Interventions (Patient has pulse or is breathing):    Full Support       [place] MD  08/23/23  08:36 EDT

## 2023-08-24 ENCOUNTER — READMISSION MANAGEMENT (OUTPATIENT)
Dept: CALL CENTER | Facility: HOSPITAL | Age: 53
End: 2023-08-24
Payer: COMMERCIAL

## 2023-08-24 ENCOUNTER — CONSULT (OUTPATIENT)
Dept: ONCOLOGY | Facility: CLINIC | Age: 53
End: 2023-08-24
Payer: COMMERCIAL

## 2023-08-24 VITALS
TEMPERATURE: 98 F | HEIGHT: 68 IN | RESPIRATION RATE: 18 BRPM | DIASTOLIC BLOOD PRESSURE: 82 MMHG | WEIGHT: 298 LBS | BODY MASS INDEX: 45.16 KG/M2 | OXYGEN SATURATION: 96 % | HEART RATE: 108 BPM | SYSTOLIC BLOOD PRESSURE: 127 MMHG

## 2023-08-24 VITALS
SYSTOLIC BLOOD PRESSURE: 158 MMHG | DIASTOLIC BLOOD PRESSURE: 98 MMHG | HEIGHT: 68 IN | OXYGEN SATURATION: 94 % | WEIGHT: 289.24 LBS | RESPIRATION RATE: 18 BRPM | TEMPERATURE: 97.6 F | BODY MASS INDEX: 43.84 KG/M2 | HEART RATE: 90 BPM

## 2023-08-24 DIAGNOSIS — C34.91 SQUAMOUS CELL CARCINOMA OF BRONCHUS OF RIGHT LUNG: Primary | ICD-10-CM

## 2023-08-24 PROBLEM — A41.9 SEPSIS: Status: RESOLVED | Noted: 2023-08-19 | Resolved: 2023-08-24

## 2023-08-24 PROBLEM — J44.1 COPD EXACERBATION: Status: RESOLVED | Noted: 2023-08-19 | Resolved: 2023-08-24

## 2023-08-24 LAB
ANION GAP SERPL CALCULATED.3IONS-SCNC: 12.2 MMOL/L (ref 5–15)
BACTERIA SPEC AEROBE CULT: ABNORMAL
BACTERIA SPEC AEROBE CULT: NO GROWTH
BACTERIA SPEC AEROBE CULT: NORMAL
BUN SERPL-MCNC: 26 MG/DL (ref 6–20)
BUN/CREAT SERPL: 27.1 (ref 7–25)
CALCIUM SPEC-SCNC: 9.2 MG/DL (ref 8.6–10.5)
CHLORIDE SERPL-SCNC: 95 MMOL/L (ref 98–107)
CO2 SERPL-SCNC: 25.8 MMOL/L (ref 22–29)
CREAT SERPL-MCNC: 0.96 MG/DL (ref 0.76–1.27)
CRP SERPL-MCNC: 1.2 MG/DL (ref 0–0.5)
DEPRECATED RDW RBC AUTO: 40.8 FL (ref 37–54)
EGFRCR SERPLBLD CKD-EPI 2021: 95.1 ML/MIN/1.73
ERYTHROCYTE [DISTWIDTH] IN BLOOD BY AUTOMATED COUNT: 12.1 % (ref 12.3–15.4)
GLUCOSE SERPL-MCNC: 234 MG/DL (ref 65–99)
GRAM STN SPEC: ABNORMAL
GRAM STN SPEC: NORMAL
HCT VFR BLD AUTO: 43.6 % (ref 37.5–51)
HGB BLD-MCNC: 13.5 G/DL (ref 13–17.7)
ISOLATED FROM: ABNORMAL
MCH RBC QN AUTO: 28.3 PG (ref 26.6–33)
MCHC RBC AUTO-ENTMCNC: 31 G/DL (ref 31.5–35.7)
MCV RBC AUTO: 91.4 FL (ref 79–97)
PLATELET # BLD AUTO: 644 10*3/MM3 (ref 140–450)
PMV BLD AUTO: 9.5 FL (ref 6–12)
POTASSIUM SERPL-SCNC: 5.2 MMOL/L (ref 3.5–5.2)
RBC # BLD AUTO: 4.77 10*6/MM3 (ref 4.14–5.8)
REF LAB TEST METHOD: NORMAL
REF LAB TEST METHOD: NORMAL
SODIUM SERPL-SCNC: 133 MMOL/L (ref 136–145)
WBC NRBC COR # BLD: 22.31 10*3/MM3 (ref 3.4–10.8)

## 2023-08-24 PROCEDURE — 94761 N-INVAS EAR/PLS OXIMETRY MLT: CPT

## 2023-08-24 PROCEDURE — 80048 BASIC METABOLIC PNL TOTAL CA: CPT | Performed by: INTERNAL MEDICINE

## 2023-08-24 PROCEDURE — 99232 SBSQ HOSP IP/OBS MODERATE 35: CPT | Performed by: NURSE PRACTITIONER

## 2023-08-24 PROCEDURE — 63710000001 ONDANSETRON ODT 4 MG TABLET DISPERSIBLE: Performed by: INTERNAL MEDICINE

## 2023-08-24 PROCEDURE — 94799 UNLISTED PULMONARY SVC/PX: CPT

## 2023-08-24 PROCEDURE — 86140 C-REACTIVE PROTEIN: CPT | Performed by: INTERNAL MEDICINE

## 2023-08-24 PROCEDURE — 85027 COMPLETE CBC AUTOMATED: CPT | Performed by: INTERNAL MEDICINE

## 2023-08-24 PROCEDURE — 99232 SBSQ HOSP IP/OBS MODERATE 35: CPT | Performed by: INTERNAL MEDICINE

## 2023-08-24 PROCEDURE — 94664 DEMO&/EVAL PT USE INHALER: CPT

## 2023-08-24 PROCEDURE — 99239 HOSP IP/OBS DSCHRG MGMT >30: CPT | Performed by: INTERNAL MEDICINE

## 2023-08-24 PROCEDURE — 25010000002 METHYLPREDNISOLONE PER 40 MG: Performed by: INTERNAL MEDICINE

## 2023-08-24 RX ORDER — FAMOTIDINE 10 MG/ML
20 INJECTION, SOLUTION INTRAVENOUS ONCE
OUTPATIENT
Start: 2023-09-06

## 2023-08-24 RX ORDER — DOXYCYCLINE 100 MG/1
100 CAPSULE ORAL EVERY 12 HOURS SCHEDULED
Qty: 5 CAPSULE | Refills: 0 | Status: SHIPPED | OUTPATIENT
Start: 2023-08-24 | End: 2023-08-27

## 2023-08-24 RX ORDER — SODIUM CHLORIDE 9 MG/ML
250 INJECTION, SOLUTION INTRAVENOUS ONCE
OUTPATIENT
Start: 2023-09-06

## 2023-08-24 RX ORDER — OXYCODONE AND ACETAMINOPHEN 10; 325 MG/1; MG/1
1 TABLET ORAL EVERY 6 HOURS PRN
Qty: 21 TABLET | Refills: 0 | Status: SHIPPED | OUTPATIENT
Start: 2023-08-24 | End: 2023-08-30 | Stop reason: SDUPTHER

## 2023-08-24 RX ORDER — FAMOTIDINE 10 MG/ML
20 INJECTION, SOLUTION INTRAVENOUS AS NEEDED
OUTPATIENT
Start: 2023-09-06

## 2023-08-24 RX ORDER — SODIUM CHLORIDE 9 MG/ML
250 INJECTION, SOLUTION INTRAVENOUS ONCE
OUTPATIENT
Start: 2023-09-13

## 2023-08-24 RX ORDER — FAMOTIDINE 10 MG/ML
20 INJECTION, SOLUTION INTRAVENOUS AS NEEDED
OUTPATIENT
Start: 2023-09-13

## 2023-08-24 RX ORDER — DIPHENHYDRAMINE HYDROCHLORIDE 50 MG/ML
50 INJECTION INTRAMUSCULAR; INTRAVENOUS AS NEEDED
OUTPATIENT
Start: 2023-09-06

## 2023-08-24 RX ORDER — DIPHENHYDRAMINE HYDROCHLORIDE 50 MG/ML
50 INJECTION INTRAMUSCULAR; INTRAVENOUS AS NEEDED
OUTPATIENT
Start: 2023-09-13

## 2023-08-24 RX ORDER — CEFDINIR 300 MG/1
300 CAPSULE ORAL EVERY 12 HOURS SCHEDULED
Qty: 5 CAPSULE | Refills: 0 | Status: SHIPPED | OUTPATIENT
Start: 2023-08-24 | End: 2023-08-27

## 2023-08-24 RX ORDER — PALONOSETRON 0.05 MG/ML
0.25 INJECTION, SOLUTION INTRAVENOUS ONCE
OUTPATIENT
Start: 2023-09-13

## 2023-08-24 RX ORDER — MORPHINE SULFATE 30 MG/1
30 TABLET, FILM COATED, EXTENDED RELEASE ORAL 2 TIMES DAILY
Qty: 60 TABLET | Refills: 0 | Status: SHIPPED | OUTPATIENT
Start: 2023-08-24

## 2023-08-24 RX ORDER — LORAZEPAM 1 MG/1
1 TABLET ORAL EVERY 8 HOURS PRN
Qty: 90 TABLET | Refills: 0 | Status: SHIPPED | OUTPATIENT
Start: 2023-08-24

## 2023-08-24 RX ORDER — CEFDINIR 300 MG/1
300 CAPSULE ORAL EVERY 12 HOURS SCHEDULED
Qty: 5 CAPSULE | Refills: 0 | Status: SHIPPED | OUTPATIENT
Start: 2023-08-24 | End: 2023-08-24 | Stop reason: SDUPTHER

## 2023-08-24 RX ORDER — FAMOTIDINE 10 MG/ML
20 INJECTION, SOLUTION INTRAVENOUS ONCE
OUTPATIENT
Start: 2023-09-13

## 2023-08-24 RX ORDER — PALONOSETRON 0.05 MG/ML
0.25 INJECTION, SOLUTION INTRAVENOUS ONCE
OUTPATIENT
Start: 2023-09-06

## 2023-08-24 RX ORDER — NALOXONE HYDROCHLORIDE 4 MG/.1ML
SPRAY NASAL
Qty: 2 EACH | Refills: 0 | Status: SHIPPED | OUTPATIENT
Start: 2023-08-24

## 2023-08-24 RX ADMIN — BENZONATATE 200 MG: 100 CAPSULE ORAL at 08:22

## 2023-08-24 RX ADMIN — LISINOPRIL 10 MG: 10 TABLET ORAL at 08:23

## 2023-08-24 RX ADMIN — IPRATROPIUM BROMIDE AND ALBUTEROL SULFATE 3 ML: .5; 2.5 SOLUTION RESPIRATORY (INHALATION) at 00:24

## 2023-08-24 RX ADMIN — GABAPENTIN 600 MG: 300 CAPSULE ORAL at 05:05

## 2023-08-24 RX ADMIN — IPRATROPIUM BROMIDE AND ALBUTEROL SULFATE 3 ML: .5; 2.5 SOLUTION RESPIRATORY (INHALATION) at 13:21

## 2023-08-24 RX ADMIN — OXYCODONE AND ACETAMINOPHEN 1 TABLET: 10; 325 TABLET ORAL at 06:45

## 2023-08-24 RX ADMIN — OXYCODONE AND ACETAMINOPHEN 1 TABLET: 10; 325 TABLET ORAL at 12:49

## 2023-08-24 RX ADMIN — IPRATROPIUM BROMIDE AND ALBUTEROL SULFATE 3 ML: .5; 2.5 SOLUTION RESPIRATORY (INHALATION) at 07:28

## 2023-08-24 RX ADMIN — ONDANSETRON 4 MG: 4 TABLET, ORALLY DISINTEGRATING ORAL at 12:21

## 2023-08-24 RX ADMIN — LEVOTHYROXINE SODIUM 50 MCG: 50 TABLET ORAL at 08:23

## 2023-08-24 RX ADMIN — BUDESONIDE AND FORMOTEROL FUMARATE DIHYDRATE 2 PUFF: 160; 4.5 AEROSOL RESPIRATORY (INHALATION) at 07:28

## 2023-08-24 RX ADMIN — PANTOPRAZOLE SODIUM 40 MG: 40 TABLET, DELAYED RELEASE ORAL at 08:23

## 2023-08-24 RX ADMIN — Medication 10 ML: at 08:23

## 2023-08-24 RX ADMIN — OXYCODONE AND ACETAMINOPHEN 1 TABLET: 10; 325 TABLET ORAL at 00:36

## 2023-08-24 RX ADMIN — DOXYCYCLINE 100 MG: 100 CAPSULE ORAL at 08:23

## 2023-08-24 RX ADMIN — METHYLPREDNISOLONE SODIUM SUCCINATE 20 MG: 40 INJECTION, POWDER, FOR SOLUTION INTRAMUSCULAR; INTRAVENOUS at 05:05

## 2023-08-24 RX ADMIN — CEFDINIR 300 MG: 300 CAPSULE ORAL at 08:23

## 2023-08-24 NOTE — PROGRESS NOTES
PROGRESS NOTE         Patient Identification:  Name:  Dexter Flores  Age:  53 y.o.  Sex:  male  :  1970  MRN:  [id number]  Visit Number:  [id number]  Primary Care Provider:  Jose F Reynolds         LOS: 5 days       ----------------------------------------------------------------------------------------------------------------------  Subjective       Chief Complaints:    Shortness of Breath and Chest Pain        Interval History:      Patient resting comfortably in bed this morning.  No issues or complaints.  Currently on room air with no apparent distress.  Lung sounds diminished bilaterally.  Abdomen soft, nontender.  WBC elevated 22.31, likely related to steroid therapy.  CRP improved at 1.20.  BAL culture shows growth of normal respiratory tramaine at this time.  Bronchoscopy preliminary pathology appears to be positive for squamous cell carcinoma per pulmonary note, patient to follow-up with hematology/oncology as outpatient.    Review of Systems:    Constitutional: no fever, chills and night sweats.    Eyes: no eye drainage, itching or redness.  HEENT: no mouth sores, dysphagia or nose bleed.  Respiratory: Overall improving respiratory status with persistent mild dry cough.  Cardiovascular: no chest pain, no palpitations, no orthopnea.  Gastrointestinal: no nausea, vomiting or diarrhea. No abdominal pain, hematemesis or rectal bleeding.  Genitourinary: no dysuria or polyuria.  Hematologic/lymphatic: no lymph node abnormalities, no easy bruising or easy bleeding.  Musculoskeletal: no muscle or joint pain.  Skin: No rash and no itching.  Neurological: no loss of consciousness, no seizure, no headache.  Behavioral/Psych: no depression or suicidal ideation.  Endocrine: no hot flashes.  Immunologic: negative.    ----------------------------------------------------------------------------------------------------------------------      Objective       Current [facility] Meds:  acetylcysteine, 3 mL,  Nebulization, BID - RT  benzonatate, 200 mg, Oral, TID  budesonide-formoterol, 2 puff, Inhalation, BID - RT  cefdinir, 300 mg, Oral, Q12H  [START ON 8/25/2023] cholecalciferol, 50,000 Units, Oral, Weekly  doxycycline, 100 mg, Oral, Q12H  enoxaparin, 40 mg, Subcutaneous, Nightly  gabapentin, 600 mg, Oral, Q8H  ipratropium-albuterol, 3 mL, Nebulization, 4x Daily - RT  levothyroxine, 50 mcg, Oral, Daily  lisinopril, 10 mg, Oral, Daily  methylPREDNISolone sodium succinate, 20 mg, Intravenous, Q12H  montelukast, 10 mg, Oral, Nightly  pantoprazole, 40 mg, Oral, Daily  senna-docusate sodium, 2 tablet, Oral, BID  sodium chloride, 10 mL, Intravenous, Q12H  traZODone, 100 mg, Oral, Nightly      Pharmacy Consult,       ----------------------------------------------------------------------------------------------------------------------    Vital Signs:  Temp:  [97.6 øF (36.4 øC)-98.6 øF (37 øC)] 97.6 øF (36.4 øC)  Heart Rate:  [] 85  Resp:  [16-20] 18  BP: (116-158)/(58-98) 158/98  Mean Arterial Pressure (Non-Invasive) for the past 24 hrs (Last 3 readings):   Noninvasive MAP (mmHg)   08/24/23 0147 102     SpO2 Percentage    08/24/23 0147 08/24/23 0529 08/24/23 0728   SpO2: 95% 96% 96%     SpO2:  [94 %-98 %] 96 %  on  Flow (L/min):  [2] 2;   Device (Oxygen Therapy): room air    Body mass index is 43.98 kg/mý.  Wt Readings from Last 3 Encounters:   08/19/23 131 kg (289 lb 3.9 oz)   07/20/23 135 kg (298 lb 6.4 oz)   04/13/23 (!) 138 kg (303 lb 3.2 oz)        Intake/Output Summary (Last 24 hours) at 8/24/2023 1337  Last data filed at 8/24/2023 0500  Gross per 24 hour   Intake 1200 ml   Output --   Net 1200 ml       Diet: Regular/House Diet; Texture: Regular Texture (IDDSI 7); Fluid Consistency: Thin (IDDSI 0)  ----------------------------------------------------------------------------------------------------------------------      Physical Exam:    Constitutional: Overall patient is showing improvement from infectious  disease and respiratory standpoint currently on room air with no acute distress.  No fever or diarrhea reported overnight.  Minimal cough.  HENT:  Head: Normocephalic and atraumatic.  Mouth:  Moist mucous membranes.    Eyes:  Conjunctivae and EOM are normal.  No scleral icterus.  Neck:  Neck supple.  No JVD present.    Cardiovascular:  Normal rate, regular rhythm and normal heart sounds with no murmur. No edema.  Pulmonary/Chest: Lung sounds diminished bilaterally.  Currently on room air.  Abdominal:  Soft.  Bowel sounds are normal.  No distension and no tenderness.   Musculoskeletal:  No edema, no tenderness, and no deformity.  No swelling or redness of joints.  Neurological:  Alert and oriented to person, place, and time.  No facial droop.  No slurred speech.   Skin:  Skin is warm and dry.  No rash noted.  No pallor.   Psychiatric:  Normal mood and affect.  Behavior is normal.        ----------------------------------------------------------------------------------------------------------------------  Results from last 7 days   Lab Units 08/19/23  1638 08/19/23  1416   HSTROP T ng/L 8 9       Results from last 7 days   Lab Units 08/19/23  1416   PROBNP pg/mL 217.8         Results from last 7 days   Lab Units 08/19/23  1506   PH, ARTERIAL pH units 7.472*   PO2 ART mm Hg 65.0*   PCO2, ARTERIAL mm Hg 40.0   HCO3 ART mmol/L 29.2*       Results from last 7 days   Lab Units 08/24/23  0054 08/23/23  0141 08/22/23  0131 08/21/23  0734 08/20/23  0734 08/19/23  1453   CRP mg/dL 1.20* 2.06* 3.75*  --   --   --    LACTATE mmol/L  --   --   --   --   --  1.5   WBC 10*3/mm3 22.31*  --  17.11* 19.86*   < >  --    HEMOGLOBIN g/dL 13.5  --  12.0* 12.3*   < >  --    HEMATOCRIT % 43.6  --  40.0 40.2   < >  --    MCV fL 91.4  --  94.6 92.4   < >  --    MCHC g/dL 31.0*  --  30.0* 30.6*   < >  --    PLATELETS 10*3/mm3 644*  --  488* 545*   < >  --    INR   --   --  0.97  --   --   --     < > = values in this interval not displayed.        Results from last 7 days   Lab Units 08/24/23  0054 08/20/23  0734 08/19/23  1416   SODIUM mmol/L 133* 137 136   POTASSIUM mmol/L 5.2 4.8 4.9   CHLORIDE mmol/L 95* 100 98   CO2 mmol/L 25.8 23.9 25.4   BUN mg/dL 26* 16 13   CREATININE mg/dL 0.96 0.80 0.93   CALCIUM mg/dL 9.2 9.4 9.5   GLUCOSE mg/dL 234* 156* 138*   ALBUMIN g/dL  --  2.6* 3.3*   BILIRUBIN mg/dL  --  0.3 0.5   ALK PHOS U/L  --  149* 167*   AST (SGOT) U/L  --  14 20   ALT (SGPT) U/L  --  17 23     Estimated Creatinine Clearance: 118.9 mL/min (by C-G formula based on SCr of 0.96 mg/dL).  No results found for: AMMONIA    No results found for: HGBA1C, POCGLU  Lab Results   Component Value Date    HGBA1C 5.3 04/23/2014     No results found for: TSH, FREET4    Blood Culture   Date Value Ref Range Status   08/19/2023 Culture in progress  Preliminary   08/19/2023 Culture in progress  Preliminary     No results found for: URINECX  No results found for: WOUNDCX  No results found for: STOOLCX  No results found for: RESPCX  Pain Management Panel          Latest Ref Rng & Units 8/19/2023   Pain Management Panel   Amphetamine, Urine Qual Negative Positive    Barbiturates Screen, Urine Negative Negative    Benzodiazepine Screen, Urine Negative Negative    Buprenorphine, Screen, Urine Negative Negative    Cocaine Screen, Urine Negative Negative    Fentanyl, Urine Negative Negative    Methadone Screen , Urine Negative Negative    Methamphetamine, Ur Negative Negative    ----------------------------------------------------------------------------------------------------------------------  Imaging Results (Last 24 Hours)       ** No results found for the last 24 hours. **            ----------------------------------------------------------------------------------------------------------------------    Pertinent Infectious Disease Results      Patient presented to [facility] Emergency Department on 8/19/2023 for shortness of breath and cough.  WBC  improving at 18.07.  CRP elevated 17.62.  Procalcitonin normal at 0.22.  Urinalysis unremarkable.  Strep pneumo antigen in process.  Lactic acid normal at 1.5 on admission.  COVID-19 and influenza PCR negative.  Blood cultures from 8/19/2023 2 out of 2 sets positive with BC ID is detecting staph species not aureus or lugdunensis and Staphylococcus lugdunensis.  Chest x-ray from 8/19/2023 reports near total opacification of the right upper lobe with a differential diagnosis including pneumonia, mass, postsurgical change.  CT of the chest from 8/19/2023 reports right upper lobe consolidative masslike opacity without violating the fissures findings concerning for infection however the presence of enlarged right paratracheal lymph node measuring 4.2 cm, underlying mass cannot be ruled out.     Assessment/Plan       Assessment       Right upper lobe pneumonia  Bacteremia versus contaminant  COPD         Plan      Patient resting comfortably in bed this morning.  No issues or complaints.  Currently on room air with no apparent distress.  Lung sounds diminished bilaterally.  Abdomen soft, nontender.  WBC elevated 22.31, likely related to steroid therapy.  CRP improved at 1.20.  BAL culture shows growth of normal respiratory tramaine at this time.  Bronchoscopy preliminary pathology appears to be positive for squamous cell carcinoma per pulmonary note, patient to follow-up with hematology/oncology as outpatient.    Patient overall improved from infectious disease standpoint.  Recommend continuing Omnicef 300 mg p.o. twice daily and doxycycline 100 mg p.o. twice daily through 8/27/2023 for treatment of postobstructive pneumonia.  Okay to discharge from ID standpoint.      ANTIMICROBIAL THERAPY    cefdinir - 300 MG, 300 MG  doxycycline - 100 MG, 100 MG     Code Status:   Code Status and Medical Interventions:   Ordered at: 08/19/23 1728     Code Status (Patient has no pulse and is not breathing):    CPR (Attempt to Resuscitate)      Medical Interventions (Patient has pulse or is breathing):    Full Support       ABHIJEET Plata  08/24/23  13:37 EDT

## 2023-08-24 NOTE — CASE MANAGEMENT/SOCIAL WORK
Discharge Planning Assessment   [place]     Patient Name: Dexter Flores  MRN: [id number]  Today's Date: 8/24/2023    Admit Date: 8/19/2023         Discharge Plan       Row Name 08/24/23 1215       Plan    Final Discharge Disposition Code 01 - home or self-care    Final Note Pt. is being d/c home on oral Abxs .  To f/u with Oncology at 14:30 today. Pt's family will transport.                    Tila Mckenna RN

## 2023-08-24 NOTE — PLAN OF CARE
Goal Outcome Evaluation:  Plan of Care Reviewed With: patient        Progress: no change  Outcome Evaluation: Patient is resting in bed at this time. PRN pain meds given per orders. No complaints at this time. Will continue POC.

## 2023-08-24 NOTE — DISCHARGE SUMMARY
[facility] HOSPITALIST MEDICINE DISCHARGE SUMMARY    Patient Identification:  Name:  Dexter Flores  Age:  53 y.o.  Sex:  male  :  1970  MRN:  [id number]  Visit Number:  [id number]    Date of Admission: 2023  Date of Discharge: 2023    PCP: Jose F Reynolds    DISCHARGE DIAGNOSIS   Right upper lobe pulmonary mass  Postobstructive pneumonia  Sepsis (present on admission, resolved)  Essential hypertension  Hypothyroidism      CONSULTS  Dr. Mcmanus, Pulmonology  Dr. Vega, ID      PROCEDURES PERFORMED   Patient did have bronchoscopy with BAL and biopsy performed on 2023 secondary to right upper lobe mass.  Please see procedure note for specific details.  Patient tolerated the procedure well with no postprocedural complications.      HOSPITAL COURSE  Mr. Flores is a 53 y.o. male who presented to [facility] ED on 2023 with a chief complaint of shortness of breath.  Patient has a past medical history remarkable for COPD, essential hypertension, hypothyroidism and GERD.  Patient reports onset of productive cough approximately 1 month prior to evaluation in the emergency department.  His cough progressed to shortness of breath approximately 2 weeks prior to admission.  Secondary to worsening shortness of breath with productive cough and associated diaphoresis, patient did present to the emergency department for further treatment and evaluation.  Initial evaluation in the emergency department did consist of basic laboratory work as well as physical exam and vital signs.  Initial vital signs found patient's blood pressure 127/72, respirations 20, heart rate 100 and temperature 98.6 øF with oxygen saturation 97% on room air.  Initial lab work did include CBC and CMP.  CBC demonstrated elevated white blood cell count of 23.7.  CT of chest was obtained which demonstrated right upper lobe masslike opacity with what appeared to be postobstructive pneumonia.  For this reason, patient  was admitted for further treatment and evaluation.    Patient was started on empiric antibiotic therapy with Rocephin and doxycycline.  Pulmonology services were consulted who did thoroughly evaluate the patient.  Recommendation was made to proceed with bronchoscopy with biopsy.  Patient did undergo bronchoscopy on 8/22/2023. Lung biopsy did demonstrate moderately differentiated squamous cell carcinoma of the lung.  With this in mind, did discuss patient's case with hematology/oncology.  Oncology services have graciously made an outpatient appointment for patient today 8/24/2023 at 2:30 PM.  As patient does not demonstrate hypoxic respiratory failure and overall does appear to be improving with treatment of postobstructive pneumonia, it is felt reasonable to discharge patient today with anticipated plan to immediately present to oncology services for further discussions regarding plan of care in an outpatient setting.  With this in mind, it is felt patient has reached maximum medical benefit of current hospitalization and will be discharged home in stable condition today.  The beforementioned plan was thoroughly discussed with the patient and he expressed his understanding and willingness to proceed with the beforementioned plan.    VITAL SIGNS:      08/19/23  1403 08/19/23  1855   Weight: 127 kg (280 lb) 131 kg (289 lb 3.9 oz)     Body mass index is 43.98 kg/mý.    PHYSICAL EXAM:  Constitutional: Well-nourished  male in no apparent distress.     HENT:  Head:  Normocephalic and atraumatic.  Mouth:  Moist mucous membranes.    Eyes:  Conjunctivae and EOM are normal.  Pupils are equal, round, and reactive to light.  No scleral icterus.    Neck:  Neck supple. No thyromegaly.  No JVD present.    Cardiovascular:  Regular rate and rhythm with no murmurs, rubs, clicks or gallops appreciated.  Pulmonary/Chest:  Clear to auscultation bilaterally with no crackles, wheezes or rhonchi appreciated.  Abdominal:  Soft.  Nontender. Nondistended  Bowel sounds are normal in all four quadrants. No organomegally appreciated.   Musculoskeletal:  No edema, no tenderness, and no deformity.  No red or swollen joints anywhere.    Neurological:  Alert, Cranial nerves II-XII intact with no focal deficits.  No facial droop.  No slurred speech.   Skin:  Warm and dry to palpation with no rashes or lesions appreciated.  Peripheral vascular:  2+ radial and pedal pulses in bilateral upper and lower extremities.  Psychiatric:  Alert and oriented x3, demonstrates appropriate judgment and insight.    DISCHARGE DISPOSITION   Stable    DISCHARGE MEDICATIONS:     Discharge Medications        New Medications        Instructions Start Date   Bevespi Aerosphere 9-4.8 MCG/ACT aerosol  Generic drug: Glycopyrrolate-Formoterol   Inhale 2 puffs by mouth 2 (Two) Times a Day.      cefdinir 300 MG capsule  Commonly known as: OMNICEF   300 mg, Oral, Every 12 Hours Scheduled      doxycycline 100 MG capsule  Commonly known as: MONODOX   100 mg, Oral, Every 12 Hours Scheduled      naloxone 4 MG/0.1ML nasal spray  Commonly known as: NARCAN   Call 911. Don't prime. [place] in 1 nostril for overdose. Repeat in 2-3 minutes in other nostril if no or minimal breathing/responsiveness.      oxyCODONE-acetaminophen  MG per tablet  Commonly known as: PERCOCET   1 tablet, Oral, Every 6 Hours PRN             Continue These Medications        Instructions Start Date   albuterol sulfate  (90 Base) MCG/ACT inhaler  Commonly known as: PROVENTIL HFA;VENTOLIN HFA;PROAIR HFA   2 puffs, Inhalation, Every 4 Hours PRN      albuterol (2.5 MG/3ML) 0.083% nebulizer solution  Commonly known as: PROVENTIL   2.5 mg, Nebulization, Every 6 Hours PRN      budesonide-formoterol 160-4.5 MCG/ACT inhaler  Commonly known as: SYMBICORT   2 puffs, Inhalation, 2 Times Daily - RT      gabapentin 600 MG tablet  Commonly known as: NEURONTIN   600 mg, Oral, 3 Times Daily      HYDROcodone-acetaminophen   MG per tablet  Commonly known as: NORCO   1 tablet, Oral, Every 8 Hours PRN      levothyroxine 50 MCG tablet  Commonly known as: SYNTHROID, LEVOTHROID   50 mcg, Oral, Daily      lisinopril 10 MG tablet  Commonly known as: PRINIVIL,ZESTRIL   10 mg, Oral, Daily      montelukast 10 MG tablet  Commonly known as: SINGULAIR   10 mg, Oral, Nightly      pantoprazole 40 MG EC tablet  Commonly known as: PROTONIX   40 mg, Oral, Daily      phentermine 37.5 MG tablet  Commonly known as: ADIPEX-P   37.5 mg, Oral, Every Morning Before Breakfast      traZODone 100 MG tablet  Commonly known as: DESYREL   100 mg, Oral, Nightly      vitamin D 1.25 MG (08665 UT) capsule capsule  Commonly known as: ERGOCALCIFEROL   50,000 Units, Oral, Weekly                 Your Scheduled Appointments      Aug 24, 2023  3:00 PM  NEW ONCOLOGY with ROMEO Rizzo MD  [facility] HEMATOLOGY & ONCOLOGY ([place]) [address]  [facility] [address] [phone]   Bring ID and  Insurance cards.  New patients are to arrive 30 minutes prior to the appointement.  If you received paperwork in the mail, fill it out and bring it with them.  All other appt's need to be here 15 minutes early.          Aug 31, 2023  3:00 PM  Initial Evaluation with  [facility]  [facility]ULMONARY CLIN[facility] (--) [address] [facility] [address] [phone]   Bring all previous medical records and films, along with current medications and insurance information.         Sep 28, 2023 11:30 AM  Follow Up with Adin Wright MD  [facility]URG[facility] ([place]) [facility]  [address]  [facility] [address] [phone]   Arrive 15 minutes prior to appointment.                Additional Instructions for the Follow-ups that You Need to Schedule       Discharge Follow-up with PCP   As directed       Currently Documented PCP:    Jose F Reynolds    PCP Phone Number:    [phone]     Follow Up  Details: please follow up with pcp in 2-3 weeks               Follow-up Information       [facility] .    Specialty: Pulmonology  Contact information:  [address] [facility] [address] [phone]             Jose F Reynolds .    Specialty: Physician Assistant  Why: please follow up with pcp in 2-3 weeks  Contact information:  [address] [place] [address] [address]  [facility] [address] [phone]                             TEST  RESULTS PENDING AT DISCHARGE  Pending Labs       Order Current Status    Fungus Culture - Lavage, Lung, Right Middle Lobe In process    Fungus Culture - Lavage, Lung, Right Upper Lobe In process    AFB Culture - Lavage, Lung, Right Middle Lobe Preliminary result    BAL Culture, Quantitative - Lavage, Lung, Right Middle Lobe Preliminary result    BAL Culture, Quantitative - Lavage, Lung, Right Upper Lobe Preliminary result    Blood Culture - Blood, Hand, Left Preliminary result    Blood Culture - Blood, Hand, Right Preliminary result             The ASCVD Risk score (Ashish DK, et al., 2019) failed to calculate for the following reasons:    Cannot find a previous HDL lab    Cannot find a previous total cholesterol lab     Leonardo Jackson DO  08/24/23  11:59 EDT    Please note that this discharge summary required more than 30 minutes to complete.    Please send a copy of this dictation to the following providers:  Jose F Reynolds

## 2023-08-24 NOTE — PROGRESS NOTES
Name:  Dexter Flores  :  1970  Date:  2023     REFERRING PHYSICIAN  Leonardo Jackson DO    PRIMARY CARE PROVIDER  Jose F Reynolds PA-C    REASON FOR CONSULTATION  1. 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    Dear Dr. Jackson,    HISTORY OF PRESENT ILLNESS:   Thank you for referring Mr. Flores to our medical oncology clinic. As you are aware, he is a pleasant, 53 y.o., white male with a history of hypertension, COPD and lifelong tobacco abuse who first noticed a cough and some worsening shortness of breath in late 2023. As the symptoms progressed, he presented to our ED on 2023, where a CT of the chest identified a mass/postobstructive pneumonia in the right upper lobe of the lung. He was admitted to your hospitalist service for further evaluation and treatment. Pulmonology performed a diagnostic bronchoscopy on 2023, and the results were consistent with squamous cell carcinoma. In the meantime, as his shortness of breath improved with antibiotics and other supportive care, he was able to be discharged earlier today (2023) with a referral to our clinic for further management.    INTERIM HISTORY:  Mr. Flores presents to clinic today for initial consultation accompanied by his niece. They confirm the above history. His breathing remains improved compare to when he had to go to our ED and was first admitted to our hospital last week. The prn Percocet he was receiving in the hospital (and received a Rx for a limited supply of at the time of his discharge this morning) has helped some near constant pain in his right upper chest wall, but each dose tends to wear off after just a few hours. He has some intermittent nausea, but prn Zofran (which he was also given a Rx for at discharge) has been controlling this. His only other complaint is of some anxiety over his cancer diagnosis.    Past Medical  History:   Diagnosis Date    Bulging of cervical intervertebral disc     COPD (chronic obstructive pulmonary disease)     GERD (gastroesophageal reflux disease)     Hypertension     Neck pain     Thyroid disease        Past Surgical History:   Procedure Laterality Date    BRONCHOSCOPY Bilateral 2023    Procedure: BRONCHOSCOPY WITH ENDOBRONCHIAL ULTRASOUND;  Surgeon: Savage Mcmanus MD;  Location: [facility];  Service: Pulmonary;  Laterality: Bilateral;       Social History     Socioeconomic History    Marital status:    Tobacco Use    Smoking status: Former     Packs/day: 1.00     Years: 30.00     Pack years: 30.00     Types: Cigarettes     Start date:      Quit date: 2023     Years since quittin.3    Smokeless tobacco: Never   Vaping Use    Vaping Use: Never used   Substance and Sexual Activity    Alcohol use: Not Currently    Drug use: Not Currently    Sexual activity: Defer       History reviewed. No pertinent family history.    No Known Allergies    Current Outpatient Medications   Medication Sig Dispense Refill    albuterol (PROVENTIL) (2.5 MG/3ML) 0.083% nebulizer solution Take 2.5 mg by nebulization Every 6 (Six) Hours As Needed for Wheezing.      albuterol sulfate  (90 Base) MCG/ACT inhaler Inhale 2 puffs Every 4 (Four) Hours As Needed for Wheezing.      cefdinir (OMNICEF) 300 MG capsule Take 1 capsule by mouth Every 12 (Twelve) Hours for 5 doses. Indications: Pneumonia 5 capsule 0    doxycycline (MONODOX) 100 MG capsule Take 1 capsule by mouth Every 12 (Twelve) Hours for 5 doses. Indications: Pneumonia 5 capsule 0    gabapentin (NEURONTIN) 600 MG tablet Take 1 tablet by mouth 3 (Three) Times a Day.      Glycopyrrolate-Formoterol (Bevespi Aerosphere) 9-4.8 MCG/ACT aerosol Inhale 2 puffs by mouth 2 (Two) Times a Day. 10.7 g 0    HYDROcodone-acetaminophen (NORCO)  MG per tablet Take 1 tablet by mouth Every 8 (Eight) Hours As Needed for Moderate Pain.      levothyroxine  (SYNTHROID, LEVOTHROID) 50 MCG tablet Take 1 tablet by mouth Daily.      lisinopril (PRINIVIL,ZESTRIL) 10 MG tablet Take 1 tablet by mouth Daily.      LORazepam (ATIVAN) 1 MG tablet Take 1 tablet by mouth Every 8 (Eight) Hours As Needed for Anxiety. 90 tablet 0    montelukast (SINGULAIR) 10 MG tablet Take 1 tablet by mouth Every Night.      Morphine (MS CONTIN) 30 MG 12 hr tablet Take 1 tablet by mouth 2 (Two) Times a Day. 60 tablet 0    naloxone (NARCAN) 4 MG/0.1ML nasal spray Call 911. Don't prime. [place] in 1 nostril for overdose. Repeat in 2-3 minutes in other nostril if no or minimal breathing/responsiveness. 2 each 0    oxyCODONE-acetaminophen (PERCOCET)  MG per tablet Take 1 tablet by mouth Every 6 (Six) Hours As Needed for Moderate Pain for up to 6 days. 21 tablet 0    pantoprazole (PROTONIX) 40 MG EC tablet Take 1 tablet by mouth Daily.      phentermine (ADIPEX-P) 37.5 MG tablet Take 1 tablet by mouth Every Morning Before Breakfast.      traZODone (DESYREL) 100 MG tablet Take 1 tablet by mouth Every Night.      vitamin D (ERGOCALCIFEROL) 1.25 MG (74617 UT) capsule capsule Take 1 capsule by mouth 1 (One) Time Per Week.       No current facility-administered medications for this visit.     REVIEW OF SYSTEMS  CONSTITUTIONAL:  No fever, chills, night sweats or fatigue.  EYES:  No blurry vision, diplopia or other vision changes.  ENT:  No hearing loss, nosebleeds or sore throat.  CARDIOVASCULAR:  No palpitations, arrhythmia, syncopal episodes or edema.  PULMONARY:  As per the HPI above.  GASTROINTESTINAL:  No constipation or diarrhea. No abdominal pain. Intermittent, mild nausea.  GENITOURINARY:  No hematuria, kidney stones or frequent urination.  MUSCULOSKELETAL:  No joint or back pains.  INTEGUMENTARY: No rashes or pruritus.  ENDOCRINE:  No excessive thirst or hot flashes.  HEMATOLOGIC:  No history of free bleeding, spontaneous bleeding or clotting.  IMMUNOLOGIC:  No allergies or frequent  "infections.  NEUROLOGIC: No numbness, tingling, seizures or weakness.  PSYCHIATRIC:  No depression. Anxiety over his recent diagnosis with cancer, as per the HPI above.    PHYSICAL EXAMINATION  /82   Pulse 108   Temp 98 øF (36.7 øC)   Resp 18   Ht 172.7 cm (68\")   Wt 135 kg (298 lb)   SpO2 96%   BMI 45.31 kg/mý     Pain Score:  Pain Score    23 1435   PainSc: 0-No pain     PHQ-Score Total:  PHQ-9 Total Score:      ECO  GENERAL:  A well-developed, well-nourished, white male in no acute distress.  HEENT:  Pupils equally round and reactive to light.  Extraocular muscles intact.  CARDIOVASCULAR:  Regular rate and rhythm.  No murmurs, gallops or rubs.  LUNGS:  Decreased breath sounds on the right, clear on the left.  ABDOMEN:  Soft, nontender, nondistended with positive bowel sounds.  EXTREMITIES:  No clubbing, cyanosis or edema bilaterally.  SKIN:  No rashes or petechiae.  NEURO:  Cranial nerves grossly intact. No focal deficits.  PSYCH:  Alert and oriented x3.    LABORATORY    Lab Results   Component Value Date    WBC 22.31 (H) 2023    HGB 13.5 2023    HCT 43.6 2023    MCV 91.4 2023     (H) 2023    NEUTROABS 14.26 (H) 2023       Lab Results   Component Value Date     (L) 2023    K 5.2 2023    CL 95 (L) 2023    CO2 25.8 2023    BUN 26 (H) 2023    CREATININE 0.96 2023    GLUCOSE 234 (H) 2023    CALCIUM 9.2 2023    AST 14 2023    ALT 17 2023    ALKPHOS 149 (H) 2023    BILITOT 0.3 2023    PROTEINTOT 7.5 2023    ALBUMIN 2.6 (L) 2023     CBC (2023): WBCs: 22.31; HgB: 13.5; Hct: 43.6; platelets: 644    IMAGING  CT angiogram chest (2023):  Impression: Right upper lobe consolidative and masslike airspace opacity without violating the fissures. Findings are concerning for infection. However, considering the presence of an enlarged right paratracheal lymph node " measuring 4.2 cm, underlying mass cannot be ruled out.    PATHOLOGY  Lung, biopsy, right upper lobe (08/22/2023):  Moderately differentiated squamous cell carcinoma. PD-L1 TPS: < 1%.    Bronchial lavage, right upper and middle lobes (08/22/2023): Malignant. Squamous cell carcinoma.    Bronchial wash (08/22/2023): Malignant. Squamous cell carcinoma.    Bronchial brushings, right upper and middle lobes (08/22/2023): Malignant. Squamous cell carcinoma.    IMPRESSION AND PLAN  Mr. Flores is a 53 y.o., white male with:  Squamous cell lung carcinoma: Diagnosed in mid-August 2023 with, so far as is currently known, locally advanced disease in the right upper lobe. I had a long discussion with the patient and his niece in clinic today regarding this (to date) diagnosis and, in general terms, its prognosis. We discussed how, with his, at a minimum, stage IIB disease, he is not a good surgical candidate (at least at this time). However, assuming a NM PET scan (which we will obtain next week) confirms that his disease is limited to the right hemithorax, he would be an excellent candidate for definitive treatment with concomitant chemotherapy and localized irradiation. This therapy would likely improve his symptoms (of dyspnea and pain), could make his disease operable; and, even if surgery is not subsequently possible, can lead to a longterm remission (if not a classical cure). Following a long discussion today regarding the potential risks vs. benefits of this regimen, he was agreeable to proceed with concomitant, qweekly carboplatin/taxol throughout a ~seven week course of localized XRT. We will refer him to [facility] radiation oncology to begin evaluation/treatment planning for the latter. A referral was also placed to local surgery for powerport placement. We will hopefully be able to start this therapy in ~two to three weeks. We will see him back in our clinic in ~three to four weeks, on the day of the second cycle of qweekly  carbo/taxol, with a CBC and CMP for a symptom/toxicity check. quickly as possible, , stage (to date) and, in general terms, its prognosis. While a NM PET scan performed on 02/10/2021 (and summarized above) did show a couple of small foci of mild, abnormal hypermetabolism within the liver (see below), these are nonspecific; and, particularly given his young age, assuming the pending MRI of the brain is negative, I agree with radiation oncology's initial plan to give him the benefit of the doubt and treat his inoperable, but hopefully only stage III, disease aggressively with concomitant chemotherapy and radiation. He will follow up with local rad/onc to plan the latter, and we will refer him to surgery to have a powerport placed. We will hopefully be able to start concomitant chemotherapy (with weekly carboplatin and taxol) and XRT within the next few weeks. We will see him back in our clinic in ~one month, on the day of the second cycle of carbo/taxol, with a CBC and CMP for a symptom/toxicity check.  Pain: Symptoms in his right chest wall are secondary to issue #1. As he has been taking Percocet 10/325 mg q6hr consistently since he has been in the hospital, a new Rx for long-acting MSContin 30 mg q12hr scheduled for baseline control was provided today. Our clinic will also refill/adjust the Percocet once he runs out of his current supply. Continue to monitor.  Anxiety: Secondary to his (very) recent diagnosis with issue #1. A Rx for Ativan 1 mg PO TID prn was provided today. Continue to monitor.  Transportation issues: The patient and his niece report that their immediate family only has one car between them. They think they will be able to make the planned chemo/XRT schedule without too much difficulty, but we will refer them to our  for any assistance we may be able to provide.  The patient and his niece were in agreement with these plans.    It is a pleasure to participate in Mr. Flores's care. Please  do not hesitate to call with any questions or concerns that you may have.    A total of 60 minutes were spent coordinating this patient's care in clinic today; more than 50% of this time was face-to-face with the patient and his niece, reviewing his medical history, discussing the (to date) diagnosis and, in general terms, its prognosis and counseling on the current evaluation, treatment and followup plan. All questions were answered to their satisfaction.    FOLLOW UP  Rxs for MSContin 30 mg PO q12hr disp #60 and Ativan 1 mg TID prn disp #90 both provided today. Continue Percocet 10/325 mg q6hr prn and Zofran prn. NM PET scan next week. Referrals placed to local surgery re: powerport placement and [facility] radiation oncology re: concomitant, localized XRT planning for ~stage IIB, inoperable, squamous cell carcinoma of the right lung. Begin concomitant chemo/XRT (with qweekly carboplatin/taxol) in ~2-3 weeks. Return to our clinic in ~3-4 weeks, on the day of the 2nd cycle of qweekly carbo/taxol, with a CBC and CMP.            This document was electronically signed by ROMEO Rizzo MD August 24, 2023 16:32 EDT      CC: Leonardo Jackson MD

## 2023-08-24 NOTE — PROGRESS NOTES
Referring Provider: Hospitalist  Reason for Consultation: Right upper lung mass      Chief complaint -shortness of breath  Subjective-resting in bed comfortably   Has heme-onc appointment today.  Discussed with Dr. Jackson.    Review of Systems  Negative.  Reviewed        History  Past Medical History:   Diagnosis Date    Bulging of cervical intervertebral disc     COPD (chronic obstructive pulmonary disease)     GERD (gastroesophageal reflux disease)     Hypertension     Neck pain     Thyroid disease    , History reviewed. No pertinent surgical history., History reviewed. No pertinent family history.,   Social History     Tobacco Use    Smoking status: Former     Packs/day: 1.00     Years: 30.00     Pack years: 30.00     Types: Cigarettes     Start date:      Quit date: 2023     Years since quittin.3    Smokeless tobacco: Never   Vaping Use    Vaping Use: Never used   Substance Use Topics    Alcohol use: Not Currently    Drug use: Not Currently   ,   Medications Prior to Admission   Medication Sig Dispense Refill Last Dose    budesonide-formoterol (SYMBICORT) 160-4.5 MCG/ACT inhaler Inhale 2 puffs 2 (Two) Times a Day.   2023    gabapentin (NEURONTIN) 600 MG tablet Take 1 tablet by mouth 3 (Three) Times a Day.   2023    levothyroxine (SYNTHROID, LEVOTHROID) 50 MCG tablet Take 1 tablet by mouth Daily.   2023    lisinopril (PRINIVIL,ZESTRIL) 10 MG tablet Take 1 tablet by mouth Daily.   2023    montelukast (SINGULAIR) 10 MG tablet Take 1 tablet by mouth Every Night.   2023    pantoprazole (PROTONIX) 40 MG EC tablet Take 1 tablet by mouth Daily.   2023    traZODone (DESYREL) 100 MG tablet Take 1 tablet by mouth Every Night.   2023    vitamin D (ERGOCALCIFEROL) 1.25 MG (97835 UT) capsule capsule Take 1 capsule by mouth 1 (One) Time Per Week.   2023    albuterol (PROVENTIL) (2.5 MG/3ML) 0.083% nebulizer solution Take 2.5 mg by nebulization Every 6 (Six) Hours As  Needed for Wheezing.   Unknown    albuterol sulfate  (90 Base) MCG/ACT inhaler Inhale 2 puffs Every 4 (Four) Hours As Needed for Wheezing.   Unknown    HYDROcodone-acetaminophen (NORCO)  MG per tablet Take 1 tablet by mouth Every 8 (Eight) Hours As Needed for Moderate Pain.   Unknown    phentermine (ADIPEX-P) 37.5 MG tablet Take 1 tablet by mouth Every Morning Before Breakfast.   More than a month   , Scheduled Meds:  acetylcysteine, 3 mL, Nebulization, BID - RT  benzonatate, 200 mg, Oral, TID  budesonide-formoterol, 2 puff, Inhalation, BID - RT  cefdinir, 300 mg, Oral, Q12H  [START ON 8/25/2023] cholecalciferol, 50,000 Units, Oral, Weekly  doxycycline, 100 mg, Oral, Q12H  enoxaparin, 40 mg, Subcutaneous, Nightly  gabapentin, 600 mg, Oral, Q8H  ipratropium-albuterol, 3 mL, Nebulization, 4x Daily - RT  levothyroxine, 50 mcg, Oral, Daily  lisinopril, 10 mg, Oral, Daily  methylPREDNISolone sodium succinate, 20 mg, Intravenous, Q12H  montelukast, 10 mg, Oral, Nightly  pantoprazole, 40 mg, Oral, Daily  senna-docusate sodium, 2 tablet, Oral, BID  sodium chloride, 10 mL, Intravenous, Q12H  traZODone, 100 mg, Oral, Nightly    , Continuous Infusions:  Pharmacy Consult,      and Allergies:  Patient has no known allergies.    Family history-reviewed and is nonsignificant.    Objective     Vital Signs   Temp:  [97.6 øF (36.4 øC)-98.6 øF (37 øC)] 97.6 øF (36.4 øC)  Heart Rate:  [] 85  Resp:  [16-20] 18  BP: (116-158)/(58-98) 158/98    Physical Exam:             General-obese in appearance, not in any distress    HEENT-PERRLA  Neck-supple not in any respiratory distress    Respiratory-respirations normal-on auscultation no wheezing no crackles, not in any respiratory distress    Cardiovascular-NSR  GI-NTND    CNS-nonfocal    Musculoskeletal -no edema  Extremities- no obvious deformity noticed     Psychiatric-mood good, good eye contact, alert awake oriented  Skin-no visible rash                                                                   Results Review:    LABS:    Lab Results   Component Value Date    GLUCOSE 234 (H) 08/24/2023    BUN 26 (H) 08/24/2023    CREATININE 0.96 08/24/2023    EGFRIFNONA 76 12/07/2021    EGFRIFAFRI 87 01/02/2018    BCR 27.1 (H) 08/24/2023    CO2 25.8 08/24/2023    CALCIUM 9.2 08/24/2023    ALBUMIN 2.6 (L) 08/20/2023    LABIL2 1.4 (L) 04/11/2015    AST 14 08/20/2023    ALT 17 08/20/2023    WBC 22.31 (H) 08/24/2023    HGB 13.5 08/24/2023    HCT 43.6 08/24/2023    MCV 91.4 08/24/2023     (H) 08/24/2023     (L) 08/24/2023    K 5.2 08/24/2023    CL 95 (L) 08/24/2023    ANIONGAP 12.2 08/24/2023       Lab Results   Component Value Date    INR 0.97 08/22/2023    PROTIME 13.4 08/22/2023       Results from last 7 days   Lab Units 08/22/23  0131   INR  0.97            I reviewed the patient's new clinical results.  I reviewed the patient's new imaging results and agree with the interpretation.  Microbiology Results (last 10 days)       Procedure Component Value - Date/Time    AFB Culture - Lavage, Lung, Right Middle Lobe [[id number]] Collected: 08/22/23 0913    Lab Status: Preliminary result Specimen: Lavage from Lung, Right Middle Lobe Updated: 08/23/23 1611     AFB Specimen Processing Concentration     Acid Fast Smear Negative    Narrative:      Performed at:  [address] [place] [address]  [id number]  : Leonardo Nicole PhD, Phone:  [phone]    BAL Culture, Quantitative - Lavage, Lung, Right Upper Lobe [[id number]] Collected: 08/22/23 0913    Lab Status: Preliminary result Specimen: Lavage from Lung, Right Upper Lobe Updated: 08/23/23 1104     BAL Culture No growth     Gram Stain WBCs seen      No organisms seen    BAL Culture, Quantitative - Lavage, Lung, Right Middle Lobe [[id number]] Collected: 08/22/23 0913    Lab Status: Preliminary result Specimen: Lavage from Lung, Right Middle Lobe Updated: 08/23/23 1137     BAL Culture <25,000 CFU/mL The culture  consists of normal respiratory tramaine. This is a preliminary report; final report to follow.     Gram Stain WBCs seen      No organisms seen    Blood Culture - Blood, Hand, Right [[id number]]  (Normal) Collected: 08/20/23 1457    Lab Status: Preliminary result Specimen: Blood from Hand, Right Updated: 08/23/23 1531     Blood Culture No growth at 3 days    Blood Culture - Blood, Hand, Left [[id number]]  (Normal) Collected: 08/20/23 1442    Lab Status: Preliminary result Specimen: Blood from Hand, Left Updated: 08/23/23 1531     Blood Culture No growth at 3 days    S. Pneumo Ag Urine or CSF - Urine, Urine, Clean Catch [[id number]]  (Normal) Collected: 08/19/23 1836    Lab Status: Final result Specimen: Urine, Clean Catch Updated: 08/20/23 1352     Strep Pneumo Ag Negative    Blood Culture - Blood, Arm, Right [[id number]]  (Abnormal)  (Susceptibility) Collected: 08/19/23 1453    Lab Status: Final result Specimen: Blood from Arm, Right Updated: 08/24/23 0709     Blood Culture Staphylococcus epidermidis     Isolated from Aerobic and Anaerobic Bottles     Blood Culture Staphylococcus lugdunensis     Isolated from Aerobic and Anaerobic Bottles     Blood Culture Staphylococcus hominis ssp hominis     Isolated from --     Gram Stain Aerobic Bottle Gram positive cocci in clusters      Anaerobic Bottle Gram positive cocci in clusters    Susceptibility        Staphylococcus epidermidis      FELICITAS      Oxacillin Resistant      Vancomycin Susceptible                       Susceptibility        Staphylococcus lugdunensis      FELICITAS      Gentamicin Susceptible      Oxacillin Susceptible      Rifampin Susceptible      Vancomycin Susceptible                       Susceptibility        Staphylococcus hominis ssp hominis      FELICITAS      Oxacillin Resistant      Vancomycin Susceptible                       Susceptibility Comments       Staphylococcus lugdunensis    This isolate does not demonstrate inducible clindamycin resistance in vitro.                  Blood Culture - Blood, Arm, Left [[id number]]  (Abnormal) Collected: 08/19/23 1453    Lab Status: Final result Specimen: Blood from Arm, Left Updated: 08/24/23 0709     Blood Culture Staphylococcus epidermidis     Isolated from Aerobic and Anaerobic Bottles     Blood Culture Staphylococcus hominis ssp hominis     Isolated from Aerobic and Anaerobic Bottles     Blood Culture Staphylococcus lugdunensis     Isolated from Aerobic and Anaerobic Bottles     Gram Stain Aerobic Bottle Gram positive cocci in clusters      Anaerobic Bottle Gram positive cocci in clusters    Narrative:      Refer to previous blood culture collected on 08/19/2023 1453 for MICs      Blood Culture ID, PCR - Blood, Arm, Right [[id number]]  (Abnormal) Collected: 08/19/23 1453    Lab Status: Final result Specimen: Blood from Arm, Right Updated: 08/20/23 0739     BCID, PCR Staph spp, not aureus or lugdunensis. Identification by BCID2 PCR.     BCID, PCR 2 Staphylococcus lugdunensis. mecA/C (methicillin resistance gene) detected.  Identification by BCID2 PCR.     BOTTLE TYPE Aerobic Bottle    Narrative:      Infectious disease consultation is highly recommended to rule out distant foci of infection.    COVID-19 and FLU A/B PCR - Swab, Nasopharynx [[id number]]  (Normal) Collected: 08/19/23 1426    Lab Status: Final result Specimen: Swab from Nasopharynx Updated: 08/19/23 1451     COVID19 Not Detected     Influenza A PCR Not Detected     Influenza B PCR Not Detected    Narrative:      Fact sheet for providers: https://www.fda.gov/media/103492/download    Fact sheet for patients: https://www.fda.gov/media/142459/download    Test performed by PCR.              Component 2 d ago   Reference Lab [facility]  [address] [place] [address]  Phone: [phone] or [phone]  Fax: [phone]  Luis Eduardo Dawson M.D., Medical Director    PATIENT NAME                                 LABORATORY NO.  [id number]   VERONICA  KVNG AVERY                                [id number]  [id number]                                     AGE                SEX     SSN            CLIENT REF #  [facility]                          53       1970   [place]       [id number]    [id number]  [address]                                 REQUESTING M.D.       ATTENDING [place]..        [place] [address]                               JACQUELIN DANIELS  DATE COLLECTED        DATE RECEIVED          DATE REPORTED  2023    DIAGNOSIS:  A.      BRONCHIAL LAVAGE, RIGHT UPPER LOBE:  Malignant  B.      BRONCHIAL LAVAGE, RIGHT MIDDLE LOBE:  Malignant  C.      LUNG, FNA, RIGHT UPPER LOBE:  Malignant  D.      BRONCH WASH:  Malignant  E.      BRONCH BRUSH, RIGHT UPPER LOBE:  Malignant  F.      BRONCH BRUSH, RIGHT MIDDLE LOBE:  Malignant    MICROSCOPIC DESCRIPTION:  A.     Squamous cell carcinoma.  B.     Squamous cell carcinoma.  C.     Squamous cell carcinoma.  D.     Squamous cell carcinoma.  E.     Squamous cell carcinoma.  F.     Squamous cell carcinoma.    Professional interpretation rendered by Jamil Del Valle M.D., F.C.A.P. at [facility], [place], [address] [place] [address].    CLINICAL HISTORY:  Mass of right lung    SPECIMENS SUBMITTED:  A.    BRONCHIAL LAVAGE, RIGHT UPPER LOBE  B.    BRONCHIAL LAVAGE, RIGHT MIDDLE LOBE  C.    LUNG, FNA, RIGHT UPPER LOBE  D.    BRONCH WASH  E.    BRONCH BRUSH, RIGHT UPPER LOBE  F.    BRONCH BRUSH, RIGHT MIDDLE LOBE    GROSS SPECIMEN DESCRIPTION:  A.     35cc of clear colorless fluid with visible sediment in fixative  B.     40cc of clear pink fluid with visible sediment in fixative  Cell block has been examined.  C.     45cc of clear dark red fluid with visible sediment in fixative  Cell block has been examined.  D.     145cc of dark red fluid with visible sediment chunks in fixative  Cell block has been examined.  E.     1 premade slides received in  alcohol with brush present  F.     1 premade slides received in alcohol with brush present    REVIEWED, DIAGNOSED AND ELECTRONICALLY  SIGNED BY:          Assessment & Plan     Abnormal CT chest-right upper lobe mass.  Came back for squamous cell carcinoma.  Reports care with patient.  Follow-up with heme-onc on the outpatient basis.  Follow-up with pulmonary on the outpatient basis.    Postobstructive pneumonia-continue antibiotics.      COPD exacerbation-continue to taper steroids    continue as needed oxygen to maintain saturation 88 to 92%      CYNTHIA-patient gives a positive history of daytime somnolence and tiredness especially with patient's BMI of 43.98 he should be screened for sleep apnea.  Can be scheduled on the outpatient basis.  For now can be started on a CPAP or BiPAP.  CPAP settings could be 12 cm of water or BiPAP of 14 x 8.    DVT prophylaxis-as per primary team    Bedside rounds were done with RT and patient's nurse. All the lab and clinical findings were discussed with them and plan was also discussed in great detail.    Family member present-none                  Mass of right lung          Savage Mcmanus MD  08/24/23  12:14 EDT

## 2023-08-25 ENCOUNTER — TELEPHONE (OUTPATIENT)
Dept: ONCOLOGY | Facility: CLINIC | Age: 53
End: 2023-08-25
Payer: COMMERCIAL

## 2023-08-25 LAB
BACTERIA SPEC AEROBE CULT: NORMAL
BACTERIA SPEC AEROBE CULT: NORMAL

## 2023-08-25 RX ORDER — ONDANSETRON HYDROCHLORIDE 8 MG/1
8 TABLET, FILM COATED ORAL EVERY 8 HOURS PRN
Qty: 30 TABLET | Refills: 2 | Status: SHIPPED | OUTPATIENT
Start: 2023-08-25

## 2023-08-25 RX ORDER — AMOXICILLIN 250 MG
1 CAPSULE ORAL DAILY
Qty: 30 TABLET | Refills: 2 | Status: SHIPPED | OUTPATIENT
Start: 2023-08-25

## 2023-08-25 NOTE — OUTREACH NOTE
Prep Survey      Flowsheet Row Responses   [facility] facility patient discharged from? [facility]   Is LACE score < 7 ? No   Eligibility Not Eligible   What are the reasons patient is not eligible? Other  [substance abuse]   Does the patient have one of the following disease processes/diagnoses(primary or secondary)? Sepsis   Prep survey completed? Yes            Shaina GÓMEZ - Registered Nurse

## 2023-08-28 ENCOUNTER — DOCUMENTATION (OUTPATIENT)
Dept: ONCOLOGY | Facility: HOSPITAL | Age: 53
End: 2023-08-28
Payer: COMMERCIAL

## 2023-08-28 NOTE — PROGRESS NOTES
Patient is 53 y.o. 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    Patient in medical oncology clinic. Patient is alert and oriented times three. Patient has history of hypertension, COPD and tobacco abuse.    Referral to surgery regarding power port placement per provider and [facility] radiation oncology referral.     Referral  Received: 4 days ago  ROMEO Rizzo MD Taylor, Pamela F, [place] [facility]      Ambulatory Referral to ONC Social Work [[id number]]    Electronically signed by: ROMEO Rizzo MD on 08/24/23 1550 Status: Active   Ordering user: ROMEO Rizzo MD 08/24/23 1550 Ordering provider: ROMEO Rizzo MD   Authorized by: ROMEO Rizzo MD   Frequency:  08/24/23 -     Diagnoses  Squamous cell carcinoma of bronchus of right lung [C34.91]   Questionnaire    Question Answer   Follow-up needed: Yes     SS contacted patient's home and spoke with patient, brother Magno, and niece Tara [phone].     Role of oncology social worker introduced to patient, brother, and niece Tara.    Patient lives at home with brother Magno and niece Tara.    Patient doesn't utilize any home health or durable medical equipment.    Patient has two children: son Henrique, and daughter Sheeba.    SS received referral for assistance with transportation per Dr. Rizzo.    SS spoke with brother and niece about transportation needs. Brother and niece verbalize that they only have one vehicle that they share as a family, therefore limiting times for appointments. Brother, Magno, is the only family member that drives and provides transportation for family.     Niece verbalized that she works in the morning hours between 8:30 am and 9:30 am and as long as patient is scheduled after 10:30, transportation isn't an issue.    Patient to be receiving chemotherapy and radiation.    Patient lives in [facility] at [address] [place] [address]  "[id number].    Patient's primary care provider is Jose F Reynolds [phone].    Advance Care Planning:  The patient and I discussed care planning \"Conversations that Matter.\" This service was offered, free of charge, for development of advance directives with a certified ACP facilitator. The patient does not have an up-to-date advance directive. The patient is not interested in an appointment with one of our facilitators to create or update their advanced directives.    Distress Screening Follow-up    Diagnosis: Squamous cell carcinoma of bronchus of right lung diagnosed in mid August 2023    Location of Distress Screening: Medical Oncology    Financial Needs: other (see comments) (Patient has no income. SS provided patient, niece, and brother with phone number for social security office to contact to schedule appointment for patient to apply for social security disability.)  Transportation Needs: public transportation; other (see comments) (SS received referral per Dr Rizzo regarding patient having transportation limitations. Family shares one vehicle, limiting times for appointments. SS to assist as needed.)  Emotional Needs: emotional suppport/coping strategies (Time spent talking with patient, empathetic listening provided, and reassurance given.    SS offered supportive counseling services but patient declines at this time.)  Restorationist Needs: other (comments) (Sabianism not discussed this date.)  Physical Needs: dietitian; outpatient rehab (Patient scheduled with Pulmonary Clinic and surgeons office for power port evaluation. Due to transportation issues, SS contacted surgery ext [phone] per Maru. Shelli. time changed to accomadate both appts on the same day.)  Palliative Care: advance care planning (Patient doesn't have a living will or power of .)    Patient request for  to call [facility] to request price on bed wedges that he could use on his twin bed. SS contacted [facility] [phone] per Delia who states " that insurance doesn't cover anything on bed wedges. Patient states that he has appointment with Mejia Lucas on September 14 and Dr. Matthias MD on September 26 th. Patient reports that he does need hospital bed, but isn't sure if he has space for hospital bed at home. Patient to discuss with brother and will follow up with Dr. Rizzo at appointment.    Due to transportation issues,  contacted Pulmonary Clinic [phone] per Alex. Alex transferred SS to inpatient respiratory ext [phone] per Chiara. Chiara transferred  to Jarett ext.  [phone] who states that he can reschedule patient but can't change time on Thursday, August 31. SS contacted outpatient surgeons office ext [phone] per Maru. Patient scheduled at 10:00 am on Thursday, August 31. Maru was able to provide new time for Thursday to accommodate patient and family with transportation issues.  Both appointments are now on the same day, one hour apart. Patient and niece verbalize understanding and being agreeable.     SS provided patient with contact information and encouraged patient to call with any questions, concerns, or needs.     Patient verbalized understanding and is in agreement with resource assistance and plan.    SS will follow and assist as needed.      .

## 2023-08-30 ENCOUNTER — DOCUMENTATION (OUTPATIENT)
Dept: ONCOLOGY | Facility: HOSPITAL | Age: 53
End: 2023-08-30
Payer: COMMERCIAL

## 2023-08-30 DIAGNOSIS — R91.8 MASS OF RIGHT LUNG: ICD-10-CM

## 2023-08-30 RX ORDER — OXYCODONE AND ACETAMINOPHEN 10; 325 MG/1; MG/1
1 TABLET ORAL EVERY 6 HOURS PRN
Qty: 120 TABLET | Refills: 0 | Status: SHIPPED | OUTPATIENT
Start: 2023-08-30

## 2023-08-30 NOTE — TELEPHONE ENCOUNTER
Caller: Dexter Flores    Relationship: Self    Best call back number: [phone]    Requested Prescriptions:   Requested Prescriptions     Pending Prescriptions Disp Refills    oxyCODONE-acetaminophen (PERCOCET)  MG per tablet 21 tablet 0     Sig: Take 1 tablet by mouth Every 6 (Six) Hours As Needed for Moderate Pain for up to 6 days.        Pharmacy where request should be sent: [facility] [id number] [place] [id number] [facility] AT [address] [place] [address] [phone] [facility] [phone] FX     Last office visit with prescribing clinician: Visit date not found   Last telemedicine visit with prescribing clinician: Visit date not found   Next office visit with prescribing clinician: 9/26/2023     Additional details provided by patient:    WILL RUN OUT OF MEDICATION TODAY, DR JAY ADVISED TO LET HIM KNOW WHEN RUNNING OUT AND HE WOULD REFILL THIS MEDICATION.     Does the patient have less than a 3 day supply:  [x] Yes  [] No    Would you like a call back once the refill request has been completed: [x] Yes [] No    If the office needs to give you a call back, can they leave a voicemail: [] Yes [x] No

## 2023-08-30 NOTE — PROGRESS NOTES
SS completed applications for travel assistance. SS completed [facility] for [facility] and faxed.    [facility] and fax[facility].

## 2023-08-31 ENCOUNTER — HOSPITAL ENCOUNTER (OUTPATIENT)
Dept: PULMONOLOGY | Facility: HOSPITAL | Age: 53
Discharge: HOME | End: 2023-08-31
Payer: COMMERCIAL

## 2023-08-31 ENCOUNTER — HOSPITAL ENCOUNTER (OUTPATIENT)
Dept: RESPIRATORY THERAPY | Facility: HOSPITAL | Age: 53
Discharge: HOME | End: 2023-08-31
Payer: COMMERCIAL

## 2023-08-31 ENCOUNTER — OFFICE VISIT (OUTPATIENT)
Dept: SURGERY | Facility: CLINIC | Age: 53
End: 2023-08-31
Payer: COMMERCIAL

## 2023-08-31 VITALS — WEIGHT: 298 LBS | BODY MASS INDEX: 45.16 KG/M2 | HEIGHT: 68 IN

## 2023-08-31 VITALS — OXYGEN SATURATION: 97 % | HEART RATE: 106 BPM | DIASTOLIC BLOOD PRESSURE: 96 MMHG | SYSTOLIC BLOOD PRESSURE: 144 MMHG

## 2023-08-31 DIAGNOSIS — E66.9 OBESITY (BMI 30-39.9): ICD-10-CM

## 2023-08-31 DIAGNOSIS — J44.9 CHRONIC OBSTRUCTIVE PULMONARY DISEASE, UNSPECIFIED COPD TYPE: ICD-10-CM

## 2023-08-31 DIAGNOSIS — G47.33 OSA (OBSTRUCTIVE SLEEP APNEA): ICD-10-CM

## 2023-08-31 DIAGNOSIS — F17.211 CIGARETTE NICOTINE DEPENDENCE IN REMISSION: ICD-10-CM

## 2023-08-31 DIAGNOSIS — J44.9 CHRONIC OBSTRUCTIVE PULMONARY DISEASE, UNSPECIFIED COPD TYPE: Primary | ICD-10-CM

## 2023-08-31 DIAGNOSIS — G47.34 NOCTURNAL HYPOXIA: ICD-10-CM

## 2023-08-31 DIAGNOSIS — C34.91 SQUAMOUS CELL CARCINOMA OF BRONCHUS OF RIGHT LUNG: Primary | ICD-10-CM

## 2023-08-31 DIAGNOSIS — C34.91 SQUAMOUS CELL CARCINOMA OF BRONCHUS OF RIGHT LUNG: ICD-10-CM

## 2023-08-31 PROCEDURE — 1160F RVW MEDS BY RX/DR IN RCRD: CPT

## 2023-08-31 PROCEDURE — 99213 OFFICE O/P EST LOW 20 MIN: CPT

## 2023-08-31 PROCEDURE — 94010 BREATHING CAPACITY TEST: CPT

## 2023-08-31 PROCEDURE — 1159F MED LIST DOCD IN RCRD: CPT

## 2023-08-31 RX ORDER — PROMETHAZINE HYDROCHLORIDE 25 MG/1
25 TABLET ORAL EVERY 8 HOURS PRN
COMMUNITY
Start: 2023-08-28

## 2023-08-31 RX ORDER — ALBUTEROL SULFATE 90 UG/1
2 AEROSOL, METERED RESPIRATORY (INHALATION) EVERY 4 HOURS PRN
Qty: 18 G | Refills: 11 | Status: SHIPPED | OUTPATIENT
Start: 2023-08-31

## 2023-08-31 RX ORDER — FLUTICASONE PROPIONATE 110 UG/1
2 AEROSOL, METERED RESPIRATORY (INHALATION)
Qty: 12 G | Refills: 11 | Status: SHIPPED | OUTPATIENT
Start: 2023-08-31

## 2023-08-31 RX ORDER — SODIUM CHLORIDE 0.9 % (FLUSH) 0.9 %
10 SYRINGE (ML) INJECTION EVERY 12 HOURS SCHEDULED
OUTPATIENT
Start: 2023-08-31

## 2023-08-31 RX ORDER — SODIUM CHLORIDE 0.9 % (FLUSH) 0.9 %
10 SYRINGE (ML) INJECTION AS NEEDED
OUTPATIENT
Start: 2023-08-31

## 2023-08-31 RX ORDER — DOXYCYCLINE HYCLATE 100 MG
100 TABLET ORAL 2 TIMES DAILY
COMMUNITY

## 2023-08-31 RX ORDER — SODIUM CHLORIDE 9 MG/ML
40 INJECTION, SOLUTION INTRAVENOUS AS NEEDED
OUTPATIENT
Start: 2023-08-31

## 2023-08-31 RX ORDER — HYDROCHLOROTHIAZIDE 12.5 MG/1
12.5 TABLET ORAL DAILY
COMMUNITY

## 2023-08-31 NOTE — PROGRESS NOTES
COPD CLINIC Questionnaire      APPOINTMENT DATE/TIME:____23___12:44 EDT___________  NAME:___Dexter Flores  :_1970_  DX_COPD       LENGTH OF DX:years  PULMONOLOGIST:_Dr. Mcmanus  LAST OUTPATIENT PULMONARY VISIT:_none   DO YOU USE NIPPV:_No_  Device:__None  RECENT WEIGHT LOSS:   _No __  # OF PILLOWS DURING SLEEP:___2__  FLAT OR INCLINED BED:__Flat bed_  DO YOU HAVE DYSPNEA:__Yes_  DYSPNEA DURING:_During exertion_  SMOKE HX:former smoker, 30 pack years  #_OF HOSPITAL STAYS IN THE LAST YEAR DUE TO LUNG DISEASE:_1_  # OF ER VISITS IN THE LAST YEAR DUE TO SOB:__2          mMRC Dyspnea Scale        mMRC SCORE: 4         STOP BANG Screening Questionnaire       Snoring?   Do you snore loudly (loud enough to be heard through closed doors or that your bed partner elbows you for snoring at night)?     yes  Tired?   Do you often feel tired, fatigued, or sleepy during the daytime (such as falling asleep during driving or talking to someone)?     yes  Observed?   Has anyone observed you stop breathing or choking/gasping during your sleep?    yes  Pressure?   Do you have or are you being treated for high blood pressure?           yes  Body mass index (BMI) more than 35 kg/m2?       yes  Age older than 50?      yes  Neck size large (measured around Steve's apple)? Is your shirt collar 16 inches (40 cm) or larger?   yes  Gender (biologic sex): male?      yes  STOP BANG Score:____8_____          STOP BANG Interpretation     Risk category Risk factors    Low risk             Yes to 0 to 2 of the questions    Intermediate risk Yes to 3 to 4 questions    High risk  Yes to 5 to 8 questions    High risk  Yes to 2 or more of 4 STOP questions and BMI >35 kg/m2    High risk  Yes to 2 or more of 4 STOP questions and neck circumference ?16 inches (?40 cm)    High risk  Yes to 2 or more of 4 STOP questions and male gender (biologic sex)               Grade: E    Comments:

## 2023-08-31 NOTE — PROGRESS NOTES
Subjective      Chief Complaint  COPD    Subjective      History of Present Illness  Dexter Flores is a 53 y.o. 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 This visit is a initial evaluation  appointment.     COPD:  Patient was recently hospitalized from 08/19/2023 to 08/24/2023 for right upper lobe pulmonary mass, postobstructive pneumonia, and sepsis. CT chest prior to admission revealed right upper lobe masslike opacity with postobstructive pneumonia. He received empiric antibiotics. Patient underwent bronchoscopy on 08/22 which demonstrated moderately differentiated squamous cell carcinoma of the lung. He had his follow-up appointment with oncology following his hospitalization to develop a treatment plan and is scheduled to begin chemotherapy on September 14th after his port is placed on September 7th.     While patient was hospitalized it was suspected that he may have underlying sleep apnea. Patient reports occasionally waking up gasping for air and has been told that he stops breathing during his sleep. He has been told that he snores loudly. He wakes up with morning headaches frequently. He does admit to being tired/fatigued throughout the day after he wakes up. He was started on CPAP/BiPAP therapy while he was hospitalized but was not discharged home with a PAP device until a formal sleep study could be performed.     Patient reports that he gets shortness of breath mainly with exertion. He also complains of a dry cough and wheezing. He has been told that he has COPD in the past but has never had a formal PFT. He was started on Bevespi upon discharge from the hospital but developed blisters around his mouth that he thinks may be due to the new inhaler. Patient appeared drowsy during exam as he  "kept falling asleep but states that he is \"just tired.\" Discussed that he is on multiple medications (Neurontin, Morphine, Percocet, and Trazadone) that can cause sedation/drowsiness that may be contributing to his symptoms and educated patient/niece to be cautious of his symptoms if they seemed to be worsening. Advised that if symptoms worsen to use the narcan that is dispensed with his narcotics and/or seek ED evaluation. He does report a smoking history of 37 years in which he smoked 1-2 packs per day but achieved cessation 2-3 months ago after quitting cold turkey.       Current Outpatient Medications:     albuterol (PROVENTIL) (2.5 MG/3ML) 0.083% nebulizer solution, Take 2.5 mg by nebulization Every 6 (Six) Hours As Needed for Wheezing., Disp: , Rfl:     albuterol sulfate  (90 Base) MCG/ACT inhaler, Inhale 2 puffs Every 4 (Four) Hours As Needed for Wheezing., Disp: , Rfl:     doxycycline (VIBRAMYICN) 100 MG tablet, Take 1 tablet by mouth 2 (Two) Times a Day., Disp: , Rfl:     gabapentin (NEURONTIN) 600 MG tablet, Take 800 mg by mouth 3 (Three) Times a Day., Disp: , Rfl:     hydroCHLOROthiazide (HYDRODIURIL) 12.5 MG tablet, Take 1 tablet by mouth Daily., Disp: , Rfl:     levothyroxine (SYNTHROID, LEVOTHROID) 50 MCG tablet, Take 1 tablet by mouth Daily., Disp: , Rfl:     lisinopril (PRINIVIL,ZESTRIL) 10 MG tablet, Take 1 tablet by mouth Daily., Disp: , Rfl:     LORazepam (ATIVAN) 1 MG tablet, Take 1 tablet by mouth Every 8 (Eight) Hours As Needed for Anxiety., Disp: 90 tablet, Rfl: 0    Morphine (MS CONTIN) 30 MG 12 hr tablet, Take 1 tablet by mouth 2 (Two) Times a Day., Disp: 60 tablet, Rfl: 0    naloxone (NARCAN) 4 MG/0.1ML nasal spray, Call 911. Don't prime. [place] in 1 nostril for overdose. Repeat in 2-3 minutes in other nostril if no or minimal breathing/responsiveness., Disp: 2 each, Rfl: 0    ondansetron (Zofran) 8 MG tablet, Take 1 tablet by mouth Every 8 (Eight) Hours As Needed for Nausea or " "Vomiting., Disp: 30 tablet, Rfl: 2    oxyCODONE-acetaminophen (PERCOCET)  MG per tablet, Take 1 tablet by mouth Every 6 (Six) Hours As Needed for Moderate Pain., Disp: 120 tablet, Rfl: 0    pantoprazole (PROTONIX) 40 MG EC tablet, Take 1 tablet by mouth Daily., Disp: , Rfl:     promethazine (PHENERGAN) 25 MG tablet, Take 1 tablet by mouth Every 8 (Eight) Hours As Needed., Disp: , Rfl:     sennosides-docusate (senna-docusate sodium) 8.6-50 MG per tablet, Take 1 tablet by mouth Daily. (Patient taking differently: Take 1 tablet by mouth Daily. PRN), Disp: 30 tablet, Rfl: 2    traZODone (DESYREL) 100 MG tablet, Take 1 tablet by mouth Every Night., Disp: , Rfl:     vitamin D (ERGOCALCIFEROL) 1.25 MG (96504 UT) capsule capsule, Take 1 capsule by mouth 1 (One) Time Per Week., Disp: , Rfl:     albuterol sulfate  (90 Base) MCG/ACT inhaler, Inhale 2 puffs Every 4 (Four) Hours As Needed for Wheezing., Disp: 18 g, Rfl: 11    fluticasone (FLOVENT HFA) 110 MCG/ACT inhaler, Inhale 2 puffs 2 (Two) Times a Day., Disp: 12 g, Rfl: 11    montelukast (SINGULAIR) 10 MG tablet, Take 1 tablet by mouth Every Night. (Patient not taking: Reported on 8/31/2023), Disp: , Rfl:     tiotropium bromide-olodaterol (STIOLTO RESPIMAT) 2.5-2.5 MCG/ACT aerosol solution inhaler, Inhale 2 puffs Daily., Disp: 4 g, Rfl: 11      Allergies   Allergen Reactions    Bevespi Aerosphere [Glycopyrrolate-Formoterol] Other (See Comments)     Developed blisters around mouth. Not a listed adverse effect of medication so unsure if this is related to inhaler. Discontinued as precaution.       Objective     Objective   Vital Signs:  /96   Pulse 106   SpO2 97%   Estimated body mass index is 45.32 kg/mý as calculated from the following:    Height as of an earlier encounter on 8/31/23: 172.7 cm (67.99\").    Weight as of an earlier encounter on 8/31/23: 135 kg (298 lb).    Past Medical History:   Diagnosis Date    Bulging of cervical intervertebral disc  "    COPD (chronic obstructive pulmonary disease)     GERD (gastroesophageal reflux disease)     Hypertension     Neck pain     Thyroid disease      Past Surgical History:   Procedure Laterality Date    BRONCHOSCOPY Bilateral 2023    Procedure: BRONCHOSCOPY WITH ENDOBRONCHIAL ULTRASOUND;  Surgeon: Savage Mcmanus MD;  Location: [facility];  Service: Pulmonary;  Laterality: Bilateral;     Social History     Socioeconomic History    Marital status:    Tobacco Use    Smoking status: Former     Packs/day: 1.00     Years: 30.00     Pack years: 30.00     Types: Cigarettes     Start date:      Quit date: 2023     Years since quittin.3    Smokeless tobacco: Never   Vaping Use    Vaping Use: Never used   Substance and Sexual Activity    Alcohol use: Not Currently    Drug use: Not Currently    Sexual activity: Defer      Physical Exam  Constitutional:       General: He is awake.      Appearance: Normal appearance. He is morbidly obese.   HENT:      Head: Normocephalic and atraumatic.      Nose: Nose normal.      Mouth/Throat:      Mouth: Mucous membranes are moist.      Pharynx: Oropharynx is clear.   Eyes:      Conjunctiva/sclera: Conjunctivae normal.      Pupils: Pupils are equal, round, and reactive to light.   Cardiovascular:      Rate and Rhythm: Normal rate and regular rhythm.      Pulses: Normal pulses.      Heart sounds: Normal heart sounds. No murmur heard.    No friction rub. No gallop.   Pulmonary:      Effort: Pulmonary effort is normal. No tachypnea, accessory muscle usage or respiratory distress.      Breath sounds: Normal breath sounds. No decreased breath sounds, wheezing, rhonchi or rales.   Musculoskeletal:         General: Normal range of motion.      Cervical back: Full passive range of motion without pain and normal range of motion.   Skin:     General: Skin is warm and dry.   Neurological:      General: No focal deficit present.      Mental Status: He is alert. Mental status is at  "baseline.      Comments: Drowsiness noted during exam as patient would frequently fall asleep but stated that he is \"just tired.\"   Psychiatric:         Mood and Affect: Mood normal.         Behavior: Behavior normal. Behavior is cooperative.         Thought Content: Thought content normal.          Result Review :  The following labs and radiology results have been reviewed.    Lab Review:   Reviewed CT chest angiogram from 08/19/2023    Narrative & Impression   Comparison: None     Modality:  CTA chest W Contrast     Technique: CT angiography of the chest was obtained after uneventful IV  contrast injection. Coronal, axial and sagittal reformats were provided.        Findings:     No evidence of pulmonary emboli.  The pulmonary artery is normal in  diameter.     Right upper lobe consolidative masslike radiopacity seen without  dilation of the fissures.  There is also right paratracheal lymph node  measuring up to 4.2 cm.     No pneumothorax or pleural effusion.     The heart size is normal.  No pericardial effusion.     The thoracic aorta is unremarkable.     The visualized portions of the upper abdomen are within normal limits.     Soft tissue and osseous structures are unremarkable.  Left eighth and ninth rib fractures are visualized     IMPRESSION:  Impression:     Right upper lobe consolidative and masslike airspace opacity without  violating the fissures.  Findings are concerning for infection.   However, considering the presence of an enlarged right paratracheal  lymph node measuring 4.2 cm.  Underlying mass cannot be ruled out.   Short-term follow-up after appropriate treatment is recommended.     Comparison: None     Modality:  CTA chest W Contrast     Technique: CT angiography of the chest was obtained after uneventful IV  contrast injection. Coronal, axial and sagittal reformats were provided.        Findings:     No evidence of pulmonary emboli.  The pulmonary artery is normal in  diameter.     Right " upper lobe consolidative masslike radiopacity seen without  dilation of the fissures.  There is also right paratracheal lymph node  measuring up to 4.2 cm.     No pneumothorax or pleural effusion.     The heart size is normal.  No pericardial effusion.     The thoracic aorta is unremarkable.     The visualized portions of the upper abdomen are within normal limits.     Soft tissue and osseous structures are unremarkable.  Left eighth and ninth rib fractures are visualized     Impression:     Right upper lobe consolidative and masslike airspace opacity without  violating the fissures.  Findings are concerning for infection.   However, considering the presence of an enlarged right paratracheal  lymph node measuring 4.2 cm.  Underlying mass cannot be ruled out.   Short-term follow-up after appropriate treatment is recommended.     This report was finalized on 8/19/2023 4:41 PM by Tabby Hampton MD.        Reviewed spirometry performed during visit     Assessment / Plan         Assessment   Diagnoses and all orders for this visit:    1. Chronic obstructive pulmonary disease, unspecified COPD type (Primary)  Performed spirometry during visit which revealed very severe obstruction and possibly some restriction (FEV1/FVC 46%, FEV1 27%, and FVC 46%) which would classify him as stage IV COPD by GOLD classification. Will obtain full PFT to further evaluate lung volumes and DLCO.   Placed on Bevespi upon discharge from the hospital but has developed fluid-filled blisters around mouth so will discontinue (no listed adverse effect of inhaler so unsure if blisters are related to inhaler at this time but discontinuing as precaution).   Will start on maintenance therapy with Stiolto 2 puffs twice daily and Flovent  mcg 2 puffs BID (considered started Bretri but insurance required step therapy). Provided educated on correct inhalers technique. Educated to rinse mouth well after ICS use to avoid oral irritation.     -      Spirometry; Future  -     Complete PFT - Pre & Post Bronchodilator; Future  -     fluticasone (FLOVENT HFA) 110 MCG/ACT inhaler; Inhale 2 puffs 2 (Two) Times a Day.  Dispense: 12 g; Refill: 11  -     tiotropium bromide-olodaterol (STIOLTO RESPIMAT) 2.5-2.5 MCG/ACT aerosol solution inhaler; Inhale 2 puffs Daily.  Dispense: 4 g; Refill: 11  -     albuterol sulfate  (90 Base) MCG/ACT inhaler; Inhale 2 puffs Every 4 (Four) Hours As Needed for Wheezing.  Dispense: 18 g; Refill: 11    2. CYNTHIA (obstructive sleep apnea)  3. Obesity (BMI 30-39.9)  4. Nocturnal hypoxia  STOP-BANG score approximately 7 which puts patient at high risk for moderate to severe CYNTHIA.   Will order home sleep study to further evaluate for CYNTHIA.   Will order overnight sleep oximetry study to evaluate need for nocturnal oxygen.    -     Home Sleep Study; Future  -     Overnight Sleep Oximetry Study; Future    5. Squamous cell carcinoma of bronchus of right lung  Patient underwent bronchoscopy on 08/22/2023 during hospitalization in which pathology revealed moderately differentiated squamous cell carcinoma of the lung. Also treated with empiric antibiotics for treatment of postobstructive pneumonia.   Currently follows with oncology and planning to start chemotherapy on September 14th following port placement on September 7th. Determined that due to current stage patient was not a good surgical candidate and planning to treat with chemotherapy and localized irradiation.  PET/CT imaging has been ordered and scheduled to be performed on September 12th.    6. Cigarette nicotine dependence in remission  Dexter Flores  reports that he quit smoking about 4 months ago. His smoking use included cigarettes. He started smoking about 37 years ago. He has a 30.00 pack-year smoking history. He has never used smokeless tobacco..      New Medications Ordered This Visit   Medications    fluticasone (FLOVENT HFA) 110 MCG/ACT inhaler     Sig: Inhale 2 puffs 2 (Two)  Times a Day.     Dispense:  12 g     Refill:  11    tiotropium bromide-olodaterol (STIOLTO RESPIMAT) 2.5-2.5 MCG/ACT aerosol solution inhaler     Sig: Inhale 2 puffs Daily.     Dispense:  4 g     Refill:  11    albuterol sulfate  (90 Base) MCG/ACT inhaler     Sig: Inhale 2 puffs Every 4 (Four) Hours As Needed for Wheezing.     Dispense:  18 g     Refill:  11       I spent 45 minutes caring for Dexter on this date of service. This time includes time spent by me in the following activities:preparing for the visit, reviewing tests, obtaining and/or reviewing a separately obtained history, performing a medically appropriate examination and/or evaluation , counseling and educating the patient/family/caregiver, ordering medications, tests, or procedures, referring and communicating with other health care professionals , documenting information in the medical record, independently interpreting results and communicating that information with the patient/family/caregiver, and care coordination    Follow Up   Return in about 18 days (around 9/18/2023), or if symptoms worsen or fail to improve, for Next scheduled follow up.    Patient was given instructions and counseling regarding his condition or for health maintenance advice. Please see specific information pulled into the AVS if appropriate.       This document has been electronically signed by Carmen Quiroz PA-C   September 1, 2023 13:10 EDT    Dictated Utilizing Dragon Dictation: Part of this note may be an electronic transcription/translation of spoken language to printed text using the Dragon Dictation System.

## 2023-08-31 NOTE — PROGRESS NOTES
[facility]  COPD Management       Dexter Flores is a 53 y.o. male seen in the [facility] for COPD. Patient was recently discharged from the hospital and was started on Bevespi, Cefdinir, Doxycyline, Naloxone and Percocet. The patient's current COPD regimen includes: Bevespi and Pt reports good adherence to maintenance regimen for the past weeks. He reports that about 3-4 days after starting this medication, he noticed blisters popping up on his lips that felt raw. He denies any tingling/swelling of his lips/face and denies any trouble breathing. He also has albuterol inhaler and nebs available. Pt reports she is not a smoker and reports that he quit about 2-3 months ago and has not picked the habit back up.     Patient wants to use [employer] for all of his Rx's.   Bevespi- caused blisters on lips       Past Medical History:   Diagnosis Date    Bulging of cervical intervertebral disc     COPD (chronic obstructive pulmonary disease)     GERD (gastroesophageal reflux disease)     Hypertension     Neck pain     Thyroid disease      Social History     Socioeconomic History    Marital status:    Tobacco Use    Smoking status: Former     Packs/day: 1.00     Years: 30.00     Pack years: 30.00     Types: Cigarettes     Start date:      Quit date: 2023     Years since quittin.3    Smokeless tobacco: Never   Vaping Use    Vaping Use: Never used   Substance and Sexual Activity    Alcohol use: Not Currently    Drug use: Not Currently    Sexual activity: Defer     Patient has no known allergies.    Current Outpatient Medications:     albuterol (PROVENTIL) (2.5 MG/3ML) 0.083% nebulizer solution, Take 2.5 mg by nebulization Every 6 (Six) Hours As Needed for Wheezing., Disp: , Rfl:     albuterol sulfate  (90 Base) MCG/ACT inhaler, Inhale 2 puffs Every 4 (Four) Hours As Needed for Wheezing., Disp: , Rfl:     doxycycline (VIBRAMYICN) 100 MG tablet, Take 1 tablet by mouth 2  (Two) Times a Day., Disp: , Rfl:     gabapentin (NEURONTIN) 600 MG tablet, Take 1 tablet by mouth 3 (Three) Times a Day., Disp: , Rfl:     Glycopyrrolate-Formoterol (Bevespi Aerosphere) 9-4.8 MCG/ACT aerosol, Inhale 2 puffs by mouth 2 (Two) Times a Day., Disp: 10.7 g, Rfl: 0    hydroCHLOROthiazide (HYDRODIURIL) 12.5 MG tablet, Take 1 tablet by mouth Daily., Disp: , Rfl:     HYDROcodone-acetaminophen (NORCO)  MG per tablet, Take 1 tablet by mouth Every 8 (Eight) Hours As Needed for Moderate Pain., Disp: , Rfl:     levothyroxine (SYNTHROID, LEVOTHROID) 50 MCG tablet, Take 1 tablet by mouth Daily., Disp: , Rfl:     lisinopril (PRINIVIL,ZESTRIL) 10 MG tablet, Take 1 tablet by mouth Daily., Disp: , Rfl:     LORazepam (ATIVAN) 1 MG tablet, Take 1 tablet by mouth Every 8 (Eight) Hours As Needed for Anxiety., Disp: 90 tablet, Rfl: 0    montelukast (SINGULAIR) 10 MG tablet, Take 1 tablet by mouth Every Night., Disp: , Rfl:     Morphine (MS CONTIN) 30 MG 12 hr tablet, Take 1 tablet by mouth 2 (Two) Times a Day., Disp: 60 tablet, Rfl: 0    naloxone (NARCAN) 4 MG/0.1ML nasal spray, Call 911. Don't prime. [place] in 1 nostril for overdose. Repeat in 2-3 minutes in other nostril if no or minimal breathing/responsiveness., Disp: 2 each, Rfl: 0    ondansetron (Zofran) 8 MG tablet, Take 1 tablet by mouth Every 8 (Eight) Hours As Needed for Nausea or Vomiting., Disp: 30 tablet, Rfl: 2    oxyCODONE-acetaminophen (PERCOCET)  MG per tablet, Take 1 tablet by mouth Every 6 (Six) Hours As Needed for Moderate Pain., Disp: 120 tablet, Rfl: 0    pantoprazole (PROTONIX) 40 MG EC tablet, Take 1 tablet by mouth Daily., Disp: , Rfl:     phentermine (ADIPEX-P) 37.5 MG tablet, Take 1 tablet by mouth Every Morning Before Breakfast., Disp: , Rfl:     sennosides-docusate (senna-docusate sodium) 8.6-50 MG per tablet, Take 1 tablet by mouth Daily., Disp: 30 tablet, Rfl: 2    traZODone (DESYREL) 100 MG tablet, Take 1 tablet by mouth Every  Night., Disp: , Rfl:     vitamin D (ERGOCALCIFEROL) 1.25 MG (96522 UT) capsule capsule, Take 1 capsule by mouth 1 (One) Time Per Week., Disp: , Rfl:       Vaccination Status   COVID 19: never had and not interested  Influenza: never had and not interested  Pneumococcal: never had and not interested  Zoster: never had and not interested    Drug-Drug Interactions: CNS depressants- Percocet + Gabapentin + Lorazepam +Morphine    Drug-Disease Interactions (non-cardioselective beta blockers, NSAIDs): N/A    Patient Assistance: N/A    Inhaler Technique Observed? No  If yes, notes:     Treatment Goals: Risk Reduction and Symptom Control     Medication Assessment & Plan:     Due to adverse effects of Bevespi, new COPD treatments were looked into. Patient's insurance prefers Anoro, Atrovent, Combivent, Spiriva or Stiolto. Due to patient having severe GOLD stage, Carmen wanted his to have triple therapy. Patient was started on Flovent and Stiolto inhalers (both $0). I counseled the patient on these new inhalers using a demo for priming and administration. Advised Pt on the importance of continuing maintenance inhaler regimen and the importance of rinsing mouth after ICS use.      Patient denied drowsiness and patient's niece, who helps to manage his medications agreed. I counseled the patient on Narcan as well as how these CND depressing medications can work together to cause respiratory depression. Patient understood.     Thank you for allowing me to participate in the care of your patient,    Zuri Edgar [facility]  8/31/2023  13:05 EDT

## 2023-08-31 NOTE — H&P (VIEW-ONLY)
Subjective   Dexter Flores is a 53 y.o. 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  According to his oncology note patient will be starting chemo within the next 2 to 3 weeks.  He denies any past surgeries to his chest.  Denies any surgeries to his neck.  He does complain of shortness of breath at times, denies any chest pain.    The following portions of the patient's history were reviewed and updated as appropriate: allergies, current medications, past family history, past medical history, past social history, past surgical history and problem list.    Past Medical History:  Past Medical History:   Diagnosis Date    Bulging of cervical intervertebral disc     COPD (chronic obstructive pulmonary disease)     GERD (gastroesophageal reflux disease)     Hypertension     Neck pain     Thyroid disease        Social History:  Social History     Socioeconomic History    Marital status:    Tobacco Use    Smoking status: Former     Packs/day: 1.00     Years: 30.00     Pack years: 30.00     Types: Cigarettes     Start date:      Quit date: 2023     Years since quittin.3    Smokeless tobacco: Never   Vaping Use    Vaping Use: Never used   Substance and Sexual Activity    Alcohol use: Not Currently    Drug use: Not Currently    Sexual activity: Defer       Family History:  History reviewed. No pertinent family history.    Past Surgical History:  Past Surgical History:   Procedure Laterality Date    BRONCHOSCOPY Bilateral 2023    Procedure: BRONCHOSCOPY WITH ENDOBRONCHIAL ULTRASOUND;  Surgeon: Savage Mcmanus MD;  Location: [facility];  Service: Pulmonary;  Laterality: Bilateral;       Problem List:  Patient Active Problem List   Diagnosis     Squamous cell carcinoma of bronchus of right lung       Allergy:   No Known Allergies     Current Medications:   Current Outpatient Medications   Medication Sig Dispense Refill    albuterol (PROVENTIL) (2.5 MG/3ML) 0.083% nebulizer solution Take 2.5 mg by nebulization Every 6 (Six) Hours As Needed for Wheezing.      albuterol sulfate  (90 Base) MCG/ACT inhaler Inhale 2 puffs Every 4 (Four) Hours As Needed for Wheezing.      gabapentin (NEURONTIN) 600 MG tablet Take 1 tablet by mouth 3 (Three) Times a Day.      Glycopyrrolate-Formoterol (Bevespi Aerosphere) 9-4.8 MCG/ACT aerosol Inhale 2 puffs by mouth 2 (Two) Times a Day. 10.7 g 0    hydroCHLOROthiazide (HYDRODIURIL) 12.5 MG tablet Take 1 tablet by mouth Daily.      HYDROcodone-acetaminophen (NORCO)  MG per tablet Take 1 tablet by mouth Every 8 (Eight) Hours As Needed for Moderate Pain.      levothyroxine (SYNTHROID, LEVOTHROID) 50 MCG tablet Take 1 tablet by mouth Daily.      lisinopril (PRINIVIL,ZESTRIL) 10 MG tablet Take 1 tablet by mouth Daily.      LORazepam (ATIVAN) 1 MG tablet Take 1 tablet by mouth Every 8 (Eight) Hours As Needed for Anxiety. 90 tablet 0    montelukast (SINGULAIR) 10 MG tablet Take 1 tablet by mouth Every Night.      Morphine (MS CONTIN) 30 MG 12 hr tablet Take 1 tablet by mouth 2 (Two) Times a Day. 60 tablet 0    naloxone (NARCAN) 4 MG/0.1ML nasal spray Call 911. Don't prime. [place] in 1 nostril for overdose. Repeat in 2-3 minutes in other nostril if no or minimal breathing/responsiveness. 2 each 0    ondansetron (Zofran) 8 MG tablet Take 1 tablet by mouth Every 8 (Eight) Hours As Needed for Nausea or Vomiting. 30 tablet 2    oxyCODONE-acetaminophen (PERCOCET)  MG per tablet Take 1 tablet by mouth Every 6 (Six) Hours As Needed for Moderate Pain. 120 tablet 0    pantoprazole (PROTONIX) 40 MG EC tablet Take 1 tablet by mouth Daily.      phentermine (ADIPEX-P) 37.5 MG tablet Take 1 tablet by mouth Every Morning Before  Breakfast.      sennosides-docusate (senna-docusate sodium) 8.6-50 MG per tablet Take 1 tablet by mouth Daily. 30 tablet 2    traZODone (DESYREL) 100 MG tablet Take 1 tablet by mouth Every Night.      vitamin D (ERGOCALCIFEROL) 1.25 MG (59458 UT) capsule capsule Take 1 capsule by mouth 1 (One) Time Per Week.      doxycycline (VIBRAMYICN) 100 MG tablet Take 1 tablet by mouth 2 (Two) Times a Day.       No current facility-administered medications for this visit.       Review of Systems:    Review of Systems   Constitutional:  Negative for activity change.   HENT:  Negative for congestion.    Eyes:  Negative for blurred vision.   Respiratory:  Positive for shortness of breath.    Cardiovascular:  Negative for chest pain.   Gastrointestinal:  Negative for abdominal pain.   Endocrine: Negative for cold intolerance.   Genitourinary:  Negative for flank pain.   Musculoskeletal:  Negative for arthralgias.   Skin:  Negative for bruise.   Allergic/Immunologic: Negative for environmental allergies.   Neurological:  Negative for confusion.   Hematological:  Negative for adenopathy.   Psychiatric/Behavioral:  Negative for agitation.        Physical Exam:   Physical Exam  Constitutional:       Appearance: Normal appearance.   HENT:      Head: Normocephalic and atraumatic.      Right Ear: External ear normal.      Left Ear: External ear normal.   Eyes:      Conjunctiva/sclera: Conjunctivae normal.      Pupils: Pupils are equal, round, and reactive to light.   Cardiovascular:      Rate and Rhythm: Normal rate and regular rhythm.      Pulses: Normal pulses.   Pulmonary:      Effort: Pulmonary effort is normal.   Abdominal:      General: Abdomen is flat.      Palpations: Abdomen is soft.   Musculoskeletal:         General: Normal range of motion.      Cervical back: Normal range of motion and neck supple.   Skin:     General: Skin is warm and dry.      Capillary Refill: Capillary refill takes less than 2 seconds.   Neurological:       "General: No focal deficit present.      Mental Status: He is alert and oriented to person, place, and time.   Psychiatric:         Mood and Affect: Mood normal.         Behavior: Behavior normal.       Vitals:  Height 172.7 cm (67.99\"), weight 135 kg (298 lb).   Body mass index is 45.32 kg/m².       Assessment & Plan   Diagnoses and all orders for this visit:    1. Squamous cell carcinoma of bronchus of right lung (Primary)  -     Case Request; Standing  -     sodium chloride 0.9 % flush 10 mL  -     sodium chloride 0.9 % flush 10 mL  -     sodium chloride 0.9 % infusion 40 mL  -     ceFAZolin 2000 mg IVPB in 100 mL NS (VTB)  -     Case Request    Other orders  -     Follow Anesthesia Guidelines / Protocol; Future  -     Follow Anesthesia Guidelines / Protocol; Standing  -     Verify / Perform Chlorhexidine Skin Prep; Standing  -     Verify / Perform Chlorhexidine Skin Prep if Indicated (If Not Already Completed); Standing  -     Obtain Informed Consent; Future  -     Provide NPO Instructions to Patient; Future  -     Chlorhexidine Skin Prep; Future  -     Insert Peripheral IV; Standing  -     Saline Lock & Maintain IV Access; Eda Renteria is a 53-year-old male who presents for port placement.  He will undergo port placement Dr. Bass.  Verbalized understanding of prep instructions and procedure wishes to proceed.    Visit Diagnoses:    ICD-10-CM ICD-9-CM   1. Squamous cell carcinoma of bronchus of right lung  C34.91 162.9         MEDS ORDERED DURING VISIT:  No orders of the defined types were placed in this encounter.      No follow-ups on file.             This document has been electronically signed by ABHIJEET Parikh  August 31, 2023 12:04 EDT    Please note that portions of this note were completed with a voice recognition program.   "

## 2023-08-31 NOTE — PROGRESS NOTES
"    NAME:___Dexter Flores  :_1970_  APPOINTMENT DATE/TIME:___________23___16:34 EDT___________    COPD Education Evaluation            COPD Education Completed (See Note) Yes     COPD Clinic encounter: 1st    Referring/consulting Provider: Dr. Jackson     Reason for Consultation:  COPD Exacerbation   COPD Diagnosis Length \"Years\" per patient     Current Outpatient Pulmonologist     DERRICK Bagley, Pulmonology           Subjective            PFT History      Spirometry Completed YES     Fev1 27%     Fev1/FVC 46%     GOLD Stage 4           Classification of Airflow Limitation Severity in COPD (Based on Post-Bronchodilator FEV1)           Gold 1: Mild FEV1 ? 80% predicted      Gold 2:  Moderate 50% ? FEV1 < 80% predicted   Gold 3: Severe 30% ? FEV1 < 50% predicted   Gold 4: Very Severe FEV1 < 30% predicted            Dyspnea:        Do you have Dyspnea? Yes, during exertion       DME Equipment Used:              Home O2? NO     Home O2 device? N/a     Nebulizer YES     NIPPV None      Objective:    Home Medications:  (Not in a hospital admission)      Barriers to Learning? No    Discussed:             COPD education given via the booklet \"A Patient's Guide to COPD\".     COPD Zones: (Green/yellow/Red): YES     Exacerbation or Flare up signs and symptoms: YES       COPD Medication Non-Compliance YES       Managing Medications: YES     Patient understands use of Rescue Medications: YES     Type of Rescue inhaler patient currently has: Albuterol   Patient understands use of Maintenance Medications: YES     Type of Maintenance inhaler patient currently has: LABA/LAMA    Proper MDI technique (w/wo Spacer): YES     Provided patient a Spacer: NO       Smoking Cessation information offered to patient: NO     Nicotine Replacement Therapy Discussed NO     Breathing Techniques:            Purse-lipped breathing YES           Action Plan            Influenza or Pneumonia Vaccine ordered: Influenza,Pneumonia,Both  "    Aerobika for sputum mobilization: NO     Rescue Inhaler ordered: NO     COPD Maintenance Inhaler ordered: YES and Flovent and Stiolto     COPD/Lung Health Clinic follow up scheduled: YES and 9/18/23 at 12:30     Education Minutes with patient: YES and 20         Comments: Spirometry completed with Mr. Flores. His Fev1 was 27% and FVC 46% indicating restrictive and obstructive lung disesase. I have ordered a Full PFT, pre and post, after consulting with DERRICK Morales. On his next visit we will do a 6MWT. Carmen stated she would be ordering a overnight sleep study and overnight pulse ox for Mr. Flores to test for CYNTHIA and desaturation during sleep. During his last admission to the hospital, Mr. Flores was sent home with Bevespi for COPD maintenance but he stated the inhaler is causing blisters in his mouth. He has visible blisters at this time. He will be switched to Stiolto and Flovent.    Thank you for allowing me to be apart of his care in the COPD Clinic.      ANNAMARIA Pike, RRT  COPD Navigator

## 2023-08-31 NOTE — PROGRESS NOTES
Subjective   Dexter Flores is a 53 y.o. 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  According to his oncology note patient will be starting chemo within the next 2 to 3 weeks.  He denies any past surgeries to his chest.  Denies any surgeries to his neck.  He does complain of shortness of breath at times, denies any chest pain.    The following portions of the patient's history were reviewed and updated as appropriate: allergies, current medications, past family history, past medical history, past social history, past surgical history and problem list.    Past Medical History:  Past Medical History:   Diagnosis Date    Bulging of cervical intervertebral disc     COPD (chronic obstructive pulmonary disease)     GERD (gastroesophageal reflux disease)     Hypertension     Neck pain     Thyroid disease        Social History:  Social History     Socioeconomic History    Marital status:    Tobacco Use    Smoking status: Former     Packs/day: 1.00     Years: 30.00     Pack years: 30.00     Types: Cigarettes     Start date:      Quit date: 2023     Years since quittin.3    Smokeless tobacco: Never   Vaping Use    Vaping Use: Never used   Substance and Sexual Activity    Alcohol use: Not Currently    Drug use: Not Currently    Sexual activity: Defer       Family History:  History reviewed. No pertinent family history.    Past Surgical History:  Past Surgical History:   Procedure Laterality Date    BRONCHOSCOPY Bilateral 2023    Procedure: BRONCHOSCOPY WITH ENDOBRONCHIAL ULTRASOUND;  Surgeon: Savage Mcmanus MD;  [facility];  Service: Pulmonary;  Laterality: Bilateral;       Problem List:  Patient Active Problem List   Diagnosis     Squamous cell carcinoma of bronchus of right lung       Allergy:   No Known Allergies     Current Medications:   Current Outpatient Medications   Medication Sig Dispense Refill    albuterol (PROVENTIL) (2.5 MG/3ML) 0.083% nebulizer solution Take 2.5 mg by nebulization Every 6 (Six) Hours As Needed for Wheezing.      albuterol sulfate  (90 Base) MCG/ACT inhaler Inhale 2 puffs Every 4 (Four) Hours As Needed for Wheezing.      gabapentin (NEURONTIN) 600 MG tablet Take 1 tablet by mouth 3 (Three) Times a Day.      Glycopyrrolate-Formoterol (Bevespi Aerosphere) 9-4.8 MCG/ACT aerosol Inhale 2 puffs by mouth 2 (Two) Times a Day. 10.7 g 0    hydroCHLOROthiazide (HYDRODIURIL) 12.5 MG tablet Take 1 tablet by mouth Daily.      HYDROcodone-acetaminophen (NORCO)  MG per tablet Take 1 tablet by mouth Every 8 (Eight) Hours As Needed for Moderate Pain.      levothyroxine (SYNTHROID, LEVOTHROID) 50 MCG tablet Take 1 tablet by mouth Daily.      lisinopril (PRINIVIL,ZESTRIL) 10 MG tablet Take 1 tablet by mouth Daily.      LORazepam (ATIVAN) 1 MG tablet Take 1 tablet by mouth Every 8 (Eight) Hours As Needed for Anxiety. 90 tablet 0    montelukast (SINGULAIR) 10 MG tablet Take 1 tablet by mouth Every Night.      Morphine (MS CONTIN) 30 MG 12 hr tablet Take 1 tablet by mouth 2 (Two) Times a Day. 60 tablet 0    naloxone (NARCAN) 4 MG/0.1ML nasal spray Call 911. Don't prime. [place] in 1 nostril for overdose. Repeat in 2-3 minutes in other nostril if no or minimal breathing/responsiveness. 2 each 0    ondansetron (Zofran) 8 MG tablet Take 1 tablet by mouth Every 8 (Eight) Hours As Needed for Nausea or Vomiting. 30 tablet 2    oxyCODONE-acetaminophen (PERCOCET)  MG per tablet Take 1 tablet by mouth Every 6 (Six) Hours As Needed for Moderate Pain. 120 tablet 0    pantoprazole (PROTONIX) 40 MG EC tablet Take 1 tablet by mouth Daily.      phentermine (ADIPEX-P) 37.5 MG tablet Take 1 tablet by mouth Every Morning Before  Breakfast.      sennosides-docusate (senna-docusate sodium) 8.6-50 MG per tablet Take 1 tablet by mouth Daily. 30 tablet 2    traZODone (DESYREL) 100 MG tablet Take 1 tablet by mouth Every Night.      vitamin D (ERGOCALCIFEROL) 1.25 MG (40177 UT) capsule capsule Take 1 capsule by mouth 1 (One) Time Per Week.      doxycycline (VIBRAMYICN) 100 MG tablet Take 1 tablet by mouth 2 (Two) Times a Day.       No current facility-administered medications for this visit.       Review of Systems:    Review of Systems   Constitutional:  Negative for activity change.   HENT:  Negative for congestion.    Eyes:  Negative for blurred vision.   Respiratory:  Positive for shortness of breath.    Cardiovascular:  Negative for chest pain.   Gastrointestinal:  Negative for abdominal pain.   Endocrine: Negative for cold intolerance.   Genitourinary:  Negative for flank pain.   Musculoskeletal:  Negative for arthralgias.   Skin:  Negative for bruise.   Allergic/Immunologic: Negative for environmental allergies.   Neurological:  Negative for confusion.   Hematological:  Negative for adenopathy.   Psychiatric/Behavioral:  Negative for agitation.        Physical Exam:   Physical Exam  Constitutional:       Appearance: Normal appearance.   HENT:      Head: Normocephalic and atraumatic.      Right Ear: External ear normal.      Left Ear: External ear normal.   Eyes:      Conjunctiva/sclera: Conjunctivae normal.      Pupils: Pupils are equal, round, and reactive to light.   Cardiovascular:      Rate and Rhythm: Normal rate and regular rhythm.      Pulses: Normal pulses.   Pulmonary:      Effort: Pulmonary effort is normal.   Abdominal:      General: Abdomen is flat.      Palpations: Abdomen is soft.   Musculoskeletal:         General: Normal range of motion.      Cervical back: Normal range of motion and neck supple.   Skin:     General: Skin is warm and dry.      Capillary Refill: Capillary refill takes less than 2 seconds.   Neurological:       "General: No focal deficit present.      Mental Status: He is alert and oriented to person, place, and time.   Psychiatric:         Mood and Affect: Mood normal.         Behavior: Behavior normal.       Vitals:  Height 172.7 cm (67.99\"), weight 135 kg (298 lb).   Body mass index is 45.32 kg/mý.       Assessment & Plan   Diagnoses and all orders for this visit:    1. Squamous cell carcinoma of bronchus of right lung (Primary)  -     Case Request; Standing  -     sodium chloride 0.9 % flush 10 mL  -     sodium chloride 0.9 % flush 10 mL  -     sodium chloride 0.9 % infusion 40 mL  -     ceFAZolin 2000 mg IVPB in 100 mL NS (VTB)  -     Case Request    Other orders  -     Follow Anesthesia Guidelines / Protocol; Future  -     Follow Anesthesia Guidelines / Protocol; Standing  -     Verify / Perform Chlorhexidine Skin Prep; Standing  -     Verify / Perform Chlorhexidine Skin Prep if Indicated (If Not Already Completed); Standing  -     Obtain Informed Consent; Future  -     Provide NPO Instructions to Patient; Future  -     Chlorhexidine Skin Prep; Future  -     Insert Peripheral IV; Standing  -     Saline Lock & Maintain IV Access; Eda Renteria is a 53-year-old male who presents for port placement.  He will undergo port placement Dr. Bass.  Verbalized understanding of prep instructions and procedure wishes to proceed.    Visit Diagnoses:    ICD-10-CM ICD-9-CM   1. Squamous cell carcinoma of bronchus of right lung  C34.91 162.9         MEDS ORDERED DURING VISIT:  No orders of the defined types were placed in this encounter.      No follow-ups on file.             This document has been electronically signed by ABHIJEET Parikh  August 31, 2023 12:04 EDT    Please note that portions of this note were completed with a voice recognition program.   "

## 2023-09-01 ENCOUNTER — DOCUMENTATION (OUTPATIENT)
Dept: SURGERY | Facility: CLINIC | Age: 53
End: 2023-09-01
Payer: COMMERCIAL

## 2023-09-01 LAB
FUNGUS SPEC CULT: ABNORMAL
FUNGUS SPEC CULT: NORMAL
FUNGUS SPEC FUNGUS STN: ABNORMAL
FUNGUS SPEC FUNGUS STN: NORMAL

## 2023-09-01 NOTE — PROGRESS NOTES
PAT: 9/5/23 AT 3:15pm    SX: 9/7/23 at 8am    NPO AFTER MIDNIGHT     MUST HAVE      PATIENT IS AWARE       DJL

## 2023-09-05 ENCOUNTER — OFFICE VISIT (OUTPATIENT)
Dept: ONCOLOGY | Facility: CLINIC | Age: 53
End: 2023-09-05
Payer: COMMERCIAL

## 2023-09-05 VITALS
DIASTOLIC BLOOD PRESSURE: 77 MMHG | RESPIRATION RATE: 18 BRPM | BODY MASS INDEX: 45.19 KG/M2 | SYSTOLIC BLOOD PRESSURE: 119 MMHG | HEIGHT: 68 IN | TEMPERATURE: 97.1 F | HEART RATE: 98 BPM | OXYGEN SATURATION: 97 % | WEIGHT: 298.2 LBS

## 2023-09-05 DIAGNOSIS — C34.91 SQUAMOUS CELL CARCINOMA OF BRONCHUS OF RIGHT LUNG: Primary | ICD-10-CM

## 2023-09-05 RX ORDER — PROCHLORPERAZINE MALEATE 10 MG
10 TABLET ORAL EVERY 6 HOURS PRN
Qty: 60 TABLET | Refills: 1 | Status: SHIPPED | OUTPATIENT
Start: 2023-09-05

## 2023-09-05 RX ORDER — LIDOCAINE AND PRILOCAINE 25; 25 MG/G; MG/G
1 CREAM TOPICAL
Qty: 30 G | Refills: 1 | Status: SHIPPED | OUTPATIENT
Start: 2023-09-05

## 2023-09-05 NOTE — PROGRESS NOTES
CHEMOTHERAPY PREPARATION    Dexter Flores  [id number]  1970    Chief Complaint: chemo education     History of present illness:  Dexter Flores is a 53 y.o. year old male who is here today for chemotherapy preparation and needs assessment. The patient has been diagnosed with squamous cell lung cancer and is scheduled to begin treatment with concomitant Taxol/Carboplatin and XRT. At present, he is doing well and denies any specific complaints.    Oncology History:    Oncology/Hematology History   Squamous cell carcinoma of bronchus of right lung   8/19/2023 Initial Diagnosis    Squamous cell carcinoma of bronchus of right lung     9/6/2023 -  Chemotherapy    OP LUNG PACLitaxel / CARBOplatin AUC=2 (Weekly) + XRT            Past Medical History:   Diagnosis Date    Bulging of cervical intervertebral disc     Cancer     lung    COPD (chronic obstructive pulmonary disease)     GERD (gastroesophageal reflux disease)     History of transfusion     Hypertension     Neck pain     Thyroid disease        Past Surgical History:   Procedure Laterality Date    ABDOMINAL HERNIA REPAIR      BRONCHOSCOPY Bilateral 08/22/2023    Procedure: BRONCHOSCOPY WITH ENDOBRONCHIAL ULTRASOUND;  Surgeon: Savage Mcmanus MD;  Location: [facility];  Service: Pulmonary;  Laterality: Bilateral;    TONSILLECTOMY      TOTAL HIP ARTHROPLASTY Bilateral        History reviewed. No pertinent family history.    Medications: The current medication list was reviewed and reconciled.     Allergies:  is allergic to bevespi aerosphere [glycopyrrolate-formoterol].    Review of Systems:  A comprehensive 14 point review of systems was performed. Significant findings as mentioned above. All other systems reviewed and are negative.        Physical Exam:    Vitals:    09/05/23 1433   BP: 119/77   Pulse: 98   Resp: 18   Temp: 97.1 °F (36.2 °C)   SpO2: 97%     Vitals:    09/05/23 1433   PainSc:   8   PainLoc: Chest         ECOG: (1) Restricted in Physically Strenuous  "Activity, Ambulatory & Able to Do Work of Light Nature  General: Well developed, well nourished. In no acute distress.  HEENT: Pupils equally round and reactive to light. Extraocular muscles intact.  Cardiovascular: Regular rate and rhythm. No murmurs, rubs or gallops.  Lungs: Clear to auscultation bilaterally.  Abdomen: Soft, nontender, nondistended. Normoactive bowel sounds x 4 quadrants.  Extremities: No clubbing, cyanosis or edema bilaterally.  Skin: Warm, dry, intact.  Neuro: Grossly non-focal exam.  Psych: Alert and oriented x 3.             NEEDS ASSESSMENTS    Genetics  The patient's new diagnosis and family history have been reviewed for genetic counseling needs. A genetic referral is not recommended.     Barriers to care  A barriers form was also completed by the patient today. We discussed services offered by our facility to help him have adequate access to care. The patient was given the name and card for our Oncology Social Worker, Eveline Lawton. Based upon barriers assessment today, the patient will not require a follow-up call from the  to further discuss needs.       VAD Assessment  The patient and I discussed planned intervenous chemotherapy as well as other IV treatments that are often needed throughout the course of treatment. These may include, but are not limited to blood transfusions, antibiotics, and IV hydration. The vasculature does not appear to be adequate for multiple peripheral IVs throughout their treatment course. Discussed risks and benefits of VADs. The patient would like to pursue Port-A-Cath insertion prior to initiation of treatment. Port-A-Cath placement is planned with Dr. Hewitt on 09/07/2023.    Advanced Care Planning  The patient and I discussed advanced care planning, \"Conversations that Matter\".   This service was offered, free of charge, for development of advance directives with a certified ACP facilitator.  The patient does not have an up-to-date advanced " directive. This document is not on file with our office. The patient is not interested in an appointment with one of our facilitators to create or update their advanced directives.      Palliative Care  The patient and I discussed palliative care services. Palliative care is not the same as Hospice care. This is specialized medical care for people living with serious illness with the goal of improving quality of life for the patient and their family. [facility] has partnered with [facility] to offer our patients outpatient palliative care early along with their treatment to assist in coordination of care, symptom management, pain management, and medical decision making.  Oncology criteria for palliative care referral is not met at this time. The patient is not interested in a palliative care consultation.     Additional Referral needs  none      CHEMOTHERAPY EDUCATION    Booklets Given: Chemotherapy and You [x]  Eating Hints [x]    Sexuality/Fertility Books []      Chemotherapy/Biotherapy Education Sheets: (list all that apply)  nausea management, acid reflux management, diarrhea management, Cancer resourse contacts information, skin and mouth care, and vaccination information                                                                                                                                                                 Chemotherapy Regimen:   Treatment Plans       Name Type Plan Dates Plan Provider         Active    OP LUNG PACLitaxel / CARBOplatin AUC=2 (Weekly) + XRT  ONCOLOGY TREATMENT  9/5/2023 - Present T Villa Rizzo MD                      TOPICS EDUCATION PROVIDED COMMENTS   ANEMIA:  role of RBC, cause, s/s, ways to manage, role of transfusion [x]    THROMBOCYTOPENIA:  role of platelet, cause, s/s, ways to prevent bleeding, things to avoid, when to seek help [x]    NEUTROPENIA:  role of WBC, cause, infection precautions, s/s of infection, when to call MD [x]    NUTRITION &  APPETITE CHANGES:  importance of maintaining healthy diet & weight, ways to manage to improve intake, dietary consult, exercise regimen [x]    DIARRHEA:  causes, s/s of dehydration, ways to manage, dietary changes, when to call MD [x]    CONSTIPATION:  causes, ways to manage, dietary changes, when to call MD [x]    NAUSEA & VOMITING:  cause, use of antiemetics, dietary changes, when to call MD [x]    MOUTH SORES:  causes, oral care, ways to manage [x]    ALOPECIA:  cause, ways to manage, resources [x]    INFERTILITY & SEXUALITY:  causes, fertility preservation options, sexuality changes, ways to manage, importance of birth control [x]    NERVOUS SYSTEM CHANGES:  causes, s/s, neuropathies, cognitive changes, ways to manage [x]    PAIN:  causes, ways to manage [x]    SKIN & NAIL CHANGES:  cause, s/s, ways to manage [x]    ORGAN TOXICITIES:  cause, s/s, need for diagnostic tests, labs, when to notify MD [x]    SURVIVORSHIP:  distress, distress assessment, secondary malignancies, early/late effects, follow-up, social issues, social support [x]    HOME CARE:  use of spill kits, storing of PO chemo, how to manage bodily fluids [x]    MISCELLANEOUS:  drug interactions, administration, vesicant, et [x]        Assessment and Plan:    Diagnoses and all orders for this visit:    1. Squamous cell carcinoma of bronchus of right lung (Primary)    Other orders  -     prochlorperazine (COMPAZINE) 10 MG tablet; Take 1 tablet by mouth Every 6 (Six) Hours As Needed for Nausea or Vomiting.  Dispense: 60 tablet; Refill: 1  -     lidocaine-prilocaine (EMLA) 2.5-2.5 % cream; Apply 1 application  topically to the appropriate area as directed Every 2 (Two) Hours As Needed for Mild Pain. Apply to Port-A-Cath site 30 minutes prior to arrival at infusion clinic.  Dispense: 30 g; Refill: 1        The patient and I have reviewed their new cancer diagnosis and scheduled treatment plan. Needs assessment was completed including genetics, barriers  to care, VAD evaluation, advanced care planning, and palliative care services. Referrals have been ordered as appropriate based upon our evaluation and patient desires.     Chemotherapy teaching was also completed today as documented above. Adequate time was given to answer all questions to his satisfaction. Patient and family are aware of their care team members and contact information if they have questions or problems throughout the treatment course. Needs assessments and education has been completed. The patient is adequately prepared to begin treatment as scheduled.     Reviewed with patient education regarding EMLA cream, Compazine and Zofran prescriptions sent to pharmacy.       I advised the patient that he can take Tylenol or Ibuprofen as needed for aches/pains related to cancer/treatment. I also advised patient he could use Senakot or Miralax as needed for constipation or Imodium as needed for diarrhea.       I reviewed with the patient the care team members. I also reviewed the option of the acute care visits provided through our oncology office for evaluation and management of symptoms related to treatment. Patient was provided with phone number to call during regular office hours [phone] press #1 and for treatment related questions call [phone] to speak to a nurse. If after hours or on the weekend call [phone] and ask to page the MD on call for [facility] Oncology services.         I spent 60 minutes with Dexter Flores today.  In the office today, more than 50% of this time was spent face-to-face with him  in counseling / coordination of care, reviewing his interim medical history and counseling on the current treatment plan.  All questions were answered to his satisfaction.           Electronically Signed by: ABHIJEET Hidalgo , September 6, 2023 08:21 EDT

## 2023-09-06 ENCOUNTER — DOCUMENTATION (OUTPATIENT)
Dept: PULMONOLOGY | Facility: CLINIC | Age: 53
End: 2023-09-06
Payer: COMMERCIAL

## 2023-09-06 NOTE — PROGRESS NOTES
Received message that patient declined his sleep study. Will discuss at follow-up appointment on September 18th.

## 2023-09-07 ENCOUNTER — HOSPITAL ENCOUNTER (OUTPATIENT)
Facility: HOSPITAL | Age: 53
Setting detail: HOSPITAL OUTPATIENT SURGERY
Discharge: HOME | End: 2023-09-07
Attending: SURGERY | Admitting: SURGERY
Payer: COMMERCIAL

## 2023-09-07 ENCOUNTER — ANESTHESIA (OUTPATIENT)
Dept: PERIOP | Facility: HOSPITAL | Age: 53
End: 2023-09-07
Payer: COMMERCIAL

## 2023-09-07 ENCOUNTER — APPOINTMENT (OUTPATIENT)
Dept: GENERAL RADIOLOGY | Facility: HOSPITAL | Age: 53
End: 2023-09-07
Payer: COMMERCIAL

## 2023-09-07 ENCOUNTER — ANESTHESIA EVENT (OUTPATIENT)
Dept: PERIOP | Facility: HOSPITAL | Age: 53
End: 2023-09-07
Payer: COMMERCIAL

## 2023-09-07 VITALS
WEIGHT: 295.2 LBS | DIASTOLIC BLOOD PRESSURE: 74 MMHG | HEIGHT: 67 IN | RESPIRATION RATE: 17 BRPM | HEART RATE: 80 BPM | SYSTOLIC BLOOD PRESSURE: 120 MMHG | TEMPERATURE: 97.8 F | BODY MASS INDEX: 46.33 KG/M2 | OXYGEN SATURATION: 98 %

## 2023-09-07 DIAGNOSIS — C34.91 SQUAMOUS CELL CARCINOMA OF BRONCHUS OF RIGHT LUNG: ICD-10-CM

## 2023-09-07 PROCEDURE — 36561 INSERT TUNNELED CV CATH: CPT | Performed by: SURGERY

## 2023-09-07 PROCEDURE — 25010000002 ONDANSETRON PER 1 MG: Performed by: ANESTHESIOLOGY

## 2023-09-07 PROCEDURE — 71045 X-RAY EXAM CHEST 1 VIEW: CPT

## 2023-09-07 PROCEDURE — 25010000002 ONDANSETRON PER 1 MG: Performed by: NURSE ANESTHETIST, CERTIFIED REGISTERED

## 2023-09-07 PROCEDURE — 76000 FLUOROSCOPY <1 HR PHYS/QHP: CPT

## 2023-09-07 PROCEDURE — 25010000002 CEFAZOLIN PER 500 MG

## 2023-09-07 PROCEDURE — 25010000002 FENTANYL CITRATE (PF) 50 MCG/ML SOLUTION: Performed by: NURSE ANESTHETIST, CERTIFIED REGISTERED

## 2023-09-07 PROCEDURE — C1751 CATH, INF, PER/CENT/MIDLINE: HCPCS | Performed by: SURGERY

## 2023-09-07 PROCEDURE — 77001 FLUOROGUIDE FOR VEIN DEVICE: CPT | Performed by: SURGERY

## 2023-09-07 PROCEDURE — 25010000002 MIDAZOLAM PER 1 MG: Performed by: NURSE ANESTHETIST, CERTIFIED REGISTERED

## 2023-09-07 PROCEDURE — 0 LIDOCAINE 1 % SOLUTION: Performed by: SURGERY

## 2023-09-07 PROCEDURE — 25010000002 PROPOFOL 200 MG/20ML EMULSION: Performed by: NURSE ANESTHETIST, CERTIFIED REGISTERED

## 2023-09-07 PROCEDURE — 94799 UNLISTED PULMONARY SVC/PX: CPT

## 2023-09-07 PROCEDURE — 94640 AIRWAY INHALATION TREATMENT: CPT

## 2023-09-07 PROCEDURE — 76937 US GUIDE VASCULAR ACCESS: CPT | Performed by: SURGERY

## 2023-09-07 PROCEDURE — 25010000002 HEPARIN LOCK FLUSH PER 10 UNITS: Performed by: SURGERY

## 2023-09-07 DEVICE — PRT INTRO INJ/PWR XCELA FILL/HL ATT/POLYURET/CATH 8F: Type: IMPLANTABLE DEVICE | Site: SUBCLAVIAN | Status: FUNCTIONAL

## 2023-09-07 RX ORDER — MAGNESIUM HYDROXIDE 1200 MG/15ML
LIQUID ORAL AS NEEDED
Status: DISCONTINUED | OUTPATIENT
Start: 2023-09-07 | End: 2023-09-07 | Stop reason: HOSPADM

## 2023-09-07 RX ORDER — SODIUM CHLORIDE 9 MG/ML
40 INJECTION, SOLUTION INTRAVENOUS AS NEEDED
Status: DISCONTINUED | OUTPATIENT
Start: 2023-09-07 | End: 2023-09-07 | Stop reason: HOSPADM

## 2023-09-07 RX ORDER — SODIUM CHLORIDE 0.9 % (FLUSH) 0.9 %
10 SYRINGE (ML) INJECTION AS NEEDED
Status: DISCONTINUED | OUTPATIENT
Start: 2023-09-07 | End: 2023-09-07 | Stop reason: HOSPADM

## 2023-09-07 RX ORDER — LIDOCAINE HYDROCHLORIDE 10 MG/ML
INJECTION, SOLUTION INFILTRATION; PERINEURAL AS NEEDED
Status: DISCONTINUED | OUTPATIENT
Start: 2023-09-07 | End: 2023-09-07 | Stop reason: HOSPADM

## 2023-09-07 RX ORDER — MEPERIDINE HYDROCHLORIDE 25 MG/ML
12.5 INJECTION INTRAMUSCULAR; INTRAVENOUS; SUBCUTANEOUS
Status: DISCONTINUED | OUTPATIENT
Start: 2023-09-07 | End: 2023-09-07 | Stop reason: HOSPADM

## 2023-09-07 RX ORDER — IPRATROPIUM BROMIDE AND ALBUTEROL SULFATE 2.5; .5 MG/3ML; MG/3ML
3 SOLUTION RESPIRATORY (INHALATION) ONCE AS NEEDED
Status: DISCONTINUED | OUTPATIENT
Start: 2023-09-07 | End: 2023-09-07 | Stop reason: HOSPADM

## 2023-09-07 RX ORDER — IPRATROPIUM BROMIDE AND ALBUTEROL SULFATE 2.5; .5 MG/3ML; MG/3ML
3 SOLUTION RESPIRATORY (INHALATION) ONCE
Status: COMPLETED | OUTPATIENT
Start: 2023-09-07 | End: 2023-09-07

## 2023-09-07 RX ORDER — HEPARIN SODIUM (PORCINE) LOCK FLUSH IV SOLN 100 UNIT/ML 100 UNIT/ML
SOLUTION INTRAVENOUS AS NEEDED
Status: DISCONTINUED | OUTPATIENT
Start: 2023-09-07 | End: 2023-09-07 | Stop reason: HOSPADM

## 2023-09-07 RX ORDER — FAMOTIDINE 10 MG/ML
INJECTION, SOLUTION INTRAVENOUS AS NEEDED
Status: DISCONTINUED | OUTPATIENT
Start: 2023-09-07 | End: 2023-09-07 | Stop reason: SURG

## 2023-09-07 RX ORDER — LIDOCAINE HYDROCHLORIDE 20 MG/ML
INJECTION, SOLUTION EPIDURAL; INFILTRATION; INTRACAUDAL; PERINEURAL AS NEEDED
Status: DISCONTINUED | OUTPATIENT
Start: 2023-09-07 | End: 2023-09-07 | Stop reason: SURG

## 2023-09-07 RX ORDER — SODIUM CHLORIDE, SODIUM LACTATE, POTASSIUM CHLORIDE, CALCIUM CHLORIDE 600; 310; 30; 20 MG/100ML; MG/100ML; MG/100ML; MG/100ML
100 INJECTION, SOLUTION INTRAVENOUS ONCE AS NEEDED
Status: DISCONTINUED | OUTPATIENT
Start: 2023-09-07 | End: 2023-09-07 | Stop reason: HOSPADM

## 2023-09-07 RX ORDER — PROPOFOL 10 MG/ML
INJECTION, EMULSION INTRAVENOUS AS NEEDED
Status: DISCONTINUED | OUTPATIENT
Start: 2023-09-07 | End: 2023-09-07 | Stop reason: SURG

## 2023-09-07 RX ORDER — KETOROLAC TROMETHAMINE 30 MG/ML
30 INJECTION, SOLUTION INTRAMUSCULAR; INTRAVENOUS EVERY 6 HOURS PRN
Status: DISCONTINUED | OUTPATIENT
Start: 2023-09-07 | End: 2023-09-07 | Stop reason: HOSPADM

## 2023-09-07 RX ORDER — SODIUM CHLORIDE 0.9 % (FLUSH) 0.9 %
10 SYRINGE (ML) INJECTION EVERY 12 HOURS SCHEDULED
Status: DISCONTINUED | OUTPATIENT
Start: 2023-09-07 | End: 2023-09-07 | Stop reason: HOSPADM

## 2023-09-07 RX ORDER — SODIUM CHLORIDE, SODIUM LACTATE, POTASSIUM CHLORIDE, CALCIUM CHLORIDE 600; 310; 30; 20 MG/100ML; MG/100ML; MG/100ML; MG/100ML
125 INJECTION, SOLUTION INTRAVENOUS ONCE
Status: COMPLETED | OUTPATIENT
Start: 2023-09-07 | End: 2023-09-07

## 2023-09-07 RX ORDER — ONDANSETRON 2 MG/ML
4 INJECTION INTRAMUSCULAR; INTRAVENOUS ONCE
Status: COMPLETED | OUTPATIENT
Start: 2023-09-07 | End: 2023-09-07

## 2023-09-07 RX ORDER — MIDAZOLAM HYDROCHLORIDE 1 MG/ML
INJECTION INTRAMUSCULAR; INTRAVENOUS AS NEEDED
Status: DISCONTINUED | OUTPATIENT
Start: 2023-09-07 | End: 2023-09-07 | Stop reason: SURG

## 2023-09-07 RX ORDER — MIDAZOLAM HYDROCHLORIDE 1 MG/ML
1 INJECTION INTRAMUSCULAR; INTRAVENOUS
Status: DISCONTINUED | OUTPATIENT
Start: 2023-09-07 | End: 2023-09-07 | Stop reason: HOSPADM

## 2023-09-07 RX ORDER — FENTANYL CITRATE 50 UG/ML
50 INJECTION, SOLUTION INTRAMUSCULAR; INTRAVENOUS
Status: DISCONTINUED | OUTPATIENT
Start: 2023-09-07 | End: 2023-09-07 | Stop reason: HOSPADM

## 2023-09-07 RX ORDER — ONDANSETRON 2 MG/ML
INJECTION INTRAMUSCULAR; INTRAVENOUS AS NEEDED
Status: DISCONTINUED | OUTPATIENT
Start: 2023-09-07 | End: 2023-09-07 | Stop reason: SURG

## 2023-09-07 RX ORDER — SODIUM CHLORIDE, SODIUM LACTATE, POTASSIUM CHLORIDE, CALCIUM CHLORIDE 600; 310; 30; 20 MG/100ML; MG/100ML; MG/100ML; MG/100ML
INJECTION, SOLUTION INTRAVENOUS CONTINUOUS PRN
Status: DISCONTINUED | OUTPATIENT
Start: 2023-09-07 | End: 2023-09-07 | Stop reason: SURG

## 2023-09-07 RX ORDER — FENTANYL CITRATE 50 UG/ML
INJECTION, SOLUTION INTRAMUSCULAR; INTRAVENOUS AS NEEDED
Status: DISCONTINUED | OUTPATIENT
Start: 2023-09-07 | End: 2023-09-07 | Stop reason: SURG

## 2023-09-07 RX ORDER — OXYCODONE HYDROCHLORIDE AND ACETAMINOPHEN 5; 325 MG/1; MG/1
1 TABLET ORAL ONCE AS NEEDED
Status: COMPLETED | OUTPATIENT
Start: 2023-09-07 | End: 2023-09-07

## 2023-09-07 RX ORDER — ONDANSETRON 2 MG/ML
4 INJECTION INTRAMUSCULAR; INTRAVENOUS AS NEEDED
Status: DISCONTINUED | OUTPATIENT
Start: 2023-09-07 | End: 2023-09-07 | Stop reason: HOSPADM

## 2023-09-07 RX ADMIN — FAMOTIDINE 20 MG: 10 INJECTION, SOLUTION INTRAVENOUS at 09:41

## 2023-09-07 RX ADMIN — PROPOFOL 150 MG: 10 INJECTION, EMULSION INTRAVENOUS at 09:41

## 2023-09-07 RX ADMIN — PROPOFOL 150 MG: 10 INJECTION, EMULSION INTRAVENOUS at 09:37

## 2023-09-07 RX ADMIN — SODIUM CHLORIDE, POTASSIUM CHLORIDE, SODIUM LACTATE AND CALCIUM CHLORIDE 125 ML/HR: 600; 310; 30; 20 INJECTION, SOLUTION INTRAVENOUS at 08:55

## 2023-09-07 RX ADMIN — ONDANSETRON 4 MG: 2 INJECTION INTRAMUSCULAR; INTRAVENOUS at 09:41

## 2023-09-07 RX ADMIN — FENTANYL CITRATE 50 MCG: 50 INJECTION, SOLUTION INTRAMUSCULAR; INTRAVENOUS at 10:50

## 2023-09-07 RX ADMIN — FENTANYL CITRATE 100 MCG: 50 INJECTION INTRAMUSCULAR; INTRAVENOUS at 09:41

## 2023-09-07 RX ADMIN — MIDAZOLAM HYDROCHLORIDE 2 MG: 1 INJECTION, SOLUTION INTRAMUSCULAR; INTRAVENOUS at 09:37

## 2023-09-07 RX ADMIN — IPRATROPIUM BROMIDE AND ALBUTEROL SULFATE 3 ML: 2.5; .5 SOLUTION RESPIRATORY (INHALATION) at 09:05

## 2023-09-07 RX ADMIN — SODIUM CHLORIDE, POTASSIUM CHLORIDE, SODIUM LACTATE AND CALCIUM CHLORIDE: 600; 310; 30; 20 INJECTION, SOLUTION INTRAVENOUS at 09:37

## 2023-09-07 RX ADMIN — OXYCODONE AND ACETAMINOPHEN 1 TABLET: 5; 325 TABLET ORAL at 10:50

## 2023-09-07 RX ADMIN — LIDOCAINE HYDROCHLORIDE 60 MG: 20 INJECTION, SOLUTION EPIDURAL; INFILTRATION; INTRACAUDAL; PERINEURAL at 09:41

## 2023-09-07 RX ADMIN — ONDANSETRON 4 MG: 2 INJECTION INTRAMUSCULAR; INTRAVENOUS at 08:55

## 2023-09-07 RX ADMIN — CEFAZOLIN 2 G: 2 INJECTION, POWDER, FOR SOLUTION INTRAMUSCULAR; INTRAVENOUS at 09:37

## 2023-09-07 NOTE — ANESTHESIA PROCEDURE NOTES
Airway  Urgency: elective    Date/Time: 9/7/2023 9:44 AM  Airway not difficult    General Information and Staff    Patient location during procedure: OR  Anesthesiologist: Gavin Curran MD  CRNA/CAA: Rodriguez Nixon CRNA    Indications and Patient Condition  Indications for airway management: airway protection    Preoxygenated: yes  MILS maintained throughout  Mask difficulty assessment: 0 - not attempted    Final Airway Details  Final airway type: supraglottic airway      Successful airway: unique  Size 4     Number of attempts at approach: 1  Assessment: lips, teeth, and gum same as pre-op and atraumatic intubation    Additional Comments  Dentition & lips unchanged from preop

## 2023-09-07 NOTE — ANESTHESIA POSTPROCEDURE EVALUATION
Patient: Dexter Flores    Procedure Summary       Date: 09/07/23 Room / Location:  [facility] OR  /  [facility] OR    Anesthesia Start: 0937 Anesthesia Stop: 1020    Procedure: INSERTION OF PORTACATH (Left) Diagnosis:       Squamous cell carcinoma of bronchus of right lung      (Squamous cell carcinoma of bronchus of right lung [C34.91])    Surgeons: Jose F Hewitt MD Provider: Gavin Curran MD    Anesthesia Type: MAC, general ASA Status: 4            Anesthesia Type: MAC, general    Vitals  Vitals Value Taken Time   /87 09/07/23 1041   Temp 97.2 °F (36.2 °C) 09/07/23 1021   Pulse 91 09/07/23 1041   Resp 14 09/07/23 1041   SpO2 95 % 09/07/23 1041           Post Anesthesia Care and Evaluation    Patient location during evaluation: PACU  Patient participation: complete - patient participated  Level of consciousness: awake  Pain score: 0  Pain management: satisfactory to patient    Airway patency: patent  Anesthetic complications: No anesthetic complications  PONV Status: none  Cardiovascular status: hemodynamically stable  Respiratory status: nasal cannula  Hydration status: acceptable

## 2023-09-07 NOTE — ANESTHESIA PREPROCEDURE EVALUATION
Anesthesia Evaluation     Patient summary reviewed and Nursing notes reviewed   no history of anesthetic complications:   NPO Solid Status: > 8 hours  NPO Liquid Status: > 8 hours           Airway   Mallampati: II  TM distance: >3 FB  Neck ROM: full  Dental    (+) poor dentition        Pulmonary    (+) lung cancer, COPD,wheezes, rales  Cardiovascular   Exercise tolerance: good (4-7 METS)    ECG reviewed  Rhythm: regular  Rate: normal    (+) hypertension      Neuro/Psych- negative ROS  GI/Hepatic/Renal/Endo    (+) obesity, GERD, thyroid problem     Musculoskeletal     (+) neck pain  Abdominal   (+) obese    Abdomen: soft.   Substance History - negative use     OB/GYN          Other      history of cancer active                  Anesthesia Plan    ASA 4     MAC and general     (General vs mac)  intravenous induction     Anesthetic plan, risks, benefits, and alternatives have been provided, discussed and informed consent has been obtained with: patient.  Pre-procedure education provided  Use of blood products discussed with  Consented to blood products.    Plan discussed with CRNA.    CODE STATUS:

## 2023-09-07 NOTE — OP NOTE
INSERTION OF PORTACATH  Procedure Note    Dexter Flores  9/7/2023    Pre-op Diagnosis:   Squamous cell carcinoma of bronchus of right lung [C34.91]    Post-op Diagnosis:     Post-Op Diagnosis Codes:     * Squamous cell carcinoma of bronchus of right lung [C34.91]    Procedure(s):  INSERTION OF PORTACATH WITH ULTRASOUND GUIDED VENOUS ACCESS AND FLUOROSCOPY    Surgeon(s):  Jose F Hewitt MD    Indication: Lung cancer    Anesthesia: Choice    Staff:   Circulator: Deo Dorman RN  Scrub Person: Sal Goodson  Assistant: Rao Marin    Operative Description: The patient was taken to the operating suite and placed supine on operating table.  Bilateral sequential compression devices were applied and anesthesia was administered.  The left neck and chest were prepped and draped in sterile fashion.  Timeout procedure was performed after antibiotics had been confirmed.   The left neck and chest were then infiltrated with local anesthetic.  Under ultrasound visualization an 18-gauge access needle was inserted into the left internal jugular vein under direct visualization.  Venous blood was aspirated.  Through the access needle guidewire was placed without resistance.  Ultrasound and fluoroscopy confirmed guidewire in appropriate position and course.  A stab incision at the guidewire entry site was made.  Two fingerbreadths below the left clavicle, a transverse incision was then made and with blunt and cautery dissection a subcutaneous pocket was created.  The catheter was then tunneled subcutaneously over the clavicle through the guidewire entry site in the left neck.  The catheter was cut to appropriate length based on patient height and the port was assembled and placed in the subcutaneous pocket.  Under fluoroscopic visualization the dilator introducer sheath was inserted via Seldinger technique over the guidewire into the left internal jugular vein.  The guidewire and sheath were removed.   The catheter was inserted into the removable sheath and the sheath was removed.  Fluoroscopy showed the catheter tip in appropriate position with no evidence of kinking.  The port was accessed and withdrew venous blood easily and flushed with heparinized saline solution easily.  The port was secured to the pectoralis fascia with Prolene sutures.  The port site was closed with subcutaneous Vicryl and Monocryl subcuticular suture.  The left neck incision was closed with a subcuticular Monocryl suture.  The incisions were dressed with SureClose and the patient was awakened from anesthesia after all counts were correct and taken to recovery where stat chest x-ray was ordered confirming placement.    Estimated Blood Loss: 5 mL    Specimens:   None                 Drains: None    Grafts/Implants: Port left IJ    Findings: Port withdrew and flushed easily    Complications: None      Jose F Hewitt MD     Date: 9/7/2023  Time: 10:27 EDT

## 2023-09-08 ENCOUNTER — TELEPHONE (OUTPATIENT)
Dept: ONCOLOGY | Facility: CLINIC | Age: 53
End: 2023-09-08
Payer: COMMERCIAL

## 2023-09-08 ENCOUNTER — TELEPHONE (OUTPATIENT)
Dept: PULMONOLOGY | Facility: CLINIC | Age: 53
End: 2023-09-08
Payer: COMMERCIAL

## 2023-09-08 DIAGNOSIS — J44.9 CHRONIC OBSTRUCTIVE PULMONARY DISEASE, UNSPECIFIED COPD TYPE: ICD-10-CM

## 2023-09-08 DIAGNOSIS — G47.34 NOCTURNAL HYPOXIA: Primary | ICD-10-CM

## 2023-09-08 NOTE — TELEPHONE ENCOUNTER
Spoke with patient relative and informed them that it was okay to use ice around the port if it is sore. Verbalized understanding, no other questions or concerns at this time.

## 2023-09-08 NOTE — TELEPHONE ENCOUNTER
Patient returned call. Discussed that overnight pulse oximetry results showed that saturations was </= 88% for approximately 10 hours and lowest saturation was 70%. Educated that he will need supplemental oxygen when sleeping. Sent order to [facility] to start supplemental oxygen at night and as needed.    Patient is requesting portable oxygen supplies. Will perform 6-MWT during next visit on 09/18 to evaluate if portable oxygen is needed.     Patient declined home sleep study due to cost. Educated that may be able to get in-lab sleep study approved by insurance but patient does not think that he would be able to have this done at this time.     Patient reports that he needs a new nebulizer machine as he feels that his current machine does not put out medication well. Will send order for nebulizer machine to [facility].

## 2023-09-08 NOTE — TELEPHONE ENCOUNTER
Caller: Dexter Flores    Relationship: Self    Best call back number: [phone]    What is the best time to reach you: ANYTIME    Who are you requesting to speak with (clinical staff, provider,  specific staff member): CLINICAL     What was the call regarding: PATIENT WANTS TO KNOW IF HE CAN PUT AN ICE PACK ON WHERE HE HAD THE PORT PUT IN?    CALL TO DISCUSS TRIED TO W/T NO ANSWER    Is it okay if the provider responds through MyChart: NO

## 2023-09-08 NOTE — TELEPHONE ENCOUNTER
Attempted to contact patient to discuss overnight pulse oximetry results by both home and mobile phone numbers but neither phone had voicemail set up so was unable to leave a message.

## 2023-09-12 ENCOUNTER — HOSPITAL ENCOUNTER (OUTPATIENT)
Dept: PET IMAGING | Facility: HOSPITAL | Age: 53
Discharge: HOME | End: 2023-09-12
Admitting: INTERNAL MEDICINE
Payer: COMMERCIAL

## 2023-09-12 DIAGNOSIS — C34.91 SQUAMOUS CELL CARCINOMA OF BRONCHUS OF RIGHT LUNG: ICD-10-CM

## 2023-09-12 PROCEDURE — A9552 F18 FDG: HCPCS | Performed by: INTERNAL MEDICINE

## 2023-09-12 PROCEDURE — 78815 PET IMAGE W/CT SKULL-THIGH: CPT

## 2023-09-12 PROCEDURE — 0 FLUDEOXYGLUCOSE F18 SOLUTION: Performed by: INTERNAL MEDICINE

## 2023-09-12 RX ADMIN — FLUDEOXYGLUCOSE F18 1 DOSE: 300 INJECTION INTRAVENOUS at 11:03

## 2023-09-13 DIAGNOSIS — C34.91 SQUAMOUS CELL CARCINOMA OF BRONCHUS OF RIGHT LUNG: Primary | ICD-10-CM

## 2023-09-13 RX ORDER — SODIUM CHLORIDE 9 MG/ML
250 INJECTION, SOLUTION INTRAVENOUS ONCE
OUTPATIENT
Start: 2023-09-20

## 2023-09-13 RX ORDER — SODIUM CHLORIDE 9 MG/ML
250 INJECTION, SOLUTION INTRAVENOUS ONCE
OUTPATIENT
Start: 2023-09-27

## 2023-09-13 RX ORDER — FAMOTIDINE 10 MG/ML
20 INJECTION, SOLUTION INTRAVENOUS AS NEEDED
OUTPATIENT
Start: 2023-09-20

## 2023-09-13 RX ORDER — DIPHENHYDRAMINE HYDROCHLORIDE 50 MG/ML
50 INJECTION INTRAMUSCULAR; INTRAVENOUS AS NEEDED
OUTPATIENT
Start: 2023-09-27

## 2023-09-13 RX ORDER — PALONOSETRON 0.05 MG/ML
0.25 INJECTION, SOLUTION INTRAVENOUS ONCE
OUTPATIENT
Start: 2023-09-20

## 2023-09-13 RX ORDER — PALONOSETRON 0.05 MG/ML
0.25 INJECTION, SOLUTION INTRAVENOUS ONCE
OUTPATIENT
Start: 2023-09-27

## 2023-09-13 RX ORDER — FAMOTIDINE 10 MG/ML
20 INJECTION, SOLUTION INTRAVENOUS ONCE
OUTPATIENT
Start: 2023-09-20

## 2023-09-13 RX ORDER — FAMOTIDINE 10 MG/ML
20 INJECTION, SOLUTION INTRAVENOUS ONCE
OUTPATIENT
Start: 2023-09-27

## 2023-09-13 RX ORDER — FAMOTIDINE 10 MG/ML
20 INJECTION, SOLUTION INTRAVENOUS AS NEEDED
OUTPATIENT
Start: 2023-09-27

## 2023-09-13 RX ORDER — DIPHENHYDRAMINE HYDROCHLORIDE 50 MG/ML
50 INJECTION INTRAMUSCULAR; INTRAVENOUS AS NEEDED
OUTPATIENT
Start: 2023-09-20

## 2023-09-14 ENCOUNTER — HOSPITAL ENCOUNTER (OUTPATIENT)
Dept: RADIATION ONCOLOGY | Facility: HOSPITAL | Age: 53
Setting detail: RADIATION/ONCOLOGY SERIES
End: 2023-09-14
Payer: COMMERCIAL

## 2023-09-14 ENCOUNTER — CONSULT (OUTPATIENT)
Dept: RADIATION ONCOLOGY | Facility: HOSPITAL | Age: 53
End: 2023-09-14
Payer: COMMERCIAL

## 2023-09-14 VITALS
BODY MASS INDEX: 47.36 KG/M2 | TEMPERATURE: 97.1 F | WEIGHT: 302.4 LBS | DIASTOLIC BLOOD PRESSURE: 98 MMHG | HEART RATE: 87 BPM | SYSTOLIC BLOOD PRESSURE: 149 MMHG | OXYGEN SATURATION: 98 % | RESPIRATION RATE: 18 BRPM

## 2023-09-14 DIAGNOSIS — C34.91 SQUAMOUS CELL CARCINOMA OF BRONCHUS OF RIGHT LUNG: Primary | ICD-10-CM

## 2023-09-14 PROCEDURE — G0463 HOSPITAL OUTPT CLINIC VISIT: HCPCS | Performed by: RADIOLOGY

## 2023-09-14 NOTE — PROGRESS NOTES
New Patient Visit       Patient: Dexter Folres   YOB: 1970   Medical Record Number: [id number]   Date of Visit  September 14, 2023   Primary Diagnosis: BHONCSTAGE@ Squamous cell carcinoma of bronchus of right lung [C34.91]                                              History of Present Illness: Mr. 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  He is referred to our department from Dr. Villa Rizzo in medical oncology for consideration of concurrent chemo and radiation therapy.    Late summer of this year the patient began having some nonproductive cough and worsening of his shortness of breath.  He is known to have COPD PD as he is a lifelong tobacco smoker.  When the symptoms progressed he went on to have a CT scan of the chest on 8/19/2023.  This revealed a right upper lobe mass with some postobstructive consolidation.  Also noted was an enlarged right paratracheal lymph node which measured 4.2 cm.  A bronchoscopy was performed on 8/22/2023 and a biopsy from the endobronchial lesion revealed a squamous cell carcinoma.  This was PD-L1 TPS less than 1%.    He was seen in medical oncology on 8/24/2023.  At that point he was felt to be clinically a stage IIb however further work-up was recommended.  An MRI of the brain has been ordered and is pending.  A PET scan was has been performed on 9/12/2023.  Hypermetabolic activity was seen within a 9.9 cm in the right upper lobe.  This had an SUV value of 15.  Hypermetabolic activity was also seen within the right hilum subcarina and supraclavicular fossa on the right.  No other hypermetabolic activity was seen.  As an aside he had an MRI of the cervical spine on 7/20/2023.  This revealed a 0.7 x 1.2 x 1.4 cm T2 hyperintense mass.  This was present in the right posterior paracentral area and extended into the right neuroforamina.  The differential diagnosis was schwannoma  and/or meningioma.  This area did not light up on PET scan.    Today Mr. Renteria states he is doing well.  He is having no hoarseness or hemoptysis.  He has noticed no dysphagia peripheral lymphadenopathy neurologic changes or skeletal tenderness.  His appetite is healthy and his weight has remained stable.    This is a new problem to me, and one that is potentially life-threatening.  (Old medical records were requested, reviewed, and summarized in the HPI)    Review of Systems    All other systems reviewed and are negative.      Vitals:    23 1420   BP: 149/98   Pulse: 87   Resp: 18   Temp: 97.1 °F (36.2 °C)   SpO2: 98%     Past Medical History:   Diagnosis Date    Arthritis     Bulging of cervical intervertebral disc     Cancer     lung    COPD (chronic obstructive pulmonary disease)     GERD (gastroesophageal reflux disease)     History of transfusion     Hypertension     Neck pain     Sleep apnea     Thyroid disease         Past Surgical History:   Procedure Laterality Date    ABDOMINAL HERNIA REPAIR      umbilical    BRONCHOSCOPY Bilateral 2023    Procedure: BRONCHOSCOPY WITH ENDOBRONCHIAL ULTRASOUND;  Surgeon: Savage Mcmanus MD;  [facility];  Service: Pulmonary;  Laterality: Bilateral;    JOINT REPLACEMENT      PORTACATH PLACEMENT Left 2023    Procedure: INSERTION OF PORTACATH;  Surgeon: Jose F Hewitt MD;  Location[facility] OR;  Service: General;  Laterality: Left;    TONSILLECTOMY      TOTAL HIP ARTHROPLASTY Bilateral       History reviewed. No pertinent family history.     Social History     Socioeconomic History    Marital status:    Tobacco Use    Smoking status: Former     Packs/day: 1.00     Years: 30.00     Pack years: 30.00     Types: Cigarettes     Start date:      Quit date: 2023     Years since quittin.3    Smokeless tobacco: Never   Vaping Use    Vaping Use: Never used   Substance and Sexual Activity    Alcohol use: Not Currently    Drug use: Not  Currently    Sexual activity: Defer          Allergies:  Bevespi aerosphere [glycopyrrolate-formoterol]   Prior to Admission medications    Medication Sig Start Date End Date Taking? Authorizing Provider   albuterol (PROVENTIL) (2.5 MG/3ML) 0.083% nebulizer solution Take 2.5 mg by nebulization Every 6 (Six) Hours As Needed for Wheezing.   Yes Bowen Martinez MD   albuterol sulfate  (90 Base) MCG/ACT inhaler Inhale 2 puffs Every 4 (Four) Hours As Needed for Wheezing. 8/31/23  Yes Carmen Quiroz PA-C   fluticasone (FLOVENT HFA) 110 MCG/ACT inhaler Inhale 2 puffs 2 (Two) Times a Day. 8/31/23  Yes Carmen Quiroz PA-C   gabapentin (NEURONTIN) 600 MG tablet Take 800 mg by mouth 3 (Three) Times a Day.   Yes Bowen Martinez MD   hydroCHLOROthiazide (HYDRODIURIL) 12.5 MG tablet Take 1 tablet by mouth Daily.   Yes Bowen Martinez MD   levothyroxine (SYNTHROID, LEVOTHROID) 50 MCG tablet Take 1 tablet by mouth Daily.   Yes Bowen Martinez MD   lidocaine-prilocaine (EMLA) 2.5-2.5 % cream Apply 1 application  topically to the appropriate area as directed Every 2 (Two) Hours As Needed for Mild Pain. Apply to Port-A-Cath site 30 minutes prior to arrival at infusion clinic. 9/5/23  Yes Katerina Fields APRN   lisinopril (PRINIVIL,ZESTRIL) 10 MG tablet Take 1 tablet by mouth Daily.   Yes Bowen Martinez MD   LORazepam (ATIVAN) 1 MG tablet Take 1 tablet by mouth Every 8 (Eight) Hours As Needed for Anxiety. 8/24/23  Yes ROMEO Rizzo MD   montelukast (SINGULAIR) 10 MG tablet Take 1 tablet by mouth Every Night.   Yes Bowen Martinez MD   Morphine (MS CONTIN) 30 MG 12 hr tablet Take 1 tablet by mouth 2 (Two) Times a Day. 8/24/23  Yes ROMEO Rizzo MD   naloxone (NARCAN) 4 MG/0.1ML nasal spray Call 911. Don't prime. [place] in 1 nostril for overdose. Repeat in 2-3 minutes in other nostril if no or minimal breathing/responsiveness. 8/24/23  Yes Leonardo Jackson, DO    oxyCODONE-acetaminophen (PERCOCET)  MG per tablet Take 1 tablet by mouth Every 6 (Six) Hours As Needed for Moderate Pain. 8/30/23  Yes ROMEO Rizzo MD   pantoprazole (PROTONIX) 40 MG EC tablet Take 1 tablet by mouth Daily.   Yes Bowen Martinez MD   prochlorperazine (COMPAZINE) 10 MG tablet Take 1 tablet by mouth Every 6 (Six) Hours As Needed for Nausea or Vomiting. 9/5/23  Yes Katerina Fields APRN   sennosides-docusate (senna-docusate sodium) 8.6-50 MG per tablet Take 1 tablet by mouth Daily. 8/25/23  Yes ROMEO Rizzo MD   tiotropium bromide-olodaterol (STIOLTO RESPIMAT) 2.5-2.5 MCG/ACT aerosol solution inhaler Inhale 2 puffs Daily. 8/31/23  Yes Carmen Quiroz PA-C   traZODone (DESYREL) 100 MG tablet Take 1 tablet by mouth Every Night.   Yes Bowen Martinez MD   vitamin D (ERGOCALCIFEROL) 1.25 MG (48445 UT) capsule capsule Take 1 capsule by mouth 1 (One) Time Per Week.   Yes Bowen Martinez MD      Pain:(on a scale of 0-10)    Pain Score    09/14/23 1420   PainSc:   6   PainLoc: Chest        Dexter Flores reports a pain score of 6.  Given his pain assessment as noted, treatment options were discussed and the following options were decided upon as a follow-up plan to address the patient's pain: continuation of current treatment plan for pain.       Quality of Life:   KPS: 90 - Limited Activity  ECOG: (1) Restricted in physically strenuous activity, ambulatory and able to do work of light nature         Physical Examination:  Vitals:  VITALS@    Height:    Weight: Weight: (!) 137 kg (302 lb 6.4 oz)   Body mass index is 47.36 kg/m².    Physical Exam  Constitutional: The patient is a well-developed, well-nourished 53 male in no acute distress.  Alert and oriented ×3.  HEENT: Atraumatic. Normocephalic. No abnormalities noted.  Lymphatics: No cervical, supraclavicular, or axillary lymphadenopathy is palpated.  CV: Regular rate and rhythm.  No murmurs, rubs, or gallops are  appreciated.  Respiratory: Lungs clear to auscultation.  There are diminished breath sounds in the right upper lobe.  GI: Abdomen soft, nontender, nondistended, with no hepatosplenomegaly or masses palpated.  Extremities: No clubbing, cyanosis, or edema.  Neurologic: Cranial nerves II through XII are grossly intact, with no focal neurological deficits noted on exam.  Psychiatric: Alert and oriented x3. Normal affect, with no anxiety or depression noted.    Radiographs : XR Chest 2 View    Result Date: 8/19/2023   Near total opacification of the right upper lobe with the differential diagnosis including pneumonia, mass, postsurgical change, please correlate clinically.  This report was finalized on 8/19/2023 3:59 PM by Stacy Mills MD.      MRI Cervical Spine Without Contrast    Result Date: 7/23/2023   1. 7.3 x 10.2 x 14.6 mm T2 hyperintense mass versus extruded disc material (less likely) at the C7/T1 level producing mass effect on the spinal cord and contributing to right neural foraminal stenosis and right central canal stenosis. Considerations would include schwannoma versus meningioma versus extruded disc material - albeit with atypical signal characteristics. Contrast-enhanced MRI is recommended to better evaluate. 2. Multilevel degenerative disc disease with bilateral facet arthropathy and congenital shortening of the pedicles resulting in multilevel central canal and neural foraminal stenosis moderate to severe in extent, at levels detailed above. 3. No acute fracture or traumatic malalignment.  This report was finalized on 7/23/2023 9:31 PM by Dr. Alex Cuevas MD.      CT Angiogram Chest Pulmonary Embolism    Result Date: 8/19/2023  Impression:  Right upper lobe consolidative and masslike airspace opacity without violating the fissures.  Findings are concerning for infection. However, considering the presence of an enlarged right paratracheal lymph node measuring 4.2 cm.  Underlying mass cannot be  ruled out. Short-term follow-up after appropriate treatment is recommended.  Comparison: None  Modality: CTA chest W Contrast  Technique: CT angiography of the chest was obtained after uneventful IV contrast injection. Coronal, axial and sagittal reformats were provided.   Findings:  No evidence of pulmonary emboli.  The pulmonary artery is normal in diameter.  Right upper lobe consolidative masslike radiopacity seen without dilation of the fissures.  There is also right paratracheal lymph node measuring up to 4.2 cm.  No pneumothorax or pleural effusion.  The heart size is normal.  No pericardial effusion.  The thoracic aorta is unremarkable.  The visualized portions of the upper abdomen are within normal limits.  Soft tissue and osseous structures are unremarkable. Left eighth and ninth rib fractures are visualized  Impression:  Right upper lobe consolidative and masslike airspace opacity without violating the fissures.  Findings are concerning for infection. However, considering the presence of an enlarged right paratracheal lymph node measuring 4.2 cm.  Underlying mass cannot be ruled out. Short-term follow-up after appropriate treatment is recommended.  This report was finalized on 8/19/2023 4:41 PM by Tabby Hampton MD.      NM PET/CT Skull Base to Mid Thigh    Result Date: 9/12/2023  1.  A right upper lobe pulmonary parenchymal mass and/or postobstructive collapse measures about 9.9 cm with hypermetabolism mSUV 15. 2.  Subcarinal region 2.3 cm lymph node shows PET hypermetabolism in measuring the 8.6. 3.  Right hilar region hypermetabolism shows mSUV 7. 4.  Probable 2 cm right suprahilar clavicular region lymph node shows PET hypermetabolism mSUV 10.2.  This report was finalized on 9/12/2023 1:44 PM by Dr. Héctor Samano MD.      XR Chest AP    Result Date: 9/7/2023    Left Port-A-Cath with tip in SVC.  This report was finalized on 9/7/2023 10:42 AM by Dr. Héctor Samano MD.      FL Surgery Fluoro    Result  Date: 9/7/2023    As above.  This report was finalized on 9/7/2023 10:28 AM by Dr. Héctor Samano MD.      Pathology:    Labs:   Lab Results   Component Value Date    GLUCOSE 234 (H) 08/24/2023    BUN 26 (H) 08/24/2023    CREATININE 0.96 08/24/2023    EGFRIFNONA 76 12/07/2021    EGFRIFAFRI 87 01/02/2018    BCR 27.1 (H) 08/24/2023    K 5.2 08/24/2023    CO2 25.8 08/24/2023    CALCIUM 9.2 08/24/2023    ALBUMIN 2.6 (L) 08/20/2023    LABIL2 1.4 (L) 04/11/2015    AST 14 08/20/2023    ALT 17 08/20/2023       WBC   Date Value Ref Range Status   08/24/2023 22.31 (H) 3.40 - 10.80 10*3/mm3 Final   01/02/2018 10.6 4.8 - 10.8 10*9/L Final     Hemoglobin   Date Value Ref Range Status   08/24/2023 13.5 13.0 - 17.7 g/dL Final   01/02/2018 16.1 14.0 - 18.0 g/dL Final     Hematocrit   Date Value Ref Range Status   08/24/2023 43.6 37.5 - 51.0 % Final   01/02/2018 47.2 42 - 54 % Final     Platelets   Date Value Ref Range Status   08/24/2023 644 (H) 140 - 450 10*3/mm3 Final   01/02/2018 363 150 - 450 10*9/L Final    No results found for: PSA No results found for: CEA       Total time spent: 45 minutes      ASSESSMENT/PLAN: Squamous cell carcinoma of the right lung with involvement of right hilar subcarinal and supraclavicular lymph nodes.  An MRI of the brain is pending.    If the MRI of the brain is negative then I believe the patient would be a good candidate for concurrent chemo and radiation therapy.  The goals of treatment, steps involved, acute and late toxicities were discussed in detail with the patient and his brother and sister-in-law.  All questions were answered.  An MRI of the brain has been ordered.  His CT simulation for his lung cancer will proceed approximately 2 days after the scan is done.  I would plan on treating the involved area to 6000 cGy in 30 equal fractions of 200 cGy/day.    Sincerely,          Electronically signed by Luis Eduardo Gallo MD 9/14/2023 14:50 EDT

## 2023-09-16 ENCOUNTER — HOSPITAL ENCOUNTER (OUTPATIENT)
Dept: MRI IMAGING | Facility: HOSPITAL | Age: 53
Discharge: HOME | End: 2023-09-16
Admitting: RADIOLOGY
Payer: COMMERCIAL

## 2023-09-16 DIAGNOSIS — C34.91 SQUAMOUS CELL CARCINOMA OF BRONCHUS OF RIGHT LUNG: ICD-10-CM

## 2023-09-16 LAB — CREAT BLDA-MCNC: 1.1 MG/DL (ref 0.6–1.3)

## 2023-09-16 PROCEDURE — A9577 INJ MULTIHANCE: HCPCS | Performed by: RADIOLOGY

## 2023-09-16 PROCEDURE — 82565 ASSAY OF CREATININE: CPT

## 2023-09-16 PROCEDURE — 0 GADOBENATE DIMEGLUMINE 529 MG/ML SOLUTION: Performed by: RADIOLOGY

## 2023-09-16 PROCEDURE — 70553 MRI BRAIN STEM W/O & W/DYE: CPT

## 2023-09-16 RX ADMIN — GADOBENATE DIMEGLUMINE 20 ML: 529 INJECTION, SOLUTION INTRAVENOUS at 09:59

## 2023-09-18 ENCOUNTER — HOSPITAL ENCOUNTER (OUTPATIENT)
Dept: RADIATION ONCOLOGY | Facility: HOSPITAL | Age: 53
Setting detail: RADIATION/ONCOLOGY SERIES
Discharge: HOME | End: 2023-09-18
Payer: COMMERCIAL

## 2023-09-18 ENCOUNTER — HOSPITAL ENCOUNTER (OUTPATIENT)
Dept: RESPIRATORY THERAPY | Facility: HOSPITAL | Age: 53
Discharge: HOME | End: 2023-09-18
Payer: COMMERCIAL

## 2023-09-18 ENCOUNTER — HOSPITAL ENCOUNTER (OUTPATIENT)
Dept: PULMONOLOGY | Facility: HOSPITAL | Age: 53
Discharge: HOME | End: 2023-09-18
Payer: COMMERCIAL

## 2023-09-18 VITALS
SYSTOLIC BLOOD PRESSURE: 153 MMHG | BODY MASS INDEX: 45.77 KG/M2 | OXYGEN SATURATION: 98 % | HEIGHT: 68 IN | WEIGHT: 302 LBS | DIASTOLIC BLOOD PRESSURE: 95 MMHG | HEART RATE: 95 BPM

## 2023-09-18 VITALS — OXYGEN SATURATION: 97 % | RESPIRATION RATE: 16 BRPM | HEART RATE: 89 BPM

## 2023-09-18 DIAGNOSIS — C34.91 SQUAMOUS CELL CARCINOMA OF BRONCHUS OF RIGHT LUNG: ICD-10-CM

## 2023-09-18 DIAGNOSIS — J44.9 CHRONIC OBSTRUCTIVE PULMONARY DISEASE, UNSPECIFIED COPD TYPE: ICD-10-CM

## 2023-09-18 DIAGNOSIS — J44.9 CHRONIC OBSTRUCTIVE PULMONARY DISEASE, UNSPECIFIED COPD TYPE: Primary | ICD-10-CM

## 2023-09-18 DIAGNOSIS — G47.34 NOCTURNAL HYPOXIA: ICD-10-CM

## 2023-09-18 DIAGNOSIS — E66.01 MORBID OBESITY WITH BMI OF 45.0-49.9, ADULT: ICD-10-CM

## 2023-09-18 DIAGNOSIS — F17.210 CIGARETTE NICOTINE DEPENDENCE WITHOUT COMPLICATION: ICD-10-CM

## 2023-09-18 PROCEDURE — 94640 AIRWAY INHALATION TREATMENT: CPT

## 2023-09-18 PROCEDURE — 77332 RADIATION TREATMENT AID(S): CPT | Performed by: RADIOLOGY

## 2023-09-18 PROCEDURE — 77334 RADIATION TREATMENT AID(S): CPT | Performed by: RADIOLOGY

## 2023-09-18 PROCEDURE — 94618 PULMONARY STRESS TESTING: CPT

## 2023-09-18 PROCEDURE — 94726 PLETHYSMOGRAPHY LUNG VOLUMES: CPT

## 2023-09-18 PROCEDURE — 94060 EVALUATION OF WHEEZING: CPT

## 2023-09-18 PROCEDURE — 94799 UNLISTED PULMONARY SVC/PX: CPT

## 2023-09-18 PROCEDURE — 77263 THER RADIOLOGY TX PLNG CPLX: CPT | Performed by: RADIOLOGY

## 2023-09-18 PROCEDURE — 94761 N-INVAS EAR/PLS OXIMETRY MLT: CPT

## 2023-09-18 PROCEDURE — 77470 SPECIAL RADIATION TREATMENT: CPT | Performed by: RADIOLOGY

## 2023-09-18 PROCEDURE — 94729 DIFFUSING CAPACITY: CPT

## 2023-09-18 RX ORDER — ALBUTEROL SULFATE 2.5 MG/3ML
2.5 SOLUTION RESPIRATORY (INHALATION) ONCE
Status: COMPLETED | OUTPATIENT
Start: 2023-09-18 | End: 2023-09-18

## 2023-09-18 RX ADMIN — ALBUTEROL SULFATE 2.5 MG: 2.5 SOLUTION RESPIRATORY (INHALATION) at 10:12

## 2023-09-18 NOTE — PROGRESS NOTES
Subjective      Chief Complaint  COPD    Subjective      History of Present Illness  Dexter Flores is a 53 y.o. male who presents today to [facility] with past medical history of COPD, recently diagnosed squamous cell lung cancer of right lung, essential hypertension, hypothyroidism, and former tobacco abuse who presents today for COPD. This visit is a initial evaluation  appointment.     COPD:  Patient was recently hospitalized from 08/19/2023 to 08/24/2023 for right upper lobe pulmonary mass, postobstructive pneumonia, and sepsis. CT chest prior to admission revealed right upper lobe masslike opacity with postobstructive pneumonia. He received empiric antibiotics. Patient underwent bronchoscopy on 08/22 which demonstrated moderately differentiated squamous cell carcinoma of the lung. He has been following with oncology since discharge. He is going to start chemotherapy in the near future.    While patient was hospitalized it was suspected that he may have underlying sleep apnea. Patient reports occasionally waking up gasping for air and has been told that he stops breathing during his sleep. He has been told that he snores loudly. He wakes up with morning headaches frequently. He does admit to being tired/fatigued throughout the day after he wakes up. He was started on CPAP/BiPAP therapy while he was hospitalized but was not discharged home with a PAP device until a formal sleep study could be performed. Patient declined home sleep study due to cost. Offered in-lab sleep study to be performed to see if insurance would approve but patient does not think he would be able to do this at this time.     Patient has history of former tobacco abuse with using 1 pack per day for 30 years. He achieved cessation in May 2023.    Interval history:  Patient reports that his symptoms are currently at baseline. He continues to use Stiolto 2 puffs daily and Flovent 2 puffs BID. He has an albuterol  inhaler that he uses as needed. He was ordered a new nebulizer machine but states that he has not received it yet. He was started on nocturnal oxygen and received his stationary oxygen concentrator and reports that he feels better rested. He has not started chemotherapy yet but states that he should start this in the near future since he now has his port-a-cath in place.      Current Outpatient Medications:     albuterol (PROVENTIL) (2.5 MG/3ML) 0.083% nebulizer solution, Take 2.5 mg by nebulization Every 6 (Six) Hours As Needed for Wheezing., Disp: , Rfl:     albuterol sulfate  (90 Base) MCG/ACT inhaler, Inhale 2 puffs Every 4 (Four) Hours As Needed for Wheezing., Disp: 18 g, Rfl: 11    fluticasone (FLOVENT HFA) 110 MCG/ACT inhaler, Inhale 2 puffs 2 (Two) Times a Day., Disp: 12 g, Rfl: 11    gabapentin (NEURONTIN) 600 MG tablet, Take 800 mg by mouth 3 (Three) Times a Day., Disp: , Rfl:     hydroCHLOROthiazide (HYDRODIURIL) 12.5 MG tablet, Take 1 tablet by mouth Daily., Disp: , Rfl:     levothyroxine (SYNTHROID, LEVOTHROID) 50 MCG tablet, Take 1 tablet by mouth Daily., Disp: , Rfl:     lidocaine-prilocaine (EMLA) 2.5-2.5 % cream, Apply 1 application  topically to the appropriate area as directed Every 2 (Two) Hours As Needed for Mild Pain. Apply to Port-A-Cath site 30 minutes prior to arrival at infusion clinic., Disp: 30 g, Rfl: 1    lisinopril (PRINIVIL,ZESTRIL) 10 MG tablet, Take 1 tablet by mouth Daily., Disp: , Rfl:     LORazepam (ATIVAN) 1 MG tablet, Take 1 tablet by mouth Every 8 (Eight) Hours As Needed for Anxiety., Disp: 90 tablet, Rfl: 0    montelukast (SINGULAIR) 10 MG tablet, Take 1 tablet by mouth Every Night., Disp: , Rfl:     Morphine (MS CONTIN) 30 MG 12 hr tablet, Take 1 tablet by mouth 2 (Two) Times a Day., Disp: 60 tablet, Rfl: 0    naloxone (NARCAN) 4 MG/0.1ML nasal spray, Call 911. Don't prime. [place] in 1 nostril for overdose. Repeat in 2-3 minutes in other nostril if no or minimal  "breathing/responsiveness., Disp: 2 each, Rfl: 0    oxyCODONE-acetaminophen (PERCOCET)  MG per tablet, Take 1 tablet by mouth Every 6 (Six) Hours As Needed for Moderate Pain., Disp: 120 tablet, Rfl: 0    pantoprazole (PROTONIX) 40 MG EC tablet, Take 1 tablet by mouth Daily., Disp: , Rfl:     prochlorperazine (COMPAZINE) 10 MG tablet, Take 1 tablet by mouth Every 6 (Six) Hours As Needed for Nausea or Vomiting., Disp: 60 tablet, Rfl: 1    sennosides-docusate (senna-docusate sodium) 8.6-50 MG per tablet, Take 1 tablet by mouth Daily., Disp: 30 tablet, Rfl: 2    tiotropium bromide-olodaterol (STIOLTO RESPIMAT) 2.5-2.5 MCG/ACT aerosol solution inhaler, Inhale 2 puffs Daily., Disp: 4 g, Rfl: 11    traZODone (DESYREL) 100 MG tablet, Take 1 tablet by mouth Every Night., Disp: , Rfl:     vitamin D (ERGOCALCIFEROL) 1.25 MG (05785 UT) capsule capsule, Take 1 capsule by mouth 1 (One) Time Per Week., Disp: , Rfl:   No current facility-administered medications for this encounter.      Allergies   Allergen Reactions    Bevespi Aerosphere [Glycopyrrolate-Formoterol] Other (See Comments)     Developed blisters around mouth. Not a listed adverse effect of medication so unsure if this is related to inhaler. Discontinued as precaution.       Objective     Objective   Vital Signs:  /95   Pulse 95   Ht 172.7 cm (68\")   Wt (!) 137 kg (302 lb)   SpO2 98%   BMI 45.92 kg/m²   Estimated body mass index is 45.92 kg/m² as calculated from the following:    Height as of this encounter: 172.7 cm (68\").    Weight as of this encounter: 137 kg (302 lb).    Past Medical History:   Diagnosis Date    Arthritis     Bulging of cervical intervertebral disc     Cancer     lung    COPD (chronic obstructive pulmonary disease)     GERD (gastroesophageal reflux disease)     History of transfusion     Hypertension     Neck pain     Sleep apnea     Thyroid disease      Past Surgical History:   Procedure Laterality Date    ABDOMINAL HERNIA REPAIR   "    umbilical    BRONCHOSCOPY Bilateral 2023    Procedure: BRONCHOSCOPY WITH ENDOBRONCHIAL ULTRASOUND;  Surgeon: Savage Mcmanus MD;  Location:  [facility] OR;  Service: Pulmonary;  Laterality: Bilateral;    JOINT REPLACEMENT      PORTACATH PLACEMENT Left 2023    Procedure: INSERTION OF PORTACATH;  Surgeon: Jose F Hewitt MD;  Location:  [facility] OR;  Service: General;  Laterality: Left;    TONSILLECTOMY      TOTAL HIP ARTHROPLASTY Bilateral      Social History     Socioeconomic History    Marital status:    Tobacco Use    Smoking status: Former     Packs/day: 1.00     Years: 30.00     Pack years: 30.00     Types: Cigarettes     Start date:      Quit date: 2023     Years since quittin.3    Smokeless tobacco: Never   Vaping Use    Vaping Use: Never used   Substance and Sexual Activity    Alcohol use: Not Currently    Drug use: Not Currently    Sexual activity: Defer      Physical Exam  Constitutional:       General: He is awake.      Appearance: Normal appearance. He is morbidly obese.   HENT:      Head: Normocephalic and atraumatic.      Nose: Nose normal.      Mouth/Throat:      Mouth: Mucous membranes are moist.      Pharynx: Oropharynx is clear.   Eyes:      Conjunctiva/sclera: Conjunctivae normal.      Pupils: Pupils are equal, round, and reactive to light.   Cardiovascular:      Rate and Rhythm: Normal rate and regular rhythm.      Pulses: Normal pulses.      Heart sounds: Normal heart sounds. No murmur heard.    No friction rub. No gallop.   Pulmonary:      Effort: Pulmonary effort is normal. No tachypnea, accessory muscle usage or respiratory distress.      Breath sounds: Normal breath sounds. No decreased breath sounds, wheezing, rhonchi or rales.   Musculoskeletal:         General: Normal range of motion.      Cervical back: Full passive range of motion without pain and normal range of motion.   Skin:     General: Skin is warm and dry.   Neurological:      General: No focal  deficit present.      Mental Status: He is alert. Mental status is at baseline.   Psychiatric:         Mood and Affect: Mood normal.         Behavior: Behavior normal. Behavior is cooperative.         Thought Content: Thought content normal.      Result Review :  The following labs and radiology results have been reviewed.    Lab Review:   Reviewed CT chest angiogram from 08/19/2023    Narrative & Impression   Comparison: None     Modality:  CTA chest W Contrast     Technique: CT angiography of the chest was obtained after uneventful IV  contrast injection. Coronal, axial and sagittal reformats were provided.        Findings:     No evidence of pulmonary emboli.  The pulmonary artery is normal in  diameter.     Right upper lobe consolidative masslike radiopacity seen without  dilation of the fissures.  There is also right paratracheal lymph node  measuring up to 4.2 cm.     No pneumothorax or pleural effusion.     The heart size is normal.  No pericardial effusion.     The thoracic aorta is unremarkable.     The visualized portions of the upper abdomen are within normal limits.     Soft tissue and osseous structures are unremarkable.  Left eighth and ninth rib fractures are visualized     IMPRESSION:  Impression:     Right upper lobe consolidative and masslike airspace opacity without  violating the fissures.  Findings are concerning for infection.   However, considering the presence of an enlarged right paratracheal  lymph node measuring 4.2 cm.  Underlying mass cannot be ruled out.   Short-term follow-up after appropriate treatment is recommended.     Comparison: None     Modality:  CTA chest W Contrast     Technique: CT angiography of the chest was obtained after uneventful IV  contrast injection. Coronal, axial and sagittal reformats were provided.        Findings:     No evidence of pulmonary emboli.  The pulmonary artery is normal in  diameter.     Right upper lobe consolidative masslike radiopacity seen  without  dilation of the fissures.  There is also right paratracheal lymph node  measuring up to 4.2 cm.     No pneumothorax or pleural effusion.     The heart size is normal.  No pericardial effusion.     The thoracic aorta is unremarkable.     The visualized portions of the upper abdomen are within normal limits.     Soft tissue and osseous structures are unremarkable.  Left eighth and ninth rib fractures are visualized     Impression:     Right upper lobe consolidative and masslike airspace opacity without  violating the fissures.  Findings are concerning for infection.   However, considering the presence of an enlarged right paratracheal  lymph node measuring 4.2 cm.  Underlying mass cannot be ruled out.   Short-term follow-up after appropriate treatment is recommended.     This report was finalized on 8/19/2023 4:41 PM by Tabby Hampton MD.        Reviewed spirometry performed on 08/31/2023      Reviewed overnight pulse oximetry results from 09/07/2023    Assessment / Plan         Assessment   Diagnoses and all orders for this visit:    1. Chronic obstructive pulmonary disease, unspecified COPD type (Primary)  Previous spirometry revealed very severe obstruction and possibly some restriction (FEV1/FVC 46%, FEV1 27%, and FVC 46%) which would classify him as stage IV COPD by GOLD classification. Performed full PFT but results are not available at this time.   Not able to tolerate Bevespi upon discharge from the hospital as patient developed fluid-filled blisters around mouth so was discontinued (no listed adverse effect of inhaler so unsure if blisters are related to inhaler at this time but discontinued as precaution).   Continue maintenance inhaler therapy with Stiolto 2 puffs daily and Flovent  mcg 2 puffs BID. May consider Breztri in the future if maintenance inhaler therapy does not maintain symptoms well (insurance required step-therapy).  Reports that he has not received his nebulizer machine that  was ordered from [employer]. Contacted [employer] company and reports that they are going to deliver it to him.  Continue albuterol inhaler and nebulizer treatments every 4-6 hours as needed.     -     6 Minute Walk Test; Future    2. Morbid obesity with BMI of 45.0-49.9, adult  3. Nocturnal hypoxia  STOP-BANG score approximately 7 which puts patient at high risk for moderate to severe CYNTHIA.   Patient declined home sleep study due to cost. Offered to do in-lab sleep study but does not think he would be able to do this at this time so prefers to hold off for now.  Overnight pulse oximetry saturations </= 88% for approximately 10 hours with lowest saturation being 70% so was started on nocturnal oxygen through [facility].  6-MWT performed during visit in which oxygen saturations remained above 88% and did not qualify for portable oxygen supplies.    Management obstructive sleep apnea also includes lifestyle modifications including weight loss, avoidance of alcohol, sedated, caffeine especially before bedtime, allowing adequate sleep time, and body position during sleep such as side versus back. 10% weight loss can result in a 50% improvement of the apnea-hypopnea index. Untreated sleep apnea can lead to cardiovascular/metabolic disorder, neurocognitive deficit, daytime sleepiness, motor vehicle accidents, depression, mood disorders and reduced quality of life.  Patient understands the risk of untreated obstructive sleep apnea and benefit benefits of the treatment. Recommended at least 4 hours of use per night for 70% of every month (approximately 21 out of 30 days) per CMS guidelines.      4. Squamous cell carcinoma of bronchus of right lung  Patient underwent bronchoscopy on 08/22/2023 during hospitalization in which pathology revealed moderately differentiated squamous cell carcinoma of the lung. Also treated with empiric antibiotics for treatment of postobstructive pneumonia.   PET/CT imaging from 09/12/2023 revealed  right upper lobe parenchymal mass and/or postobstructive collapse measuring 9.9 cm with hypermetabolism, subcarinal 2.3 cm lymph node with hypermetabolism, right hilar region with hypermetabolism, and probable 2 cm right suprahilar clavicular lymph node with hypermetabolism.  MRI brain was did not reveal any parenchymal mass concerning for metastasis.  Currently follows with oncology and planning to start chemotherapy on September 14th following port placement on September 7th. Determined that due to current stage patient was not a good surgical candidate and planning to treat with chemotherapy and localized irradiation.    5. Cigarette nicotine dependence without complication  Dexter Flores  reports that he quit smoking about 4 months ago. His smoking use included cigarettes. He started smoking about 37 years ago. He has a 30.00 pack-year smoking history. He has never used smokeless tobacco.    I spent >40 minutes caring for Dexter on this date of service. This time includes time spent by me in the following activities:preparing for the visit, reviewing tests, obtaining and/or reviewing a separately obtained history, performing a medically appropriate examination and/or evaluation , counseling and educating the patient/family/caregiver, ordering medications, tests, or procedures, referring and communicating with other health care professionals , documenting information in the medical record, independently interpreting results and communicating that information with the patient/family/caregiver, and care coordination    Follow Up   Return in about 3 months (around 12/18/2023), or if symptoms worsen or fail to improve, for Next scheduled follow up.    Patient was given instructions and counseling regarding his condition or for health maintenance advice. Please see specific information pulled into the AVS if appropriate.       This document has been electronically signed by Carmen Quiroz PA-C   September 18, 2023  16:07 EDT    Dictated Utilizing Dragon Dictation: Part of this note may be an electronic transcription/translation of spoken language to printed text using the Dragon Dictation System.

## 2023-09-18 NOTE — PROGRESS NOTES
"COPD/Pulmonary Clinic Follow Up Encounter    NAME:___Dexter Flores  :_1970_  APPOINTMENT DATE/TIME:___________23___12:50 EDT___________    COPD Education Evaluation            COPD Education Completed (See Note) Yes     COPD Clinic encounter: 2nd    Referring/consulting Provider: Carmen Quiroz     Reason for Consultation:  COPD History     Objective:    Home Medications:  (Not in a hospital admission)    Barriers to Learning? No    COPD education given via the booklet \"A Patient's Guide to COPD\".     COPD Zones: (Green/yellow/Red): YES     Exacerbation or Flare up signs and symptoms: YES     Causes of COPD Exacerbation:      Lung Infection YES     Indoor and Outdoor Irratants YES     COPD Medication Non-Compliance YES     Healthy eating and drinking habbits: YES     Exercise and Activity: YES     Managing Medications: YES     Patient understands use of Rescue Medications: YES       Patient understands use of Maintenance Medications: YES       Proper MDI technique (w/wo Spacer): YES       How to use a nebulizer: YES     How to clean a nebulizer: YES     Breathing Techniques:       Purse-lipped breathing YES     Oxygen therapy SAFETY:  YES       Action Plan            COPD/Lung Health Clinic follow up scheduled: YES and 23 at 1PM     Education Minutes with patient: YES and 20 minutes       Goals Discussed with patient: Keep home smoke free.Stay smoke free. Exercise. Drink more water. Avoid COPD Exacerbation Triggers.       Comments: Mr. Flores completed a 6MWT today. His SPO2 did not decrease below 95% on room air. He had a slow pace but comfortable for him. He completed 6 15 meter laps. We discussed the COPD topics listed above. He has not been utilizing his COPD maintenance inhalers, (Stiolto,Flovent) accurately. We discussed he should use both inhaler BID, 2 puffs each time on both. Blisters he had in his mouth from Bevespi on his last visit are now absent. He will follow up with the COPD Clinic " in around 3 months on 12/18/23.    Thank you for allowing me to participate in his care,     ANNAMARIA Pike, RRT  COPD Navigator

## 2023-09-20 LAB
FUNGUS SPEC CULT: NORMAL
FUNGUS SPEC FUNGUS STN: NORMAL

## 2023-09-21 ENCOUNTER — TELEPHONE (OUTPATIENT)
Dept: ONCOLOGY | Facility: CLINIC | Age: 53
End: 2023-09-21
Payer: COMMERCIAL

## 2023-09-21 DIAGNOSIS — C34.91 SQUAMOUS CELL CARCINOMA OF BRONCHUS OF RIGHT LUNG: ICD-10-CM

## 2023-09-21 RX ORDER — ONDANSETRON HYDROCHLORIDE 8 MG/1
8 TABLET, FILM COATED ORAL EVERY 8 HOURS PRN
Qty: 30 TABLET | Refills: 2 | Status: SHIPPED | OUTPATIENT
Start: 2023-09-21

## 2023-09-21 RX ORDER — MORPHINE SULFATE 60 MG/1
60 TABLET, FILM COATED, EXTENDED RELEASE ORAL 2 TIMES DAILY
Qty: 60 TABLET | Refills: 0 | Status: SHIPPED | OUTPATIENT
Start: 2023-09-21

## 2023-09-21 NOTE — TELEPHONE ENCOUNTER
Caller: Dexter Flores    Relationship: Self    Best call back number: [phone]    What is the best time to reach you: ANY    Who are you requesting to speak with (clinical staff, provider,  specific staff member): CLNIICAL      What was the call regarding: PATIENT CALLED TO SPEAK TO DR JAY'S NURSE.     Is it okay if the provider responds through MyChart: YES

## 2023-09-22 ENCOUNTER — TELEPHONE (OUTPATIENT)
Dept: ONCOLOGY | Facility: CLINIC | Age: 53
End: 2023-09-22

## 2023-09-22 NOTE — TELEPHONE ENCOUNTER
Caller: Dexter Flores    Relationship: Self    Best call back number: [phone]    What is the best time to reach you: ASAP    Who are you requesting to speak with (clinical staff, provider,  specific staff member): CLINICAL    What was the call regarding: REQUESTING A CALL FROM DR BURR NURSE TO DISCUSS MEDICATION    Is it okay if the provider responds through MyChart: N/A

## 2023-09-25 ENCOUNTER — HOSPITAL ENCOUNTER (OUTPATIENT)
Dept: RADIATION ONCOLOGY | Facility: HOSPITAL | Age: 53
Discharge: HOME | End: 2023-09-25
Payer: COMMERCIAL

## 2023-09-25 ENCOUNTER — LAB (OUTPATIENT)
Dept: ONCOLOGY | Facility: HOSPITAL | Age: 53
End: 2023-09-25
Payer: COMMERCIAL

## 2023-09-25 ENCOUNTER — DOCUMENTATION (OUTPATIENT)
Dept: ONCOLOGY | Facility: HOSPITAL | Age: 53
End: 2023-09-25

## 2023-09-25 ENCOUNTER — INFUSION (OUTPATIENT)
Dept: ONCOLOGY | Facility: HOSPITAL | Age: 53
End: 2023-09-25

## 2023-09-25 VITALS
TEMPERATURE: 98 F | WEIGHT: 299.2 LBS | OXYGEN SATURATION: 98 % | HEART RATE: 89 BPM | SYSTOLIC BLOOD PRESSURE: 128 MMHG | DIASTOLIC BLOOD PRESSURE: 77 MMHG | BODY MASS INDEX: 45.49 KG/M2 | RESPIRATION RATE: 18 BRPM

## 2023-09-25 DIAGNOSIS — R52 PAIN: ICD-10-CM

## 2023-09-25 DIAGNOSIS — C34.91 SQUAMOUS CELL CARCINOMA OF BRONCHUS OF RIGHT LUNG: Primary | ICD-10-CM

## 2023-09-25 DIAGNOSIS — C34.91 SQUAMOUS CELL CARCINOMA OF BRONCHUS OF RIGHT LUNG: ICD-10-CM

## 2023-09-25 DIAGNOSIS — Z95.828 PORT-A-CATH IN PLACE: ICD-10-CM

## 2023-09-25 LAB
ALBUMIN SERPL-MCNC: 3.8 G/DL (ref 3.5–5.2)
ALBUMIN/GLOB SERPL: 1 G/DL
ALP SERPL-CCNC: 88 U/L (ref 39–117)
ALT SERPL W P-5'-P-CCNC: 13 U/L (ref 1–41)
ANION GAP SERPL CALCULATED.3IONS-SCNC: 8.6 MMOL/L (ref 5–15)
AST SERPL-CCNC: 15 U/L (ref 1–40)
BASOPHILS # BLD AUTO: 0.09 10*3/MM3 (ref 0–0.2)
BASOPHILS NFR BLD AUTO: 0.6 % (ref 0–1.5)
BILIRUB SERPL-MCNC: 0.2 MG/DL (ref 0–1.2)
BUN SERPL-MCNC: 16 MG/DL (ref 6–20)
BUN/CREAT SERPL: 19 (ref 7–25)
CALCIUM SPEC-SCNC: 9.5 MG/DL (ref 8.6–10.5)
CHLORIDE SERPL-SCNC: 97 MMOL/L (ref 98–107)
CO2 SERPL-SCNC: 31.4 MMOL/L (ref 22–29)
CREAT SERPL-MCNC: 0.84 MG/DL (ref 0.76–1.27)
DEPRECATED RDW RBC AUTO: 43.2 FL (ref 37–54)
EGFRCR SERPLBLD CKD-EPI 2021: 104.9 ML/MIN/1.73
EOSINOPHIL # BLD AUTO: 0.34 10*3/MM3 (ref 0–0.4)
EOSINOPHIL NFR BLD AUTO: 2.4 % (ref 0.3–6.2)
ERYTHROCYTE [DISTWIDTH] IN BLOOD BY AUTOMATED COUNT: 13.1 % (ref 12.3–15.4)
FUNGUS SPEC CULT: ABNORMAL
FUNGUS SPEC FUNGUS STN: ABNORMAL
GLOBULIN UR ELPH-MCNC: 4 GM/DL
GLUCOSE SERPL-MCNC: 161 MG/DL (ref 65–99)
HCT VFR BLD AUTO: 40.5 % (ref 37.5–51)
HGB BLD-MCNC: 12.9 G/DL (ref 13–17.7)
IMM GRANULOCYTES # BLD AUTO: 0.08 10*3/MM3 (ref 0–0.05)
IMM GRANULOCYTES NFR BLD AUTO: 0.6 % (ref 0–0.5)
LYMPHOCYTES # BLD AUTO: 1.98 10*3/MM3 (ref 0.7–3.1)
LYMPHOCYTES NFR BLD AUTO: 14.2 % (ref 19.6–45.3)
MCH RBC QN AUTO: 28.7 PG (ref 26.6–33)
MCHC RBC AUTO-ENTMCNC: 31.9 G/DL (ref 31.5–35.7)
MCV RBC AUTO: 90 FL (ref 79–97)
MONOCYTES # BLD AUTO: 1.12 10*3/MM3 (ref 0.1–0.9)
MONOCYTES NFR BLD AUTO: 8 % (ref 5–12)
NEUTROPHILS NFR BLD AUTO: 10.34 10*3/MM3 (ref 1.7–7)
NEUTROPHILS NFR BLD AUTO: 74.2 % (ref 42.7–76)
NRBC BLD AUTO-RTO: 0 /100 WBC (ref 0–0.2)
PLATELET # BLD AUTO: 483 10*3/MM3 (ref 140–450)
PMV BLD AUTO: 9.3 FL (ref 6–12)
POTASSIUM SERPL-SCNC: 4.4 MMOL/L (ref 3.5–5.2)
PROT SERPL-MCNC: 7.8 G/DL (ref 6–8.5)
RAD ONC ARIA COURSE ID: NORMAL
RAD ONC ARIA COURSE INTENT: NORMAL
RAD ONC ARIA COURSE LAST TREATMENT DATE: NORMAL
RAD ONC ARIA COURSE START DATE: NORMAL
RAD ONC ARIA COURSE TREATMENT ELAPSED DAYS: 0
RAD ONC ARIA FIRST TREATMENT DATE: NORMAL
RAD ONC ARIA PLAN FRACTIONS TREATED TO DATE: 1
RAD ONC ARIA PLAN ID: NORMAL
RAD ONC ARIA PLAN PRESCRIBED DOSE PER FRACTION: 2 GY
RAD ONC ARIA PLAN PRIMARY REFERENCE POINT: NORMAL
RAD ONC ARIA PLAN TOTAL FRACTIONS PRESCRIBED: 30
RAD ONC ARIA PLAN TOTAL PRESCRIBED DOSE: 6000 CGY
RAD ONC ARIA REFERENCE POINT DOSAGE GIVEN TO DATE: 2 GY
RAD ONC ARIA REFERENCE POINT ID: NORMAL
RAD ONC ARIA REFERENCE POINT SESSION DOSAGE GIVEN: 2 GY
RBC # BLD AUTO: 4.5 10*6/MM3 (ref 4.14–5.8)
SODIUM SERPL-SCNC: 137 MMOL/L (ref 136–145)
WBC NRBC COR # BLD: 13.95 10*3/MM3 (ref 3.4–10.8)

## 2023-09-25 PROCEDURE — 96417 CHEMO IV INFUS EACH ADDL SEQ: CPT

## 2023-09-25 PROCEDURE — 77386: CPT | Performed by: RADIOLOGY

## 2023-09-25 PROCEDURE — 25010000002 CARBOPLATIN PER 50 MG: Performed by: INTERNAL MEDICINE

## 2023-09-25 PROCEDURE — 25010000002 DIPHENHYDRAMINE PER 50 MG: Performed by: INTERNAL MEDICINE

## 2023-09-25 PROCEDURE — 96375 TX/PRO/DX INJ NEW DRUG ADDON: CPT

## 2023-09-25 PROCEDURE — 77427 RADIATION TX MANAGEMENT X5: CPT | Performed by: RADIOLOGY

## 2023-09-25 PROCEDURE — 25010000002 DEXAMETHASONE SODIUM PHOSPHATE 20 MG/5ML SOLUTION: Performed by: INTERNAL MEDICINE

## 2023-09-25 PROCEDURE — 85025 COMPLETE CBC W/AUTO DIFF WBC: CPT

## 2023-09-25 PROCEDURE — 77336 RADIATION PHYSICS CONSULT: CPT | Performed by: RADIOLOGY

## 2023-09-25 PROCEDURE — 25010000002 HEPARIN LOCK FLUSH PER 10 UNITS: Performed by: INTERNAL MEDICINE

## 2023-09-25 PROCEDURE — 96413 CHEMO IV INFUSION 1 HR: CPT

## 2023-09-25 PROCEDURE — 77014 CHG CT GUIDANCE RADIATION THERAPY FLDS PLACEMENT: CPT | Performed by: RADIOLOGY

## 2023-09-25 PROCEDURE — 25010000002 PALONOSETRON 0.25 MG/5ML SOLUTION PREFILLED SYRINGE: Performed by: INTERNAL MEDICINE

## 2023-09-25 PROCEDURE — 80053 COMPREHEN METABOLIC PANEL: CPT

## 2023-09-25 PROCEDURE — 25010000002 PACLITAXEL PER 1 MG: Performed by: INTERNAL MEDICINE

## 2023-09-25 RX ORDER — SODIUM CHLORIDE 9 MG/ML
250 INJECTION, SOLUTION INTRAVENOUS ONCE
Status: COMPLETED | OUTPATIENT
Start: 2023-09-25 | End: 2023-09-25

## 2023-09-25 RX ORDER — OXYCODONE AND ACETAMINOPHEN 10; 325 MG/1; MG/1
1 TABLET ORAL ONCE
Status: COMPLETED | OUTPATIENT
Start: 2023-09-25 | End: 2023-09-25

## 2023-09-25 RX ORDER — HEPARIN SODIUM (PORCINE) LOCK FLUSH IV SOLN 100 UNIT/ML 100 UNIT/ML
500 SOLUTION INTRAVENOUS AS NEEDED
OUTPATIENT
Start: 2023-09-25

## 2023-09-25 RX ORDER — FAMOTIDINE 10 MG/ML
20 INJECTION, SOLUTION INTRAVENOUS ONCE
Status: COMPLETED | OUTPATIENT
Start: 2023-09-25 | End: 2023-09-25

## 2023-09-25 RX ORDER — SODIUM CHLORIDE 0.9 % (FLUSH) 0.9 %
10 SYRINGE (ML) INJECTION AS NEEDED
OUTPATIENT
Start: 2023-09-25

## 2023-09-25 RX ORDER — PALONOSETRON 0.05 MG/ML
0.25 INJECTION, SOLUTION INTRAVENOUS ONCE
Status: COMPLETED | OUTPATIENT
Start: 2023-09-25 | End: 2023-09-25

## 2023-09-25 RX ORDER — HEPARIN SODIUM (PORCINE) LOCK FLUSH IV SOLN 100 UNIT/ML 100 UNIT/ML
500 SOLUTION INTRAVENOUS AS NEEDED
Status: DISCONTINUED | OUTPATIENT
Start: 2023-09-25 | End: 2023-09-25 | Stop reason: HOSPADM

## 2023-09-25 RX ORDER — SODIUM CHLORIDE 0.9 % (FLUSH) 0.9 %
10 SYRINGE (ML) INJECTION AS NEEDED
Status: DISCONTINUED | OUTPATIENT
Start: 2023-09-25 | End: 2023-09-25 | Stop reason: HOSPADM

## 2023-09-25 RX ADMIN — OXYCODONE AND ACETAMINOPHEN 1 TABLET: 10; 325 TABLET ORAL at 09:27

## 2023-09-25 RX ADMIN — SODIUM CHLORIDE 250 ML: 9 INJECTION, SOLUTION INTRAVENOUS at 09:05

## 2023-09-25 RX ADMIN — PACLITAXEL 120 MG: 6 INJECTION, SOLUTION, CONCENTRATE INTRAVENOUS at 10:21

## 2023-09-25 RX ADMIN — PALONOSETRON 0.25 MG: 0.25 INJECTION, SOLUTION INTRAVENOUS at 09:07

## 2023-09-25 RX ADMIN — FAMOTIDINE 20 MG: 10 INJECTION INTRAVENOUS at 09:08

## 2023-09-25 RX ADMIN — DEXAMETHASONE SODIUM PHOSPHATE 12 MG: 4 INJECTION, SOLUTION INTRA-ARTICULAR; INTRALESIONAL; INTRAMUSCULAR; INTRAVENOUS; SOFT TISSUE at 09:28

## 2023-09-25 RX ADMIN — CARBOPLATIN 300 MG: 600 INJECTION, SOLUTION INTRAVENOUS at 11:31

## 2023-09-25 RX ADMIN — Medication 10 ML: at 12:22

## 2023-09-25 RX ADMIN — HEPARIN 500 UNITS: 100 SYRINGE at 12:22

## 2023-09-25 RX ADMIN — DIPHENHYDRAMINE HYDROCHLORIDE 50 MG: 50 INJECTION, SOLUTION INTRAMUSCULAR; INTRAVENOUS at 09:11

## 2023-09-25 NOTE — PROGRESS NOTES
Patient is 53 y.o. white female diagnosed with Squamous cell carcinoma of bronchus of right lung diagnosed in mid August 2023.    Patient in clinic on 9/25/2023 for new chemo therapy regimen.    SS received referral for psychotherapy assessment per Jaimie Dorman RN.    Oncology Distress Level 4-7  Received: Today  Jaimie Dorman RN Taylor, Pamela F, MSW    Ambulatory Referral to ONC Social Work [[id number]]    Electronically signed by: Delfina Gaxiola APRN on 09/25/23 0859 Status: Active   Mode: Ordering in Telephone with readback mode Communicated by: Jaimie Dorman RN   Ordering user: Jaimie Dorman RN 09/25/23 0829 Ordering provider: Delfina Gaxiola APRN   Authorized by: Delfina Gaxiola APRN   Frequency:  09/25/23 -     Diagnoses  Squamous cell carcinoma of bronchus of right lung [C34.91]   Questionnaire    Question Answer   Follow-up needed: Yes Comment - New Oncology patient distress level 4, pt reports already speaking with ANDREI Lawton CSW     Patient is 53 y.o. 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     Patient in medical oncology clinic. Patient is alert and oriented times three. Patient has history of hypertension, COPD and tobacco abuse.    SS contacted patient's home and spoke with patient, brother Magno, and americoece Tara [phone].      Role of oncology social worker introduced to patient, brother, and niece Tara.     Patient lives at home with brother Magno and niece Tara.     Patient doesn't utilize any home health or durable medical equipment.     Patient has two children: son Henrique, and daughter Sheeba.     SS received referral for assistance with transportation per Dr. Rizzo.     SS spoke with brother and niece about transportation needs. Brother and niece verbalize that they only have one vehicle that they share as a family, therefore limiting times for appointments. Brother, Magno, is  "the only family member that drives and provides transportation for family.      Niece verbalized that she works in the morning hours between 8:30 am and 9:30 am and as long as patient is scheduled after 10:30, transportation isn't an issue.     Patient to be receiving chemotherapy and radiation.     Patient lives in [facility] at [address] [place] [id number].     Patient's primary care provider is Jose F Reynolds [phone].     Advance Care Planning:  The patient and I discussed care planning \"Conversations that Matter.\" This service was offered, free of charge, for development of advance directives with a certified ACP facilitator. The patient does not have an up-to-date advance directive. The patient is not interested in an appointment with one of our facilitators to create or update their advanced directives.     Distress Screening Follow-up    Diagnosis: Squamous cell carcinoma of bronchus of right lung diagnosed in mid August 2023    Location of Distress Screening: Medical Oncology    Distress Level: 4 (Delfina JHA aware of distress screening, no new orders received, pt given ANDREI Lawton baseclickW card he reports already speaking with her.) (9/25/2023  8:27 AM)    Financial Needs: -- (Patient has no income. SS provided patient, niece, and brother with phone number for social security office to contact to schedule appointment for patient to apply for social security disability.)  Transportation Needs: public transportation (SS received referral per Dr Rizzo regarding patient having transportation limitations. Family shares one vehicle, limiting times for appointments. SS to assist as needed)  Emotional Needs: emotional suppport/coping strategies (Time spent talking with patient, empathetic listening provided, and reassurance given.    SS offered supportive counseling services but patient declines at this time.)  Jehovah's witness Needs: other (comments) (Buddhist not discussed this date)  Physical " Needs: -- (Patient scheduled with Pulmonary Clinic and surgeons office for power port evaluation. Due to transportation issues, SS contacted surgery ext [phone] per Maru. Appt. time changed to accomadate both appts on the same day.))  Palliative Care: advance care planning (Palliative Care: advance care planning (Patient doesn't have a living will or power of )  Practical Needs: other (see comments) (Patient lives with brother and niece. Family provides daily needs for patient as needed.)    SS provided patient with contact information and encouraged patient to call with any questions, concerns, or needs.      Patient verbalized understanding and is in agreement with resource assistance and plan.     SS will follow and assist as needed.

## 2023-09-26 ENCOUNTER — HOSPITAL ENCOUNTER (OUTPATIENT)
Dept: RADIATION ONCOLOGY | Facility: HOSPITAL | Age: 53
Discharge: HOME | End: 2023-09-26
Payer: COMMERCIAL

## 2023-09-26 ENCOUNTER — HOSPITAL ENCOUNTER (OUTPATIENT)
Dept: RADIATION ONCOLOGY | Facility: HOSPITAL | Age: 53
Discharge: HOME | End: 2023-09-26

## 2023-09-26 VITALS
RESPIRATION RATE: 18 BRPM | HEART RATE: 93 BPM | TEMPERATURE: 98.8 F | OXYGEN SATURATION: 98 % | DIASTOLIC BLOOD PRESSURE: 94 MMHG | WEIGHT: 305 LBS | SYSTOLIC BLOOD PRESSURE: 139 MMHG | BODY MASS INDEX: 46.38 KG/M2

## 2023-09-26 DIAGNOSIS — C34.91 SQUAMOUS CELL CARCINOMA OF BRONCHUS OF RIGHT LUNG: Primary | ICD-10-CM

## 2023-09-26 LAB
RAD ONC ARIA COURSE ID: NORMAL
RAD ONC ARIA COURSE INTENT: NORMAL
RAD ONC ARIA COURSE LAST TREATMENT DATE: NORMAL
RAD ONC ARIA COURSE START DATE: NORMAL
RAD ONC ARIA COURSE TREATMENT ELAPSED DAYS: 1
RAD ONC ARIA FIRST TREATMENT DATE: NORMAL
RAD ONC ARIA PLAN FRACTIONS TREATED TO DATE: 2
RAD ONC ARIA PLAN ID: NORMAL
RAD ONC ARIA PLAN PRESCRIBED DOSE PER FRACTION: 2 GY
RAD ONC ARIA PLAN PRIMARY REFERENCE POINT: NORMAL
RAD ONC ARIA PLAN TOTAL FRACTIONS PRESCRIBED: 30
RAD ONC ARIA PLAN TOTAL PRESCRIBED DOSE: 6000 CGY
RAD ONC ARIA REFERENCE POINT DOSAGE GIVEN TO DATE: 4 GY
RAD ONC ARIA REFERENCE POINT ID: NORMAL
RAD ONC ARIA REFERENCE POINT SESSION DOSAGE GIVEN: 2 GY

## 2023-09-26 PROCEDURE — 77386: CPT | Performed by: RADIOLOGY

## 2023-09-26 PROCEDURE — 77014 CHG CT GUIDANCE RADIATION THERAPY FLDS PLACEMENT: CPT | Performed by: RADIOLOGY

## 2023-09-26 NOTE — PROGRESS NOTES
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  The patient is receiving concurrent carboplatin and Taxol chemotherapy.    This patient was seen today for an on treatment visit.  To date, the patient has received 400 cGy of a 6000 cGy VMAT regimen.    Mr. Flores is tolerating treatment well.  He has no side effects to report from radiotherapy.  Patient states that his appetite and energy levels are good.    Radiation Treatments       Active   Plans   Rt Lung   Most recent treatment: Dose planned: 200 cGy (fraction 2 on 2023)   Total: Dose planned: 6,000 cGy (30 fractions)   Elapsed Days: 1      Reference Points   PTV   Most recent treatment: Dose given: 200 cGy (on 2023)   Total: Dose given: 400 cGy   Elapsed Days: 1           Historical   No historical radiation treatments to show.              Subjective      Review of Systems:   A 14 point review of systems was negative.    Medications:     Current Outpatient Medications:     albuterol (PROVENTIL) (2.5 MG/3ML) 0.083% nebulizer solution, Take 2.5 mg by nebulization Every 6 (Six) Hours As Needed for Wheezing., Disp: , Rfl:     albuterol sulfate  (90 Base) MCG/ACT inhaler, Inhale 2 puffs Every 4 (Four) Hours As Needed for Wheezing., Disp: 18 g, Rfl: 11    fluticasone (FLOVENT HFA) 110 MCG/ACT inhaler, Inhale 2 puffs 2 (Two) Times a Day., Disp: 12 g, Rfl: 11    gabapentin (NEURONTIN) 600 MG tablet, Take 800 mg by mouth 3 (Three) Times a Day., Disp: , Rfl:     hydroCHLOROthiazide (HYDRODIURIL) 12.5 MG tablet, Take 1 tablet by mouth Daily., Disp: , Rfl:     levothyroxine (SYNTHROID, LEVOTHROID) 50 MCG tablet, Take 1 tablet by mouth Daily., Disp: ,  Rfl:     lidocaine-prilocaine (EMLA) 2.5-2.5 % cream, Apply 1 application  topically to the appropriate area as directed Every 2 (Two) Hours As Needed for Mild Pain. Apply to Port-A-Cath site 30 minutes prior to arrival at infusion clinic., Disp: 30 g, Rfl: 1    lisinopril (PRINIVIL,ZESTRIL) 10 MG tablet, Take 1 tablet by mouth Daily., Disp: , Rfl:     LORazepam (ATIVAN) 1 MG tablet, Take 1 tablet by mouth Every 8 (Eight) Hours As Needed for Anxiety., Disp: 90 tablet, Rfl: 0    montelukast (SINGULAIR) 10 MG tablet, Take 1 tablet by mouth Every Night., Disp: , Rfl:     Morphine (MS CONTIN) 60 MG 12 hr tablet, Take 1 tablet by mouth 2 (Two) Times a Day., Disp: 60 tablet, Rfl: 0    naloxone (NARCAN) 4 MG/0.1ML nasal spray, Call 911. Don't prime. [place] in 1 nostril for overdose. Repeat in 2-3 minutes in other nostril if no or minimal breathing/responsiveness., Disp: 2 each, Rfl: 0    ondansetron (ZOFRAN) 8 MG tablet, Take 1 tablet by mouth Every 8 (Eight) Hours As Needed for Nausea or Vomiting., Disp: 30 tablet, Rfl: 2    oxyCODONE-acetaminophen (PERCOCET)  MG per tablet, Take 1 tablet by mouth Every 6 (Six) Hours As Needed for Moderate Pain., Disp: 120 tablet, Rfl: 0    pantoprazole (PROTONIX) 40 MG EC tablet, Take 1 tablet by mouth Daily., Disp: , Rfl:     prochlorperazine (COMPAZINE) 10 MG tablet, Take 1 tablet by mouth Every 6 (Six) Hours As Needed for Nausea or Vomiting., Disp: 60 tablet, Rfl: 1    sennosides-docusate (senna-docusate sodium) 8.6-50 MG per tablet, Take 1 tablet by mouth Daily., Disp: 30 tablet, Rfl: 2    tiotropium bromide-olodaterol (STIOLTO RESPIMAT) 2.5-2.5 MCG/ACT aerosol solution inhaler, Inhale 2 puffs Daily., Disp: 4 g, Rfl: 11    traZODone (DESYREL) 100 MG tablet, Take 1 tablet by mouth Every Night., Disp: , Rfl:     vitamin D (ERGOCALCIFEROL) 1.25 MG (24311 UT) capsule capsule, Take 1 capsule by mouth 1 (One) Time Per Week., Disp: , Rfl:   No current facility-administered medications  for this encounter.    Allergies:   Allergies   Allergen Reactions    Bevespi Aerosphere [Glycopyrrolate-Formoterol] Other (See Comments)     Developed blisters around mouth. Not a listed adverse effect of medication so unsure if this is related to inhaler. Discontinued as precaution.     Objective     Physical Exam:  Physical examination was not done today due to the recent treatment start and absence of radiation therapy associated side effects.    Vital Signs:   Vitals:    09/26/23 1114   BP: 139/94   Pulse: 93   Resp: 18   Temp: 98.8 °F (37.1 °C)   TempSrc: Temporal   SpO2: 98%   Weight: (!) 138 kg (305 lb)   PainSc:   6   PainLoc: Generalized     Body mass index is 46.38 kg/m².     Current Total XRT Dose (cGY):    Radiation Treatments       Active   Plans   Rt Lung   Most recent treatment: Dose planned: 200 cGy (fraction 2 on 9/26/2023)   Total: Dose planned: 6,000 cGy (30 fractions)   Elapsed Days: 1      Reference Points   PTV   Most recent treatment: Dose given: 200 cGy (on 9/26/2023)   Total: Dose given: 400 cGy   Elapsed Days: 1           Historical   No historical radiation treatments to show.              Plan      Plan:   Patient was seen today for an on treatment visit. Patient is receiving radiation therapy to the right upper lung malignancy and to sites of lymphatic metastases.. Patient is stable and tolerating radiation therapy well with minimal side effects. Continue with radiation therapy.     I have reviewed treatment setup notes, checked and approved the daily guidance images. I reviewed dose delivery, treatment parameters and deemed them appropriate. We plan to continue radiation therapy as prescribed.     Digital speech recognition software was used to dictate parts of this note, some dictation errors may occur.    Sriram Correa MD   09/26/23 11:18 EDT

## 2023-09-27 ENCOUNTER — HOSPITAL ENCOUNTER (OUTPATIENT)
Dept: RADIATION ONCOLOGY | Facility: HOSPITAL | Age: 53
Discharge: HOME | End: 2023-09-27
Payer: COMMERCIAL

## 2023-09-27 LAB
RAD ONC ARIA COURSE ID: NORMAL
RAD ONC ARIA COURSE INTENT: NORMAL
RAD ONC ARIA COURSE LAST TREATMENT DATE: NORMAL
RAD ONC ARIA COURSE START DATE: NORMAL
RAD ONC ARIA COURSE TREATMENT ELAPSED DAYS: 2
RAD ONC ARIA FIRST TREATMENT DATE: NORMAL
RAD ONC ARIA PLAN FRACTIONS TREATED TO DATE: 3
RAD ONC ARIA PLAN ID: NORMAL
RAD ONC ARIA PLAN PRESCRIBED DOSE PER FRACTION: 2 GY
RAD ONC ARIA PLAN PRIMARY REFERENCE POINT: NORMAL
RAD ONC ARIA PLAN TOTAL FRACTIONS PRESCRIBED: 30
RAD ONC ARIA PLAN TOTAL PRESCRIBED DOSE: 6000 CGY
RAD ONC ARIA REFERENCE POINT DOSAGE GIVEN TO DATE: 6 GY
RAD ONC ARIA REFERENCE POINT ID: NORMAL
RAD ONC ARIA REFERENCE POINT SESSION DOSAGE GIVEN: 2 GY

## 2023-09-27 PROCEDURE — 77014 CHG CT GUIDANCE RADIATION THERAPY FLDS PLACEMENT: CPT | Performed by: RADIOLOGY

## 2023-09-27 PROCEDURE — 77386: CPT | Performed by: RADIOLOGY

## 2023-09-28 ENCOUNTER — TELEPHONE (OUTPATIENT)
Dept: ONCOLOGY | Facility: CLINIC | Age: 53
End: 2023-09-28
Payer: COMMERCIAL

## 2023-09-28 ENCOUNTER — HOSPITAL ENCOUNTER (OUTPATIENT)
Dept: RADIATION ONCOLOGY | Facility: HOSPITAL | Age: 53
Discharge: HOME | End: 2023-09-28
Payer: COMMERCIAL

## 2023-09-28 DIAGNOSIS — R91.8 MASS OF RIGHT LUNG: ICD-10-CM

## 2023-09-28 LAB
RAD ONC ARIA COURSE ID: NORMAL
RAD ONC ARIA COURSE INTENT: NORMAL
RAD ONC ARIA COURSE LAST TREATMENT DATE: NORMAL
RAD ONC ARIA COURSE START DATE: NORMAL
RAD ONC ARIA COURSE TREATMENT ELAPSED DAYS: 3
RAD ONC ARIA FIRST TREATMENT DATE: NORMAL
RAD ONC ARIA PLAN FRACTIONS TREATED TO DATE: 4
RAD ONC ARIA PLAN ID: NORMAL
RAD ONC ARIA PLAN PRESCRIBED DOSE PER FRACTION: 2 GY
RAD ONC ARIA PLAN PRIMARY REFERENCE POINT: NORMAL
RAD ONC ARIA PLAN TOTAL FRACTIONS PRESCRIBED: 30
RAD ONC ARIA PLAN TOTAL PRESCRIBED DOSE: 6000 CGY
RAD ONC ARIA REFERENCE POINT DOSAGE GIVEN TO DATE: 8 GY
RAD ONC ARIA REFERENCE POINT ID: NORMAL
RAD ONC ARIA REFERENCE POINT SESSION DOSAGE GIVEN: 2 GY

## 2023-09-28 PROCEDURE — 77386: CPT | Performed by: RADIOLOGY

## 2023-09-28 PROCEDURE — 77014 CHG CT GUIDANCE RADIATION THERAPY FLDS PLACEMENT: CPT | Performed by: RADIOLOGY

## 2023-09-28 RX ORDER — OXYCODONE AND ACETAMINOPHEN 10; 325 MG/1; MG/1
1 TABLET ORAL EVERY 6 HOURS PRN
Qty: 120 TABLET | Refills: 0 | Status: SHIPPED | OUTPATIENT
Start: 2023-09-28

## 2023-09-28 NOTE — TELEPHONE ENCOUNTER
Caller: Dexter Flores    Relationship: Self    Best call back number:  [phone]    What is the best time to reach you: ANYTIME    Who are you requesting to speak with (clinical staff, provider,  specific staff member): NURSE    Do you know the name of the person who called:     What was the call regarding: PATIENT NEEDS TO SPEAK TO DR. JAY'S NURSE REGARDING HIS OXYCODONE REFILL, HE NEEDS TO MAKE SURE THAT YOU HAVE CALLED IT IN AND THAT HE WILL NOT HAVE A COPAY AS HE DOES NOT HAVE ANY INCOME AT THIS TIME?  NEEDS TO GO TO [facility] ON [address]    CALL TO DISCUSS     Is it okay if the provider responds through [facility]:

## 2023-09-29 ENCOUNTER — HOSPITAL ENCOUNTER (OUTPATIENT)
Dept: RADIATION ONCOLOGY | Facility: HOSPITAL | Age: 53
Discharge: HOME | End: 2023-09-29
Payer: COMMERCIAL

## 2023-09-29 LAB
RAD ONC ARIA COURSE ID: NORMAL
RAD ONC ARIA COURSE INTENT: NORMAL
RAD ONC ARIA COURSE LAST TREATMENT DATE: NORMAL
RAD ONC ARIA COURSE START DATE: NORMAL
RAD ONC ARIA COURSE TREATMENT ELAPSED DAYS: 4
RAD ONC ARIA FIRST TREATMENT DATE: NORMAL
RAD ONC ARIA PLAN FRACTIONS TREATED TO DATE: 5
RAD ONC ARIA PLAN ID: NORMAL
RAD ONC ARIA PLAN PRESCRIBED DOSE PER FRACTION: 2 GY
RAD ONC ARIA PLAN PRIMARY REFERENCE POINT: NORMAL
RAD ONC ARIA PLAN TOTAL FRACTIONS PRESCRIBED: 30
RAD ONC ARIA PLAN TOTAL PRESCRIBED DOSE: 6000 CGY
RAD ONC ARIA REFERENCE POINT DOSAGE GIVEN TO DATE: 10 GY
RAD ONC ARIA REFERENCE POINT ID: NORMAL
RAD ONC ARIA REFERENCE POINT SESSION DOSAGE GIVEN: 2 GY

## 2023-09-29 PROCEDURE — 77014 CHG CT GUIDANCE RADIATION THERAPY FLDS PLACEMENT: CPT | Performed by: RADIOLOGY

## 2023-09-29 PROCEDURE — 77386: CPT | Performed by: RADIOLOGY

## 2023-10-02 ENCOUNTER — HOSPITAL ENCOUNTER (OUTPATIENT)
Dept: RADIATION ONCOLOGY | Facility: HOSPITAL | Age: 53
Discharge: HOME | End: 2023-10-02
Payer: COMMERCIAL

## 2023-10-02 ENCOUNTER — HOSPITAL ENCOUNTER (OUTPATIENT)
Dept: RADIATION ONCOLOGY | Facility: HOSPITAL | Age: 53
Setting detail: RADIATION/ONCOLOGY SERIES
End: 2023-10-02
Payer: COMMERCIAL

## 2023-10-02 LAB
RAD ONC ARIA COURSE ID: NORMAL
RAD ONC ARIA COURSE INTENT: NORMAL
RAD ONC ARIA COURSE LAST TREATMENT DATE: NORMAL
RAD ONC ARIA COURSE START DATE: NORMAL
RAD ONC ARIA COURSE TREATMENT ELAPSED DAYS: 7
RAD ONC ARIA FIRST TREATMENT DATE: NORMAL
RAD ONC ARIA PLAN FRACTIONS TREATED TO DATE: 6
RAD ONC ARIA PLAN ID: NORMAL
RAD ONC ARIA PLAN PRESCRIBED DOSE PER FRACTION: 2 GY
RAD ONC ARIA PLAN PRIMARY REFERENCE POINT: NORMAL
RAD ONC ARIA PLAN TOTAL FRACTIONS PRESCRIBED: 30
RAD ONC ARIA PLAN TOTAL PRESCRIBED DOSE: 6000 CGY
RAD ONC ARIA REFERENCE POINT DOSAGE GIVEN TO DATE: 12 GY
RAD ONC ARIA REFERENCE POINT ID: NORMAL
RAD ONC ARIA REFERENCE POINT SESSION DOSAGE GIVEN: 2 GY

## 2023-10-02 PROCEDURE — 77427 RADIATION TX MANAGEMENT X5: CPT | Performed by: RADIOLOGY

## 2023-10-02 PROCEDURE — 77386: CPT | Performed by: RADIOLOGY

## 2023-10-02 PROCEDURE — 77014 CHG CT GUIDANCE RADIATION THERAPY FLDS PLACEMENT: CPT | Performed by: RADIOLOGY

## 2023-10-03 ENCOUNTER — OFFICE VISIT (OUTPATIENT)
Dept: ONCOLOGY | Facility: CLINIC | Age: 53
End: 2023-10-03
Payer: COMMERCIAL

## 2023-10-03 ENCOUNTER — INFUSION (OUTPATIENT)
Dept: ONCOLOGY | Facility: HOSPITAL | Age: 53
End: 2023-10-03
Payer: COMMERCIAL

## 2023-10-03 ENCOUNTER — LAB (OUTPATIENT)
Dept: ONCOLOGY | Facility: CLINIC | Age: 53
End: 2023-10-03
Payer: COMMERCIAL

## 2023-10-03 ENCOUNTER — DOCUMENTATION (OUTPATIENT)
Dept: ONCOLOGY | Facility: HOSPITAL | Age: 53
End: 2023-10-03
Payer: COMMERCIAL

## 2023-10-03 ENCOUNTER — HOSPITAL ENCOUNTER (OUTPATIENT)
Dept: RADIATION ONCOLOGY | Facility: HOSPITAL | Age: 53
Discharge: HOME | End: 2023-10-03

## 2023-10-03 VITALS
OXYGEN SATURATION: 96 % | WEIGHT: 295.8 LBS | SYSTOLIC BLOOD PRESSURE: 144 MMHG | BODY MASS INDEX: 44.98 KG/M2 | HEART RATE: 102 BPM | DIASTOLIC BLOOD PRESSURE: 85 MMHG | TEMPERATURE: 97.4 F | RESPIRATION RATE: 18 BRPM

## 2023-10-03 VITALS
HEIGHT: 68 IN | BODY MASS INDEX: 44.83 KG/M2 | SYSTOLIC BLOOD PRESSURE: 125 MMHG | DIASTOLIC BLOOD PRESSURE: 85 MMHG | TEMPERATURE: 96.9 F | WEIGHT: 295.8 LBS | HEART RATE: 99 BPM | OXYGEN SATURATION: 94 % | RESPIRATION RATE: 18 BRPM

## 2023-10-03 VITALS
SYSTOLIC BLOOD PRESSURE: 144 MMHG | TEMPERATURE: 97.4 F | DIASTOLIC BLOOD PRESSURE: 85 MMHG | HEART RATE: 102 BPM | OXYGEN SATURATION: 96 % | RESPIRATION RATE: 18 BRPM

## 2023-10-03 DIAGNOSIS — C34.91 SQUAMOUS CELL CARCINOMA OF BRONCHUS OF RIGHT LUNG: ICD-10-CM

## 2023-10-03 DIAGNOSIS — C34.91 SQUAMOUS CELL CARCINOMA OF BRONCHUS OF RIGHT LUNG: Primary | ICD-10-CM

## 2023-10-03 DIAGNOSIS — Z95.828 PORT-A-CATH IN PLACE: ICD-10-CM

## 2023-10-03 LAB
ALBUMIN SERPL-MCNC: 3.5 G/DL (ref 3.5–5.2)
ALBUMIN/GLOB SERPL: 0.9 G/DL
ALP SERPL-CCNC: 72 U/L (ref 39–117)
ALT SERPL W P-5'-P-CCNC: 9 U/L (ref 1–41)
ANION GAP SERPL CALCULATED.3IONS-SCNC: 10 MMOL/L (ref 5–15)
AST SERPL-CCNC: 15 U/L (ref 1–40)
BASOPHILS # BLD AUTO: 0.04 10*3/MM3 (ref 0–0.2)
BASOPHILS NFR BLD AUTO: 0.4 % (ref 0–1.5)
BILIRUB SERPL-MCNC: 0.5 MG/DL (ref 0–1.2)
BUN SERPL-MCNC: 16 MG/DL (ref 6–20)
BUN/CREAT SERPL: 19.3 (ref 7–25)
CALCIUM SPEC-SCNC: 9.5 MG/DL (ref 8.6–10.5)
CHLORIDE SERPL-SCNC: 100 MMOL/L (ref 98–107)
CO2 SERPL-SCNC: 27 MMOL/L (ref 22–29)
CREAT SERPL-MCNC: 0.83 MG/DL (ref 0.76–1.27)
DEPRECATED RDW RBC AUTO: 45.6 FL (ref 37–54)
EGFRCR SERPLBLD CKD-EPI 2021: 105.3 ML/MIN/1.73
EOSINOPHIL # BLD AUTO: 0.25 10*3/MM3 (ref 0–0.4)
EOSINOPHIL NFR BLD AUTO: 2.4 % (ref 0.3–6.2)
ERYTHROCYTE [DISTWIDTH] IN BLOOD BY AUTOMATED COUNT: 13.7 % (ref 12.3–15.4)
GLOBULIN UR ELPH-MCNC: 3.9 GM/DL
GLUCOSE SERPL-MCNC: 133 MG/DL (ref 65–99)
HCT VFR BLD AUTO: 41.4 % (ref 37.5–51)
HGB BLD-MCNC: 12.7 G/DL (ref 13–17.7)
IMM GRANULOCYTES # BLD AUTO: 0.05 10*3/MM3 (ref 0–0.05)
IMM GRANULOCYTES NFR BLD AUTO: 0.5 % (ref 0–0.5)
LYMPHOCYTES # BLD AUTO: 1.01 10*3/MM3 (ref 0.7–3.1)
LYMPHOCYTES NFR BLD AUTO: 9.5 % (ref 19.6–45.3)
MCH RBC QN AUTO: 28.2 PG (ref 26.6–33)
MCHC RBC AUTO-ENTMCNC: 30.7 G/DL (ref 31.5–35.7)
MCV RBC AUTO: 91.8 FL (ref 79–97)
MONOCYTES # BLD AUTO: 0.73 10*3/MM3 (ref 0.1–0.9)
MONOCYTES NFR BLD AUTO: 6.9 % (ref 5–12)
NEUTROPHILS NFR BLD AUTO: 8.51 10*3/MM3 (ref 1.7–7)
NEUTROPHILS NFR BLD AUTO: 80.3 % (ref 42.7–76)
NRBC BLD AUTO-RTO: 0 /100 WBC (ref 0–0.2)
PLATELET # BLD AUTO: 389 10*3/MM3 (ref 140–450)
PMV BLD AUTO: 9.2 FL (ref 6–12)
POTASSIUM SERPL-SCNC: 5.1 MMOL/L (ref 3.5–5.2)
PROT SERPL-MCNC: 7.4 G/DL (ref 6–8.5)
RAD ONC ARIA COURSE ID: NORMAL
RAD ONC ARIA COURSE INTENT: NORMAL
RAD ONC ARIA COURSE LAST TREATMENT DATE: NORMAL
RAD ONC ARIA COURSE START DATE: NORMAL
RAD ONC ARIA COURSE TREATMENT ELAPSED DAYS: 8
RAD ONC ARIA FIRST TREATMENT DATE: NORMAL
RAD ONC ARIA PLAN FRACTIONS TREATED TO DATE: 7
RAD ONC ARIA PLAN ID: NORMAL
RAD ONC ARIA PLAN PRESCRIBED DOSE PER FRACTION: 2 GY
RAD ONC ARIA PLAN PRIMARY REFERENCE POINT: NORMAL
RAD ONC ARIA PLAN TOTAL FRACTIONS PRESCRIBED: 30
RAD ONC ARIA PLAN TOTAL PRESCRIBED DOSE: 6000 CGY
RAD ONC ARIA REFERENCE POINT DOSAGE GIVEN TO DATE: 14 GY
RAD ONC ARIA REFERENCE POINT ID: NORMAL
RAD ONC ARIA REFERENCE POINT SESSION DOSAGE GIVEN: 2 GY
RBC # BLD AUTO: 4.51 10*6/MM3 (ref 4.14–5.8)
SODIUM SERPL-SCNC: 137 MMOL/L (ref 136–145)
WBC NRBC COR # BLD: 10.59 10*3/MM3 (ref 3.4–10.8)

## 2023-10-03 PROCEDURE — 96375 TX/PRO/DX INJ NEW DRUG ADDON: CPT

## 2023-10-03 PROCEDURE — 77386: CPT | Performed by: RADIOLOGY

## 2023-10-03 PROCEDURE — 25010000002 CARBOPLATIN PER 50 MG: Performed by: INTERNAL MEDICINE

## 2023-10-03 PROCEDURE — 25010000002 PALONOSETRON 0.25 MG/5ML SOLUTION PREFILLED SYRINGE: Performed by: INTERNAL MEDICINE

## 2023-10-03 PROCEDURE — 80053 COMPREHEN METABOLIC PANEL: CPT | Performed by: INTERNAL MEDICINE

## 2023-10-03 PROCEDURE — 25810000003 SODIUM CHLORIDE 0.9 % SOLUTION: Performed by: INTERNAL MEDICINE

## 2023-10-03 PROCEDURE — 96417 CHEMO IV INFUS EACH ADDL SEQ: CPT

## 2023-10-03 PROCEDURE — 99214 OFFICE O/P EST MOD 30 MIN: CPT | Performed by: INTERNAL MEDICINE

## 2023-10-03 PROCEDURE — 25010000002 PROCHLORPERAZINE 10 MG/2ML SOLUTION: Performed by: INTERNAL MEDICINE

## 2023-10-03 PROCEDURE — 25010000002 PACLITAXEL PER 1 MG: Performed by: INTERNAL MEDICINE

## 2023-10-03 PROCEDURE — 25010000002 DIPHENHYDRAMINE PER 50 MG: Performed by: INTERNAL MEDICINE

## 2023-10-03 PROCEDURE — 85025 COMPLETE CBC W/AUTO DIFF WBC: CPT | Performed by: INTERNAL MEDICINE

## 2023-10-03 PROCEDURE — 1125F AMNT PAIN NOTED PAIN PRSNT: CPT | Performed by: INTERNAL MEDICINE

## 2023-10-03 PROCEDURE — 25010000002 HEPARIN LOCK FLUSH PER 10 UNITS: Performed by: INTERNAL MEDICINE

## 2023-10-03 PROCEDURE — 96413 CHEMO IV INFUSION 1 HR: CPT

## 2023-10-03 PROCEDURE — 77014 CHG CT GUIDANCE RADIATION THERAPY FLDS PLACEMENT: CPT | Performed by: RADIOLOGY

## 2023-10-03 PROCEDURE — 25010000002 DEXAMETHASONE SODIUM PHOSPHATE 20 MG/5ML SOLUTION: Performed by: INTERNAL MEDICINE

## 2023-10-03 PROCEDURE — 25810000003 SODIUM CHLORIDE 0.9 % SOLUTION 250 ML FLEX CONT: Performed by: INTERNAL MEDICINE

## 2023-10-03 PROCEDURE — 77336 RADIATION PHYSICS CONSULT: CPT | Performed by: RADIOLOGY

## 2023-10-03 RX ORDER — SODIUM CHLORIDE 0.9 % (FLUSH) 0.9 %
10 SYRINGE (ML) INJECTION AS NEEDED
Status: DISCONTINUED | OUTPATIENT
Start: 2023-10-03 | End: 2023-10-03 | Stop reason: HOSPADM

## 2023-10-03 RX ORDER — FAMOTIDINE 10 MG/ML
20 INJECTION, SOLUTION INTRAVENOUS AS NEEDED
OUTPATIENT
Start: 2023-11-07

## 2023-10-03 RX ORDER — DIPHENHYDRAMINE HYDROCHLORIDE 50 MG/ML
50 INJECTION INTRAMUSCULAR; INTRAVENOUS AS NEEDED
OUTPATIENT
Start: 2023-10-31

## 2023-10-03 RX ORDER — DIPHENHYDRAMINE HYDROCHLORIDE 50 MG/ML
50 INJECTION INTRAMUSCULAR; INTRAVENOUS AS NEEDED
OUTPATIENT
Start: 2023-10-24

## 2023-10-03 RX ORDER — PALONOSETRON 0.05 MG/ML
0.25 INJECTION, SOLUTION INTRAVENOUS ONCE
Status: COMPLETED | OUTPATIENT
Start: 2023-10-03 | End: 2023-10-03

## 2023-10-03 RX ORDER — FAMOTIDINE 10 MG/ML
20 INJECTION, SOLUTION INTRAVENOUS ONCE
OUTPATIENT
Start: 2023-10-31

## 2023-10-03 RX ORDER — HEPARIN SODIUM (PORCINE) LOCK FLUSH IV SOLN 100 UNIT/ML 100 UNIT/ML
500 SOLUTION INTRAVENOUS AS NEEDED
OUTPATIENT
Start: 2023-10-03

## 2023-10-03 RX ORDER — FAMOTIDINE 10 MG/ML
20 INJECTION, SOLUTION INTRAVENOUS AS NEEDED
OUTPATIENT
Start: 2023-10-24

## 2023-10-03 RX ORDER — SODIUM CHLORIDE 9 MG/ML
250 INJECTION, SOLUTION INTRAVENOUS ONCE
OUTPATIENT
Start: 2023-10-24

## 2023-10-03 RX ORDER — PROCHLORPERAZINE EDISYLATE 5 MG/ML
10 INJECTION INTRAMUSCULAR; INTRAVENOUS ONCE
Status: COMPLETED | OUTPATIENT
Start: 2023-10-03 | End: 2023-10-03

## 2023-10-03 RX ORDER — HEPARIN SODIUM (PORCINE) LOCK FLUSH IV SOLN 100 UNIT/ML 100 UNIT/ML
500 SOLUTION INTRAVENOUS AS NEEDED
Status: DISCONTINUED | OUTPATIENT
Start: 2023-10-03 | End: 2023-10-03 | Stop reason: HOSPADM

## 2023-10-03 RX ORDER — MEGESTROL ACETATE 40 MG/ML
400 SUSPENSION ORAL DAILY
Qty: 480 ML | Refills: 2 | Status: SHIPPED | OUTPATIENT
Start: 2023-10-03

## 2023-10-03 RX ORDER — SODIUM CHLORIDE 9 MG/ML
250 INJECTION, SOLUTION INTRAVENOUS ONCE
Status: COMPLETED | OUTPATIENT
Start: 2023-10-03 | End: 2023-10-03

## 2023-10-03 RX ORDER — FAMOTIDINE 10 MG/ML
20 INJECTION, SOLUTION INTRAVENOUS ONCE
OUTPATIENT
Start: 2023-10-24

## 2023-10-03 RX ORDER — DIPHENHYDRAMINE HYDROCHLORIDE 50 MG/ML
50 INJECTION INTRAMUSCULAR; INTRAVENOUS AS NEEDED
OUTPATIENT
Start: 2023-11-07

## 2023-10-03 RX ORDER — SODIUM CHLORIDE 0.9 % (FLUSH) 0.9 %
10 SYRINGE (ML) INJECTION AS NEEDED
OUTPATIENT
Start: 2023-10-03

## 2023-10-03 RX ORDER — FAMOTIDINE 10 MG/ML
20 INJECTION, SOLUTION INTRAVENOUS ONCE
Status: COMPLETED | OUTPATIENT
Start: 2023-10-03 | End: 2023-10-03

## 2023-10-03 RX ORDER — FAMOTIDINE 10 MG/ML
20 INJECTION, SOLUTION INTRAVENOUS AS NEEDED
OUTPATIENT
Start: 2023-10-31

## 2023-10-03 RX ORDER — SODIUM CHLORIDE 9 MG/ML
250 INJECTION, SOLUTION INTRAVENOUS ONCE
OUTPATIENT
Start: 2023-10-31

## 2023-10-03 RX ORDER — SODIUM CHLORIDE 9 MG/ML
250 INJECTION, SOLUTION INTRAVENOUS ONCE
OUTPATIENT
Start: 2023-11-07

## 2023-10-03 RX ORDER — PALONOSETRON 0.05 MG/ML
0.25 INJECTION, SOLUTION INTRAVENOUS ONCE
OUTPATIENT
Start: 2023-11-07

## 2023-10-03 RX ORDER — PALONOSETRON 0.05 MG/ML
0.25 INJECTION, SOLUTION INTRAVENOUS ONCE
OUTPATIENT
Start: 2023-10-24

## 2023-10-03 RX ORDER — PALONOSETRON 0.05 MG/ML
0.25 INJECTION, SOLUTION INTRAVENOUS ONCE
OUTPATIENT
Start: 2023-10-31

## 2023-10-03 RX ORDER — FAMOTIDINE 10 MG/ML
20 INJECTION, SOLUTION INTRAVENOUS ONCE
OUTPATIENT
Start: 2023-11-07

## 2023-10-03 RX ADMIN — PALONOSETRON 0.25 MG: 0.25 INJECTION, SOLUTION INTRAVENOUS at 09:20

## 2023-10-03 RX ADMIN — DEXAMETHASONE SODIUM PHOSPHATE 12 MG: 4 INJECTION, SOLUTION INTRA-ARTICULAR; INTRALESIONAL; INTRAMUSCULAR; INTRAVENOUS; SOFT TISSUE at 09:32

## 2023-10-03 RX ADMIN — SODIUM CHLORIDE 250 ML: 9 INJECTION, SOLUTION INTRAVENOUS at 09:18

## 2023-10-03 RX ADMIN — Medication 10 ML: at 12:09

## 2023-10-03 RX ADMIN — PROCHLORPERAZINE EDISYLATE 10 MG: 5 INJECTION INTRAMUSCULAR; INTRAVENOUS at 12:07

## 2023-10-03 RX ADMIN — CARBOPLATIN 300 MG: 10 INJECTION, SOLUTION INTRAVENOUS at 11:21

## 2023-10-03 RX ADMIN — FAMOTIDINE 20 MG: 10 INJECTION INTRAVENOUS at 09:18

## 2023-10-03 RX ADMIN — DIPHENHYDRAMINE HYDROCHLORIDE 25 MG: 50 INJECTION, SOLUTION INTRAMUSCULAR; INTRAVENOUS at 09:21

## 2023-10-03 RX ADMIN — HEPARIN 500 UNITS: 100 SYRINGE at 12:09

## 2023-10-03 RX ADMIN — PACLITAXEL 120 MG: 6 INJECTION, SOLUTION INTRAVENOUS at 10:02

## 2023-10-03 NOTE — PROGRESS NOTES
Patient in clinic today for follow up appointment with Dr. Rizzo Oncology and Dr. Correa Radiation.    Patient verbalizes that he plans to continue radiation and chemotherapy.    SS contacted patient [phone] this date to follow up after office visits.    Patient verbalizes that he is tired after having a long day at clinic.    Patient verbalizes that he applied for social security disability last week on September 28, 2023.    Patient verbalizes that since he last spoke with SS that he now has home oxygen and nebulizer per [facility].Patient verbalizes that he has blood pressure cuff and everything that he needs except a thermometer. SS made patient aware that SS would relay message to providers nurse.    Patient verbalizes that transportation has not been an issue to this date.     SS offered contact information to patient. Patient verbalizes that he has contact information and is currently resting and will request a new business card tomorrow at radiation appointment if needed. SS provided patient with contact information and encouraged patient to call with any questions, concerns, or needs.     Patient verbalized understanding and in agreement to contacting SS with any questions, concerns, or needs.    SS will follow as needed.

## 2023-10-03 NOTE — PROGRESS NOTES
Venipuncture Blood Specimen Collection  Venipuncture performed in left arm by Fernando Riddle MA with good hemostasis. Patient tolerated the procedure well without complications.   10/03/23   Fernando Riddle MA

## 2023-10-03 NOTE — PROGRESS NOTES
On Treatment Visit Note      Patient Name: Dexter Flores  : 1970   MRN: [id number]     Diagnosis:    Chief Complaint   Patient presents with    Squamous Cell Carcinoma     SCC of Bronchus of Right Lung      Stage III squamous cell carcinoma of the right upper lobe with metastases to the right paratracheal, subcarinal, and right hilar lymph nodes.  The patient is receiving chest VMAT with concurrent carboplatin and Taxol chemotherapy    This patient was seen today for an on treatment visit.  To date, the patient has received 1400 cGy of a 6000 cGy regimen.    Radiation Treatments       Active   Plans   Rt Lung   Most recent treatment: Dose planned: 200 cGy (fraction 7 on 10/3/2023)   Total: Dose planned: 6,000 cGy (30 fractions)   Elapsed Days: 8      Reference Points   PTV   Most recent treatment: Dose given: 200 cGy (on 10/3/2023)   Total: Dose given: 1,400 cGy   Elapsed Days: 8           Historical   No historical radiation treatments to show.              Subjective      Treatment tolerance:  Mr. Flores appears to be tolerating chemoradiation well.  His chief complaint is diminished appetite.  The patient is taking boost supplements.  Patient also notes occasional mild dysphagia localized to the suprasternal notch region.    Medications:     Current Outpatient Medications:     albuterol (PROVENTIL) (2.5 MG/3ML) 0.083% nebulizer solution, Take 2.5 mg by nebulization Every 6 (Six) Hours As Needed for Wheezing., Disp: , Rfl:     albuterol sulfate  (90 Base) MCG/ACT inhaler, Inhale 2 puffs Every 4 (Four) Hours As Needed for Wheezing., Disp: 18 g, Rfl: 11    fluticasone (FLOVENT HFA) 110 MCG/ACT inhaler, Inhale 2 puffs 2 (Two) Times a Day., Disp: 12 g, Rfl: 11    gabapentin (NEURONTIN) 600 MG tablet, Take 800 mg by mouth 3 (Three) Times a Day., Disp: , Rfl:     hydroCHLOROthiazide (HYDRODIURIL) 12.5 MG tablet, Take 1 tablet by mouth Daily., Disp: , Rfl:     levothyroxine (SYNTHROID, LEVOTHROID) 50 MCG  tablet, Take 1 tablet by mouth Daily., Disp: , Rfl:     lidocaine-prilocaine (EMLA) 2.5-2.5 % cream, Apply 1 application  topically to the appropriate area as directed Every 2 (Two) Hours As Needed for Mild Pain. Apply to Port-A-Cath site 30 minutes prior to arrival at infusion clinic., Disp: 30 g, Rfl: 1    lisinopril (PRINIVIL,ZESTRIL) 10 MG tablet, Take 1 tablet by mouth Daily., Disp: , Rfl:     LORazepam (ATIVAN) 1 MG tablet, Take 1 tablet by mouth Every 8 (Eight) Hours As Needed for Anxiety., Disp: 90 tablet, Rfl: 0    megestrol (MEGACE) 40 MG/ML suspension, Take 10 mL by mouth Daily., Disp: 480 mL, Rfl: 2    montelukast (SINGULAIR) 10 MG tablet, Take 1 tablet by mouth Every Night., Disp: , Rfl:     Morphine (MS CONTIN) 60 MG 12 hr tablet, Take 1 tablet by mouth 2 (Two) Times a Day., Disp: 60 tablet, Rfl: 0    naloxone (NARCAN) 4 MG/0.1ML nasal spray, Call 911. Don't prime. [place] in 1 nostril for overdose. Repeat in 2-3 minutes in other nostril if no or minimal breathing/responsiveness., Disp: 2 each, Rfl: 0    ondansetron (ZOFRAN) 8 MG tablet, Take 1 tablet by mouth Every 8 (Eight) Hours As Needed for Nausea or Vomiting., Disp: 30 tablet, Rfl: 2    oxyCODONE-acetaminophen (PERCOCET)  MG per tablet, Take 1 tablet by mouth Every 6 (Six) Hours As Needed for Moderate Pain., Disp: 120 tablet, Rfl: 0    pantoprazole (PROTONIX) 40 MG EC tablet, Take 1 tablet by mouth Daily., Disp: , Rfl:     prochlorperazine (COMPAZINE) 10 MG tablet, Take 1 tablet by mouth Every 6 (Six) Hours As Needed for Nausea or Vomiting., Disp: 60 tablet, Rfl: 1    sennosides-docusate (senna-docusate sodium) 8.6-50 MG per tablet, Take 1 tablet by mouth Daily., Disp: 30 tablet, Rfl: 2    tiotropium bromide-olodaterol (STIOLTO RESPIMAT) 2.5-2.5 MCG/ACT aerosol solution inhaler, Inhale 2 puffs Daily., Disp: 4 g, Rfl: 11    traZODone (DESYREL) 100 MG tablet, Take 1 tablet by mouth Every Night., Disp: , Rfl:     vitamin D (ERGOCALCIFEROL) 1.25  MG (28702 UT) capsule capsule, Take 1 capsule by mouth 1 (One) Time Per Week., Disp: , Rfl:   No current facility-administered medications for this encounter.    Facility-Administered Medications Ordered in Other Encounters:     heparin injection 500 Units, 500 Units, Intravenous, PRN, ROMEO Rizzo MD, 500 Units at 10/03/23 1209    sodium chloride 0.9 % flush 10 mL, 10 mL, Intravenous, PRN, ROMEO Rizzo MD, 10 mL at 10/03/23 1209    Allergies:   Allergies   Allergen Reactions    Bevespi Aerosphere [Glycopyrrolate-Formoterol] Other (See Comments)     Developed blisters around mouth. Not a listed adverse effect of medication so unsure if this is related to inhaler. Discontinued as precaution.     Objective     Physical Exam:  Patient is a fit appearing middle-aged gentleman in no acute distress.  Vital signs as below.  KPS 90.    Vital Signs:   Vitals:    10/03/23 1100   BP: 144/85   Pulse: 102   Resp: 18   Temp: 97.4 °F (36.3 °C)   TempSrc: Temporal   SpO2: 96%   Weight: 134 kg (295 lb 12.8 oz)   PainSc:   7   PainLoc: Chest     Body mass index is 44.98 kg/m².     Neck: Short, supple no cervical or periclavicular adenopathy  Heart: Regular rate and rhythm  Lungs: Clear to auscultation bilaterally  Chest: Bilateral gynecomastia.  No radiation changes are noted over the anterior posterior chest wall  Lymphatics: No cervical, periclavicular or axillary adenopathy      Current Total XRT Dose (cGY):    Radiation Treatments       Active   Plans   Rt Lung   Most recent treatment: Dose planned: 200 cGy (fraction 7 on 10/3/2023)   Total: Dose planned: 6,000 cGy (30 fractions)   Elapsed Days: 8      Reference Points   PTV   Most recent treatment: Dose given: 200 cGy (on 10/3/2023)   Total: Dose given: 1,400 cGy   Elapsed Days: 8           Historical   No historical radiation treatments to show.              Plan      Plan:   Patient was seen today for an on treatment visit. Patient is receiving radiation therapy to  the right upper lung mass and regional lymph nodes. Patient is stable and tolerating radiation therapy well with minimal side effects. Continue with radiation therapy.     The patient has a 3 kg weight loss since initiating radiotherapy.    I have reviewed treatment setup notes, checked and approved the daily guidance images. I reviewed dose delivery, treatment parameters and deemed them appropriate. We plan to continue radiation therapy as prescribed.     Digital speech recognition software was used to dictate parts of this note, some dictation errors may occur.    Sriram Correa MD   10/03/23 12:41 EDT

## 2023-10-03 NOTE — PROGRESS NOTES
Name:  Dexter Flores  :  1970  Date:  10/3/2023     REFERRING PHYSICIAN  Leonardo Jackson DO    PRIMARY CARE PROVIDER  Jose F Reynolds PA-C    REASON FOR FOLLOWUP  1. 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    Dear Mr. Reynolds,    HISTORY OF PRESENT ILLNESS:   I saw Mr. Flores in follow up today in our medical oncology clinic. As you are aware, he is a pleasant, 53 y.o., white male with a history of hypertension, COPD and lifelong tobacco abuse who first noticed a cough and some worsening shortness of breath in late 2023. As the symptoms progressed, he presented to our ED on 2023, where a CT of the chest identified a mass/postobstructive pneumonia in the right upper lobe of the lung. He was admitted to the [facility] hospitalist service for further evaluation and treatment. Pulmonology performed a diagnostic bronchoscopy on 2023, and the results were consistent with squamous cell carcinoma. In the meantime, as his shortness of breath improved with antibiotics and other supportive care, he was able to be discharged on 2023 with a referral to our clinic (that same day) for further management. At that time, he was agreeable to undergoing an initial staging PET scan, having a powerport placed; and, assuming the PET scan remained consistent with localized disease, proceeding with definitive treatment with concomitant chemotherapy and localized irradiation.    INTERIM HISTORY:  Mr. Flores returns to clinic today for follow up by himself. His breathing remains improved compared to early last month. His pain is under good control on his current combination of scheduled, long-acting Morphine and prn Percocet. He still has some intermittent nausea, but prn Zofran has still been controlling this. Meanwhile, following powerport placement and CT simulation, he began concomitant chemotherapy and localized irradiation  in late September. He has received one cycle of qweekly carboplatin and taxol and six out of a planned total of thirty (6/30) fractions of XRT to date; and he reports today that he is tolerating this regimen overall well so far, with tolerable, increased fatigue as his only noticeable side effect to date. He has no other specific complaints.    Past Medical History:   Diagnosis Date    Arthritis     Bulging of cervical intervertebral disc     Cancer     lung    COPD (chronic obstructive pulmonary disease)     GERD (gastroesophageal reflux disease)     History of transfusion     Hypertension     Neck pain     Sleep apnea     Thyroid disease        Past Surgical History:   Procedure Laterality Date    ABDOMINAL HERNIA REPAIR      umbilical    BRONCHOSCOPY Bilateral 2023    Procedure: BRONCHOSCOPY WITH ENDOBRONCHIAL ULTRASOUND;  Surgeon: Savage Mcmanus MD;  Location: [facility] OR;  Service: Pulmonary;  Laterality: Bilateral;    JOINT REPLACEMENT      PORTACATH PLACEMENT Left 2023    Procedure: INSERTION OF PORTACATH;  Surgeon: Jose F Hewitt MD;  Location: [facility] OR;  Service: General;  Laterality: Left;    TONSILLECTOMY      TOTAL HIP ARTHROPLASTY Bilateral        Social History     Socioeconomic History    Marital status:    Tobacco Use    Smoking status: Former     Packs/day: 1.00     Years: 30.00     Pack years: 30.00     Types: Cigarettes     Start date:      Quit date: 2023     Years since quittin.4    Smokeless tobacco: Never   Vaping Use    Vaping Use: Never used   Substance and Sexual Activity    Alcohol use: Not Currently    Drug use: Not Currently    Sexual activity: Defer       History reviewed. No pertinent family history.    Allergies   Allergen Reactions    Bevespi Aerosphere [Glycopyrrolate-Formoterol] Other (See Comments)     Developed blisters around mouth. Not a listed adverse effect of medication so unsure if this is related to inhaler. Discontinued as precaution.        Current Outpatient Medications   Medication Sig Dispense Refill    albuterol (PROVENTIL) (2.5 MG/3ML) 0.083% nebulizer solution Take 2.5 mg by nebulization Every 6 (Six) Hours As Needed for Wheezing.      albuterol sulfate  (90 Base) MCG/ACT inhaler Inhale 2 puffs Every 4 (Four) Hours As Needed for Wheezing. 18 g 11    fluticasone (FLOVENT HFA) 110 MCG/ACT inhaler Inhale 2 puffs 2 (Two) Times a Day. 12 g 11    gabapentin (NEURONTIN) 600 MG tablet Take 800 mg by mouth 3 (Three) Times a Day.      hydroCHLOROthiazide (HYDRODIURIL) 12.5 MG tablet Take 1 tablet by mouth Daily.      levothyroxine (SYNTHROID, LEVOTHROID) 50 MCG tablet Take 1 tablet by mouth Daily.      lidocaine-prilocaine (EMLA) 2.5-2.5 % cream Apply 1 application  topically to the appropriate area as directed Every 2 (Two) Hours As Needed for Mild Pain. Apply to Port-A-Cath site 30 minutes prior to arrival at infusion clinic. 30 g 1    lisinopril (PRINIVIL,ZESTRIL) 10 MG tablet Take 1 tablet by mouth Daily.      LORazepam (ATIVAN) 1 MG tablet Take 1 tablet by mouth Every 8 (Eight) Hours As Needed for Anxiety. 90 tablet 0    montelukast (SINGULAIR) 10 MG tablet Take 1 tablet by mouth Every Night.      Morphine (MS CONTIN) 60 MG 12 hr tablet Take 1 tablet by mouth 2 (Two) Times a Day. 60 tablet 0    naloxone (NARCAN) 4 MG/0.1ML nasal spray Call 911. Don't prime. [place] in 1 nostril for overdose. Repeat in 2-3 minutes in other nostril if no or minimal breathing/responsiveness. 2 each 0    ondansetron (ZOFRAN) 8 MG tablet Take 1 tablet by mouth Every 8 (Eight) Hours As Needed for Nausea or Vomiting. 30 tablet 2    oxyCODONE-acetaminophen (PERCOCET)  MG per tablet Take 1 tablet by mouth Every 6 (Six) Hours As Needed for Moderate Pain. 120 tablet 0    pantoprazole (PROTONIX) 40 MG EC tablet Take 1 tablet by mouth Daily.      prochlorperazine (COMPAZINE) 10 MG tablet Take 1 tablet by mouth Every 6 (Six) Hours As Needed for Nausea or Vomiting.  "60 tablet 1    sennosides-docusate (senna-docusate sodium) 8.6-50 MG per tablet Take 1 tablet by mouth Daily. 30 tablet 2    tiotropium bromide-olodaterol (STIOLTO RESPIMAT) 2.5-2.5 MCG/ACT aerosol solution inhaler Inhale 2 puffs Daily. 4 g 11    traZODone (DESYREL) 100 MG tablet Take 1 tablet by mouth Every Night.      vitamin D (ERGOCALCIFEROL) 1.25 MG (35247 UT) capsule capsule Take 1 capsule by mouth 1 (One) Time Per Week.       No current facility-administered medications for this visit.     REVIEW OF SYSTEMS  CONSTITUTIONAL:  No fever, chills or night sweats. Increased fatigue since starting concomitant chemo/XRT in late September.  EYES:  No blurry vision, diplopia or other vision changes.  ENT:  No hearing loss, nosebleeds or sore throat.  CARDIOVASCULAR:  No palpitations, arrhythmia, syncopal episodes or edema.  PULMONARY:  As per the HPI above.  GASTROINTESTINAL:  No constipation or diarrhea. No abdominal pain. Intermittent, mild nausea.  GENITOURINARY:  No hematuria, kidney stones or frequent urination.  MUSCULOSKELETAL:  No joint or back pains.  INTEGUMENTARY: No rashes or pruritus.  ENDOCRINE:  No excessive thirst or hot flashes.  HEMATOLOGIC:  No history of free bleeding, spontaneous bleeding or clotting.  IMMUNOLOGIC:  No allergies or frequent infections.  NEUROLOGIC: No numbness, tingling, seizures or weakness.  PSYCHIATRIC:  No depression. Some anxiety over his recent diagnosis with cancer, currently improved.    PHYSICAL EXAMINATION  /85   Pulse 99   Temp 96.9 °F (36.1 °C) (Temporal)   Resp 18   Ht 172.7 cm (68\")   Wt 134 kg (295 lb 12.8 oz)   SpO2 94%   BMI 44.98 kg/m²     Pain Score:  Pain Score    10/03/23 0825   PainSc:   6   PainLoc: Shoulder     PHQ-Score Total:  PHQ-9 Total Score:      ECO  GENERAL:  A well-developed, well-nourished, white male in no acute distress.  HEENT:  Pupils equally round and reactive to light.  Extraocular muscles intact.  CARDIOVASCULAR:  Regular " rate and rhythm.  No murmurs, gallops or rubs.  LUNGS:  Decreased breath sounds on the right, clear on the left.  ABDOMEN:  Soft, nontender, nondistended with positive bowel sounds.  EXTREMITIES:  No clubbing, cyanosis or edema bilaterally.  SKIN:  No rashes or petechiae. Powerport now in place.  NEURO:  Cranial nerves grossly intact. No focal deficits.  PSYCH:  Alert and oriented x3.    LABORATORY    Lab Results   Component Value Date    WBC 10.59 10/03/2023    HGB 12.7 (L) 10/03/2023    HCT 41.4 10/03/2023    MCV 91.8 10/03/2023     10/03/2023    NEUTROABS 8.51 (H) 10/03/2023       Lab Results   Component Value Date     09/25/2023    K 4.4 09/25/2023    CL 97 (L) 09/25/2023    CO2 31.4 (H) 09/25/2023    BUN 16 09/25/2023    CREATININE 0.84 09/25/2023    GLUCOSE 161 (H) 09/25/2023    CALCIUM 9.5 09/25/2023    AST 15 09/25/2023    ALT 13 09/25/2023    ALKPHOS 88 09/25/2023    BILITOT 0.2 09/25/2023    PROTEINTOT 7.8 09/25/2023    ALBUMIN 3.8 09/25/2023     CBC (10/03/2023): WBCs: 10.59; HgB: 12.7; Hct: 41.4; platelets: 389; MCV: 91.8  CBC (09/25/2023): WBCs: 13.95; HgB: 12.9; Hct: 40.5; platelets: 483; MCV: 90.0  CBC (08/24/2023): WBCs: 22.31; HgB: 13.5; Hct: 43.6; platelets: 644    IMAGING  CT angiogram chest (08/19/2023):  Impression: Right upper lobe consolidative and masslike airspace opacity without violating the fissures. Findings are concerning for infection. However, considering the presence of an enlarged right paratracheal lymph node measuring 4.2 cm, underlying mass cannot be ruled out.    NM PET with fusion CT, skull base to mid-thigh (09/12/2023):  Impression:  1) A right upper lobe pulmonary parenchymal mass and/or postobstructive collapse measures about 9.9 cm with hypermetabolism mSUV 15.  2) Subcarinal region 2.3 cm lymph node shows PET hypermetabolism measuring 8.6.  3) Right hilar region hypermetabolism shows mSUV 7.  4) Probable 2 cm right suprahilar clavicular region lymph node  shows PET hypermetabolism mSUV 10.2.    MRI brain with and without contrast (09/16/2023):  Impression:  1) No parenchymal mass, hemorrhage or midline shift.  2) Increased signal in the mastoid air cells bilaterally.  3) No parenchymal mass, hemorrhage or midline shift.    PATHOLOGY  Lung, biopsy, right upper lobe (08/22/2023):  Moderately differentiated squamous cell carcinoma. PD-L1 TPS: < 1%.    Bronchial lavage, right upper and middle lobes (08/22/2023): Malignant. Squamous cell carcinoma.    Bronchial wash (08/22/2023): Malignant. Squamous cell carcinoma.    Bronchial brushings, right upper and middle lobes (08/22/2023): Malignant. Squamous cell carcinoma.    RADIATION THERAPY  Radiation Treatments       Active   Plans   Rt Lung   Most recent treatment: Dose planned: 200 cGy (fraction 6 on 10/2/2023)   Total: Dose planned: 6,000 cGy (30 fractions)   Elapsed Days: 7      Reference Points   PTV   Most recent treatment: Dose given: 200 cGy (on 10/2/2023)   Total: Dose given: 1,200 cGy   Elapsed Days: 7           Historical   No historical radiation treatments to show.             IMPRESSION AND PLAN  Mr. Flores is a 53 y.o., white male with:  Squamous cell lung carcinoma: Diagnosed in mid-August 2023 with locally advanced disease involving the right hemithorax. I had a long discussion with the patient and his niece at the time of his initial consultation in our clinic (on 08/24/2023) regarding this diagnosis and, in general terms, its prognosis. We discussed how, with his, at a minimum, stage IIB disease, he was/is not a good surgical candidate (at least at this time). However, he was/is an excellent candidate for definitive treatment with concomitant chemotherapy and localized irradiation. This therapy is anticipated to have the best chance of improving his symptoms (of dyspnea and pain), could make his disease operable; and, even if surgery is not subsequently possible, can lead to a longterm remission (if not a  classical cure). Following another long discussion regarding the potential risks vs. benefits of this regimen, he was agreeable to proceeding with concomitant, qweekly carboplatin/taxol throughout a ~seven week course of localized XRT. Once a powerport was placed and his CT simulation was completed, he began a regimen consisting of qweekly carboplatin and taxol throughout a planned, ~thirty-fraction course of localized XRT in late September 2023. He has received one (1) cycle of the former and six (6) fractions of the latter to date; and he reports today that he is tolerating this regimen overall well so far, without any significant side effects (other than mildly increased fatigue). We will proceed with this current treatment plan (second cycle of qweekly carbo/taxol today). We will see him back in our clinic in two weeks, on the day of the fourth cycle of carbo/taxol, with a CBC and CMP for another symptom/toxicity check.  Pain: Symptoms in his right chest wall are secondary to issue #1. Currently under good control with a combination of scheduled, long-acting MSContin 30 mg q12hr and prn Percocet 10/325 mg q6hr. Continue to monitor.  Anxiety: Recently exacerbated by his diagnosis with issue #1. Continue Ativan 1 mg PO TID prn Continue to monitor.  Transportation issues: The patient and his niece previously reported that their immediate family only has one car between them. This has not posed a significant problem with his treatment scheduled so far, but we previously referred him to our  for assistance.  The patient was in agreement with these plans.    It is a pleasure to participate in Mr. Flores's care. Please do not hesitate to call with any questions or concerns that you may have.    A total of 30 minutes were spent coordinating this patient’s care in clinic today; more than 50% of this time was face-to-face with the patient, reviewing his interim medical history, discussing the results of the  recent PET scan and MRI of the brain and counseling on the current treatment and followup plan. All questions were answered to his satisfaction.    FOLLOW UP  Continue MSContin 30 mg PO q12hr and and Percocet 10/325 mg q6hr prn. Continue Ativan 1 mg TID prn. Continue Zofran prn. With [facility] radiation oncology for ongoing, localized XRT, as planned. Proceed with concomitant qweekly carbo/taxol, as planned (cycle #2 today). Return to our clinic in ~2 weeks, on the day of the 4th cycle of qweekly carbo/taxol, with a CBC and CMP.            This document was electronically signed by ROMEO Rizzo MD October 3, 2023 08:56 EDT      CC: ABRAN Oglesby MD

## 2023-10-04 ENCOUNTER — HOSPITAL ENCOUNTER (OUTPATIENT)
Dept: RADIATION ONCOLOGY | Facility: HOSPITAL | Age: 53
Discharge: HOME | End: 2023-10-04
Payer: COMMERCIAL

## 2023-10-04 ENCOUNTER — DOCUMENTATION (OUTPATIENT)
Dept: ONCOLOGY | Facility: HOSPITAL | Age: 53
End: 2023-10-04
Payer: COMMERCIAL

## 2023-10-04 LAB
RAD ONC ARIA COURSE ID: NORMAL
RAD ONC ARIA COURSE INTENT: NORMAL
RAD ONC ARIA COURSE LAST TREATMENT DATE: NORMAL
RAD ONC ARIA COURSE START DATE: NORMAL
RAD ONC ARIA COURSE TREATMENT ELAPSED DAYS: 9
RAD ONC ARIA FIRST TREATMENT DATE: NORMAL
RAD ONC ARIA PLAN FRACTIONS TREATED TO DATE: 8
RAD ONC ARIA PLAN ID: NORMAL
RAD ONC ARIA PLAN PRESCRIBED DOSE PER FRACTION: 2 GY
RAD ONC ARIA PLAN PRIMARY REFERENCE POINT: NORMAL
RAD ONC ARIA PLAN TOTAL FRACTIONS PRESCRIBED: 30
RAD ONC ARIA PLAN TOTAL PRESCRIBED DOSE: 6000 CGY
RAD ONC ARIA REFERENCE POINT DOSAGE GIVEN TO DATE: 16 GY
RAD ONC ARIA REFERENCE POINT ID: NORMAL
RAD ONC ARIA REFERENCE POINT SESSION DOSAGE GIVEN: 2 GY

## 2023-10-04 PROCEDURE — 77386: CPT | Performed by: RADIOLOGY

## 2023-10-04 PROCEDURE — 77014 CHG CT GUIDANCE RADIATION THERAPY FLDS PLACEMENT: CPT | Performed by: RADIOLOGY

## 2023-10-04 NOTE — PROGRESS NOTES
SS received call from Lisa ext [phone] requesting a thermometer. SS contacted TEO Gonzalez ext [phone] to make aware. Patient to go upstairs to medical group to discuss needing a thermometer.    TEO Gonzalez agreeable to meeting with patient to follow up.    SS will follow.

## 2023-10-05 ENCOUNTER — HOSPITAL ENCOUNTER (OUTPATIENT)
Dept: RADIATION ONCOLOGY | Facility: HOSPITAL | Age: 53
Discharge: HOME | End: 2023-10-05
Payer: COMMERCIAL

## 2023-10-05 LAB
ACID FAST STN SPEC: NEGATIVE
MYCOBACTERIUM SPEC QL CULT: NEGATIVE
RAD ONC ARIA COURSE ID: NORMAL
RAD ONC ARIA COURSE INTENT: NORMAL
RAD ONC ARIA COURSE LAST TREATMENT DATE: NORMAL
RAD ONC ARIA COURSE START DATE: NORMAL
RAD ONC ARIA COURSE TREATMENT ELAPSED DAYS: 10
RAD ONC ARIA FIRST TREATMENT DATE: NORMAL
RAD ONC ARIA PLAN FRACTIONS TREATED TO DATE: 9
RAD ONC ARIA PLAN ID: NORMAL
RAD ONC ARIA PLAN PRESCRIBED DOSE PER FRACTION: 2 GY
RAD ONC ARIA PLAN PRIMARY REFERENCE POINT: NORMAL
RAD ONC ARIA PLAN TOTAL FRACTIONS PRESCRIBED: 30
RAD ONC ARIA PLAN TOTAL PRESCRIBED DOSE: 6000 CGY
RAD ONC ARIA REFERENCE POINT DOSAGE GIVEN TO DATE: 18 GY
RAD ONC ARIA REFERENCE POINT ID: NORMAL
RAD ONC ARIA REFERENCE POINT SESSION DOSAGE GIVEN: 2 GY
SPECIMEN PREPARATION: NORMAL

## 2023-10-05 PROCEDURE — 77014 CHG CT GUIDANCE RADIATION THERAPY FLDS PLACEMENT: CPT | Performed by: RADIOLOGY

## 2023-10-05 PROCEDURE — 77386: CPT | Performed by: RADIOLOGY

## 2023-10-06 ENCOUNTER — HOSPITAL ENCOUNTER (OUTPATIENT)
Dept: RADIATION ONCOLOGY | Facility: HOSPITAL | Age: 53
Discharge: HOME | End: 2023-10-06
Payer: COMMERCIAL

## 2023-10-06 LAB
RAD ONC ARIA COURSE ID: NORMAL
RAD ONC ARIA COURSE INTENT: NORMAL
RAD ONC ARIA COURSE LAST TREATMENT DATE: NORMAL
RAD ONC ARIA COURSE START DATE: NORMAL
RAD ONC ARIA COURSE TREATMENT ELAPSED DAYS: 11
RAD ONC ARIA FIRST TREATMENT DATE: NORMAL
RAD ONC ARIA PLAN FRACTIONS TREATED TO DATE: 10
RAD ONC ARIA PLAN ID: NORMAL
RAD ONC ARIA PLAN PRESCRIBED DOSE PER FRACTION: 2 GY
RAD ONC ARIA PLAN PRIMARY REFERENCE POINT: NORMAL
RAD ONC ARIA PLAN TOTAL FRACTIONS PRESCRIBED: 30
RAD ONC ARIA PLAN TOTAL PRESCRIBED DOSE: 6000 CGY
RAD ONC ARIA REFERENCE POINT DOSAGE GIVEN TO DATE: 20 GY
RAD ONC ARIA REFERENCE POINT ID: NORMAL
RAD ONC ARIA REFERENCE POINT SESSION DOSAGE GIVEN: 2 GY

## 2023-10-06 PROCEDURE — 77386: CPT | Performed by: RADIOLOGY

## 2023-10-06 PROCEDURE — 77014 CHG CT GUIDANCE RADIATION THERAPY FLDS PLACEMENT: CPT | Performed by: RADIOLOGY

## 2023-10-09 ENCOUNTER — TELEPHONE (OUTPATIENT)
Dept: ONCOLOGY | Facility: CLINIC | Age: 53
End: 2023-10-09
Payer: COMMERCIAL

## 2023-10-09 ENCOUNTER — HOSPITAL ENCOUNTER (OUTPATIENT)
Dept: RADIATION ONCOLOGY | Facility: HOSPITAL | Age: 53
Discharge: HOME | End: 2023-10-09
Payer: COMMERCIAL

## 2023-10-09 LAB
RAD ONC ARIA COURSE ID: NORMAL
RAD ONC ARIA COURSE INTENT: NORMAL
RAD ONC ARIA COURSE LAST TREATMENT DATE: NORMAL
RAD ONC ARIA COURSE START DATE: NORMAL
RAD ONC ARIA COURSE TREATMENT ELAPSED DAYS: 14
RAD ONC ARIA FIRST TREATMENT DATE: NORMAL
RAD ONC ARIA PLAN FRACTIONS TREATED TO DATE: 11
RAD ONC ARIA PLAN ID: NORMAL
RAD ONC ARIA PLAN PRESCRIBED DOSE PER FRACTION: 2 GY
RAD ONC ARIA PLAN PRIMARY REFERENCE POINT: NORMAL
RAD ONC ARIA PLAN TOTAL FRACTIONS PRESCRIBED: 30
RAD ONC ARIA PLAN TOTAL PRESCRIBED DOSE: 6000 CGY
RAD ONC ARIA REFERENCE POINT DOSAGE GIVEN TO DATE: 22 GY
RAD ONC ARIA REFERENCE POINT ID: NORMAL
RAD ONC ARIA REFERENCE POINT SESSION DOSAGE GIVEN: 2 GY

## 2023-10-09 PROCEDURE — 77427 RADIATION TX MANAGEMENT X5: CPT | Performed by: RADIOLOGY

## 2023-10-09 PROCEDURE — 77014 CHG CT GUIDANCE RADIATION THERAPY FLDS PLACEMENT: CPT | Performed by: RADIOLOGY

## 2023-10-09 PROCEDURE — 77336 RADIATION PHYSICS CONSULT: CPT | Performed by: RADIOLOGY

## 2023-10-09 PROCEDURE — 77386: CPT | Performed by: RADIOLOGY

## 2023-10-10 ENCOUNTER — INFUSION (OUTPATIENT)
Dept: ONCOLOGY | Facility: HOSPITAL | Age: 53
End: 2023-10-10
Payer: COMMERCIAL

## 2023-10-10 ENCOUNTER — LAB (OUTPATIENT)
Dept: ONCOLOGY | Facility: HOSPITAL | Age: 53
End: 2023-10-10
Payer: COMMERCIAL

## 2023-10-10 ENCOUNTER — HOSPITAL ENCOUNTER (OUTPATIENT)
Dept: RADIATION ONCOLOGY | Facility: HOSPITAL | Age: 53
Discharge: HOME | End: 2023-10-10
Payer: COMMERCIAL

## 2023-10-10 ENCOUNTER — HOSPITAL ENCOUNTER (OUTPATIENT)
Dept: RADIATION ONCOLOGY | Facility: HOSPITAL | Age: 53
Discharge: HOME | End: 2023-10-10

## 2023-10-10 VITALS
HEART RATE: 99 BPM | SYSTOLIC BLOOD PRESSURE: 142 MMHG | BODY MASS INDEX: 46.77 KG/M2 | OXYGEN SATURATION: 97 % | TEMPERATURE: 97.5 F | WEIGHT: 307.6 LBS | DIASTOLIC BLOOD PRESSURE: 94 MMHG | RESPIRATION RATE: 18 BRPM

## 2023-10-10 VITALS
TEMPERATURE: 97.5 F | WEIGHT: 303.4 LBS | DIASTOLIC BLOOD PRESSURE: 94 MMHG | BODY MASS INDEX: 46.13 KG/M2 | SYSTOLIC BLOOD PRESSURE: 142 MMHG | OXYGEN SATURATION: 97 % | RESPIRATION RATE: 18 BRPM | HEART RATE: 99 BPM

## 2023-10-10 DIAGNOSIS — C34.91 SQUAMOUS CELL CARCINOMA OF BRONCHUS OF RIGHT LUNG: ICD-10-CM

## 2023-10-10 DIAGNOSIS — C34.91 SQUAMOUS CELL CARCINOMA OF BRONCHUS OF RIGHT LUNG: Primary | ICD-10-CM

## 2023-10-10 DIAGNOSIS — Z95.828 PORT-A-CATH IN PLACE: ICD-10-CM

## 2023-10-10 LAB
ALBUMIN SERPL-MCNC: 3.6 G/DL (ref 3.5–5.2)
ALBUMIN/GLOB SERPL: 1 G/DL
ALP SERPL-CCNC: 68 U/L (ref 39–117)
ALT SERPL W P-5'-P-CCNC: 12 U/L (ref 1–41)
ANION GAP SERPL CALCULATED.3IONS-SCNC: 8.7 MMOL/L (ref 5–15)
AST SERPL-CCNC: 14 U/L (ref 1–40)
BASOPHILS # BLD AUTO: 0.03 10*3/MM3 (ref 0–0.2)
BASOPHILS NFR BLD AUTO: 0.5 % (ref 0–1.5)
BILIRUB SERPL-MCNC: 0.3 MG/DL (ref 0–1.2)
BUN SERPL-MCNC: 11 MG/DL (ref 6–20)
BUN/CREAT SERPL: 16.4 (ref 7–25)
CALCIUM SPEC-SCNC: 9.3 MG/DL (ref 8.6–10.5)
CHLORIDE SERPL-SCNC: 101 MMOL/L (ref 98–107)
CO2 SERPL-SCNC: 27.3 MMOL/L (ref 22–29)
CREAT SERPL-MCNC: 0.67 MG/DL (ref 0.76–1.27)
DEPRECATED RDW RBC AUTO: 48.3 FL (ref 37–54)
EGFRCR SERPLBLD CKD-EPI 2021: 112.3 ML/MIN/1.73
EOSINOPHIL # BLD AUTO: 0.11 10*3/MM3 (ref 0–0.4)
EOSINOPHIL NFR BLD AUTO: 1.8 % (ref 0.3–6.2)
ERYTHROCYTE [DISTWIDTH] IN BLOOD BY AUTOMATED COUNT: 15.2 % (ref 12.3–15.4)
GLOBULIN UR ELPH-MCNC: 3.6 GM/DL
GLUCOSE SERPL-MCNC: 97 MG/DL (ref 65–99)
HCT VFR BLD AUTO: 37.8 % (ref 37.5–51)
HGB BLD-MCNC: 11.6 G/DL (ref 13–17.7)
IMM GRANULOCYTES # BLD AUTO: 0.03 10*3/MM3 (ref 0–0.05)
IMM GRANULOCYTES NFR BLD AUTO: 0.5 % (ref 0–0.5)
LYMPHOCYTES # BLD AUTO: 0.72 10*3/MM3 (ref 0.7–3.1)
LYMPHOCYTES NFR BLD AUTO: 11.7 % (ref 19.6–45.3)
MCH RBC QN AUTO: 28 PG (ref 26.6–33)
MCHC RBC AUTO-ENTMCNC: 30.7 G/DL (ref 31.5–35.7)
MCV RBC AUTO: 91.3 FL (ref 79–97)
MONOCYTES # BLD AUTO: 0.64 10*3/MM3 (ref 0.1–0.9)
MONOCYTES NFR BLD AUTO: 10.4 % (ref 5–12)
NEUTROPHILS NFR BLD AUTO: 4.61 10*3/MM3 (ref 1.7–7)
NEUTROPHILS NFR BLD AUTO: 75.1 % (ref 42.7–76)
NRBC BLD AUTO-RTO: 0 /100 WBC (ref 0–0.2)
PLATELET # BLD AUTO: 325 10*3/MM3 (ref 140–450)
PMV BLD AUTO: 9 FL (ref 6–12)
POTASSIUM SERPL-SCNC: 4.3 MMOL/L (ref 3.5–5.2)
PROT SERPL-MCNC: 7.2 G/DL (ref 6–8.5)
RAD ONC ARIA COURSE ID: NORMAL
RAD ONC ARIA COURSE INTENT: NORMAL
RAD ONC ARIA COURSE LAST TREATMENT DATE: NORMAL
RAD ONC ARIA COURSE START DATE: NORMAL
RAD ONC ARIA COURSE TREATMENT ELAPSED DAYS: 15
RAD ONC ARIA FIRST TREATMENT DATE: NORMAL
RAD ONC ARIA PLAN FRACTIONS TREATED TO DATE: 12
RAD ONC ARIA PLAN ID: NORMAL
RAD ONC ARIA PLAN PRESCRIBED DOSE PER FRACTION: 2 GY
RAD ONC ARIA PLAN PRIMARY REFERENCE POINT: NORMAL
RAD ONC ARIA PLAN TOTAL FRACTIONS PRESCRIBED: 30
RAD ONC ARIA PLAN TOTAL PRESCRIBED DOSE: 6000 CGY
RAD ONC ARIA REFERENCE POINT DOSAGE GIVEN TO DATE: 24 GY
RAD ONC ARIA REFERENCE POINT ID: NORMAL
RAD ONC ARIA REFERENCE POINT SESSION DOSAGE GIVEN: 2 GY
RBC # BLD AUTO: 4.14 10*6/MM3 (ref 4.14–5.8)
SODIUM SERPL-SCNC: 137 MMOL/L (ref 136–145)
WBC NRBC COR # BLD: 6.14 10*3/MM3 (ref 3.4–10.8)

## 2023-10-10 PROCEDURE — 25010000002 DEXAMETHASONE SODIUM PHOSPHATE 20 MG/5ML SOLUTION: Performed by: INTERNAL MEDICINE

## 2023-10-10 PROCEDURE — 77014 CHG CT GUIDANCE RADIATION THERAPY FLDS PLACEMENT: CPT | Performed by: RADIOLOGY

## 2023-10-10 PROCEDURE — 25810000003 SODIUM CHLORIDE 0.9 % SOLUTION: Performed by: INTERNAL MEDICINE

## 2023-10-10 PROCEDURE — 25010000002 HEPARIN LOCK FLUSH PER 10 UNITS: Performed by: INTERNAL MEDICINE

## 2023-10-10 PROCEDURE — 25010000002 CARBOPLATIN PER 50 MG: Performed by: INTERNAL MEDICINE

## 2023-10-10 PROCEDURE — 25810000003 SODIUM CHLORIDE 0.9 % SOLUTION 250 ML FLEX CONT: Performed by: INTERNAL MEDICINE

## 2023-10-10 PROCEDURE — 80053 COMPREHEN METABOLIC PANEL: CPT

## 2023-10-10 PROCEDURE — 85025 COMPLETE CBC W/AUTO DIFF WBC: CPT

## 2023-10-10 PROCEDURE — 77386: CPT | Performed by: RADIOLOGY

## 2023-10-10 PROCEDURE — 96417 CHEMO IV INFUS EACH ADDL SEQ: CPT

## 2023-10-10 PROCEDURE — 25010000002 DIPHENHYDRAMINE PER 50 MG: Performed by: INTERNAL MEDICINE

## 2023-10-10 PROCEDURE — 25010000002 PALONOSETRON 0.25 MG/5ML SOLUTION PREFILLED SYRINGE: Performed by: INTERNAL MEDICINE

## 2023-10-10 PROCEDURE — 96375 TX/PRO/DX INJ NEW DRUG ADDON: CPT

## 2023-10-10 PROCEDURE — 25010000002 PACLITAXEL PER 1 MG: Performed by: INTERNAL MEDICINE

## 2023-10-10 PROCEDURE — 96413 CHEMO IV INFUSION 1 HR: CPT

## 2023-10-10 RX ORDER — PALONOSETRON 0.05 MG/ML
0.25 INJECTION, SOLUTION INTRAVENOUS ONCE
Status: COMPLETED | OUTPATIENT
Start: 2023-10-10 | End: 2023-10-10

## 2023-10-10 RX ORDER — SODIUM CHLORIDE 0.9 % (FLUSH) 0.9 %
10 SYRINGE (ML) INJECTION AS NEEDED
Status: DISCONTINUED | OUTPATIENT
Start: 2023-10-10 | End: 2023-10-10 | Stop reason: HOSPADM

## 2023-10-10 RX ORDER — DIPHENHYDRAMINE HYDROCHLORIDE AND LIDOCAINE HYDROCHLORIDE AND ALUMINUM HYDROXIDE AND MAGNESIUM HYDRO
10 KIT ONCE
Status: COMPLETED | OUTPATIENT
Start: 2023-10-10 | End: 2023-10-10

## 2023-10-10 RX ORDER — HEPARIN SODIUM (PORCINE) LOCK FLUSH IV SOLN 100 UNIT/ML 100 UNIT/ML
500 SOLUTION INTRAVENOUS AS NEEDED
OUTPATIENT
Start: 2023-10-10

## 2023-10-10 RX ORDER — HEPARIN SODIUM (PORCINE) LOCK FLUSH IV SOLN 100 UNIT/ML 100 UNIT/ML
500 SOLUTION INTRAVENOUS AS NEEDED
Status: DISCONTINUED | OUTPATIENT
Start: 2023-10-10 | End: 2023-10-10 | Stop reason: HOSPADM

## 2023-10-10 RX ORDER — SODIUM CHLORIDE 0.9 % (FLUSH) 0.9 %
10 SYRINGE (ML) INJECTION AS NEEDED
OUTPATIENT
Start: 2023-10-10

## 2023-10-10 RX ORDER — DIPHENHYDRAMINE HYDROCHLORIDE AND LIDOCAINE HYDROCHLORIDE AND ALUMINUM HYDROXIDE AND MAGNESIUM HYDRO
5 KIT ONCE
Status: DISCONTINUED | OUTPATIENT
Start: 2023-10-10 | End: 2023-10-10

## 2023-10-10 RX ORDER — SODIUM CHLORIDE 9 MG/ML
250 INJECTION, SOLUTION INTRAVENOUS ONCE
Status: COMPLETED | OUTPATIENT
Start: 2023-10-10 | End: 2023-10-10

## 2023-10-10 RX ORDER — FAMOTIDINE 10 MG/ML
20 INJECTION, SOLUTION INTRAVENOUS ONCE
Status: COMPLETED | OUTPATIENT
Start: 2023-10-10 | End: 2023-10-10

## 2023-10-10 RX ADMIN — DIPHENHYDRAMINE HYDROCHLORIDE 25 MG: 50 INJECTION, SOLUTION INTRAMUSCULAR; INTRAVENOUS at 12:46

## 2023-10-10 RX ADMIN — DEXAMETHASONE SODIUM PHOSPHATE 12 MG: 4 INJECTION, SOLUTION INTRA-ARTICULAR; INTRALESIONAL; INTRAMUSCULAR; INTRAVENOUS; SOFT TISSUE at 12:59

## 2023-10-10 RX ADMIN — SODIUM CHLORIDE 250 ML: 9 INJECTION, SOLUTION INTRAVENOUS at 12:43

## 2023-10-10 RX ADMIN — Medication 10 ML: at 15:28

## 2023-10-10 RX ADMIN — Medication 500 UNITS: at 15:28

## 2023-10-10 RX ADMIN — PALONOSETRON 0.25 MG: 0.25 INJECTION, SOLUTION INTRAVENOUS at 12:45

## 2023-10-10 RX ADMIN — PACLITAXEL 120 MG: 6 INJECTION, SOLUTION, CONCENTRATE INTRAVENOUS at 13:37

## 2023-10-10 RX ADMIN — CARBOPLATIN 300 MG: 10 INJECTION INTRAVENOUS at 14:56

## 2023-10-10 RX ADMIN — DIPHENHYDRAMINE HYDROCHLORIDE AND LIDOCAINE HYDROCHLORIDE AND ALUMINUM HYDROXIDE AND MAGNESIUM HYDRO 10 ML: KIT at 12:29

## 2023-10-10 RX ADMIN — FAMOTIDINE 20 MG: 10 INJECTION INTRAVENOUS at 12:43

## 2023-10-10 NOTE — PROGRESS NOTES
On Treatment Visit     Patient: Dexter Flores   YOB: 1970   Medical Record Number: [id number]     Date of Visit  October 10, 2023   Primary Diagnosis:  Cancer Staging   No matching staging information was found for the patient.      Mr. Flores was seen today for an on treatment visit. He is receiving radiation therapy to the right lung.     Treatment Site Modality Dose per fraction (cGy) Fractions Total dose (cGy)   Right lung VMAT 200 12 of 30 2400 of 6000     Concurrent therapy: Yes    He is doing very well no unexpected side effects today.       Dexter Flores reports a pain score of 0.        Wt Readings from Last 3 Encounters:   10/03/23 134 kg (295 lb 12.8 oz)   10/03/23 134 kg (295 lb 12.8 oz)   09/26/23 (!) 138 kg (305 lb)     On exam, he is sitting up in no distress, breathing comfortably on room air.  Chest is clear.  There is no evidence of lymphadenopathy.    Plan: I have reviewed treatment setup notes and checked and approved the daily guidance images. I reviewed dose delivery and treatment parameters and deemed them appropriate. Continue radiation as prescribed.    DIALLO Gallo MD

## 2023-10-11 ENCOUNTER — HOSPITAL ENCOUNTER (OUTPATIENT)
Dept: RADIATION ONCOLOGY | Facility: HOSPITAL | Age: 53
Discharge: HOME | End: 2023-10-11
Payer: COMMERCIAL

## 2023-10-11 LAB
RAD ONC ARIA COURSE ID: NORMAL
RAD ONC ARIA COURSE INTENT: NORMAL
RAD ONC ARIA COURSE LAST TREATMENT DATE: NORMAL
RAD ONC ARIA COURSE START DATE: NORMAL
RAD ONC ARIA COURSE TREATMENT ELAPSED DAYS: 16
RAD ONC ARIA FIRST TREATMENT DATE: NORMAL
RAD ONC ARIA PLAN FRACTIONS TREATED TO DATE: 13
RAD ONC ARIA PLAN ID: NORMAL
RAD ONC ARIA PLAN PRESCRIBED DOSE PER FRACTION: 2 GY
RAD ONC ARIA PLAN PRIMARY REFERENCE POINT: NORMAL
RAD ONC ARIA PLAN TOTAL FRACTIONS PRESCRIBED: 30
RAD ONC ARIA PLAN TOTAL PRESCRIBED DOSE: 6000 CGY
RAD ONC ARIA REFERENCE POINT DOSAGE GIVEN TO DATE: 26 GY
RAD ONC ARIA REFERENCE POINT ID: NORMAL
RAD ONC ARIA REFERENCE POINT SESSION DOSAGE GIVEN: 2 GY

## 2023-10-11 PROCEDURE — 77386: CPT | Performed by: RADIOLOGY

## 2023-10-11 PROCEDURE — 77014 CHG CT GUIDANCE RADIATION THERAPY FLDS PLACEMENT: CPT | Performed by: RADIOLOGY

## 2023-10-12 ENCOUNTER — HOSPITAL ENCOUNTER (OUTPATIENT)
Dept: RADIATION ONCOLOGY | Facility: HOSPITAL | Age: 53
Discharge: HOME | End: 2023-10-12
Payer: COMMERCIAL

## 2023-10-12 LAB
RAD ONC ARIA COURSE ID: NORMAL
RAD ONC ARIA COURSE INTENT: NORMAL
RAD ONC ARIA COURSE LAST TREATMENT DATE: NORMAL
RAD ONC ARIA COURSE START DATE: NORMAL
RAD ONC ARIA COURSE TREATMENT ELAPSED DAYS: 17
RAD ONC ARIA FIRST TREATMENT DATE: NORMAL
RAD ONC ARIA PLAN FRACTIONS TREATED TO DATE: 14
RAD ONC ARIA PLAN ID: NORMAL
RAD ONC ARIA PLAN PRESCRIBED DOSE PER FRACTION: 2 GY
RAD ONC ARIA PLAN PRIMARY REFERENCE POINT: NORMAL
RAD ONC ARIA PLAN TOTAL FRACTIONS PRESCRIBED: 30
RAD ONC ARIA PLAN TOTAL PRESCRIBED DOSE: 6000 CGY
RAD ONC ARIA REFERENCE POINT DOSAGE GIVEN TO DATE: 28 GY
RAD ONC ARIA REFERENCE POINT ID: NORMAL
RAD ONC ARIA REFERENCE POINT SESSION DOSAGE GIVEN: 2 GY

## 2023-10-12 PROCEDURE — 77386: CPT | Performed by: RADIOLOGY

## 2023-10-12 PROCEDURE — 77014 CHG CT GUIDANCE RADIATION THERAPY FLDS PLACEMENT: CPT | Performed by: RADIOLOGY

## 2023-10-13 ENCOUNTER — HOSPITAL ENCOUNTER (OUTPATIENT)
Dept: RADIATION ONCOLOGY | Facility: HOSPITAL | Age: 53
Discharge: HOME | End: 2023-10-13
Payer: COMMERCIAL

## 2023-10-13 LAB
RAD ONC ARIA COURSE ID: NORMAL
RAD ONC ARIA COURSE INTENT: NORMAL
RAD ONC ARIA COURSE LAST TREATMENT DATE: NORMAL
RAD ONC ARIA COURSE START DATE: NORMAL
RAD ONC ARIA COURSE TREATMENT ELAPSED DAYS: 18
RAD ONC ARIA FIRST TREATMENT DATE: NORMAL
RAD ONC ARIA PLAN FRACTIONS TREATED TO DATE: 15
RAD ONC ARIA PLAN ID: NORMAL
RAD ONC ARIA PLAN PRESCRIBED DOSE PER FRACTION: 2 GY
RAD ONC ARIA PLAN PRIMARY REFERENCE POINT: NORMAL
RAD ONC ARIA PLAN TOTAL FRACTIONS PRESCRIBED: 30
RAD ONC ARIA PLAN TOTAL PRESCRIBED DOSE: 6000 CGY
RAD ONC ARIA REFERENCE POINT DOSAGE GIVEN TO DATE: 30 GY
RAD ONC ARIA REFERENCE POINT ID: NORMAL
RAD ONC ARIA REFERENCE POINT SESSION DOSAGE GIVEN: 2 GY

## 2023-10-13 PROCEDURE — 77386: CPT | Performed by: RADIOLOGY

## 2023-10-13 PROCEDURE — 77014 CHG CT GUIDANCE RADIATION THERAPY FLDS PLACEMENT: CPT | Performed by: RADIOLOGY

## 2023-10-16 ENCOUNTER — HOSPITAL ENCOUNTER (OUTPATIENT)
Dept: RADIATION ONCOLOGY | Facility: HOSPITAL | Age: 53
Discharge: HOME | End: 2023-10-16
Payer: COMMERCIAL

## 2023-10-16 LAB
RAD ONC ARIA COURSE ID: NORMAL
RAD ONC ARIA COURSE INTENT: NORMAL
RAD ONC ARIA COURSE LAST TREATMENT DATE: NORMAL
RAD ONC ARIA COURSE START DATE: NORMAL
RAD ONC ARIA COURSE TREATMENT ELAPSED DAYS: 21
RAD ONC ARIA FIRST TREATMENT DATE: NORMAL
RAD ONC ARIA PLAN FRACTIONS TREATED TO DATE: 16
RAD ONC ARIA PLAN ID: NORMAL
RAD ONC ARIA PLAN PRESCRIBED DOSE PER FRACTION: 2 GY
RAD ONC ARIA PLAN PRIMARY REFERENCE POINT: NORMAL
RAD ONC ARIA PLAN TOTAL FRACTIONS PRESCRIBED: 30
RAD ONC ARIA PLAN TOTAL PRESCRIBED DOSE: 6000 CGY
RAD ONC ARIA REFERENCE POINT DOSAGE GIVEN TO DATE: 32 GY
RAD ONC ARIA REFERENCE POINT ID: NORMAL
RAD ONC ARIA REFERENCE POINT SESSION DOSAGE GIVEN: 2 GY

## 2023-10-16 PROCEDURE — 77386: CPT | Performed by: RADIOLOGY

## 2023-10-16 PROCEDURE — 77336 RADIATION PHYSICS CONSULT: CPT | Performed by: RADIOLOGY

## 2023-10-17 ENCOUNTER — INFUSION (OUTPATIENT)
Dept: ONCOLOGY | Facility: HOSPITAL | Age: 53
End: 2023-10-17
Payer: COMMERCIAL

## 2023-10-17 ENCOUNTER — HOSPITAL ENCOUNTER (OUTPATIENT)
Dept: RADIATION ONCOLOGY | Facility: HOSPITAL | Age: 53
Discharge: HOME | End: 2023-10-17
Payer: COMMERCIAL

## 2023-10-17 ENCOUNTER — HOSPITAL ENCOUNTER (OUTPATIENT)
Dept: RADIATION ONCOLOGY | Facility: HOSPITAL | Age: 53
Discharge: HOME | End: 2023-10-17

## 2023-10-17 ENCOUNTER — OFFICE VISIT (OUTPATIENT)
Dept: ONCOLOGY | Facility: CLINIC | Age: 53
End: 2023-10-17
Payer: COMMERCIAL

## 2023-10-17 ENCOUNTER — LAB (OUTPATIENT)
Dept: ONCOLOGY | Facility: CLINIC | Age: 53
End: 2023-10-17
Payer: COMMERCIAL

## 2023-10-17 VITALS
HEART RATE: 111 BPM | OXYGEN SATURATION: 98 % | TEMPERATURE: 98.4 F | SYSTOLIC BLOOD PRESSURE: 143 MMHG | DIASTOLIC BLOOD PRESSURE: 88 MMHG | RESPIRATION RATE: 18 BRPM

## 2023-10-17 VITALS
OXYGEN SATURATION: 92 % | HEART RATE: 101 BPM | DIASTOLIC BLOOD PRESSURE: 88 MMHG | WEIGHT: 302.8 LBS | TEMPERATURE: 97.7 F | RESPIRATION RATE: 18 BRPM | BODY MASS INDEX: 46.04 KG/M2 | SYSTOLIC BLOOD PRESSURE: 138 MMHG

## 2023-10-17 VITALS
BODY MASS INDEX: 46.04 KG/M2 | TEMPERATURE: 97.7 F | DIASTOLIC BLOOD PRESSURE: 88 MMHG | OXYGEN SATURATION: 92 % | RESPIRATION RATE: 18 BRPM | HEART RATE: 101 BPM | WEIGHT: 302.8 LBS | SYSTOLIC BLOOD PRESSURE: 138 MMHG

## 2023-10-17 DIAGNOSIS — C34.91 SQUAMOUS CELL CARCINOMA OF BRONCHUS OF RIGHT LUNG: ICD-10-CM

## 2023-10-17 DIAGNOSIS — Z95.828 PORT-A-CATH IN PLACE: ICD-10-CM

## 2023-10-17 DIAGNOSIS — C34.91 SQUAMOUS CELL CARCINOMA OF BRONCHUS OF RIGHT LUNG: Primary | ICD-10-CM

## 2023-10-17 LAB
ALBUMIN SERPL-MCNC: 3.7 G/DL (ref 3.5–5.2)
ALBUMIN/GLOB SERPL: 1 G/DL
ALP SERPL-CCNC: 58 U/L (ref 39–117)
ALT SERPL W P-5'-P-CCNC: 13 U/L (ref 1–41)
ANION GAP SERPL CALCULATED.3IONS-SCNC: 10 MMOL/L (ref 5–15)
AST SERPL-CCNC: 14 U/L (ref 1–40)
BASOPHILS # BLD AUTO: 0.05 10*3/MM3 (ref 0–0.2)
BASOPHILS NFR BLD AUTO: 0.7 % (ref 0–1.5)
BILIRUB SERPL-MCNC: 0.3 MG/DL (ref 0–1.2)
BUN SERPL-MCNC: 20 MG/DL (ref 6–20)
BUN/CREAT SERPL: 26 (ref 7–25)
CALCIUM SPEC-SCNC: 9.3 MG/DL (ref 8.6–10.5)
CHLORIDE SERPL-SCNC: 101 MMOL/L (ref 98–107)
CO2 SERPL-SCNC: 26 MMOL/L (ref 22–29)
CREAT SERPL-MCNC: 0.77 MG/DL (ref 0.76–1.27)
DEPRECATED RDW RBC AUTO: 53.9 FL (ref 37–54)
EGFRCR SERPLBLD CKD-EPI 2021: 107.7 ML/MIN/1.73
EOSINOPHIL # BLD AUTO: 0.06 10*3/MM3 (ref 0–0.4)
EOSINOPHIL NFR BLD AUTO: 0.8 % (ref 0.3–6.2)
ERYTHROCYTE [DISTWIDTH] IN BLOOD BY AUTOMATED COUNT: 16.6 % (ref 12.3–15.4)
GLOBULIN UR ELPH-MCNC: 3.6 GM/DL
GLUCOSE SERPL-MCNC: 138 MG/DL (ref 65–99)
HCT VFR BLD AUTO: 41 % (ref 37.5–51)
HGB BLD-MCNC: 12.6 G/DL (ref 13–17.7)
IMM GRANULOCYTES # BLD AUTO: 0.04 10*3/MM3 (ref 0–0.05)
IMM GRANULOCYTES NFR BLD AUTO: 0.6 % (ref 0–0.5)
LYMPHOCYTES # BLD AUTO: 0.69 10*3/MM3 (ref 0.7–3.1)
LYMPHOCYTES NFR BLD AUTO: 9.5 % (ref 19.6–45.3)
MCH RBC QN AUTO: 28.7 PG (ref 26.6–33)
MCHC RBC AUTO-ENTMCNC: 30.7 G/DL (ref 31.5–35.7)
MCV RBC AUTO: 93.4 FL (ref 79–97)
MONOCYTES # BLD AUTO: 0.51 10*3/MM3 (ref 0.1–0.9)
MONOCYTES NFR BLD AUTO: 7 % (ref 5–12)
NEUTROPHILS NFR BLD AUTO: 5.9 10*3/MM3 (ref 1.7–7)
NEUTROPHILS NFR BLD AUTO: 81.4 % (ref 42.7–76)
NRBC BLD AUTO-RTO: 0 /100 WBC (ref 0–0.2)
PLATELET # BLD AUTO: 269 10*3/MM3 (ref 140–450)
PMV BLD AUTO: 8.7 FL (ref 6–12)
POTASSIUM SERPL-SCNC: 4.5 MMOL/L (ref 3.5–5.2)
PROT SERPL-MCNC: 7.3 G/DL (ref 6–8.5)
RAD ONC ARIA COURSE ID: NORMAL
RAD ONC ARIA COURSE INTENT: NORMAL
RAD ONC ARIA COURSE LAST TREATMENT DATE: NORMAL
RAD ONC ARIA COURSE START DATE: NORMAL
RAD ONC ARIA COURSE TREATMENT ELAPSED DAYS: 22
RAD ONC ARIA FIRST TREATMENT DATE: NORMAL
RAD ONC ARIA PLAN FRACTIONS TREATED TO DATE: 17
RAD ONC ARIA PLAN ID: NORMAL
RAD ONC ARIA PLAN PRESCRIBED DOSE PER FRACTION: 2 GY
RAD ONC ARIA PLAN PRIMARY REFERENCE POINT: NORMAL
RAD ONC ARIA PLAN TOTAL FRACTIONS PRESCRIBED: 30
RAD ONC ARIA PLAN TOTAL PRESCRIBED DOSE: 6000 CGY
RAD ONC ARIA REFERENCE POINT DOSAGE GIVEN TO DATE: 34 GY
RAD ONC ARIA REFERENCE POINT ID: NORMAL
RAD ONC ARIA REFERENCE POINT SESSION DOSAGE GIVEN: 2 GY
RBC # BLD AUTO: 4.39 10*6/MM3 (ref 4.14–5.8)
SODIUM SERPL-SCNC: 137 MMOL/L (ref 136–145)
WBC NRBC COR # BLD: 7.25 10*3/MM3 (ref 3.4–10.8)

## 2023-10-17 PROCEDURE — 25010000002 DIPHENHYDRAMINE PER 50 MG: Performed by: INTERNAL MEDICINE

## 2023-10-17 PROCEDURE — 77386: CPT | Performed by: RADIOLOGY

## 2023-10-17 PROCEDURE — 25010000002 CARBOPLATIN PER 50 MG: Performed by: INTERNAL MEDICINE

## 2023-10-17 PROCEDURE — 85025 COMPLETE CBC W/AUTO DIFF WBC: CPT | Performed by: INTERNAL MEDICINE

## 2023-10-17 PROCEDURE — 25010000002 DEXAMETHASONE SODIUM PHOSPHATE 20 MG/5ML SOLUTION: Performed by: INTERNAL MEDICINE

## 2023-10-17 PROCEDURE — 96417 CHEMO IV INFUS EACH ADDL SEQ: CPT

## 2023-10-17 PROCEDURE — 96375 TX/PRO/DX INJ NEW DRUG ADDON: CPT

## 2023-10-17 PROCEDURE — 80053 COMPREHEN METABOLIC PANEL: CPT | Performed by: INTERNAL MEDICINE

## 2023-10-17 PROCEDURE — 96413 CHEMO IV INFUSION 1 HR: CPT

## 2023-10-17 PROCEDURE — 99214 OFFICE O/P EST MOD 30 MIN: CPT | Performed by: INTERNAL MEDICINE

## 2023-10-17 PROCEDURE — 1125F AMNT PAIN NOTED PAIN PRSNT: CPT | Performed by: INTERNAL MEDICINE

## 2023-10-17 PROCEDURE — 25010000002 PACLITAXEL PER 1 MG: Performed by: INTERNAL MEDICINE

## 2023-10-17 PROCEDURE — 96367 TX/PROPH/DG ADDL SEQ IV INF: CPT

## 2023-10-17 PROCEDURE — 25010000002 PALONOSETRON 0.25 MG/5ML SOLUTION PREFILLED SYRINGE: Performed by: INTERNAL MEDICINE

## 2023-10-17 PROCEDURE — 25810000003 SODIUM CHLORIDE 0.9 % SOLUTION 250 ML FLEX CONT: Performed by: INTERNAL MEDICINE

## 2023-10-17 PROCEDURE — 25810000003 SODIUM CHLORIDE 0.9 % SOLUTION: Performed by: INTERNAL MEDICINE

## 2023-10-17 PROCEDURE — 25010000002 HEPARIN LOCK FLUSH PER 10 UNITS: Performed by: INTERNAL MEDICINE

## 2023-10-17 RX ORDER — HEPARIN SODIUM (PORCINE) LOCK FLUSH IV SOLN 100 UNIT/ML 100 UNIT/ML
500 SOLUTION INTRAVENOUS AS NEEDED
Status: DISCONTINUED | OUTPATIENT
Start: 2023-10-17 | End: 2023-10-17 | Stop reason: HOSPADM

## 2023-10-17 RX ORDER — OXYCODONE AND ACETAMINOPHEN 10; 325 MG/1; MG/1
1 TABLET ORAL ONCE
Status: COMPLETED | OUTPATIENT
Start: 2023-10-17 | End: 2023-10-17

## 2023-10-17 RX ORDER — SODIUM CHLORIDE 9 MG/ML
250 INJECTION, SOLUTION INTRAVENOUS ONCE
Status: COMPLETED | OUTPATIENT
Start: 2023-10-17 | End: 2023-10-17

## 2023-10-17 RX ORDER — SODIUM CHLORIDE 0.9 % (FLUSH) 0.9 %
10 SYRINGE (ML) INJECTION AS NEEDED
OUTPATIENT
Start: 2023-10-17

## 2023-10-17 RX ORDER — FAMOTIDINE 10 MG/ML
20 INJECTION, SOLUTION INTRAVENOUS ONCE
Status: COMPLETED | OUTPATIENT
Start: 2023-10-17 | End: 2023-10-17

## 2023-10-17 RX ORDER — SODIUM CHLORIDE 0.9 % (FLUSH) 0.9 %
10 SYRINGE (ML) INJECTION AS NEEDED
Status: DISCONTINUED | OUTPATIENT
Start: 2023-10-17 | End: 2023-10-17 | Stop reason: HOSPADM

## 2023-10-17 RX ORDER — QUETIAPINE FUMARATE 25 MG/1
25 TABLET, FILM COATED ORAL NIGHTLY
COMMUNITY

## 2023-10-17 RX ORDER — PALONOSETRON 0.05 MG/ML
0.25 INJECTION, SOLUTION INTRAVENOUS ONCE
Status: COMPLETED | OUTPATIENT
Start: 2023-10-17 | End: 2023-10-17

## 2023-10-17 RX ORDER — HEPARIN SODIUM (PORCINE) LOCK FLUSH IV SOLN 100 UNIT/ML 100 UNIT/ML
500 SOLUTION INTRAVENOUS AS NEEDED
OUTPATIENT
Start: 2023-10-17

## 2023-10-17 RX ADMIN — PALONOSETRON 0.25 MG: 0.25 INJECTION, SOLUTION INTRAVENOUS at 09:24

## 2023-10-17 RX ADMIN — Medication 10 ML: at 11:56

## 2023-10-17 RX ADMIN — PACLITAXEL 120 MG: 6 INJECTION, SOLUTION, CONCENTRATE INTRAVENOUS at 10:14

## 2023-10-17 RX ADMIN — SODIUM CHLORIDE 250 ML: 9 INJECTION, SOLUTION INTRAVENOUS at 09:24

## 2023-10-17 RX ADMIN — OXYCODONE AND ACETAMINOPHEN 1 TABLET: 10; 325 TABLET ORAL at 09:25

## 2023-10-17 RX ADMIN — Medication 500 UNITS: at 11:56

## 2023-10-17 RX ADMIN — CARBOPLATIN 300 MG: 10 INJECTION INTRAVENOUS at 11:24

## 2023-10-17 RX ADMIN — FAMOTIDINE 20 MG: 10 INJECTION INTRAVENOUS at 09:25

## 2023-10-17 RX ADMIN — DEXAMETHASONE SODIUM PHOSPHATE 12 MG: 4 INJECTION, SOLUTION INTRA-ARTICULAR; INTRALESIONAL; INTRAMUSCULAR; INTRAVENOUS; SOFT TISSUE at 09:50

## 2023-10-17 RX ADMIN — DIPHENHYDRAMINE HYDROCHLORIDE 25 MG: 50 INJECTION, SOLUTION INTRAMUSCULAR; INTRAVENOUS at 09:27

## 2023-10-17 NOTE — PROGRESS NOTES
Name:  Dexter Flores  :  1970  Date:  10/17/2023     REFERRING PHYSICIAN  Leonardo Jackson DO    PRIMARY CARE PROVIDER  Jose F Reynolds PA-C    REASON FOR FOLLOWUP  1. 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    Dear Mr. Reynolds,    HISTORY OF PRESENT ILLNESS:   I saw Mr. Flores in follow up today in our medical oncology clinic. As you are aware, he is a pleasant, 53 y.o., white male with a history of hypertension, COPD and lifelong tobacco abuse who first noticed a cough and some worsening shortness of breath in late 2023. As the symptoms progressed, he presented to our ED on 2023, where a CT of the chest identified a mass/postobstructive pneumonia in the right upper lobe of the lung. He was admitted to the [facility] hospitalist service for further evaluation and treatment. Pulmonology performed a diagnostic bronchoscopy on 2023, and the results were consistent with squamous cell carcinoma. In the meantime, as his shortness of breath improved with antibiotics and other supportive care, he was able to be discharged on 2023 with a referral to our clinic (that same day) for further management. At that time, he was agreeable to undergoing an initial staging PET scan, having a powerport placed; and, assuming the PET scan remained consistent with localized disease, proceeding with definitive treatment with concomitant chemotherapy and localized irradiation.    INTERIM HISTORY:  Mr. Flores returns to clinic today for follow up by himself. His pain is still under good control on his current combination of scheduled, long-acting Morphine and prn Percocet. He still has some intermittent nausea, but prn Zofran has still been controlling this. Meanwhile, following powerport placement and CT simulation, he began concomitant chemotherapy and localized irradiation  in late September. He has received three (3) cycles of qweekly carboplatin and taxol and sixteen out of a planned total of thirty (16/30) fractions of XRT to date; and he reiterates today that he is still tolerating this regimen overall well so far, with tolerable, increased fatigue as his only noticeable side effect to date. His breathing has gotten somewhat better since he started this therapy as well. He again has no other specific complaints.    Past Medical History:   Diagnosis Date    Arthritis     Bulging of cervical intervertebral disc     Cancer     lung    COPD (chronic obstructive pulmonary disease)     GERD (gastroesophageal reflux disease)     History of transfusion     Hypertension     Neck pain     Sleep apnea     Thyroid disease        Past Surgical History:   Procedure Laterality Date    ABDOMINAL HERNIA REPAIR      umbilical    BRONCHOSCOPY Bilateral 2023    Procedure: BRONCHOSCOPY WITH ENDOBRONCHIAL ULTRASOUND;  Surgeon: Savage Mcmanus MD;  Location: [facility];  Service: Pulmonary;  Laterality: Bilateral;    JOINT REPLACEMENT      PORTACATH PLACEMENT Left 2023    Procedure: INSERTION OF PORTACATH;  Surgeon: Jose F Hewitt MD;  Location: [facility];  Service: General;  Laterality: Left;    TONSILLECTOMY      TOTAL HIP ARTHROPLASTY Bilateral        Social History     Socioeconomic History    Marital status:    Tobacco Use    Smoking status: Former     Packs/day: 1.00     Years: 30.00     Additional pack years: 0.00     Total pack years: 30.00     Types: Cigarettes     Start date:      Quit date: 2023     Years since quittin.4    Smokeless tobacco: Never   Vaping Use    Vaping Use: Never used   Substance and Sexual Activity    Alcohol use: Not Currently    Drug use: Not Currently    Sexual activity: Defer       History reviewed. No pertinent family history.    Allergies   Allergen Reactions    Bevespi Aerosphere [Glycopyrrolate-Formoterol] Other (See Comments)      Developed blisters around mouth. Not a listed adverse effect of medication so unsure if this is related to inhaler. Discontinued as precaution.       Current Outpatient Medications   Medication Sig Dispense Refill    albuterol (PROVENTIL) (2.5 MG/3ML) 0.083% nebulizer solution Take 2.5 mg by nebulization Every 6 (Six) Hours As Needed for Wheezing.      albuterol sulfate  (90 Base) MCG/ACT inhaler Inhale 2 puffs Every 4 (Four) Hours As Needed for Wheezing. 18 g 11    Ihjfbnnypgepven51.5mg/5ml,Aluminum&Wdebeeqwd812-087-67jv/5ml,LidocaineViscous2%,[id number]/ml 1:1:1:1 Swish in mouth and Swallow 10 mL 4 times daily as needed. 480 mL 1    fluticasone (FLOVENT HFA) 110 MCG/ACT inhaler Inhale 2 puffs 2 (Two) Times a Day. 12 g 11    gabapentin (NEURONTIN) 600 MG tablet Take 800 mg by mouth 3 (Three) Times a Day.      hydroCHLOROthiazide (HYDRODIURIL) 12.5 MG tablet Take 1 tablet by mouth Daily.      levothyroxine (SYNTHROID, LEVOTHROID) 50 MCG tablet Take 1 tablet by mouth Daily.      lidocaine-prilocaine (EMLA) 2.5-2.5 % cream Apply 1 application  topically to the appropriate area as directed Every 2 (Two) Hours As Needed for Mild Pain. Apply to Port-A-Cath site 30 minutes prior to arrival at infusion clinic. 30 g 1    lisinopril (PRINIVIL,ZESTRIL) 10 MG tablet Take 1 tablet by mouth Daily.      LORazepam (ATIVAN) 1 MG tablet Take 1 tablet by mouth Every 8 (Eight) Hours As Needed for Anxiety. 90 tablet 0    megestrol (MEGACE) 40 MG/ML suspension Take 10 mL by mouth Daily. 480 mL 2    montelukast (SINGULAIR) 10 MG tablet Take 1 tablet by mouth Every Night.      Morphine (MS CONTIN) 60 MG 12 hr tablet Take 1 tablet by mouth 2 (Two) Times a Day. 60 tablet 0    naloxone (NARCAN) 4 MG/0.1ML nasal spray Call 911. Don't prime. Hooppole in 1 nostril for overdose. Repeat in 2-3 minutes in other nostril if no or minimal breathing/responsiveness. 2 each 0    ondansetron (ZOFRAN) 8 MG tablet Take 1 tablet by mouth Every 8  (Eight) Hours As Needed for Nausea or Vomiting. 30 tablet 2    oxyCODONE-acetaminophen (PERCOCET)  MG per tablet Take 1 tablet by mouth Every 6 (Six) Hours As Needed for Moderate Pain. 120 tablet 0    pantoprazole (PROTONIX) 40 MG EC tablet Take 1 tablet by mouth Daily.      prochlorperazine (COMPAZINE) 10 MG tablet Take 1 tablet by mouth Every 6 (Six) Hours As Needed for Nausea or Vomiting. 60 tablet 1    sennosides-docusate (senna-docusate sodium) 8.6-50 MG per tablet Take 1 tablet by mouth Daily. 30 tablet 2    tiotropium bromide-olodaterol (STIOLTO RESPIMAT) 2.5-2.5 MCG/ACT aerosol solution inhaler Inhale 2 puffs Daily. 4 g 11    traZODone (DESYREL) 100 MG tablet Take 1 tablet by mouth Every Night.      vitamin D (ERGOCALCIFEROL) 1.25 MG (03525 UT) capsule capsule Take 1 capsule by mouth 1 (One) Time Per Week.       No current facility-administered medications for this visit.     REVIEW OF SYSTEMS  CONSTITUTIONAL:  No fever, chills or night sweats. Increased fatigue since starting concomitant chemo/XRT in late September.  EYES:  No blurry vision, diplopia or other vision changes.  ENT:  No hearing loss, nosebleeds or sore throat.  CARDIOVASCULAR:  No palpitations, arrhythmia, syncopal episodes or edema.  PULMONARY:  As per the HPI above.  GASTROINTESTINAL:  No constipation or diarrhea. No abdominal pain. Intermittent, mild nausea.  GENITOURINARY:  No hematuria, kidney stones or frequent urination.  MUSCULOSKELETAL:  No joint or back pains.  INTEGUMENTARY: No rashes or pruritus.  ENDOCRINE:  No excessive thirst or hot flashes.  HEMATOLOGIC:  No history of free bleeding, spontaneous bleeding or clotting.  IMMUNOLOGIC:  No allergies or frequent infections.  NEUROLOGIC: No numbness, tingling, seizures or weakness.  PSYCHIATRIC:  No depression. Some anxiety over his recent diagnosis with cancer, currently still improved.    PHYSICAL EXAMINATION  /88   Pulse 101   Temp 97.7 °F (36.5 °C) (Temporal)    Resp 18   Wt (!) 137 kg (302 lb 12.8 oz)   SpO2 92%   BMI 46.04 kg/m²     Pain Score:  Pain Score    10/17/23 0826   PainSc:   6       PHQ-Score Total:  PHQ-9 Total Score:      ECO  GENERAL:  A well-developed, well-nourished, white male in no acute distress.  HEENT:  Pupils equally round and reactive to light.  Extraocular muscles intact.  CARDIOVASCULAR:  Regular rate and rhythm.  No murmurs, gallops or rubs.  LUNGS:  Decreased breath sounds on the right, clear on the left.  ABDOMEN:  Soft, nontender, nondistended with positive bowel sounds.  EXTREMITIES:  No clubbing, cyanosis or edema bilaterally.  SKIN:  No rashes or petechiae. Powerport still in place.  NEURO:  Cranial nerves grossly intact. No focal deficits.  PSYCH:  Alert and oriented x3.    LABORATORY    Lab Results   Component Value Date    WBC 7.25 10/17/2023    HGB 12.6 (L) 10/17/2023    HCT 41.0 10/17/2023    MCV 93.4 10/17/2023     10/17/2023    NEUTROABS 5.90 10/17/2023       Lab Results   Component Value Date     10/10/2023    K 4.3 10/10/2023     10/10/2023    CO2 27.3 10/10/2023    BUN 11 10/10/2023    CREATININE 0.67 (L) 10/10/2023    GLUCOSE 97 10/10/2023    CALCIUM 9.3 10/10/2023    AST 14 10/10/2023    ALT 12 10/10/2023    ALKPHOS 68 10/10/2023    BILITOT 0.3 10/10/2023    PROTEINTOT 7.2 10/10/2023    ALBUMIN 3.6 10/10/2023     CBC (10/17/2023): WBCs: 7.25; HgB: 12.6; Hct: 41.0; platelets: 269; MCV: 93.4  CBC (10/03/2023): WBCs: 10.59; HgB: 12.7; Hct: 41.4; platelets: 389; MCV: 91.8  CBC (2023): WBCs: 13.95; HgB: 12.9; Hct: 40.5; platelets: 483; MCV: 90.0  CBC (2023): WBCs: 22.31; HgB: 13.5; Hct: 43.6; platelets: 644    IMAGING  CT angiogram chest (2023):  Impression: Right upper lobe consolidative and masslike airspace opacity without violating the fissures. Findings are concerning for infection. However, considering the presence of an enlarged right paratracheal lymph node measuring 4.2 cm,  underlying mass cannot be ruled out.    NM PET with fusion CT, skull base to mid-thigh (09/12/2023):  Impression:  1) A right upper lobe pulmonary parenchymal mass and/or postobstructive collapse measures about 9.9 cm with hypermetabolism mSUV 15.  2) Subcarinal region 2.3 cm lymph node shows PET hypermetabolism measuring 8.6.  3) Right hilar region hypermetabolism shows mSUV 7.  4) Probable 2 cm right suprahilar clavicular region lymph node shows PET hypermetabolism mSUV 10.2.    MRI brain with and without contrast (09/16/2023):  Impression:  1) No parenchymal mass, hemorrhage or midline shift.  2) Increased signal in the mastoid air cells bilaterally.  3) No parenchymal mass, hemorrhage or midline shift.    PATHOLOGY  Lung, biopsy, right upper lobe (08/22/2023):  Moderately differentiated squamous cell carcinoma. PD-L1 TPS: < 1%.    Bronchial lavage, right upper and middle lobes (08/22/2023): Malignant. Squamous cell carcinoma.    Bronchial wash (08/22/2023): Malignant. Squamous cell carcinoma.    Bronchial brushings, right upper and middle lobes (08/22/2023): Malignant. Squamous cell carcinoma.    RADIATION THERAPY  Radiation Treatments       Active   Plans   Rt Lung   Most recent treatment: Dose planned: 200 cGy (fraction 16 on 10/16/2023)   Total: Dose planned: 6,000 cGy (30 fractions)   Elapsed Days: 21      Reference Points   PTV   Most recent treatment: Dose given: 200 cGy (on 10/16/2023)   Total: Dose given: 3,200 cGy   Elapsed Days: 21           Historical   No historical radiation treatments to show.             IMPRESSION AND PLAN  Mr. Floers is a 53 y.o., white male with:  Squamous cell lung carcinoma: Diagnosed in mid-August 2023 with locally advanced disease involving the right hemithorax. I have had several, long discussions with the patient (+/- his niece) since the time of his initial consultation in our clinic (on 08/24/2023) regarding this diagnosis and, in general terms, its prognosis. They remain  aware that, with his, at a minimum, stage IIB disease, he was/is not a good surgical candidate (at least at this time). However, he was, and continues to be, an excellent candidate for definitive treatment with concomitant chemotherapy and localized irradiation. This therapy was/is anticipated to have the best chance of improving his symptoms (of dyspnea and pain), could make his disease operable; and, even if surgery is not subsequently possible, can lead to a longterm remission (if not a classical cure). Following another long discussion regarding the potential risks vs. benefits of this regimen, he was agreeable to proceeding with concomitant, qweekly carboplatin/taxol throughout a ~seven week course of localized XRT. Once a powerport was placed and his CT simulation was completed, he began a regimen consisting of qweekly carboplatin and taxol throughout a planned, thirty-fraction course of localized XRT in late September 2023. He has now received three (3) cycles of the former and sixteen (16) fractions of the latter to date; and he reiterates today that he is still tolerating this regimen overall very well so far, without any significant side effects (other than mildly increased fatigue). We will proceed with this current treatment plan (fourth cycle of qweekly carbo/taxol today). We will see him back in our clinic in one week, on the day of the fifth cycle of carbo/taxol, with a CBC and CMP for another symptom/toxicity check.  Pain: Symptoms in his right chest wall are secondary to issue #1. Currently still under good control with a combination of scheduled, long-acting MSContin 30 mg q12hr and prn Percocet 10/325 mg q6hr. Continue to monitor.  Anxiety: Exacerbated by his diagnosis with issue #1, but recently better. Continue Ativan 1 mg PO TID prn. Continue to monitor.  Dyspnea: Multifactorial, with issue #1 and COPD both contributing. Recently improving with ongoing, concomitant chemo/XRT. Refills of his prn  inhaler were provided today.  Transportation issues: The patient and his niece previously reported that their immediate family only has one car between them. This has still not posed a significant problem with his treatment so far, but we previously referred him to our  for assistance.  The patient was in agreement with these plans.    It is a pleasure to participate in Mr. Flores's care. Please do not hesitate to call with any questions or concerns that you may have.    A total of 30 minutes were spent coordinating this patient’s care in clinic today; more than 50% of this time was face-to-face with the patient, reviewing his interim medical history, discussing the results of the most recent labwork and counseling on the current treatment and followup plan. All questions were answered to his satisfaction.    FOLLOW UP  Continue MSContin 30 mg PO q12hr and and Percocet 10/325 mg q6hr prn. Continue Ativan 1 mg TID prn. Continue Zofran prn. With [facility] radiation oncology for ongoing, localized XRT, as planned. Proceed with concomitant qweekly carbo/taxol, as planned (cycle #4 today). Return to our clinic in 1 week, on the day of the 5th cycle of qweekly carbo/taxol, with a CBC and CMP.            This document was electronically signed by ROMEO Rizzo MD October 17, 2023 08:34 EDT      CC: ABRAN Oglesby MD

## 2023-10-17 NOTE — PROGRESS NOTES
On Treatment Visit     Patient: Dexter Flores   YOB: 1970   Medical Record Number: [id number]     Date of Visit  October 17, 2023   Primary Diagnosis:  Cancer Staging   No matching staging information was found for the patient.      Mr. Flores was seen today for an on treatment visit. He is receiving radiation therapy to the right lung.     Treatment Site Modality Dose per fraction (cGy) Fractions Total dose (cGy)   Right lung VMAT 200 17/30 3400 of 6000     Concurrent therapy: Yes    He is doing well today.  He is tolerating his combined modality treatment quite well.  He does have a dry cough which is nonproductive.  His swallowing is improved with Magic mouthwash.  He has no shortness of breath at rest but some dyspnea upon exertion.  He has had no fever and/or chills.    Pain Score    10/17/23 1056   PainSc:   6   PainLoc: Chest     Dexter Flores reports a pain score of 6.  Given his pain assessment as noted, treatment options were discussed and the following options were decided upon as a follow-up plan to address the patient's pain: continuation of current treatment plan for pain.    Vitals:    10/17/23 1056   BP: 138/88   Pulse: 101   Resp: 18   Temp: 97.7 °F (36.5 °C)   SpO2: 92%     Wt Readings from Last 3 Encounters:   10/17/23 (!) 137 kg (302 lb 12.8 oz)   10/17/23 (!) 137 kg (302 lb 12.8 oz)   10/10/23 (!) 138 kg (303 lb 6.4 oz)     On exam, he is sitting up in no distress, breathing comfortably on room air.  There is no evidence of thrush.  The chest is clear to PNA.  There is no adenopathy palpated in the supraclavicular fossa.     Plan: I have reviewed treatment setup notes and checked and approved the daily guidance images. I reviewed dose delivery and treatment parameters and deemed them appropriate. Continue radiation as prescribed.  The patient was given a refill for his Magic mouthwash.    DIALLO Gallo MD

## 2023-10-17 NOTE — PROGRESS NOTES
Venipuncture Blood Specimen Collection  Venipuncture performed in left hand by Fernando Riddle MA with good hemostasis. Patient tolerated the procedure well without complications.   10/17/23   Fernando Riddle MA

## 2023-10-18 ENCOUNTER — HOSPITAL ENCOUNTER (OUTPATIENT)
Dept: RADIATION ONCOLOGY | Facility: HOSPITAL | Age: 53
Discharge: HOME | End: 2023-10-18

## 2023-10-18 LAB
RAD ONC ARIA COURSE ID: NORMAL
RAD ONC ARIA COURSE INTENT: NORMAL
RAD ONC ARIA COURSE LAST TREATMENT DATE: NORMAL
RAD ONC ARIA COURSE START DATE: NORMAL
RAD ONC ARIA COURSE TREATMENT ELAPSED DAYS: 23
RAD ONC ARIA FIRST TREATMENT DATE: NORMAL
RAD ONC ARIA PLAN FRACTIONS TREATED TO DATE: 18
RAD ONC ARIA PLAN ID: NORMAL
RAD ONC ARIA PLAN PRESCRIBED DOSE PER FRACTION: 2 GY
RAD ONC ARIA PLAN PRIMARY REFERENCE POINT: NORMAL
RAD ONC ARIA PLAN TOTAL FRACTIONS PRESCRIBED: 30
RAD ONC ARIA PLAN TOTAL PRESCRIBED DOSE: 6000 CGY
RAD ONC ARIA REFERENCE POINT DOSAGE GIVEN TO DATE: 36 GY
RAD ONC ARIA REFERENCE POINT ID: NORMAL
RAD ONC ARIA REFERENCE POINT SESSION DOSAGE GIVEN: 2 GY

## 2023-10-18 PROCEDURE — 77386: CPT | Performed by: RADIOLOGY

## 2023-10-19 ENCOUNTER — HOSPITAL ENCOUNTER (OUTPATIENT)
Dept: RADIATION ONCOLOGY | Facility: HOSPITAL | Age: 53
Discharge: HOME | End: 2023-10-19
Payer: COMMERCIAL

## 2023-10-19 LAB
RAD ONC ARIA COURSE ID: NORMAL
RAD ONC ARIA COURSE INTENT: NORMAL
RAD ONC ARIA COURSE LAST TREATMENT DATE: NORMAL
RAD ONC ARIA COURSE START DATE: NORMAL
RAD ONC ARIA COURSE TREATMENT ELAPSED DAYS: 24
RAD ONC ARIA FIRST TREATMENT DATE: NORMAL
RAD ONC ARIA PLAN FRACTIONS TREATED TO DATE: 19
RAD ONC ARIA PLAN ID: NORMAL
RAD ONC ARIA PLAN PRESCRIBED DOSE PER FRACTION: 2 GY
RAD ONC ARIA PLAN PRIMARY REFERENCE POINT: NORMAL
RAD ONC ARIA PLAN TOTAL FRACTIONS PRESCRIBED: 30
RAD ONC ARIA PLAN TOTAL PRESCRIBED DOSE: 6000 CGY
RAD ONC ARIA REFERENCE POINT DOSAGE GIVEN TO DATE: 38 GY
RAD ONC ARIA REFERENCE POINT ID: NORMAL
RAD ONC ARIA REFERENCE POINT SESSION DOSAGE GIVEN: 2 GY

## 2023-10-19 PROCEDURE — 77386: CPT | Performed by: RADIOLOGY

## 2023-10-20 ENCOUNTER — TELEPHONE (OUTPATIENT)
Dept: RADIATION ONCOLOGY | Facility: HOSPITAL | Age: 53
End: 2023-10-20
Payer: COMMERCIAL

## 2023-10-20 DIAGNOSIS — R91.8 MASS OF RIGHT LUNG: ICD-10-CM

## 2023-10-20 RX ORDER — OXYCODONE AND ACETAMINOPHEN 10; 325 MG/1; MG/1
1 TABLET ORAL EVERY 6 HOURS PRN
Qty: 120 TABLET | Refills: 0 | OUTPATIENT
Start: 2023-10-20

## 2023-10-20 NOTE — TELEPHONE ENCOUNTER
CALLED PT AT FIRST NUMBER AND RANG TO A FAMILY MEMBER THAT STATED PT IS AT HOME AND STATED WASN'T FEELING WELL TO CALL OTHER NUMBER AND WASN'T SURE IF PT WAS GOING TO COME TODAY. CALLED OTHER NUMBER AS ABOVE AND PT DID NOT ANSWER AND DIDN'T HAVE VOICEMAIL SET UP. PT THEN CALLED BACK TO OFFICE AND STATES ISN'T FEELING WELL AND ISN'T COMING IN TODAY. WILL LET MA AND RADIATION TECHS KNOW AS WELL.

## 2023-10-20 NOTE — TELEPHONE ENCOUNTER
Caller: Dexter Flores    Relationship: Self    Best call back number: [phone]    Requested Prescriptions:   Requested Prescriptions     Pending Prescriptions Disp Refills    oxyCODONE-acetaminophen (PERCOCET)  MG per tablet 120 tablet 0     Sig: Take 1 tablet by mouth Every 6 (Six) Hours As Needed for Moderate Pain.        Pharmacy where request should be sent: [facility] [phone] [place] [id number] [facility] AT [address] [place] [address] [phone] [facility] [phone] FX     Last office visit with prescribing clinician: 10/17/2023   Last telemedicine visit with prescribing clinician: Visit date not found   Next office visit with prescribing clinician: 10/24/2023     Additional details provided by patient: HE HAS ONE PILL REMAINING.    Does the patient have less than a 3 day supply:  [x] Yes  [] No    Would you like a call back once the refill request has been completed: [] Yes [x] No    If the office needs to give you a call back, can they leave a voicemail: [] Yes [x] No    Kahlil Cooper Rep   10/20/23 11:01 EDT

## 2023-10-23 ENCOUNTER — HOSPITAL ENCOUNTER (OUTPATIENT)
Dept: RADIATION ONCOLOGY | Facility: HOSPITAL | Age: 53
Discharge: HOME | End: 2023-10-23
Payer: COMMERCIAL

## 2023-10-23 DIAGNOSIS — C34.91 SQUAMOUS CELL CARCINOMA OF BRONCHUS OF RIGHT LUNG: ICD-10-CM

## 2023-10-23 DIAGNOSIS — R91.8 MASS OF RIGHT LUNG: ICD-10-CM

## 2023-10-23 LAB
RAD ONC ARIA COURSE ID: NORMAL
RAD ONC ARIA COURSE INTENT: NORMAL
RAD ONC ARIA COURSE LAST TREATMENT DATE: NORMAL
RAD ONC ARIA COURSE START DATE: NORMAL
RAD ONC ARIA COURSE TREATMENT ELAPSED DAYS: 28
RAD ONC ARIA FIRST TREATMENT DATE: NORMAL
RAD ONC ARIA PLAN FRACTIONS TREATED TO DATE: 20
RAD ONC ARIA PLAN ID: NORMAL
RAD ONC ARIA PLAN PRESCRIBED DOSE PER FRACTION: 2 GY
RAD ONC ARIA PLAN PRIMARY REFERENCE POINT: NORMAL
RAD ONC ARIA PLAN TOTAL FRACTIONS PRESCRIBED: 30
RAD ONC ARIA PLAN TOTAL PRESCRIBED DOSE: 6000 CGY
RAD ONC ARIA REFERENCE POINT DOSAGE GIVEN TO DATE: 40 GY
RAD ONC ARIA REFERENCE POINT ID: NORMAL
RAD ONC ARIA REFERENCE POINT SESSION DOSAGE GIVEN: 2 GY

## 2023-10-23 PROCEDURE — 77386: CPT | Performed by: RADIOLOGY

## 2023-10-23 RX ORDER — MORPHINE SULFATE 60 MG/1
60 TABLET, FILM COATED, EXTENDED RELEASE ORAL 2 TIMES DAILY
Qty: 60 TABLET | Refills: 0 | Status: SHIPPED | OUTPATIENT
Start: 2023-10-23

## 2023-10-23 RX ORDER — OXYCODONE AND ACETAMINOPHEN 10; 325 MG/1; MG/1
1 TABLET ORAL EVERY 6 HOURS PRN
Qty: 120 TABLET | Refills: 0 | Status: CANCELLED | OUTPATIENT
Start: 2023-10-23

## 2023-10-23 NOTE — TELEPHONE ENCOUNTER
Caller: Dexter Flores    Relationship: Self    Best call back number: [phone]    Requested Prescriptions:   Requested Prescriptions     Pending Prescriptions Disp Refills    Morphine (MS CONTIN) 60 MG 12 hr tablet 60 tablet 0     Sig: Take 1 tablet by mouth 2 (Two) Times a Day.    oxyCODONE-acetaminophen (PERCOCET)  MG per tablet 120 tablet 0     Sig: Take 1 tablet by mouth Every 6 (Six) Hours As Needed for Moderate Pain.        Pharmacy where request should be sent: [place] [address] - [phone]  - [phone] FX     Last office visit with prescribing clinician: 10/17/2023   Last telemedicine visit with prescribing clinician: Visit date not found   Next office visit with prescribing clinician: 10/24/2023         Does the patient have less than a 3 day supply:  [x] Yes  [] No    Would you like a call back once the refill request has been completed: [] Yes [x] No    If the office needs to give you a call back, can they leave a voicemail: [] Yes [x] No

## 2023-10-24 ENCOUNTER — OFFICE VISIT (OUTPATIENT)
Dept: ONCOLOGY | Facility: CLINIC | Age: 53
End: 2023-10-24
Payer: COMMERCIAL

## 2023-10-24 ENCOUNTER — HOSPITAL ENCOUNTER (OUTPATIENT)
Dept: RADIATION ONCOLOGY | Facility: HOSPITAL | Age: 53
Discharge: HOME | End: 2023-10-24

## 2023-10-24 ENCOUNTER — INFUSION (OUTPATIENT)
Dept: ONCOLOGY | Facility: HOSPITAL | Age: 53
End: 2023-10-24
Payer: COMMERCIAL

## 2023-10-24 ENCOUNTER — LAB (OUTPATIENT)
Dept: ONCOLOGY | Facility: CLINIC | Age: 53
End: 2023-10-24
Payer: COMMERCIAL

## 2023-10-24 VITALS
RESPIRATION RATE: 20 BRPM | TEMPERATURE: 97.7 F | OXYGEN SATURATION: 100 % | SYSTOLIC BLOOD PRESSURE: 130 MMHG | BODY MASS INDEX: 43.97 KG/M2 | WEIGHT: 289.2 LBS | DIASTOLIC BLOOD PRESSURE: 90 MMHG | HEART RATE: 118 BPM

## 2023-10-24 VITALS
WEIGHT: 289.2 LBS | BODY MASS INDEX: 43.97 KG/M2 | TEMPERATURE: 97.7 F | OXYGEN SATURATION: 100 % | HEART RATE: 118 BPM | SYSTOLIC BLOOD PRESSURE: 130 MMHG | RESPIRATION RATE: 20 BRPM | DIASTOLIC BLOOD PRESSURE: 90 MMHG

## 2023-10-24 VITALS
HEART RATE: 118 BPM | TEMPERATURE: 97.7 F | DIASTOLIC BLOOD PRESSURE: 90 MMHG | BODY MASS INDEX: 43.94 KG/M2 | OXYGEN SATURATION: 100 % | WEIGHT: 289 LBS | SYSTOLIC BLOOD PRESSURE: 130 MMHG | RESPIRATION RATE: 18 BRPM

## 2023-10-24 DIAGNOSIS — R52 PAIN: ICD-10-CM

## 2023-10-24 DIAGNOSIS — C34.91 SQUAMOUS CELL CARCINOMA OF BRONCHUS OF RIGHT LUNG: ICD-10-CM

## 2023-10-24 DIAGNOSIS — C34.91 SQUAMOUS CELL CARCINOMA OF BRONCHUS OF RIGHT LUNG: Primary | ICD-10-CM

## 2023-10-24 DIAGNOSIS — Z95.828 PORT-A-CATH IN PLACE: ICD-10-CM

## 2023-10-24 LAB
ALBUMIN SERPL-MCNC: 4.2 G/DL (ref 3.5–5.2)
ALBUMIN/GLOB SERPL: 1.1 G/DL
ALP SERPL-CCNC: 63 U/L (ref 39–117)
ALT SERPL W P-5'-P-CCNC: 16 U/L (ref 1–41)
ANION GAP SERPL CALCULATED.3IONS-SCNC: 12 MMOL/L (ref 5–15)
AST SERPL-CCNC: 13 U/L (ref 1–40)
BASOPHILS # BLD AUTO: 0.05 10*3/MM3 (ref 0–0.2)
BASOPHILS NFR BLD AUTO: 0.9 % (ref 0–1.5)
BILIRUB SERPL-MCNC: 0.6 MG/DL (ref 0–1.2)
BUN SERPL-MCNC: 12 MG/DL (ref 6–20)
BUN/CREAT SERPL: 14.6 (ref 7–25)
CALCIUM SPEC-SCNC: 9.8 MG/DL (ref 8.6–10.5)
CHLORIDE SERPL-SCNC: 101 MMOL/L (ref 98–107)
CO2 SERPL-SCNC: 25 MMOL/L (ref 22–29)
CREAT SERPL-MCNC: 0.82 MG/DL (ref 0.76–1.27)
DEPRECATED RDW RBC AUTO: 56.6 FL (ref 37–54)
EGFRCR SERPLBLD CKD-EPI 2021: 105.7 ML/MIN/1.73
EOSINOPHIL # BLD AUTO: 0.04 10*3/MM3 (ref 0–0.4)
EOSINOPHIL NFR BLD AUTO: 0.7 % (ref 0.3–6.2)
ERYTHROCYTE [DISTWIDTH] IN BLOOD BY AUTOMATED COUNT: 17.2 % (ref 12.3–15.4)
GLOBULIN UR ELPH-MCNC: 3.7 GM/DL
GLUCOSE SERPL-MCNC: 143 MG/DL (ref 65–99)
HCT VFR BLD AUTO: 44.2 % (ref 37.5–51)
HGB BLD-MCNC: 13.9 G/DL (ref 13–17.7)
IMM GRANULOCYTES # BLD AUTO: 0.02 10*3/MM3 (ref 0–0.05)
IMM GRANULOCYTES NFR BLD AUTO: 0.4 % (ref 0–0.5)
LYMPHOCYTES # BLD AUTO: 0.55 10*3/MM3 (ref 0.7–3.1)
LYMPHOCYTES NFR BLD AUTO: 9.6 % (ref 19.6–45.3)
MCH RBC QN AUTO: 29.1 PG (ref 26.6–33)
MCHC RBC AUTO-ENTMCNC: 31.4 G/DL (ref 31.5–35.7)
MCV RBC AUTO: 92.7 FL (ref 79–97)
MONOCYTES # BLD AUTO: 0.58 10*3/MM3 (ref 0.1–0.9)
MONOCYTES NFR BLD AUTO: 10.2 % (ref 5–12)
NEUTROPHILS NFR BLD AUTO: 4.46 10*3/MM3 (ref 1.7–7)
NEUTROPHILS NFR BLD AUTO: 78.2 % (ref 42.7–76)
NRBC BLD AUTO-RTO: 0 /100 WBC (ref 0–0.2)
PLATELET # BLD AUTO: 240 10*3/MM3 (ref 140–450)
PMV BLD AUTO: 8.8 FL (ref 6–12)
POTASSIUM SERPL-SCNC: 4 MMOL/L (ref 3.5–5.2)
PROT SERPL-MCNC: 7.9 G/DL (ref 6–8.5)
RAD ONC ARIA COURSE ID: NORMAL
RAD ONC ARIA COURSE INTENT: NORMAL
RAD ONC ARIA COURSE LAST TREATMENT DATE: NORMAL
RAD ONC ARIA COURSE START DATE: NORMAL
RAD ONC ARIA COURSE TREATMENT ELAPSED DAYS: 29
RAD ONC ARIA FIRST TREATMENT DATE: NORMAL
RAD ONC ARIA PLAN FRACTIONS TREATED TO DATE: 21
RAD ONC ARIA PLAN ID: NORMAL
RAD ONC ARIA PLAN PRESCRIBED DOSE PER FRACTION: 2 GY
RAD ONC ARIA PLAN PRIMARY REFERENCE POINT: NORMAL
RAD ONC ARIA PLAN TOTAL FRACTIONS PRESCRIBED: 30
RAD ONC ARIA PLAN TOTAL PRESCRIBED DOSE: 6000 CGY
RAD ONC ARIA REFERENCE POINT DOSAGE GIVEN TO DATE: 42 GY
RAD ONC ARIA REFERENCE POINT ID: NORMAL
RAD ONC ARIA REFERENCE POINT SESSION DOSAGE GIVEN: 2 GY
RBC # BLD AUTO: 4.77 10*6/MM3 (ref 4.14–5.8)
SODIUM SERPL-SCNC: 138 MMOL/L (ref 136–145)
WBC NRBC COR # BLD: 5.7 10*3/MM3 (ref 3.4–10.8)

## 2023-10-24 PROCEDURE — 96375 TX/PRO/DX INJ NEW DRUG ADDON: CPT

## 2023-10-24 PROCEDURE — 96413 CHEMO IV INFUSION 1 HR: CPT

## 2023-10-24 PROCEDURE — 25010000002 CARBOPLATIN PER 50 MG: Performed by: INTERNAL MEDICINE

## 2023-10-24 PROCEDURE — 77336 RADIATION PHYSICS CONSULT: CPT | Performed by: RADIOLOGY

## 2023-10-24 PROCEDURE — 1125F AMNT PAIN NOTED PAIN PRSNT: CPT | Performed by: INTERNAL MEDICINE

## 2023-10-24 PROCEDURE — 77386: CPT | Performed by: RADIOLOGY

## 2023-10-24 PROCEDURE — 25010000002 HEPARIN LOCK FLUSH PER 10 UNITS: Performed by: INTERNAL MEDICINE

## 2023-10-24 PROCEDURE — 25810000003 SODIUM CHLORIDE 0.9 % SOLUTION 250 ML FLEX CONT: Performed by: INTERNAL MEDICINE

## 2023-10-24 PROCEDURE — 96367 TX/PROPH/DG ADDL SEQ IV INF: CPT

## 2023-10-24 PROCEDURE — 99214 OFFICE O/P EST MOD 30 MIN: CPT | Performed by: INTERNAL MEDICINE

## 2023-10-24 PROCEDURE — 25010000002 PACLITAXEL PER 1 MG: Performed by: INTERNAL MEDICINE

## 2023-10-24 PROCEDURE — 96417 CHEMO IV INFUS EACH ADDL SEQ: CPT

## 2023-10-24 PROCEDURE — 80053 COMPREHEN METABOLIC PANEL: CPT

## 2023-10-24 PROCEDURE — 25010000002 PALONOSETRON 0.25 MG/5ML SOLUTION PREFILLED SYRINGE: Performed by: INTERNAL MEDICINE

## 2023-10-24 PROCEDURE — 25810000003 SODIUM CHLORIDE 0.9 % SOLUTION: Performed by: INTERNAL MEDICINE

## 2023-10-24 PROCEDURE — 25010000002 DIPHENHYDRAMINE PER 50 MG: Performed by: INTERNAL MEDICINE

## 2023-10-24 PROCEDURE — 25010000002 DEXAMETHASONE SODIUM PHOSPHATE 20 MG/5ML SOLUTION: Performed by: INTERNAL MEDICINE

## 2023-10-24 PROCEDURE — 85025 COMPLETE CBC W/AUTO DIFF WBC: CPT

## 2023-10-24 RX ORDER — PALONOSETRON 0.05 MG/ML
0.25 INJECTION, SOLUTION INTRAVENOUS ONCE
Status: COMPLETED | OUTPATIENT
Start: 2023-10-24 | End: 2023-10-24

## 2023-10-24 RX ORDER — DIPHENHYDRAMINE HYDROCHLORIDE AND LIDOCAINE HYDROCHLORIDE AND ALUMINUM HYDROXIDE AND MAGNESIUM HYDRO
5 KIT ONCE
Status: COMPLETED | OUTPATIENT
Start: 2023-10-24 | End: 2023-10-24

## 2023-10-24 RX ORDER — HEPARIN SODIUM (PORCINE) LOCK FLUSH IV SOLN 100 UNIT/ML 100 UNIT/ML
500 SOLUTION INTRAVENOUS AS NEEDED
Status: DISCONTINUED | OUTPATIENT
Start: 2023-10-24 | End: 2023-10-24 | Stop reason: HOSPADM

## 2023-10-24 RX ORDER — FAMOTIDINE 10 MG/ML
20 INJECTION, SOLUTION INTRAVENOUS ONCE
Status: COMPLETED | OUTPATIENT
Start: 2023-10-24 | End: 2023-10-24

## 2023-10-24 RX ORDER — SODIUM CHLORIDE 0.9 % (FLUSH) 0.9 %
10 SYRINGE (ML) INJECTION AS NEEDED
OUTPATIENT
Start: 2023-10-24

## 2023-10-24 RX ORDER — HEPARIN SODIUM (PORCINE) LOCK FLUSH IV SOLN 100 UNIT/ML 100 UNIT/ML
500 SOLUTION INTRAVENOUS AS NEEDED
OUTPATIENT
Start: 2023-10-24

## 2023-10-24 RX ORDER — SODIUM CHLORIDE 0.9 % (FLUSH) 0.9 %
10 SYRINGE (ML) INJECTION AS NEEDED
Status: DISCONTINUED | OUTPATIENT
Start: 2023-10-24 | End: 2023-10-24 | Stop reason: HOSPADM

## 2023-10-24 RX ORDER — OXYCODONE AND ACETAMINOPHEN 10; 325 MG/1; MG/1
1 TABLET ORAL ONCE
Status: COMPLETED | OUTPATIENT
Start: 2023-10-24 | End: 2023-10-24

## 2023-10-24 RX ORDER — SODIUM CHLORIDE 9 MG/ML
250 INJECTION, SOLUTION INTRAVENOUS ONCE
Status: COMPLETED | OUTPATIENT
Start: 2023-10-24 | End: 2023-10-24

## 2023-10-24 RX ADMIN — CARBOPLATIN 300 MG: 600 INJECTION, SOLUTION INTRAVENOUS at 13:51

## 2023-10-24 RX ADMIN — PALONOSETRON 0.25 MG: 0.25 INJECTION, SOLUTION INTRAVENOUS at 11:49

## 2023-10-24 RX ADMIN — OXYCODONE AND ACETAMINOPHEN 1 TABLET: 10; 325 TABLET ORAL at 12:03

## 2023-10-24 RX ADMIN — DEXAMETHASONE SODIUM PHOSPHATE 12 MG: 4 INJECTION, SOLUTION INTRA-ARTICULAR; INTRALESIONAL; INTRAMUSCULAR; INTRAVENOUS; SOFT TISSUE at 11:54

## 2023-10-24 RX ADMIN — DIPHENHYDRAMINE HYDROCHLORIDE 25 MG: 50 INJECTION, SOLUTION INTRAMUSCULAR; INTRAVENOUS at 12:05

## 2023-10-24 RX ADMIN — SODIUM CHLORIDE 250 ML: 9 INJECTION, SOLUTION INTRAVENOUS at 11:48

## 2023-10-24 RX ADMIN — PACLITAXEL 120 MG: 6 INJECTION, SOLUTION, CONCENTRATE INTRAVENOUS at 12:36

## 2023-10-24 RX ADMIN — Medication 10 ML: at 14:23

## 2023-10-24 RX ADMIN — FAMOTIDINE 20 MG: 10 INJECTION INTRAVENOUS at 11:51

## 2023-10-24 RX ADMIN — Medication 500 UNITS: at 14:23

## 2023-10-24 RX ADMIN — DIPHENHYDRAMINE HYDROCHLORIDE AND LIDOCAINE HYDROCHLORIDE AND ALUMINUM HYDROXIDE AND MAGNESIUM HYDRO 5 ML: KIT at 14:06

## 2023-10-24 NOTE — PROGRESS NOTES
Name:  Dexter Flores  :  1970  Date:  10/24/2023     REFERRING PHYSICIAN  Leonardo Jackson DO    PRIMARY CARE PROVIDER  Jose F Reynolds PA-C    REASON FOR FOLLOWUP  1. 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    Dear Farzana Rodolfo,    HISTORY OF PRESENT ILLNESS:   I saw Mr. Flores in follow up today in our medical oncology clinic. As you are aware, he is a pleasant, 53 y.o., white male with a history of hypertension, COPD and lifelong tobacco abuse who first noticed a cough and some worsening shortness of breath in late 2023. As the symptoms progressed, he presented to our ED on 2023, where a CT of the chest identified a mass/postobstructive pneumonia in the right upper lobe of the lung. He was admitted to the [facility] hospitalist service for further evaluation and treatment. Pulmonology performed a diagnostic bronchoscopy on 2023, and the results were consistent with squamous cell carcinoma. In the meantime, as his shortness of breath improved with antibiotics and other supportive care, he was able to be discharged on 2023 with a referral to our clinic (that same day) for further management. At that time, he was agreeable to undergoing an initial staging PET scan, having a powerport placed; and, assuming the PET scan remained consistent with localized disease, proceeding with definitive treatment with concomitant chemotherapy and localized irradiation.    INTERIM HISTORY:  Mr. Flores returns to clinic today for follow up by himself. His pain is still under good control on his current combination of scheduled, long-acting Morphine and prn Percocet. He still has some intermittent nausea, but prn Zofran has still been controlling this. Meanwhile, following powerport placement and CT simulation, he began concomitant chemotherapy and localized  irradiation in late September. He has now received four (4) cycles of qweekly carboplatin and taxol and twenty out of a planned total of thirty (20/30) fractions of XRT to date; and he reiterates today that he is still tolerating this regimen overall well so far, with tolerable, increased fatigue as his only noticeable side effect to date. His breathing has improved, and remains improved, since he started this therapy as well. He again has no other specific complaints.    Past Medical History:   Diagnosis Date    Arthritis     Bulging of cervical intervertebral disc     Cancer     lung    COPD (chronic obstructive pulmonary disease)     GERD (gastroesophageal reflux disease)     History of transfusion     Hypertension     Neck pain     Sleep apnea     Thyroid disease        Past Surgical History:   Procedure Laterality Date    ABDOMINAL HERNIA REPAIR      umbilical    BRONCHOSCOPY Bilateral 2023    Procedure: BRONCHOSCOPY WITH ENDOBRONCHIAL ULTRASOUND;  Surgeon: Savage Mcmanus MD;  Location: [facility];  Service: Pulmonary;  Laterality: Bilateral;    JOINT REPLACEMENT      PORTACATH PLACEMENT Left 2023    Procedure: INSERTION OF PORTACATH;  Surgeon: Jose F Hewitt MD;  Location: [facility];  Service: General;  Laterality: Left;    TONSILLECTOMY      TOTAL HIP ARTHROPLASTY Bilateral        Social History     Socioeconomic History    Marital status:    Tobacco Use    Smoking status: Former     Packs/day: 1.00     Years: 30.00     Additional pack years: 0.00     Total pack years: 30.00     Types: Cigarettes     Start date:      Quit date: 2023     Years since quittin.4    Smokeless tobacco: Never   Vaping Use    Vaping Use: Never used   Substance and Sexual Activity    Alcohol use: Not Currently    Drug use: Not Currently    Sexual activity: Defer       History reviewed. No pertinent family history.    Allergies   Allergen Reactions    Bevespi Aerosphere [Glycopyrrolate-Formoterol]  Other (See Comments)     Developed blisters around mouth. Not a listed adverse effect of medication so unsure if this is related to inhaler. Discontinued as precaution.       Current Outpatient Medications   Medication Sig Dispense Refill    albuterol (PROVENTIL) (2.5 MG/3ML) 0.083% nebulizer solution Take 2.5 mg by nebulization Every 6 (Six) Hours As Needed for Wheezing.      albuterol sulfate  (90 Base) MCG/ACT inhaler Inhale 2 puffs Every 4 (Four) Hours As Needed for Wheezing. 18 g 11    Cvgfvouvhuijhtx74.5mg/5ml,Aluminum&Aafhmentd073-747-82eo/5ml,LidocaineViscous2%,Ksdodfkj758585iqls/ml 1:1:1:1 Swish in mouth and Swallow 10 mL 4 times daily as needed. 480 mL 1    fluticasone (FLOVENT HFA) 110 MCG/ACT inhaler Inhale 2 puffs 2 (Two) Times a Day. 12 g 11    gabapentin (NEURONTIN) 600 MG tablet Take 800 mg by mouth 3 (Three) Times a Day.      hydroCHLOROthiazide (HYDRODIURIL) 12.5 MG tablet Take 1 tablet by mouth Daily.      levothyroxine (SYNTHROID, LEVOTHROID) 50 MCG tablet Take 1 tablet by mouth Daily.      lidocaine-prilocaine (EMLA) 2.5-2.5 % cream Apply 1 application  topically to the appropriate area as directed Every 2 (Two) Hours As Needed for Mild Pain. Apply to Port-A-Cath site 30 minutes prior to arrival at infusion clinic. 30 g 1    lisinopril (PRINIVIL,ZESTRIL) 10 MG tablet Take 1 tablet by mouth Daily.      LORazepam (ATIVAN) 1 MG tablet Take 1 tablet by mouth Every 8 (Eight) Hours As Needed for Anxiety. 90 tablet 0    megestrol (MEGACE) 40 MG/ML suspension Take 10 mL by mouth Daily. 480 mL 2    montelukast (SINGULAIR) 10 MG tablet Take 1 tablet by mouth Every Night.      Morphine (MS CONTIN) 60 MG 12 hr tablet Take 1 tablet by mouth 2 (Two) Times a Day. 60 tablet 0    naloxone (NARCAN) 4 MG/0.1ML nasal spray Call 911. Don't prime. [place] in 1 nostril for overdose. Repeat in 2-3 minutes in other nostril if no or minimal breathing/responsiveness. 2 each 0    ondansetron (ZOFRAN) 8 MG tablet Take  1 tablet by mouth Every 8 (Eight) Hours As Needed for Nausea or Vomiting. 30 tablet 2    oxyCODONE-acetaminophen (PERCOCET)  MG per tablet Take 1 tablet by mouth Every 6 (Six) Hours As Needed for Moderate Pain. 120 tablet 0    pantoprazole (PROTONIX) 40 MG EC tablet Take 1 tablet by mouth Daily.      prochlorperazine (COMPAZINE) 10 MG tablet Take 1 tablet by mouth Every 6 (Six) Hours As Needed for Nausea or Vomiting. 60 tablet 1    QUEtiapine (SEROquel) 25 MG tablet Take 1 tablet by mouth Every Night.      sennosides-docusate (senna-docusate sodium) 8.6-50 MG per tablet Take 1 tablet by mouth Daily. 30 tablet 2    tiotropium bromide-olodaterol (STIOLTO RESPIMAT) 2.5-2.5 MCG/ACT aerosol solution inhaler Inhale 2 puffs Daily. 4 g 11    traZODone (DESYREL) 100 MG tablet Take 1 tablet by mouth Every Night.      vitamin D (ERGOCALCIFEROL) 1.25 MG (97566 UT) capsule capsule Take 1 capsule by mouth 1 (One) Time Per Week.       No current facility-administered medications for this visit.     REVIEW OF SYSTEMS  CONSTITUTIONAL:  No fever, chills or night sweats. Increased fatigue since starting concomitant chemo/XRT in late September.  EYES:  No blurry vision, diplopia or other vision changes.  ENT:  No hearing loss, nosebleeds or sore throat.  CARDIOVASCULAR:  No palpitations, arrhythmia, syncopal episodes or edema.  PULMONARY:  As per the HPI above.  GASTROINTESTINAL:  No constipation or diarrhea. No abdominal pain. Intermittent, mild nausea.  GENITOURINARY:  No hematuria, kidney stones or frequent urination.  MUSCULOSKELETAL:  No joint or back pains.  INTEGUMENTARY: No rashes or pruritus.  ENDOCRINE:  No excessive thirst or hot flashes.  HEMATOLOGIC:  No history of free bleeding, spontaneous bleeding or clotting.  IMMUNOLOGIC:  No allergies or frequent infections.  NEUROLOGIC: No numbness, tingling, seizures or weakness.  PSYCHIATRIC:  No depression. Some anxiety over his recent diagnosis with cancer, currently still  improved.    PHYSICAL EXAMINATION  /90   Pulse 118   Temp 97.7 °F (36.5 °C) (Temporal)   Resp 20   Wt 131 kg (289 lb 3.2 oz)   SpO2 100%   BMI 43.97 kg/m²     Pain Score:  Pain Score    10/24/23 0914   PainSc:   7     PHQ-Score Total:  PHQ-9 Total Score:      ECO  GENERAL:  A well-developed, well-nourished, morbidly obese, white male in no acute distress.  HEENT:  Pupils equally round and reactive to light.  Extraocular muscles intact.  CARDIOVASCULAR:  Regular rate and rhythm.  No murmurs, gallops or rubs.  LUNGS:  Decreased breath sounds on the right, clear on the left.  ABDOMEN:  Soft, nontender, nondistended with positive bowel sounds.  EXTREMITIES:  No clubbing, cyanosis or edema bilaterally.  SKIN:  No rashes or petechiae. Powerport still in place.  NEURO:  Cranial nerves grossly intact. No focal deficits.  PSYCH:  Alert and oriented x3.    LABORATORY    Lab Results   Component Value Date    WBC 5.70 10/24/2023    HGB 13.9 10/24/2023    HCT 44.2 10/24/2023    MCV 92.7 10/24/2023     10/24/2023    NEUTROABS 4.46 10/24/2023       Lab Results   Component Value Date     10/17/2023    K 4.5 10/17/2023     10/17/2023    CO2 26.0 10/17/2023    BUN 20 10/17/2023    CREATININE 0.77 10/17/2023    GLUCOSE 138 (H) 10/17/2023    CALCIUM 9.3 10/17/2023    AST 14 10/17/2023    ALT 13 10/17/2023    ALKPHOS 58 10/17/2023    BILITOT 0.3 10/17/2023    PROTEINTOT 7.3 10/17/2023    ALBUMIN 3.7 10/17/2023     CBC (10/24/2023): WBCs: 5.70; HgB: 13.9; Hct: 44.2; platelets: 92.7  CBC (10/17/2023): WBCs: 7.25; HgB: 12.6; Hct: 41.0; platelets: 269; MCV: 93.4  CBC (10/03/2023): WBCs: 10.59; HgB: 12.7; Hct: 41.4; platelets: 389; MCV: 91.8  CBC (2023): WBCs: 13.95; HgB: 12.9; Hct: 40.5; platelets: 483; MCV: 90.0  CBC (2023): WBCs: 22.31; HgB: 13.5; Hct: 43.6; platelets: 644    IMAGING  CT angiogram chest (2023):  Impression: Right upper lobe consolidative and masslike airspace opacity  without violating the fissures. Findings are concerning for infection. However, considering the presence of an enlarged right paratracheal lymph node measuring 4.2 cm, underlying mass cannot be ruled out.    NM PET with fusion CT, skull base to mid-thigh (09/12/2023):  Impression:  1) A right upper lobe pulmonary parenchymal mass and/or postobstructive collapse measures about 9.9 cm with hypermetabolism mSUV 15.  2) Subcarinal region 2.3 cm lymph node shows PET hypermetabolism measuring 8.6.  3) Right hilar region hypermetabolism shows mSUV 7.  4) Probable 2 cm right suprahilar clavicular region lymph node shows PET hypermetabolism mSUV 10.2.    MRI brain with and without contrast (09/16/2023):  Impression:  1) No parenchymal mass, hemorrhage or midline shift.  2) Increased signal in the mastoid air cells bilaterally.  3) No parenchymal mass, hemorrhage or midline shift.    PATHOLOGY  Lung, biopsy, right upper lobe (08/22/2023):  Moderately differentiated squamous cell carcinoma. PD-L1 TPS: < 1%.    Bronchial lavage, right upper and middle lobes (08/22/2023): Malignant. Squamous cell carcinoma.    Bronchial wash (08/22/2023): Malignant. Squamous cell carcinoma.    Bronchial brushings, right upper and middle lobes (08/22/2023): Malignant. Squamous cell carcinoma.    RADIATION THERAPY  Radiation Treatments       Active   Plans   Rt Lung   Most recent treatment: Dose planned: 200 cGy (fraction 20 on 10/23/2023)   Total: Dose planned: 6,000 cGy (30 fractions)   Elapsed Days: 28      Reference Points   PTV   Most recent treatment: Dose given: 200 cGy (on 10/23/2023)   Total: Dose given: 4,000 cGy   Elapsed Days: 28           Historical   No historical radiation treatments to show.             IMPRESSION AND PLAN  Mr. Flores is a 53 y.o., white male with:  Squamous cell lung carcinoma: Diagnosed in mid-August 2023 with locally advanced disease involving the right hemithorax. I have had multiple, long discussions with the  patient (+/- his niece) since the time of his initial consultation in our clinic (on 08/24/2023) regarding this diagnosis and, in general terms, its prognosis. They remain aware that, with his, at a minimum, stage IIB disease, he was/is not a good surgical candidate (at least at this time). However, he was, and continues to be, a good candidate for definitive treatment with concomitant chemotherapy and localized irradiation. This therapy was/is anticipated to have the best chance of improving his symptoms (of dyspnea and pain), could make his disease operable; and, even if surgery is not subsequently possible, can lead to a longterm remission (if not a classical cure). Following another long discussion regarding the potential risks vs. benefits of this regimen, he was agreeable to proceeding with concomitant, qweekly carboplatin/taxol throughout a ~seven week course of localized XRT. Once a powerport was placed and his CT simulation was completed, he began a regimen consisting of qweekly carboplatin and taxol throughout a planned, thirty-fraction course of localized XRT in late September 2023. He has now received four (4) cycles of the former and twenty (20) fractions of the latter to date; and he reiterates today that he is still tolerating this regimen overall very well so far, without any significant side effects (other than mildly increased fatigue). We will proceed with this current treatment plan (fifth cycle of qweekly carbo/taxol today). We will see him back in our clinic in two weeks, on the day of the potential seventh cycle of carbo/taxol, with a CBC and CMP for another symptom/toxicity check.  Pain: Symptoms in his right chest wall are secondary to issue #1. Currently still under good control with a combination of scheduled, long-acting MSContin 30 mg q12hr and prn Percocet 10/325 mg q6hr (the latter will be due for a refill tomorrow, 10/25). Continue to monitor.  Anxiety: Exacerbated by his diagnosis  with issue #1, but recently better. Continue Ativan 1 mg PO TID prn. Continue to monitor.  Dyspnea: Multifactorial, with issue #1 and COPD both contributing. Recently still improving with ongoing, concomitant chemo/XRT. Continue to monitor.  Transportation issues: The patient and his niece previously reported that their immediate family only has one car between them. This has still not posed a significant problem with his treatment so far, but we previously referred him to our  for assistance.  The patient was in agreement with these plans.    It is a pleasure to participate in Mr. Flores's care. Please do not hesitate to call with any questions or concerns that you may have.    A total of 30 minutes were spent coordinating this patient’s care in clinic today; more than 50% of this time was face-to-face with the patient, reviewing his interim medical history and counseling on the current treatment and followup plan. All questions were answered to his satisfaction.    FOLLOW UP  Continue MSContin 30 mg PO q12hr and and Percocet 10/325 mg q6hr prn (a refill of the latter will be provided tomorrow, 10/25). Continue Ativan 1 mg TID prn. Continue Zofran prn. With [facility] [place] radiation oncology for ongoing, localized XRT, as planned. Proceed with concomitant qweekly carbo/taxol, as planned (cycle #5 today). Return to our clinic in 2 weeks, on the day of the potential 7th (and final) cycle of qweekly carbo/taxol, with a CBC and CMP.            This document was electronically signed by ROMEO Rizzo MD October 24, 2023 09:30 EDT      CC: ABRAN Oglesby MD

## 2023-10-24 NOTE — PROGRESS NOTES
On Treatment Visit Note      Patient Name: Dexter Flores  : 1970   MRN: [id number]     Diagnosis:    Chief Complaint   Patient presents with    Lung Cancer     Malignant Neoplasm of Unspecified Part of Right Bronchus or Lung       Staging: No matching staging information was found for the patient.       This patient was seen today for an on treatment visit.  He is receiving radiation treatments to the right lung.    Radiation Treatments       Active   Plans   Rt Lung   Most recent treatment: Dose planned: 200 cGy (fraction 21 on 10/24/2023)   Total: Dose planned: 6,000 cGy (30 fractions)   Elapsed Days: 29      Reference Points   PTV   Most recent treatment: Dose given: 200 cGy (on 10/24/2023)   Total: Dose given: 4,200 cGy   Elapsed Days: 29           Historical   No historical radiation treatments to show.              Subjective      Review of Systems:   Review of Systems   Constitutional:         He has fatigue.   HENT:          He has some pain with swallowing for which she is using Karol's Magic mouthwash with some relief.   Skin:         He has minimal skin erythema.    All other systems reviewed and are negative.      Medications:     Current Outpatient Medications:     albuterol (PROVENTIL) (2.5 MG/3ML) 0.083% nebulizer solution, Take 2.5 mg by nebulization Every 6 (Six) Hours As Needed for Wheezing., Disp: , Rfl:     albuterol sulfate  (90 Base) MCG/ACT inhaler, Inhale 2 puffs Every 4 (Four) Hours As Needed for Wheezing., Disp: 18 g, Rfl: 11    Uirnhnesijuvyzr44.5mg/5ml,Aluminum&Ruocuzjep824-620-56ao/5ml,LidocaineViscous2%,Qnpfvyib964823nbsj/ml 1:1:1:1, Swish in mouth and Swallow 10 mL 4 times daily as needed., Disp: 480 mL, Rfl: 1    fluticasone (FLOVENT HFA) 110 MCG/ACT inhaler, Inhale 2 puffs 2 (Two) Times a Day., Disp: 12 g, Rfl: 11    gabapentin (NEURONTIN) 600 MG tablet, Take 800 mg by mouth 3 (Three) Times a Day., Disp: , Rfl:     hydroCHLOROthiazide (HYDRODIURIL) 12.5 MG tablet,  Take 1 tablet by mouth Daily., Disp: , Rfl:     levothyroxine (SYNTHROID, LEVOTHROID) 50 MCG tablet, Take 1 tablet by mouth Daily., Disp: , Rfl:     lidocaine-prilocaine (EMLA) 2.5-2.5 % cream, Apply 1 application  topically to the appropriate area as directed Every 2 (Two) Hours As Needed for Mild Pain. Apply to Port-A-Cath site 30 minutes prior to arrival at infusion clinic., Disp: 30 g, Rfl: 1    lisinopril (PRINIVIL,ZESTRIL) 10 MG tablet, Take 1 tablet by mouth Daily., Disp: , Rfl:     LORazepam (ATIVAN) 1 MG tablet, Take 1 tablet by mouth Every 8 (Eight) Hours As Needed for Anxiety., Disp: 90 tablet, Rfl: 0    megestrol (MEGACE) 40 MG/ML suspension, Take 10 mL by mouth Daily., Disp: 480 mL, Rfl: 2    montelukast (SINGULAIR) 10 MG tablet, Take 1 tablet by mouth Every Night., Disp: , Rfl:     Morphine (MS CONTIN) 60 MG 12 hr tablet, Take 1 tablet by mouth 2 (Two) Times a Day., Disp: 60 tablet, Rfl: 0    naloxone (NARCAN) 4 MG/0.1ML nasal spray, Call 911. Don't prime. [place] in 1 nostril for overdose. Repeat in 2-3 minutes in other nostril if no or minimal breathing/responsiveness., Disp: 2 each, Rfl: 0    ondansetron (ZOFRAN) 8 MG tablet, Take 1 tablet by mouth Every 8 (Eight) Hours As Needed for Nausea or Vomiting., Disp: 30 tablet, Rfl: 2    oxyCODONE-acetaminophen (PERCOCET)  MG per tablet, Take 1 tablet by mouth Every 6 (Six) Hours As Needed for Moderate Pain., Disp: 120 tablet, Rfl: 0    pantoprazole (PROTONIX) 40 MG EC tablet, Take 1 tablet by mouth Daily., Disp: , Rfl:     prochlorperazine (COMPAZINE) 10 MG tablet, Take 1 tablet by mouth Every 6 (Six) Hours As Needed for Nausea or Vomiting., Disp: 60 tablet, Rfl: 1    QUEtiapine (SEROquel) 25 MG tablet, Take 1 tablet by mouth Every Night., Disp: , Rfl:     sennosides-docusate (senna-docusate sodium) 8.6-50 MG per tablet, Take 1 tablet by mouth Daily., Disp: 30 tablet, Rfl: 2    tiotropium bromide-olodaterol (STIOLTO RESPIMAT) 2.5-2.5 MCG/ACT aerosol  solution inhaler, Inhale 2 puffs Daily., Disp: 4 g, Rfl: 11    traZODone (DESYREL) 100 MG tablet, Take 1 tablet by mouth Every Night., Disp: , Rfl:     vitamin D (ERGOCALCIFEROL) 1.25 MG (39987 UT) capsule capsule, Take 1 capsule by mouth 1 (One) Time Per Week., Disp: , Rfl:   No current facility-administered medications for this encounter.    Facility-Administered Medications Ordered in Other Encounters:     heparin injection 500 Units, 500 Units, Intravenous, PRN, ROMEO Rizzo MD, 500 Units at 10/24/23 1423    sodium chloride 0.9 % flush 10 mL, 10 mL, Intravenous, PRN, ROMEO Rizzo MD, 10 mL at 10/24/23 1423    Allergies:   Allergies   Allergen Reactions    Bevespi Aerosphere [Glycopyrrolate-Formoterol] Other (See Comments)     Developed blisters around mouth. Not a listed adverse effect of medication so unsure if this is related to inhaler. Discontinued as precaution.     Objective     Physical Exam:  Physical Exam  Vitals and nursing note reviewed.   Constitutional:       Appearance: Normal appearance.   HENT:      Head:      Comments: No thrush present.    Skin:     Comments: He has minimal skin erythema.     Neurological:      Mental Status: He is alert.         Vital Signs:   Vitals:    10/24/23 1037   BP: 130/90   Pulse: 118   Resp: 20   Temp: 97.7 °F (36.5 °C)   TempSrc: Temporal   SpO2: 100%   Weight: 131 kg (289 lb 3.2 oz)   PainSc:   7     Body mass index is 43.97 kg/m².     Current Total XRT Dose (cGY):    Radiation Treatments       Active   Plans   Rt Lung   Most recent treatment: Dose planned: 200 cGy (fraction 21 on 10/24/2023)   Total: Dose planned: 6,000 cGy (30 fractions)   Elapsed Days: 29      Reference Points   PTV   Most recent treatment: Dose given: 200 cGy (on 10/24/2023)   Total: Dose given: 4,200 cGy   Elapsed Days: 29           Historical   No historical radiation treatments to show.              Plan      Plan:   Patient was seen today for an on treatment visit. Patient is  receiving radiation therapy to the right lung. Patient is stable and tolerating radiation therapy fairly well.. Continue with radiation therapy.     I have reviewed treatment setup notes, checked and approved the daily guidance images. I reviewed dose delivery, treatment parameters and deemed them appropriate. We plan to continue radiation therapy as prescribed.     Digital speech recognition software was used to dictate parts of this note, some dictation errors may occur.    Kyara Roberts MD   10/24/23 15:04 EDT

## 2023-10-25 ENCOUNTER — TELEPHONE (OUTPATIENT)
Dept: RADIATION ONCOLOGY | Facility: HOSPITAL | Age: 53
End: 2023-10-25
Payer: COMMERCIAL

## 2023-10-25 ENCOUNTER — TELEPHONE (OUTPATIENT)
Dept: ONCOLOGY | Facility: CLINIC | Age: 53
End: 2023-10-25
Payer: COMMERCIAL

## 2023-10-25 ENCOUNTER — HOSPITAL ENCOUNTER (OUTPATIENT)
Dept: RADIATION ONCOLOGY | Facility: HOSPITAL | Age: 53
Discharge: HOME | End: 2023-10-25

## 2023-10-25 DIAGNOSIS — R91.8 MASS OF RIGHT LUNG: ICD-10-CM

## 2023-10-25 LAB
RAD ONC ARIA COURSE ID: NORMAL
RAD ONC ARIA COURSE INTENT: NORMAL
RAD ONC ARIA COURSE LAST TREATMENT DATE: NORMAL
RAD ONC ARIA COURSE START DATE: NORMAL
RAD ONC ARIA COURSE TREATMENT ELAPSED DAYS: 30
RAD ONC ARIA FIRST TREATMENT DATE: NORMAL
RAD ONC ARIA PLAN FRACTIONS TREATED TO DATE: 22
RAD ONC ARIA PLAN ID: NORMAL
RAD ONC ARIA PLAN PRESCRIBED DOSE PER FRACTION: 2 GY
RAD ONC ARIA PLAN PRIMARY REFERENCE POINT: NORMAL
RAD ONC ARIA PLAN TOTAL FRACTIONS PRESCRIBED: 30
RAD ONC ARIA PLAN TOTAL PRESCRIBED DOSE: 6000 CGY
RAD ONC ARIA REFERENCE POINT DOSAGE GIVEN TO DATE: 44 GY
RAD ONC ARIA REFERENCE POINT ID: NORMAL
RAD ONC ARIA REFERENCE POINT SESSION DOSAGE GIVEN: 2 GY

## 2023-10-25 PROCEDURE — 77386: CPT | Performed by: RADIOLOGY

## 2023-10-25 RX ORDER — OXYCODONE AND ACETAMINOPHEN 10; 325 MG/1; MG/1
1 TABLET ORAL EVERY 6 HOURS PRN
Qty: 120 TABLET | Refills: 0 | Status: SHIPPED | OUTPATIENT
Start: 2023-10-25

## 2023-10-25 RX ORDER — OXYCODONE AND ACETAMINOPHEN 10; 325 MG/1; MG/1
1 TABLET ORAL EVERY 6 HOURS PRN
Qty: 120 TABLET | Refills: 0 | Status: SHIPPED | OUTPATIENT
Start: 2023-10-25 | End: 2023-10-25 | Stop reason: SDUPTHER

## 2023-10-25 NOTE — TELEPHONE ENCOUNTER
Pt here today for radiation therapy, pt currently has the swish and swallow already at the pharmacy here, but is requesting to have other oral medicine to be sent for a refill as well to the same pharmacy and states can pick it up tomorrow when pt comes for radiation therapy. Thank you.

## 2023-10-25 NOTE — TELEPHONE ENCOUNTER
Caller: Dexter Flores    Relationship: Self    Best call back number: [phone]    What is the best time to reach you: ANYTIME    Who are you requesting to speak with (clinical staff, provider,  specific staff member): CLINICAL    What was the call regarding: PT REQUESTING HIS PRESCRIPTION FOR OXYCODONE BE SENT OVER TO     [facility], [place] - [address] - [phone]  - [phone] [[id number]]     Is it okay if the provider responds through MyChart: N/A

## 2023-10-26 ENCOUNTER — HOSPITAL ENCOUNTER (OUTPATIENT)
Dept: RADIATION ONCOLOGY | Facility: HOSPITAL | Age: 53
Discharge: HOME | End: 2023-10-26

## 2023-10-26 LAB
RAD ONC ARIA COURSE ID: NORMAL
RAD ONC ARIA COURSE INTENT: NORMAL
RAD ONC ARIA COURSE LAST TREATMENT DATE: NORMAL
RAD ONC ARIA COURSE START DATE: NORMAL
RAD ONC ARIA COURSE TREATMENT ELAPSED DAYS: 31
RAD ONC ARIA FIRST TREATMENT DATE: NORMAL
RAD ONC ARIA PLAN FRACTIONS TREATED TO DATE: 23
RAD ONC ARIA PLAN ID: NORMAL
RAD ONC ARIA PLAN PRESCRIBED DOSE PER FRACTION: 2 GY
RAD ONC ARIA PLAN PRIMARY REFERENCE POINT: NORMAL
RAD ONC ARIA PLAN TOTAL FRACTIONS PRESCRIBED: 30
RAD ONC ARIA PLAN TOTAL PRESCRIBED DOSE: 6000 CGY
RAD ONC ARIA REFERENCE POINT DOSAGE GIVEN TO DATE: 46 GY
RAD ONC ARIA REFERENCE POINT ID: NORMAL
RAD ONC ARIA REFERENCE POINT SESSION DOSAGE GIVEN: 2 GY

## 2023-10-26 PROCEDURE — 77386: CPT | Performed by: RADIOLOGY

## 2023-10-26 RX ORDER — MEGESTROL ACETATE 40 MG/ML
400 SUSPENSION ORAL DAILY
Qty: 480 ML | Refills: 2 | Status: SHIPPED | OUTPATIENT
Start: 2023-10-26

## 2023-10-27 ENCOUNTER — HOSPITAL ENCOUNTER (OUTPATIENT)
Dept: RADIATION ONCOLOGY | Facility: HOSPITAL | Age: 53
Discharge: HOME | End: 2023-10-27

## 2023-10-27 LAB
RAD ONC ARIA COURSE ID: NORMAL
RAD ONC ARIA COURSE INTENT: NORMAL
RAD ONC ARIA COURSE LAST TREATMENT DATE: NORMAL
RAD ONC ARIA COURSE START DATE: NORMAL
RAD ONC ARIA COURSE TREATMENT ELAPSED DAYS: 32
RAD ONC ARIA FIRST TREATMENT DATE: NORMAL
RAD ONC ARIA PLAN FRACTIONS TREATED TO DATE: 24
RAD ONC ARIA PLAN ID: NORMAL
RAD ONC ARIA PLAN PRESCRIBED DOSE PER FRACTION: 2 GY
RAD ONC ARIA PLAN PRIMARY REFERENCE POINT: NORMAL
RAD ONC ARIA PLAN TOTAL FRACTIONS PRESCRIBED: 30
RAD ONC ARIA PLAN TOTAL PRESCRIBED DOSE: 6000 CGY
RAD ONC ARIA REFERENCE POINT DOSAGE GIVEN TO DATE: 48 GY
RAD ONC ARIA REFERENCE POINT ID: NORMAL
RAD ONC ARIA REFERENCE POINT SESSION DOSAGE GIVEN: 2 GY

## 2023-10-27 PROCEDURE — 77386: CPT | Performed by: RADIOLOGY

## 2023-10-30 ENCOUNTER — HOSPITAL ENCOUNTER (OUTPATIENT)
Dept: RADIATION ONCOLOGY | Facility: HOSPITAL | Age: 53
Discharge: HOME | End: 2023-10-30
Payer: COMMERCIAL

## 2023-10-30 LAB
RAD ONC ARIA COURSE ID: NORMAL
RAD ONC ARIA COURSE INTENT: NORMAL
RAD ONC ARIA COURSE LAST TREATMENT DATE: NORMAL
RAD ONC ARIA COURSE START DATE: NORMAL
RAD ONC ARIA COURSE TREATMENT ELAPSED DAYS: 35
RAD ONC ARIA FIRST TREATMENT DATE: NORMAL
RAD ONC ARIA PLAN FRACTIONS TREATED TO DATE: 25
RAD ONC ARIA PLAN ID: NORMAL
RAD ONC ARIA PLAN PRESCRIBED DOSE PER FRACTION: 2 GY
RAD ONC ARIA PLAN PRIMARY REFERENCE POINT: NORMAL
RAD ONC ARIA PLAN TOTAL FRACTIONS PRESCRIBED: 30
RAD ONC ARIA PLAN TOTAL PRESCRIBED DOSE: 6000 CGY
RAD ONC ARIA REFERENCE POINT DOSAGE GIVEN TO DATE: 50 GY
RAD ONC ARIA REFERENCE POINT ID: NORMAL
RAD ONC ARIA REFERENCE POINT SESSION DOSAGE GIVEN: 2 GY

## 2023-10-30 PROCEDURE — 77386: CPT | Performed by: RADIOLOGY

## 2023-10-31 ENCOUNTER — HOSPITAL ENCOUNTER (OUTPATIENT)
Dept: RADIATION ONCOLOGY | Facility: HOSPITAL | Age: 53
Discharge: HOME | End: 2023-10-31

## 2023-10-31 ENCOUNTER — OFFICE VISIT (OUTPATIENT)
Dept: ONCOLOGY | Facility: CLINIC | Age: 53
End: 2023-10-31
Payer: COMMERCIAL

## 2023-10-31 ENCOUNTER — INFUSION (OUTPATIENT)
Dept: ONCOLOGY | Facility: HOSPITAL | Age: 53
End: 2023-10-31
Payer: COMMERCIAL

## 2023-10-31 ENCOUNTER — LAB (OUTPATIENT)
Dept: ONCOLOGY | Facility: HOSPITAL | Age: 53
End: 2023-10-31
Payer: COMMERCIAL

## 2023-10-31 VITALS
HEART RATE: 120 BPM | SYSTOLIC BLOOD PRESSURE: 135 MMHG | TEMPERATURE: 96.6 F | RESPIRATION RATE: 18 BRPM | WEIGHT: 284.8 LBS | OXYGEN SATURATION: 95 % | DIASTOLIC BLOOD PRESSURE: 81 MMHG | BODY MASS INDEX: 43.3 KG/M2

## 2023-10-31 VITALS
BODY MASS INDEX: 43.24 KG/M2 | OXYGEN SATURATION: 95 % | RESPIRATION RATE: 18 BRPM | WEIGHT: 284.39 LBS | SYSTOLIC BLOOD PRESSURE: 135 MMHG | DIASTOLIC BLOOD PRESSURE: 81 MMHG | TEMPERATURE: 96.6 F | HEART RATE: 120 BPM

## 2023-10-31 VITALS
HEART RATE: 120 BPM | BODY MASS INDEX: 43.24 KG/M2 | TEMPERATURE: 96.6 F | OXYGEN SATURATION: 95 % | RESPIRATION RATE: 18 BRPM | WEIGHT: 284.39 LBS | DIASTOLIC BLOOD PRESSURE: 81 MMHG | SYSTOLIC BLOOD PRESSURE: 135 MMHG

## 2023-10-31 DIAGNOSIS — B37.0 ORAL CANDIDIASIS: ICD-10-CM

## 2023-10-31 DIAGNOSIS — C34.91 SQUAMOUS CELL CARCINOMA OF BRONCHUS OF RIGHT LUNG: Primary | ICD-10-CM

## 2023-10-31 DIAGNOSIS — R13.10 ODYNOPHAGIA: ICD-10-CM

## 2023-10-31 DIAGNOSIS — Z95.828 PORT-A-CATH IN PLACE: ICD-10-CM

## 2023-10-31 DIAGNOSIS — C34.91 SQUAMOUS CELL CARCINOMA OF BRONCHUS OF RIGHT LUNG: ICD-10-CM

## 2023-10-31 LAB
ALBUMIN SERPL-MCNC: 4 G/DL (ref 3.5–5.2)
ALBUMIN/GLOB SERPL: 1.1 G/DL
ALP SERPL-CCNC: 59 U/L (ref 39–117)
ALT SERPL W P-5'-P-CCNC: 16 U/L (ref 1–41)
ANION GAP SERPL CALCULATED.3IONS-SCNC: 7.3 MMOL/L (ref 5–15)
AST SERPL-CCNC: 17 U/L (ref 1–40)
BASOPHILS # BLD AUTO: 0.04 10*3/MM3 (ref 0–0.2)
BASOPHILS NFR BLD AUTO: 0.9 % (ref 0–1.5)
BILIRUB SERPL-MCNC: 0.4 MG/DL (ref 0–1.2)
BUN SERPL-MCNC: 14 MG/DL (ref 6–20)
BUN/CREAT SERPL: 15.6 (ref 7–25)
CALCIUM SPEC-SCNC: 9.3 MG/DL (ref 8.6–10.5)
CHLORIDE SERPL-SCNC: 101 MMOL/L (ref 98–107)
CO2 SERPL-SCNC: 28.7 MMOL/L (ref 22–29)
CREAT SERPL-MCNC: 0.9 MG/DL (ref 0.76–1.27)
DEPRECATED RDW RBC AUTO: 56.8 FL (ref 37–54)
EGFRCR SERPLBLD CKD-EPI 2021: 102.8 ML/MIN/1.73
EOSINOPHIL # BLD AUTO: 0.04 10*3/MM3 (ref 0–0.4)
EOSINOPHIL NFR BLD AUTO: 0.9 % (ref 0.3–6.2)
ERYTHROCYTE [DISTWIDTH] IN BLOOD BY AUTOMATED COUNT: 16.9 % (ref 12.3–15.4)
GLOBULIN UR ELPH-MCNC: 3.5 GM/DL
GLUCOSE SERPL-MCNC: 108 MG/DL (ref 65–99)
HCT VFR BLD AUTO: 41.8 % (ref 37.5–51)
HGB BLD-MCNC: 13.4 G/DL (ref 13–17.7)
IMM GRANULOCYTES # BLD AUTO: 0.02 10*3/MM3 (ref 0–0.05)
IMM GRANULOCYTES NFR BLD AUTO: 0.5 % (ref 0–0.5)
LYMPHOCYTES # BLD AUTO: 0.64 10*3/MM3 (ref 0.7–3.1)
LYMPHOCYTES NFR BLD AUTO: 14.6 % (ref 19.6–45.3)
MCH RBC QN AUTO: 29.9 PG (ref 26.6–33)
MCHC RBC AUTO-ENTMCNC: 32.1 G/DL (ref 31.5–35.7)
MCV RBC AUTO: 93.3 FL (ref 79–97)
MONOCYTES # BLD AUTO: 0.35 10*3/MM3 (ref 0.1–0.9)
MONOCYTES NFR BLD AUTO: 8 % (ref 5–12)
NEUTROPHILS NFR BLD AUTO: 3.28 10*3/MM3 (ref 1.7–7)
NEUTROPHILS NFR BLD AUTO: 75.1 % (ref 42.7–76)
NRBC BLD AUTO-RTO: 0 /100 WBC (ref 0–0.2)
PLATELET # BLD AUTO: 187 10*3/MM3 (ref 140–450)
PMV BLD AUTO: 9 FL (ref 6–12)
POTASSIUM SERPL-SCNC: 4.2 MMOL/L (ref 3.5–5.2)
PROT SERPL-MCNC: 7.5 G/DL (ref 6–8.5)
RAD ONC ARIA COURSE ID: NORMAL
RAD ONC ARIA COURSE INTENT: NORMAL
RAD ONC ARIA COURSE LAST TREATMENT DATE: NORMAL
RAD ONC ARIA COURSE START DATE: NORMAL
RAD ONC ARIA COURSE TREATMENT ELAPSED DAYS: 36
RAD ONC ARIA FIRST TREATMENT DATE: NORMAL
RAD ONC ARIA PLAN FRACTIONS TREATED TO DATE: 26
RAD ONC ARIA PLAN ID: NORMAL
RAD ONC ARIA PLAN PRESCRIBED DOSE PER FRACTION: 2 GY
RAD ONC ARIA PLAN PRIMARY REFERENCE POINT: NORMAL
RAD ONC ARIA PLAN TOTAL FRACTIONS PRESCRIBED: 30
RAD ONC ARIA PLAN TOTAL PRESCRIBED DOSE: 6000 CGY
RAD ONC ARIA REFERENCE POINT DOSAGE GIVEN TO DATE: 52 GY
RAD ONC ARIA REFERENCE POINT ID: NORMAL
RAD ONC ARIA REFERENCE POINT SESSION DOSAGE GIVEN: 2 GY
RBC # BLD AUTO: 4.48 10*6/MM3 (ref 4.14–5.8)
SODIUM SERPL-SCNC: 137 MMOL/L (ref 136–145)
WBC NRBC COR # BLD: 4.37 10*3/MM3 (ref 3.4–10.8)

## 2023-10-31 PROCEDURE — 77427 RADIATION TX MANAGEMENT X5: CPT | Performed by: RADIOLOGY

## 2023-10-31 PROCEDURE — 96413 CHEMO IV INFUSION 1 HR: CPT

## 2023-10-31 PROCEDURE — 85025 COMPLETE CBC W/AUTO DIFF WBC: CPT

## 2023-10-31 PROCEDURE — 25010000002 CARBOPLATIN PER 50 MG: Performed by: INTERNAL MEDICINE

## 2023-10-31 PROCEDURE — 25010000002 DIPHENHYDRAMINE PER 50 MG: Performed by: INTERNAL MEDICINE

## 2023-10-31 PROCEDURE — 96375 TX/PRO/DX INJ NEW DRUG ADDON: CPT

## 2023-10-31 PROCEDURE — 77336 RADIATION PHYSICS CONSULT: CPT | Performed by: RADIOLOGY

## 2023-10-31 PROCEDURE — 96417 CHEMO IV INFUS EACH ADDL SEQ: CPT

## 2023-10-31 PROCEDURE — 25810000003 SODIUM CHLORIDE 0.9 % SOLUTION 250 ML FLEX CONT: Performed by: INTERNAL MEDICINE

## 2023-10-31 PROCEDURE — 25010000002 HEPARIN LOCK FLUSH PER 10 UNITS: Performed by: INTERNAL MEDICINE

## 2023-10-31 PROCEDURE — 25810000003 SODIUM CHLORIDE 0.9 % SOLUTION: Performed by: INTERNAL MEDICINE

## 2023-10-31 PROCEDURE — 80053 COMPREHEN METABOLIC PANEL: CPT

## 2023-10-31 PROCEDURE — 25010000002 DEXAMETHASONE SODIUM PHOSPHATE 20 MG/5ML SOLUTION: Performed by: INTERNAL MEDICINE

## 2023-10-31 PROCEDURE — 25010000002 PACLITAXEL PER 1 MG: Performed by: INTERNAL MEDICINE

## 2023-10-31 PROCEDURE — 77014 CHG CT GUIDANCE RADIATION THERAPY FLDS PLACEMENT: CPT | Performed by: RADIOLOGY

## 2023-10-31 PROCEDURE — 77386: CPT | Performed by: RADIOLOGY

## 2023-10-31 PROCEDURE — 25010000002 PALONOSETRON 0.25 MG/5ML SOLUTION PREFILLED SYRINGE: Performed by: INTERNAL MEDICINE

## 2023-10-31 RX ORDER — PALONOSETRON 0.05 MG/ML
0.25 INJECTION, SOLUTION INTRAVENOUS ONCE
Status: COMPLETED | OUTPATIENT
Start: 2023-10-31 | End: 2023-10-31

## 2023-10-31 RX ORDER — SODIUM CHLORIDE 0.9 % (FLUSH) 0.9 %
10 SYRINGE (ML) INJECTION AS NEEDED
Status: DISCONTINUED | OUTPATIENT
Start: 2023-10-31 | End: 2023-10-31 | Stop reason: HOSPADM

## 2023-10-31 RX ORDER — SODIUM CHLORIDE 9 MG/ML
250 INJECTION, SOLUTION INTRAVENOUS ONCE
Status: COMPLETED | OUTPATIENT
Start: 2023-10-31 | End: 2023-10-31

## 2023-10-31 RX ORDER — SODIUM CHLORIDE 0.9 % (FLUSH) 0.9 %
10 SYRINGE (ML) INJECTION AS NEEDED
OUTPATIENT
Start: 2023-10-31

## 2023-10-31 RX ORDER — FAMOTIDINE 10 MG/ML
20 INJECTION, SOLUTION INTRAVENOUS ONCE
Status: COMPLETED | OUTPATIENT
Start: 2023-10-31 | End: 2023-10-31

## 2023-10-31 RX ORDER — HEPARIN SODIUM (PORCINE) LOCK FLUSH IV SOLN 100 UNIT/ML 100 UNIT/ML
500 SOLUTION INTRAVENOUS AS NEEDED
OUTPATIENT
Start: 2023-10-31

## 2023-10-31 RX ORDER — FLUCONAZOLE 100 MG/1
100 TABLET ORAL DAILY
Qty: 7 TABLET | Refills: 0 | Status: SHIPPED | OUTPATIENT
Start: 2023-10-31

## 2023-10-31 RX ORDER — DIPHENHYDRAMINE HYDROCHLORIDE AND LIDOCAINE HYDROCHLORIDE AND ALUMINUM HYDROXIDE AND MAGNESIUM HYDRO
5 KIT ONCE
Status: COMPLETED | OUTPATIENT
Start: 2023-10-31 | End: 2023-10-31

## 2023-10-31 RX ORDER — LIDOCAINE HYDROCHLORIDE 20 MG/ML
10 SOLUTION OROPHARYNGEAL EVERY 6 HOURS PRN
Qty: 100 ML | Refills: 0 | Status: CANCELLED | OUTPATIENT
Start: 2023-10-31

## 2023-10-31 RX ORDER — HEPARIN SODIUM (PORCINE) LOCK FLUSH IV SOLN 100 UNIT/ML 100 UNIT/ML
500 SOLUTION INTRAVENOUS AS NEEDED
Status: DISCONTINUED | OUTPATIENT
Start: 2023-10-31 | End: 2023-10-31 | Stop reason: HOSPADM

## 2023-10-31 RX ADMIN — DIPHENHYDRAMINE HYDROCHLORIDE AND LIDOCAINE HYDROCHLORIDE AND ALUMINUM HYDROXIDE AND MAGNESIUM HYDRO 5 ML: KIT at 10:04

## 2023-10-31 RX ADMIN — CARBOPLATIN 300 MG: 600 INJECTION, SOLUTION INTRAVENOUS at 11:24

## 2023-10-31 RX ADMIN — FAMOTIDINE 20 MG: 10 INJECTION INTRAVENOUS at 09:32

## 2023-10-31 RX ADMIN — SODIUM CHLORIDE 250 ML: 9 INJECTION, SOLUTION INTRAVENOUS at 09:32

## 2023-10-31 RX ADMIN — DEXAMETHASONE SODIUM PHOSPHATE 12 MG: 4 INJECTION, SOLUTION INTRA-ARTICULAR; INTRALESIONAL; INTRAMUSCULAR; INTRAVENOUS; SOFT TISSUE at 09:50

## 2023-10-31 RX ADMIN — PALONOSETRON 0.25 MG: 0.25 INJECTION, SOLUTION INTRAVENOUS at 09:34

## 2023-10-31 RX ADMIN — PACLITAXEL 120 MG: 6 INJECTION, SOLUTION, CONCENTRATE INTRAVENOUS at 10:16

## 2023-10-31 RX ADMIN — Medication 10 ML: at 11:59

## 2023-10-31 RX ADMIN — DIPHENHYDRAMINE HYDROCHLORIDE 25 MG: 50 INJECTION INTRAMUSCULAR; INTRAVENOUS at 09:34

## 2023-10-31 RX ADMIN — Medication 500 UNITS: at 11:59

## 2023-10-31 NOTE — PROGRESS NOTES
"Date: 10/31/2023    Patient Name: Dexter Flores   : 1970   ID: [id number]    Diagnosis: Squamous cell carcinoma of bronchus of right lung    Treatment History: Please see Dr. Rizzo's most recent office note (10/24/2023) for full oncology history. He is being seen today for an acute visit.    Complaint: Odynophagia    Interim History:  Mr. Flores is a patient that currently follows with Dr. Rizzo for the treatment of lung cancer. He is currently receiving chemotherapy (qweekly Paclitaxel/Carboplatin) with concominant XRT. He has completed 25/30 planned fractions of XRT to date. He states that for the past 2-3 days that he has been having significant worsening of difficulty swallowing. He reports that swallowing is painful and that he feels that \"even liquids get stuck\". He states that he is doing liquids only (water, soups, etc) at this time and is not able to tolerate any solid foods. He denies nausea, vomiting, diarrhea or any other issues. He is using magic mouthwash swish and swallow without improvement in symptoms. He is otherwise without specific complaints.     VS:   /81   Pulse 120   Temp 96.6 °F (35.9 °C) (Temporal)   Resp 18   Wt 129 kg (284 lb 6.3 oz)   SpO2 95%   BMI 43.24 kg/m²      PHQ-Score Total:  PHQ-9 Total Score:      Review of Systems  A comprehensive 14 point review of systems was conducted with patient and positive as per HPI otherwise negative.    Physical Exam:  General/Constitutional: Awake, alert and oriented. No apparent acute distress is noted.  HEENT: Normocephalic, atraumatic. PERRLA, conjunctiva normal. Extraocular movements are intact. Dry tongue with thick white coating.   Neck: No JVD, thyromegaly or cervical lymphadenopathy.  Cardiovascular: S1, S2. Regular rate and rhythm, no murmurs, rubs or gallops.  Respiratory: Pulmonary effort is normal, lungs are clear to auscultation bilaterally. No wheezing, rhonchi or rales. No use of accessory muscles, no " retractions.  Abdomen: Soft, non-tender, non-distended with normoactive bowel sounds x 4 quadrants. No palpable hepatosplenomegaly.  Lymph: No cervical, supraclavicular, axillary, inguinal or femoral adenopathy.  Integumentary: Skin warm and dry. No bruising, ecchymosis.  Extremities: No clubbing, cyanosis or edema.  Neurological: Alert and oriented x 3. Grossly non-focal examination.      Labs:       Lab Results   Component Value Date    WBC 4.37 10/31/2023    HGB 13.4 10/31/2023    HCT 41.8 10/31/2023    MCV 93.3 10/31/2023    RDW 16.9 (H) 10/31/2023     10/31/2023    NEUTRORELPCT 75.1 10/31/2023    LYMPHORELPCT 14.6 (L) 10/31/2023    MONORELPCT 8.0 10/31/2023    EOSRELPCT 0.9 10/31/2023    BASORELPCT 0.9 10/31/2023    NEUTROABS 3.28 10/31/2023    LYMPHSABS 0.64 (L) 10/31/2023       Lab Results   Component Value Date     10/31/2023    K 4.2 10/31/2023    CO2 28.7 10/31/2023     10/31/2023    BUN 14 10/31/2023    CREATININE 0.90 10/31/2023    GLUCOSE 108 (H) 10/31/2023    CALCIUM 9.3 10/31/2023    ALKPHOS 59 10/31/2023    AST 17 10/31/2023    ALT 16 10/31/2023    BILITOT 0.4 10/31/2023    ALBUMIN 4.0 10/31/2023    PROTEINTOT 7.5 10/31/2023    MG 2.3 07/11/2022                 Assessment & Plan:  Dexter Flores is a 53 y.o. male with:    1. Squamous cell lung carcinoma  - Please see Dr. Rizzo's last office note (10/24/23) for full oncology history. He is being seen today for an acute visit.   - He has completed 25/30 planned fractions of XRT to date. He is also receiving cycle 6 Carboplatin/Paclitaxel today.  - He will follow up with Dr. Rizzo as previously planned.    2. Odynophagia  3. Oral candidiasis  - Reports that he has been having worsening of difficulty swallowing with painful swallowing for the past 3 days.  - He is using Magic Mouthwash without improvement in symptoms.  - Will give dose of Magic Mouthwash while here for treatment today.  - Will give Diflucan 100 mg PO daily x 7 days and  encouraged to continue to use Magic Mouthwash. He also states that he is planning to discuss this issue with Dr. Correa today during his office visit.   - Advised if unable to tolerate liquids or has decreased PO intake to notify our office.          I spent 30 minutes with Dexter Flores today.  In the office today, more than 50% of this time was spent face-to-face with him  in counseling / coordination of care, reviewing his interim medical history and counseling on the current treatment plan.  All questions were answered to his satisfaction.         Electronically Signed By: ABHIJEET Briones , ABHIJEET October 31, 2023 12:53 EDT

## 2023-10-31 NOTE — PROGRESS NOTES
On Treatment Visit Note      Patient Name: Dexter Flores  : 1970   MRN: [id number]     Diagnosis:    Chief Complaint   Patient presents with    Lung Cancer     Malignant Neoplasm of Unspecified Part of Right Bronchus or Lung       Diagnosis: Stage III squamous cell carcinoma of the right upper lobe with metastases to the right paratracheal, subcarinal and right hilar lymph nodes.    This patient was seen today for an on treatment visit.  The patient is receiving radiosensitizing carboplatin and Taxol chemotherapy under Dr. Rizzo's supervision.  To date, the patient has received 5200 cGy of a 6000 cGy regimen.    Radiation Treatments       Active   Plans   Rt Lung   Most recent treatment: Dose planned: 200 cGy (fraction 26 on 10/31/2023)   Total: Dose planned: 6,000 cGy (30 fractions)   Elapsed Days: 36      Reference Points   PTV   Most recent treatment: Dose given: 200 cGy (on 10/31/2023)   Total: Dose given: 5,200 cGy   Elapsed Days: 36           Historical   No historical radiation treatments to show.              Subjective      Treatment tolerance:  Mr. Flores appears to be tolerating radiotherapy well.  He has the expected side effects of fatigue and odynophagia localized to the upper anterior retrosternal region.  Patient has developed some moderate erythema over the right low periclavicular region.    Medications:     Current Outpatient Medications:     albuterol (PROVENTIL) (2.5 MG/3ML) 0.083% nebulizer solution, Take 2.5 mg by nebulization Every 6 (Six) Hours As Needed for Wheezing., Disp: , Rfl:     albuterol sulfate  (90 Base) MCG/ACT inhaler, Inhale 2 puffs Every 4 (Four) Hours As Needed for Wheezing., Disp: 18 g, Rfl: 11    Urborcocidhdtbm11.5mg/5ml,Aluminum&Chogvfoel414-900-49wg/5ml,LidocaineViscous2%,[id number]/ml 1:1:1:1, Swish in mouth and Swallow 10 mL 4 times daily as needed., Disp: 480 mL, Rfl: 1    fluticasone (FLOVENT HFA) 110 MCG/ACT inhaler, Inhale 2 puffs 2 (Two)  Times a Day., Disp: 12 g, Rfl: 11    gabapentin (NEURONTIN) 600 MG tablet, Take 800 mg by mouth 3 (Three) Times a Day., Disp: , Rfl:     hydroCHLOROthiazide (HYDRODIURIL) 12.5 MG tablet, Take 1 tablet by mouth Daily., Disp: , Rfl:     levothyroxine (SYNTHROID, LEVOTHROID) 50 MCG tablet, Take 1 tablet by mouth Daily., Disp: , Rfl:     lidocaine-prilocaine (EMLA) 2.5-2.5 % cream, Apply 1 application  topically to the appropriate area as directed Every 2 (Two) Hours As Needed for Mild Pain. Apply to Port-A-Cath site 30 minutes prior to arrival at infusion clinic., Disp: 30 g, Rfl: 1    lisinopril (PRINIVIL,ZESTRIL) 10 MG tablet, Take 1 tablet by mouth Daily., Disp: , Rfl:     LORazepam (ATIVAN) 1 MG tablet, Take 1 tablet by mouth Every 8 (Eight) Hours As Needed for Anxiety., Disp: 90 tablet, Rfl: 0    megestrol (MEGACE) 40 MG/ML suspension, Take 10 mL by mouth Daily., Disp: 480 mL, Rfl: 2    montelukast (SINGULAIR) 10 MG tablet, Take 1 tablet by mouth Every Night., Disp: , Rfl:     Morphine (MS CONTIN) 60 MG 12 hr tablet, Take 1 tablet by mouth 2 (Two) Times a Day., Disp: 60 tablet, Rfl: 0    naloxone (NARCAN) 4 MG/0.1ML nasal spray, Call 911. Don't prime. [place] in 1 nostril for overdose. Repeat in 2-3 minutes in other nostril if no or minimal breathing/responsiveness., Disp: 2 each, Rfl: 0    ondansetron (ZOFRAN) 8 MG tablet, Take 1 tablet by mouth Every 8 (Eight) Hours As Needed for Nausea or Vomiting., Disp: 30 tablet, Rfl: 2    oxyCODONE-acetaminophen (PERCOCET)  MG per tablet, Take 1 tablet by mouth Every 6 (Six) Hours As Needed for Moderate Pain., Disp: 120 tablet, Rfl: 0    pantoprazole (PROTONIX) 40 MG EC tablet, Take 1 tablet by mouth Daily., Disp: , Rfl:     prochlorperazine (COMPAZINE) 10 MG tablet, Take 1 tablet by mouth Every 6 (Six) Hours As Needed for Nausea or Vomiting., Disp: 60 tablet, Rfl: 1    QUEtiapine (SEROquel) 25 MG tablet, Take 1 tablet by mouth Every Night., Disp: , Rfl:      sennosides-docusate (senna-docusate sodium) 8.6-50 MG per tablet, Take 1 tablet by mouth Daily., Disp: 30 tablet, Rfl: 2    tiotropium bromide-olodaterol (STIOLTO RESPIMAT) 2.5-2.5 MCG/ACT aerosol solution inhaler, Inhale 2 puffs Daily., Disp: 4 g, Rfl: 11    traZODone (DESYREL) 100 MG tablet, Take 1 tablet by mouth Every Night., Disp: , Rfl:     vitamin D (ERGOCALCIFEROL) 1.25 MG (40557 UT) capsule capsule, Take 1 capsule by mouth 1 (One) Time Per Week., Disp: , Rfl:   No current facility-administered medications for this encounter.    Facility-Administered Medications Ordered in Other Encounters:     heparin injection 500 Units, 500 Units, Intravenous, PRN, ROMEO Rizzo MD, 500 Units at 10/31/23 1159    sodium chloride 0.9 % flush 10 mL, 10 mL, Intravenous, PRN, ROMEO Rizzo MD, 10 mL at 10/31/23 1159    Allergies:   Allergies   Allergen Reactions    Bevespi Aerosphere [Glycopyrrolate-Formoterol] Other (See Comments)     Developed blisters around mouth. Not a listed adverse effect of medication so unsure if this is related to inhaler. Discontinued as precaution.     Objective     Physical Exam:  Patient is a somewhat fatigued middle-aged gentleman in no acute distress.  Vital signs are as below.    Vital Signs:   Vitals:    10/31/23 1109   BP: 135/81   Pulse: 120   Resp: 18   Temp: 96.6 °F (35.9 °C)   TempSrc: Temporal   SpO2: 95%   Weight: 129 kg (284 lb 6.3 oz)   PainSc: 0-No pain     Body mass index is 43.24 kg/m².   HEENT: Oral mucosa is pink and moist.  There is no evidence of intraoral fungal infections.  Neck: Short, supple no detectable cervical or periclavicular adenopathy  Heart: Regular rate and rhythm  Lungs: Clear to auscultation bilaterally  Integumentary: minimal skin erythema is noted over the upper chest wall region.  No skin breakdown is noted.  Lymphatics: No detectable cervical, periclavicular or axillary adenopathy.    Current Total XRT Dose (cGY):    Radiation Treatments        Active   Plans   Rt Lung   Most recent treatment: Dose planned: 200 cGy (fraction 26 on 10/31/2023)   Total: Dose planned: 6,000 cGy (30 fractions)   Elapsed Days: 36      Reference Points   PTV   Most recent treatment: Dose given: 200 cGy (on 10/31/2023)   Total: Dose given: 5,200 cGy   Elapsed Days: 36           Historical   No historical radiation treatments to show.              Plan      Plan:   Patient was seen today for an on treatment visit. Patient is receiving radiation therapy to the squamous cell carcinoma of the right upper lung with regional lymph nodes.  The patient has 3 treatment fractions pending.. Patient is stable and tolerating radiation therapy well with minimal side effects. Continue with radiation therapy.  She was instructed to continue use of the Magic mouthwash mixture.  I anticipate that odynophagia will resolve after completion of radiotherapy.    I have reviewed treatment setup notes, checked and approved the daily guidance images. I reviewed dose delivery, treatment parameters and deemed them appropriate. We plan to continue radiation therapy as prescribed.     Digital speech recognition software was used to dictate parts of this note, some dictation errors may occur.    Sriram Correa MD   10/31/23 12:44 EDT

## 2023-11-01 ENCOUNTER — HOSPITAL ENCOUNTER (OUTPATIENT)
Dept: RADIATION ONCOLOGY | Facility: HOSPITAL | Age: 53
Discharge: HOME | End: 2023-11-01

## 2023-11-01 ENCOUNTER — HOSPITAL ENCOUNTER (OUTPATIENT)
Dept: RADIATION ONCOLOGY | Facility: HOSPITAL | Age: 53
Setting detail: RADIATION/ONCOLOGY SERIES
End: 2023-11-01
Payer: COMMERCIAL

## 2023-11-01 LAB
RAD ONC ARIA COURSE ID: NORMAL
RAD ONC ARIA COURSE INTENT: NORMAL
RAD ONC ARIA COURSE LAST TREATMENT DATE: NORMAL
RAD ONC ARIA COURSE START DATE: NORMAL
RAD ONC ARIA COURSE TREATMENT ELAPSED DAYS: 37
RAD ONC ARIA FIRST TREATMENT DATE: NORMAL
RAD ONC ARIA PLAN FRACTIONS TREATED TO DATE: 27
RAD ONC ARIA PLAN ID: NORMAL
RAD ONC ARIA PLAN PRESCRIBED DOSE PER FRACTION: 2 GY
RAD ONC ARIA PLAN PRIMARY REFERENCE POINT: NORMAL
RAD ONC ARIA PLAN TOTAL FRACTIONS PRESCRIBED: 30
RAD ONC ARIA PLAN TOTAL PRESCRIBED DOSE: 6000 CGY
RAD ONC ARIA REFERENCE POINT DOSAGE GIVEN TO DATE: 54 GY
RAD ONC ARIA REFERENCE POINT ID: NORMAL
RAD ONC ARIA REFERENCE POINT SESSION DOSAGE GIVEN: 2 GY

## 2023-11-01 PROCEDURE — 77386: CPT | Performed by: RADIOLOGY

## 2023-11-01 PROCEDURE — 77014 CHG CT GUIDANCE RADIATION THERAPY FLDS PLACEMENT: CPT | Performed by: RADIOLOGY

## 2023-11-02 ENCOUNTER — HOSPITAL ENCOUNTER (OUTPATIENT)
Dept: RADIATION ONCOLOGY | Facility: HOSPITAL | Age: 53
Discharge: HOME | End: 2023-11-02

## 2023-11-02 LAB
RAD ONC ARIA COURSE ID: NORMAL
RAD ONC ARIA COURSE INTENT: NORMAL
RAD ONC ARIA COURSE LAST TREATMENT DATE: NORMAL
RAD ONC ARIA COURSE START DATE: NORMAL
RAD ONC ARIA COURSE TREATMENT ELAPSED DAYS: 38
RAD ONC ARIA FIRST TREATMENT DATE: NORMAL
RAD ONC ARIA PLAN FRACTIONS TREATED TO DATE: 28
RAD ONC ARIA PLAN ID: NORMAL
RAD ONC ARIA PLAN PRESCRIBED DOSE PER FRACTION: 2 GY
RAD ONC ARIA PLAN PRIMARY REFERENCE POINT: NORMAL
RAD ONC ARIA PLAN TOTAL FRACTIONS PRESCRIBED: 30
RAD ONC ARIA PLAN TOTAL PRESCRIBED DOSE: 6000 CGY
RAD ONC ARIA REFERENCE POINT DOSAGE GIVEN TO DATE: 56 GY
RAD ONC ARIA REFERENCE POINT ID: NORMAL
RAD ONC ARIA REFERENCE POINT SESSION DOSAGE GIVEN: 2 GY

## 2023-11-02 PROCEDURE — 77014 CHG CT GUIDANCE RADIATION THERAPY FLDS PLACEMENT: CPT | Performed by: RADIOLOGY

## 2023-11-02 PROCEDURE — 77386: CPT | Performed by: RADIOLOGY

## 2023-11-03 ENCOUNTER — HOSPITAL ENCOUNTER (OUTPATIENT)
Dept: RADIATION ONCOLOGY | Facility: HOSPITAL | Age: 53
Discharge: HOME | End: 2023-11-03

## 2023-11-03 LAB
RAD ONC ARIA COURSE ID: NORMAL
RAD ONC ARIA COURSE INTENT: NORMAL
RAD ONC ARIA COURSE LAST TREATMENT DATE: NORMAL
RAD ONC ARIA COURSE START DATE: NORMAL
RAD ONC ARIA COURSE TREATMENT ELAPSED DAYS: 39
RAD ONC ARIA FIRST TREATMENT DATE: NORMAL
RAD ONC ARIA PLAN FRACTIONS TREATED TO DATE: 29
RAD ONC ARIA PLAN ID: NORMAL
RAD ONC ARIA PLAN PRESCRIBED DOSE PER FRACTION: 2 GY
RAD ONC ARIA PLAN PRIMARY REFERENCE POINT: NORMAL
RAD ONC ARIA PLAN TOTAL FRACTIONS PRESCRIBED: 30
RAD ONC ARIA PLAN TOTAL PRESCRIBED DOSE: 6000 CGY
RAD ONC ARIA REFERENCE POINT DOSAGE GIVEN TO DATE: 58 GY
RAD ONC ARIA REFERENCE POINT ID: NORMAL
RAD ONC ARIA REFERENCE POINT SESSION DOSAGE GIVEN: 2 GY

## 2023-11-03 PROCEDURE — 77014 CHG CT GUIDANCE RADIATION THERAPY FLDS PLACEMENT: CPT | Performed by: RADIOLOGY

## 2023-11-03 PROCEDURE — 77386: CPT | Performed by: RADIOLOGY

## 2023-11-06 ENCOUNTER — HOSPITAL ENCOUNTER (OUTPATIENT)
Dept: RADIATION ONCOLOGY | Facility: HOSPITAL | Age: 53
Discharge: HOME | End: 2023-11-06
Payer: COMMERCIAL

## 2023-11-06 LAB
RAD ONC ARIA COURSE ID: NORMAL
RAD ONC ARIA COURSE INTENT: NORMAL
RAD ONC ARIA COURSE LAST TREATMENT DATE: NORMAL
RAD ONC ARIA COURSE START DATE: NORMAL
RAD ONC ARIA COURSE TREATMENT ELAPSED DAYS: 42
RAD ONC ARIA FIRST TREATMENT DATE: NORMAL
RAD ONC ARIA PLAN FRACTIONS TREATED TO DATE: 30
RAD ONC ARIA PLAN ID: NORMAL
RAD ONC ARIA PLAN PRESCRIBED DOSE PER FRACTION: 2 GY
RAD ONC ARIA PLAN PRIMARY REFERENCE POINT: NORMAL
RAD ONC ARIA PLAN TOTAL FRACTIONS PRESCRIBED: 30
RAD ONC ARIA PLAN TOTAL PRESCRIBED DOSE: 6000 CGY
RAD ONC ARIA REFERENCE POINT DOSAGE GIVEN TO DATE: 60 GY
RAD ONC ARIA REFERENCE POINT ID: NORMAL
RAD ONC ARIA REFERENCE POINT SESSION DOSAGE GIVEN: 2 GY

## 2023-11-06 PROCEDURE — 77014 CHG CT GUIDANCE RADIATION THERAPY FLDS PLACEMENT: CPT | Performed by: RADIOLOGY

## 2023-11-06 PROCEDURE — 77386: CPT | Performed by: RADIOLOGY

## 2023-11-07 ENCOUNTER — APPOINTMENT (OUTPATIENT)
Dept: ONCOLOGY | Facility: HOSPITAL | Age: 53
End: 2023-11-07
Payer: COMMERCIAL

## 2023-11-07 ENCOUNTER — LAB (OUTPATIENT)
Dept: ONCOLOGY | Facility: CLINIC | Age: 53
End: 2023-11-07
Payer: COMMERCIAL

## 2023-11-07 ENCOUNTER — OFFICE VISIT (OUTPATIENT)
Dept: ONCOLOGY | Facility: CLINIC | Age: 53
End: 2023-11-07
Payer: COMMERCIAL

## 2023-11-07 ENCOUNTER — TELEPHONE (OUTPATIENT)
Dept: ONCOLOGY | Facility: CLINIC | Age: 53
End: 2023-11-07

## 2023-11-07 VITALS
DIASTOLIC BLOOD PRESSURE: 66 MMHG | OXYGEN SATURATION: 94 % | RESPIRATION RATE: 18 BRPM | HEART RATE: 118 BPM | TEMPERATURE: 96.9 F | SYSTOLIC BLOOD PRESSURE: 100 MMHG | BODY MASS INDEX: 42.85 KG/M2 | WEIGHT: 281.8 LBS

## 2023-11-07 DIAGNOSIS — C34.91 SQUAMOUS CELL CARCINOMA OF BRONCHUS OF RIGHT LUNG: Primary | ICD-10-CM

## 2023-11-07 DIAGNOSIS — C34.91 SQUAMOUS CELL CARCINOMA OF BRONCHUS OF RIGHT LUNG: ICD-10-CM

## 2023-11-07 DIAGNOSIS — R91.8 MASS OF RIGHT LUNG: ICD-10-CM

## 2023-11-07 LAB
ALBUMIN SERPL-MCNC: 3.5 G/DL (ref 3.5–5.2)
ALBUMIN/GLOB SERPL: 0.8 G/DL
ALP SERPL-CCNC: 53 U/L (ref 39–117)
ALT SERPL W P-5'-P-CCNC: 21 U/L (ref 1–41)
ANION GAP SERPL CALCULATED.3IONS-SCNC: 11.5 MMOL/L (ref 5–15)
AST SERPL-CCNC: 24 U/L (ref 1–40)
BASOPHILS # BLD AUTO: 0.01 10*3/MM3 (ref 0–0.2)
BASOPHILS NFR BLD AUTO: 0.7 % (ref 0–1.5)
BILIRUB SERPL-MCNC: 0.6 MG/DL (ref 0–1.2)
BUN SERPL-MCNC: 12 MG/DL (ref 6–20)
BUN/CREAT SERPL: 12.8 (ref 7–25)
CALCIUM SPEC-SCNC: 9.2 MG/DL (ref 8.6–10.5)
CHLORIDE SERPL-SCNC: 103 MMOL/L (ref 98–107)
CO2 SERPL-SCNC: 21.5 MMOL/L (ref 22–29)
CREAT SERPL-MCNC: 0.94 MG/DL (ref 0.76–1.27)
DEPRECATED RDW RBC AUTO: 56.1 FL (ref 37–54)
EGFRCR SERPLBLD CKD-EPI 2021: 97.5 ML/MIN/1.73
EOSINOPHIL # BLD AUTO: 0.04 10*3/MM3 (ref 0–0.4)
EOSINOPHIL NFR BLD AUTO: 3 % (ref 0.3–6.2)
ERYTHROCYTE [DISTWIDTH] IN BLOOD BY AUTOMATED COUNT: 16.5 % (ref 12.3–15.4)
GLOBULIN UR ELPH-MCNC: 4.2 GM/DL
GLUCOSE SERPL-MCNC: 131 MG/DL (ref 65–99)
HCT VFR BLD AUTO: 38.5 % (ref 37.5–51)
HGB BLD-MCNC: 12.2 G/DL (ref 13–17.7)
IMM GRANULOCYTES # BLD AUTO: 0.06 10*3/MM3 (ref 0–0.05)
IMM GRANULOCYTES NFR BLD AUTO: 4.4 % (ref 0–0.5)
LYMPHOCYTES # BLD AUTO: 0.28 10*3/MM3 (ref 0.7–3.1)
LYMPHOCYTES NFR BLD AUTO: 20.7 % (ref 19.6–45.3)
MCH RBC QN AUTO: 29.4 PG (ref 26.6–33)
MCHC RBC AUTO-ENTMCNC: 31.7 G/DL (ref 31.5–35.7)
MCV RBC AUTO: 92.8 FL (ref 79–97)
MONOCYTES # BLD AUTO: 0.15 10*3/MM3 (ref 0.1–0.9)
MONOCYTES NFR BLD AUTO: 11.1 % (ref 5–12)
NEUTROPHILS NFR BLD AUTO: 0.81 10*3/MM3 (ref 1.7–7)
NEUTROPHILS NFR BLD AUTO: 60.1 % (ref 42.7–76)
NRBC BLD AUTO-RTO: 0 /100 WBC (ref 0–0.2)
PLATELET # BLD AUTO: 188 10*3/MM3 (ref 140–450)
PMV BLD AUTO: 10.1 FL (ref 6–12)
POTASSIUM SERPL-SCNC: 4 MMOL/L (ref 3.5–5.2)
PROT SERPL-MCNC: 7.7 G/DL (ref 6–8.5)
RBC # BLD AUTO: 4.15 10*6/MM3 (ref 4.14–5.8)
SODIUM SERPL-SCNC: 136 MMOL/L (ref 136–145)
WBC NRBC COR # BLD: 1.35 10*3/MM3 (ref 3.4–10.8)

## 2023-11-07 PROCEDURE — 1125F AMNT PAIN NOTED PAIN PRSNT: CPT | Performed by: INTERNAL MEDICINE

## 2023-11-07 PROCEDURE — 80053 COMPREHEN METABOLIC PANEL: CPT

## 2023-11-07 PROCEDURE — 99214 OFFICE O/P EST MOD 30 MIN: CPT | Performed by: INTERNAL MEDICINE

## 2023-11-07 PROCEDURE — 85025 COMPLETE CBC W/AUTO DIFF WBC: CPT

## 2023-11-07 NOTE — TELEPHONE ENCOUNTER
Caller: Dexter Flores    Relationship: Self    Best call back number: [phone]    What is the best time to reach you: ANY    Who are you requesting to speak with (clinical staff, provider,  specific staff member): CLINICAL     What was the call regarding: DEXTER IS CALLING TO LET DR JAY KNOW THAT SUNDAY HIS BLOOD PRESSURE DROPPED VERY LOW AND THEY HAD TO CALL THE AMBULANCE    HE WAS WANTING TO KNOW IF HE NEEDED TO CONTINUE TO TAKE HIS BLOOD PRESSURE MEDICINE       PLEASE ADVISE

## 2023-11-07 NOTE — PROGRESS NOTES
Venipuncture Blood Specimen Collection  Venipuncture performed in right hand by Perez Hassan MA with good hemostasis. Patient tolerated the procedure well without complications.   11/07/23   Perez Hassan MA

## 2023-11-07 NOTE — PROGRESS NOTES
Name:  Dexter Flores  :  1970  Date:  2023     REFERRING PHYSICIAN  Leonardo Jackson DO    PRIMARY CARE PROVIDER  Jose F Reynolds PA-C    REASON FOR FOLLOWUP  1. 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    Dear  Rodolfo,    HISTORY OF PRESENT ILLNESS:   I saw Mr. Flores in follow up today in our medical oncology clinic. As you are aware, he is a pleasant, 53 y.o., white male with a history of hypertension, COPD and lifelong tobacco abuse who first noticed a cough and some worsening shortness of breath in late 2023. As the symptoms progressed, he presented to our ED on 2023, where a CT of the chest identified a mass/postobstructive pneumonia in the right upper lobe of the lung. He was admitted to the [facility] [place] hospitalist service for further evaluation and treatment. Pulmonology performed a diagnostic bronchoscopy on 2023, and the results were consistent with squamous cell carcinoma. In the meantime, as his shortness of breath improved with antibiotics and other supportive care, he was able to be discharged on 2023 with a referral to our clinic (that same day) for further management. At that time, he was agreeable to undergoing an initial staging PET scan, having a powerport placed; and, assuming the PET scan remained consistent with localized disease, proceeding with definitive treatment with concomitant chemotherapy and localized irradiation.    INTERIM HISTORY:  Mr. Flores returns to clinic today for follow up by himself. His pain is still under good control on his current combination of scheduled, long-acting Morphine and prn Percocet. He still has some intermittent nausea, but prn Zofran has still been controlling this. Meanwhile, following powerport placement and CT simulation, he began concomitant chemotherapy and localized  irradiation in late September. He has now received six (6) cycles of qweekly carboplatin and taxol and all thirty (30) of the planned fractions of XRT; and he has tolerated this regimen overall very well, with tolerably, increased fatigue as his only noticeable side effect. His breathing has improved, and remains improved, since he started this therapy as well. He again has no other specific complaints.    Past Medical History:   Diagnosis Date    Arthritis     Bulging of cervical intervertebral disc     Cancer     lung    COPD (chronic obstructive pulmonary disease)     GERD (gastroesophageal reflux disease)     History of transfusion     Hypertension     Neck pain     Sleep apnea     Thyroid disease        Past Surgical History:   Procedure Laterality Date    ABDOMINAL HERNIA REPAIR      umbilical    BRONCHOSCOPY Bilateral 2023    Procedure: BRONCHOSCOPY WITH ENDOBRONCHIAL ULTRASOUND;  Surgeon: Savage Mcmanus MD;  Location: [facility];  Service: Pulmonary;  Laterality: Bilateral;    JOINT REPLACEMENT      PORTACATH PLACEMENT Left 2023    Procedure: INSERTION OF PORTACATH;  Surgeon: Jose F Hewitt MD;  Location: [facility];  Service: General;  Laterality: Left;    TONSILLECTOMY      TOTAL HIP ARTHROPLASTY Bilateral        Social History     Socioeconomic History    Marital status:    Tobacco Use    Smoking status: Former     Packs/day: 1.00     Years: 30.00     Additional pack years: 0.00     Total pack years: 30.00     Types: Cigarettes     Start date:      Quit date: 2023     Years since quittin.5    Smokeless tobacco: Never   Vaping Use    Vaping Use: Never used   Substance and Sexual Activity    Alcohol use: Not Currently    Drug use: Not Currently    Sexual activity: Defer       History reviewed. No pertinent family history.    Allergies   Allergen Reactions    Bevespi Aerosphere [Glycopyrrolate-Formoterol] Other (See Comments)     Developed blisters around mouth. Not a  listed adverse effect of medication so unsure if this is related to inhaler. Discontinued as precaution.       Current Outpatient Medications   Medication Sig Dispense Refill    albuterol (PROVENTIL) (2.5 MG/3ML) 0.083% nebulizer solution Take 2.5 mg by nebulization Every 6 (Six) Hours As Needed for Wheezing.      albuterol sulfate  (90 Base) MCG/ACT inhaler Inhale 2 puffs Every 4 (Four) Hours As Needed for Wheezing. 18 g 11    Qyskgtvoqgefyex28.5mg/5ml,Aluminum&Opwtalaem120-903-91zs/5ml,LidocaineViscous2%,[id number]/ml 1:1:1:1 Swish in mouth and Swallow 10 mL 4 times daily as needed. 480 mL 1    fluconazole (DIFLUCAN) 100 MG tablet Take 1 tablet by mouth Daily. 7 tablet 0    fluticasone (FLOVENT HFA) 110 MCG/ACT inhaler Inhale 2 puffs 2 (Two) Times a Day. 12 g 11    gabapentin (NEURONTIN) 600 MG tablet Take 800 mg by mouth 3 (Three) Times a Day.      hydroCHLOROthiazide (HYDRODIURIL) 12.5 MG tablet Take 1 tablet by mouth Daily.      levothyroxine (SYNTHROID, LEVOTHROID) 50 MCG tablet Take 1 tablet by mouth Daily.      lidocaine-prilocaine (EMLA) 2.5-2.5 % cream Apply 1 application  topically to the appropriate area as directed Every 2 (Two) Hours As Needed for Mild Pain. Apply to Port-A-Cath site 30 minutes prior to arrival at infusion clinic. 30 g 1    lisinopril (PRINIVIL,ZESTRIL) 10 MG tablet Take 1 tablet by mouth Daily.      LORazepam (ATIVAN) 1 MG tablet Take 1 tablet by mouth Every 8 (Eight) Hours As Needed for Anxiety. 90 tablet 0    megestrol (MEGACE) 40 MG/ML suspension Take 10 mL by mouth Daily. 480 mL 2    montelukast (SINGULAIR) 10 MG tablet Take 1 tablet by mouth Every Night.      Morphine (MS CONTIN) 60 MG 12 hr tablet Take 1 tablet by mouth 2 (Two) Times a Day. 60 tablet 0    naloxone (NARCAN) 4 MG/0.1ML nasal spray Call 911. Don't prime. [place] in 1 nostril for overdose. Repeat in 2-3 minutes in other nostril if no or minimal breathing/responsiveness. 2 each 0    ondansetron (ZOFRAN)  8 MG tablet Take 1 tablet by mouth Every 8 (Eight) Hours As Needed for Nausea or Vomiting. 30 tablet 2    oxyCODONE-acetaminophen (PERCOCET)  MG per tablet Take 1 tablet by mouth Every 6 (Six) Hours As Needed for Moderate Pain. 120 tablet 0    pantoprazole (PROTONIX) 40 MG EC tablet Take 1 tablet by mouth Daily.      prochlorperazine (COMPAZINE) 10 MG tablet Take 1 tablet by mouth Every 6 (Six) Hours As Needed for Nausea or Vomiting. 60 tablet 1    QUEtiapine (SEROquel) 25 MG tablet Take 1 tablet by mouth Every Night.      sennosides-docusate (senna-docusate sodium) 8.6-50 MG per tablet Take 1 tablet by mouth Daily. 30 tablet 2    silver sulfadiazine (SILVADENE, SSD) 1 % cream Apply 1 application  topically to the appropriate area as directed 2 (Two) Times a Day.      tiotropium bromide-olodaterol (STIOLTO RESPIMAT) 2.5-2.5 MCG/ACT aerosol solution inhaler Inhale 2 puffs Daily. 4 g 11    traZODone (DESYREL) 100 MG tablet Take 1 tablet by mouth Every Night.      vitamin D (ERGOCALCIFEROL) 1.25 MG (81413 UT) capsule capsule Take 1 capsule by mouth 1 (One) Time Per Week.       No current facility-administered medications for this visit.     REVIEW OF SYSTEMS  CONSTITUTIONAL:  No fever, chills or night sweats. Increased fatigue since starting concomitant chemo/XRT in late September.  EYES:  No blurry vision, diplopia or other vision changes.  ENT:  No hearing loss, nosebleeds or sore throat.  CARDIOVASCULAR:  No palpitations, arrhythmia, syncopal episodes or edema.  PULMONARY:  As per the HPI above.  GASTROINTESTINAL:  No constipation or diarrhea. No abdominal pain. Intermittent, mild nausea.  GENITOURINARY:  No hematuria, kidney stones or frequent urination.  MUSCULOSKELETAL:  No joint or back pains.  INTEGUMENTARY: No rashes or pruritus.  ENDOCRINE:  No excessive thirst or hot flashes.  HEMATOLOGIC:  No history of free bleeding, spontaneous bleeding or clotting.  IMMUNOLOGIC:  No allergies or frequent  infections.  NEUROLOGIC: No numbness, tingling, seizures or weakness.  PSYCHIATRIC:  No depression. Some anxiety over his recent diagnosis with cancer, currently still improved.    PHYSICAL EXAMINATION  /66   Pulse 118   Temp 96.9 °F (36.1 °C) (Temporal)   Resp 18   Wt 128 kg (281 lb 12.8 oz)   SpO2 94%   BMI 42.85 kg/m²     Pain Score:  Pain Score    23 0901   PainSc:   7   PainLoc: Neck     PHQ-Score Total:  PHQ-9 Total Score:      ECO  GENERAL:  A well-developed, well-nourished, morbidly obese, white male in no acute distress.  HEENT:  Pupils equally round and reactive to light.  Extraocular muscles intact.  CARDIOVASCULAR:  Regular rate and rhythm.  No murmurs, gallops or rubs.  LUNGS:  Decreased breath sounds on the right, clear on the left.  ABDOMEN:  Soft, nontender, nondistended with positive bowel sounds.  EXTREMITIES:  No clubbing, cyanosis or edema bilaterally.  SKIN:  No rashes or petechiae. Powerport still in place.  NEURO:  Cranial nerves grossly intact. No focal deficits.  PSYCH:  Alert and oriented x3.    The physical exam is unchanged from recent priors.    LABORATORY  Lab Results   Component Value Date    WBC 1.35 (C) 2023    HGB 12.2 (L) 2023    HCT 38.5 2023    MCV 92.8 2023     2023    NEUTROABS 0.81 (L) 2023       Lab Results   Component Value Date     10/31/2023    K 4.2 10/31/2023     10/31/2023    CO2 28.7 10/31/2023    BUN 14 10/31/2023    CREATININE 0.90 10/31/2023    GLUCOSE 108 (H) 10/31/2023    CALCIUM 9.3 10/31/2023    AST 17 10/31/2023    ALT 16 10/31/2023    ALKPHOS 59 10/31/2023    BILITOT 0.4 10/31/2023    PROTEINTOT 7.5 10/31/2023    ALBUMIN 4.0 10/31/2023     CBC (2023): WBCs: 1.35 (ANC: 810); HgB: 12.2; Hct: 38.5; platelets: 188; MCV: 92.8  CBC (10/24/2023): WBCs: 5.70; HgB: 13.9; Hct: 44.2; platelets: 92.7  CBC (10/17/2023): WBCs: 7.25; HgB: 12.6; Hct: 41.0; platelets: 269; MCV: 93.4  CBC  (10/03/2023): WBCs: 10.59; HgB: 12.7; Hct: 41.4; platelets: 389; MCV: 91.8  CBC (09/25/2023): WBCs: 13.95; HgB: 12.9; Hct: 40.5; platelets: 483; MCV: 90.0  CBC (08/24/2023): WBCs: 22.31; HgB: 13.5; Hct: 43.6; platelets: 644    IMAGING  CT angiogram chest (08/19/2023):  Impression: Right upper lobe consolidative and masslike airspace opacity without violating the fissures. Findings are concerning for infection. However, considering the presence of an enlarged right paratracheal lymph node measuring 4.2 cm, underlying mass cannot be ruled out.    NM PET with fusion CT, skull base to mid-thigh (09/12/2023):  Impression:  1) A right upper lobe pulmonary parenchymal mass and/or postobstructive collapse measures about 9.9 cm with hypermetabolism mSUV 15.  2) Subcarinal region 2.3 cm lymph node shows PET hypermetabolism measuring 8.6.  3) Right hilar region hypermetabolism shows mSUV 7.  4) Probable 2 cm right suprahilar clavicular region lymph node shows PET hypermetabolism mSUV 10.2.    MRI brain with and without contrast (09/16/2023):  Impression:  1) No parenchymal mass, hemorrhage or midline shift.  2) Increased signal in the mastoid air cells bilaterally.  3) No parenchymal mass, hemorrhage or midline shift.    PATHOLOGY  Lung, biopsy, right upper lobe (08/22/2023):  Moderately differentiated squamous cell carcinoma. PD-L1 TPS: < 1%.    Bronchial lavage, right upper and middle lobes (08/22/2023): Malignant. Squamous cell carcinoma.    Bronchial wash (08/22/2023): Malignant. Squamous cell carcinoma.    Bronchial brushings, right upper and middle lobes (08/22/2023): Malignant. Squamous cell carcinoma.    RADIATION THERAPY  Radiation Treatments       Active   Reference Points   PTV   Most recent treatment: Dose given: 200 cGy (on 11/6/2023)   Total: Dose given: 6,000 cGy   Elapsed Days: 42           Historical   Plans   Rt Lung   Most recent treatment: Dose planned: 200 cGy (fraction 30 on 11/6/2023)   Total: Dose  planned: 6,000 cGy (30 fractions)   Elapsed Days: 42                   IMPRESSION AND PLAN  Mr. Flores is a 53 y.o., white male with:  Squamous cell lung carcinoma: Diagnosed in mid-August 2023 with locally advanced disease involving the right hemithorax. I have had multiple, long discussions with the patient (+/- his niece) since the time of his initial consultation in our clinic (on 08/24/2023) regarding this diagnosis and, in general terms, its prognosis. They remain aware that, with his stage III disease, he was not a good surgical candidate (at least initially). However, he was a good candidate for definitive treatment with concomitant chemotherapy and localized irradiation. This therapy was/is anticipated to have the best chance of improving his symptoms (of dyspnea and pain), could make his disease operable; and, even if surgery is not subsequently possible, can lead to a longterm remission (if not a classical cure). Following another long discussion regarding the potential risks vs. benefits of this regimen, he was agreeable to proceeding with concomitant, qweekly carboplatin/taxol throughout a ~seven week course of localized XRT. Once a powerport was placed and his CT simulation was completed, he began a regimen consisting of qweekly carboplatin and taxol throughout a planned, thirty-fraction course of localized XRT in late September 2023. He has now received a total of six (6) cycles of the former and all thirty (30) planned fractions of the latter; and he has tolerated this regimen overall very well, without any significant side effects (other than mildly increased fatigue and, more recently, radiation-related skin changes on his upper chest). He would now benefit from some time off to recover from this therapy. We will see him back in our clinic in one month (early December) with a CBC, CMP and new, baseline, post-chemo/XRT CTs of the chest, abdomen and pelvis with contrast. He is also agreeable to being  evaluated by CT surgery around that same time to consider resection (a referral was placed today).  Pain: Symptoms in his right chest wall are secondary to issue #1. Currently still under good control with a combination of scheduled, long-acting MSContin 30 mg q12hr and prn Percocet 10/325 mg q6hr. Continue to monitor.  Anxiety: Exacerbated by his diagnosis with issue #1, but recently better. Continue Ativan 1 mg PO TID prn. Continue to monitor.  Dyspnea: Multifactorial, with issue #1 and COPD both contributing. Recently still improved following concomitant chemo/XRT. Continue to monitor.  Transportation issues: The patient and his niece previously reported that their immediate family only has one car between them. This has still not posed a significant problem with his treatment so far, but we previously referred him to our  for assistance.  The patient was in agreement with these plans.    It is a pleasure to participate in Mr. Flores's care. Please do not hesitate to call with any questions or concerns that you may have.    A total of 30 minutes were spent coordinating this patient’s care in clinic today; more than 50% of this time was face-to-face with the patient, reviewing his interim medical history, discussing the results of today's repeat labwork and counseling on the current treatment and followup plan. All questions were answered to his satisfaction.    FOLLOW UP  Continue MSContin 30 mg PO q12hr and and Percocet 10/325 mg q6hr prn. Continue Ativan 1 mg TID prn. Continue Zofran prn. With [facility] radiation oncology, as planned. No additional cycles of qweekly carbo/taxol. Referral placed to CT surgery re: consider resection of stage III squamous cell carcinoma of the right lung status post concomitant chemo/XRT. Return to our clinic in 1 month with a CBC, CMP and CTs of the chest, abdomen and pelvis with contrast.             This document was electronically signed by ROMEO Rizzo MD November 7,  2023 09:05 EST      CC: ABRAN Oglesby MD

## 2023-11-09 LAB
RAD ONC ARIA COURSE END DATE: NORMAL
RAD ONC ARIA COURSE ID: NORMAL
RAD ONC ARIA COURSE INTENT: NORMAL
RAD ONC ARIA COURSE LAST TREATMENT DATE: NORMAL
RAD ONC ARIA COURSE START DATE: NORMAL
RAD ONC ARIA COURSE TREATMENT ELAPSED DAYS: 42
RAD ONC ARIA FIRST TREATMENT DATE: NORMAL
RAD ONC ARIA PLAN FRACTIONS TREATED TO DATE: 30
RAD ONC ARIA PLAN ID: NORMAL
RAD ONC ARIA PLAN NAME: NORMAL
RAD ONC ARIA PLAN PRESCRIBED DOSE PER FRACTION: 2 GY
RAD ONC ARIA PLAN PRIMARY REFERENCE POINT: NORMAL
RAD ONC ARIA PLAN TOTAL FRACTIONS PRESCRIBED: 30
RAD ONC ARIA PLAN TOTAL PRESCRIBED DOSE: 6000 CGY
RAD ONC ARIA REFERENCE POINT DOSAGE GIVEN TO DATE: 60 GY
RAD ONC ARIA REFERENCE POINT ID: NORMAL

## 2023-11-20 ENCOUNTER — TELEPHONE (OUTPATIENT)
Dept: ONCOLOGY | Facility: CLINIC | Age: 53
End: 2023-11-20
Payer: COMMERCIAL

## 2023-11-21 DIAGNOSIS — C34.91 SQUAMOUS CELL CARCINOMA OF BRONCHUS OF RIGHT LUNG: ICD-10-CM

## 2023-11-21 DIAGNOSIS — R91.8 MASS OF RIGHT LUNG: ICD-10-CM

## 2023-11-21 RX ORDER — MORPHINE SULFATE 60 MG/1
60 TABLET, FILM COATED, EXTENDED RELEASE ORAL 2 TIMES DAILY
Qty: 60 TABLET | Refills: 0 | Status: SHIPPED | OUTPATIENT
Start: 2023-11-21

## 2023-11-21 RX ORDER — OXYCODONE AND ACETAMINOPHEN 10; 325 MG/1; MG/1
1 TABLET ORAL EVERY 6 HOURS PRN
Qty: 120 TABLET | Refills: 0 | Status: SHIPPED | OUTPATIENT
Start: 2023-11-21

## 2023-11-21 NOTE — TELEPHONE ENCOUNTER
Caller: Dexter Flores    Relationship: Self    Best call back number: [phone]    Requested Prescriptions:   Requested Prescriptions     Pending Prescriptions Disp Refills    oxyCODONE-acetaminophen (PERCOCET)  MG per tablet 120 tablet 0     Sig: Take 1 tablet by mouth Every 6 (Six) Hours As Needed for Moderate Pain.    Morphine (MS CONTIN) 60 MG 12 hr tablet 60 tablet 0     Sig: Take 1 tablet by mouth 2 (Two) Times a Day.        Pharmacy where request should be sent: [place] [address] - [phone]  - [phone] FX     Last office visit with prescribing clinician: 11/7/2023   Last telemedicine visit with prescribing clinician: Visit date not found   Next office visit with prescribing clinician: 12/6/2023     Additional details provided by patient: PATIENT'S PHARMACY WILL BE CLOSED ON THURS AND CLOSED ON THE WEEKENDS WILL BE OUT.    Does the patient have less than a 3 day supply:  [x] Yes  [] No    Would you like a call back once the refill request has been completed: [x] Yes [] No    If the office needs to give you a call back, can they leave a voicemail: [x] Yes [] No

## 2023-12-05 ENCOUNTER — TELEPHONE (OUTPATIENT)
Dept: ONCOLOGY | Facility: CLINIC | Age: 53
End: 2023-12-05
Payer: COMMERCIAL

## 2023-12-05 NOTE — TELEPHONE ENCOUNTER
Spoke with patient, instructed him on how to drink the contrast for his CT scans tomorrow. No other questions or concerns at this time.

## 2023-12-05 NOTE — TELEPHONE ENCOUNTER
Caller: Dexter Flores    Relationship: Self    Best call back number: [phone]    What is the best time to reach you: ASAP    Who are you requesting to speak with (clinical staff, provider,  specific staff member): CLINICAL    What was the call regarding: REQUESTING A CALL BACK TO GO OVER CT SCAN INSTRUCTIONS AND THE STUFF TO DRINK , WHEN WILL HE START THE DRINK

## 2023-12-06 ENCOUNTER — HOSPITAL ENCOUNTER (OUTPATIENT)
Dept: CT IMAGING | Facility: HOSPITAL | Age: 53
Discharge: HOME | End: 2023-12-06
Payer: COMMERCIAL

## 2023-12-06 ENCOUNTER — OFFICE VISIT (OUTPATIENT)
Dept: CARDIAC SURGERY | Facility: CLINIC | Age: 53
End: 2023-12-06
Payer: COMMERCIAL

## 2023-12-06 ENCOUNTER — LAB (OUTPATIENT)
Dept: ONCOLOGY | Facility: CLINIC | Age: 53
End: 2023-12-06
Payer: COMMERCIAL

## 2023-12-06 ENCOUNTER — TELEPHONE (OUTPATIENT)
Dept: ONCOLOGY | Facility: CLINIC | Age: 53
End: 2023-12-06

## 2023-12-06 ENCOUNTER — OFFICE VISIT (OUTPATIENT)
Dept: ONCOLOGY | Facility: CLINIC | Age: 53
End: 2023-12-06
Payer: COMMERCIAL

## 2023-12-06 VITALS
BODY MASS INDEX: 40.93 KG/M2 | OXYGEN SATURATION: 98 % | WEIGHT: 269.2 LBS | HEART RATE: 117 BPM | TEMPERATURE: 97.1 F | SYSTOLIC BLOOD PRESSURE: 143 MMHG | DIASTOLIC BLOOD PRESSURE: 100 MMHG

## 2023-12-06 VITALS
WEIGHT: 268.8 LBS | BODY MASS INDEX: 40.74 KG/M2 | RESPIRATION RATE: 18 BRPM | HEART RATE: 110 BPM | SYSTOLIC BLOOD PRESSURE: 130 MMHG | TEMPERATURE: 97.5 F | OXYGEN SATURATION: 94 % | DIASTOLIC BLOOD PRESSURE: 87 MMHG | HEIGHT: 68 IN

## 2023-12-06 DIAGNOSIS — Z79.899 LONG-TERM USE OF HIGH-RISK MEDICATION: ICD-10-CM

## 2023-12-06 DIAGNOSIS — R91.8 MASS OF RIGHT LUNG: Primary | ICD-10-CM

## 2023-12-06 DIAGNOSIS — C34.91 SQUAMOUS CELL CARCINOMA OF BRONCHUS OF RIGHT LUNG: ICD-10-CM

## 2023-12-06 DIAGNOSIS — C34.91 SQUAMOUS CELL CARCINOMA LUNG, RIGHT: Primary | ICD-10-CM

## 2023-12-06 DIAGNOSIS — R91.8 MASS OF RIGHT LUNG: ICD-10-CM

## 2023-12-06 LAB
ALBUMIN SERPL-MCNC: 3.4 G/DL (ref 3.5–5.2)
ALBUMIN/GLOB SERPL: 0.8 G/DL
ALP SERPL-CCNC: 75 U/L (ref 39–117)
ALT SERPL W P-5'-P-CCNC: 9 U/L (ref 1–41)
ANION GAP SERPL CALCULATED.3IONS-SCNC: 12.1 MMOL/L (ref 5–15)
AST SERPL-CCNC: 13 U/L (ref 1–40)
BASOPHILS # BLD AUTO: 0.12 10*3/MM3 (ref 0–0.2)
BASOPHILS NFR BLD AUTO: 0.9 % (ref 0–1.5)
BILIRUB SERPL-MCNC: 0.4 MG/DL (ref 0–1.2)
BUN SERPL-MCNC: 11 MG/DL (ref 6–20)
BUN/CREAT SERPL: 11.7 (ref 7–25)
CALCIUM SPEC-SCNC: 9.9 MG/DL (ref 8.6–10.5)
CHLORIDE SERPL-SCNC: 102 MMOL/L (ref 98–107)
CO2 SERPL-SCNC: 22.9 MMOL/L (ref 22–29)
CREAT SERPL-MCNC: 0.94 MG/DL (ref 0.76–1.27)
DEPRECATED RDW RBC AUTO: 59.1 FL (ref 37–54)
EGFRCR SERPLBLD CKD-EPI 2021: 97.5 ML/MIN/1.73
EOSINOPHIL # BLD AUTO: 2.32 10*3/MM3 (ref 0–0.4)
EOSINOPHIL NFR BLD AUTO: 17.6 % (ref 0.3–6.2)
ERYTHROCYTE [DISTWIDTH] IN BLOOD BY AUTOMATED COUNT: 16.5 % (ref 12.3–15.4)
GLOBULIN UR ELPH-MCNC: 4.3 GM/DL
GLUCOSE SERPL-MCNC: 103 MG/DL (ref 65–99)
HCT VFR BLD AUTO: 40.5 % (ref 37.5–51)
HGB BLD-MCNC: 12.4 G/DL (ref 13–17.7)
IMM GRANULOCYTES # BLD AUTO: 0.08 10*3/MM3 (ref 0–0.05)
IMM GRANULOCYTES NFR BLD AUTO: 0.6 % (ref 0–0.5)
LYMPHOCYTES # BLD AUTO: 0.91 10*3/MM3 (ref 0.7–3.1)
LYMPHOCYTES NFR BLD AUTO: 6.9 % (ref 19.6–45.3)
MCH RBC QN AUTO: 29.8 PG (ref 26.6–33)
MCHC RBC AUTO-ENTMCNC: 30.6 G/DL (ref 31.5–35.7)
MCV RBC AUTO: 97.4 FL (ref 79–97)
MONOCYTES # BLD AUTO: 1.01 10*3/MM3 (ref 0.1–0.9)
MONOCYTES NFR BLD AUTO: 7.7 % (ref 5–12)
NEUTROPHILS NFR BLD AUTO: 66.3 % (ref 42.7–76)
NEUTROPHILS NFR BLD AUTO: 8.74 10*3/MM3 (ref 1.7–7)
NRBC BLD AUTO-RTO: 0 /100 WBC (ref 0–0.2)
PLATELET # BLD AUTO: 374 10*3/MM3 (ref 140–450)
PMV BLD AUTO: 9.5 FL (ref 6–12)
POTASSIUM SERPL-SCNC: 4.9 MMOL/L (ref 3.5–5.2)
PROT SERPL-MCNC: 7.7 G/DL (ref 6–8.5)
RBC # BLD AUTO: 4.16 10*6/MM3 (ref 4.14–5.8)
SODIUM SERPL-SCNC: 137 MMOL/L (ref 136–145)
WBC NRBC COR # BLD AUTO: 13.18 10*3/MM3 (ref 3.4–10.8)

## 2023-12-06 PROCEDURE — 25510000001 IOPAMIDOL 61 % SOLUTION: Performed by: INTERNAL MEDICINE

## 2023-12-06 PROCEDURE — 1159F MED LIST DOCD IN RCRD: CPT | Performed by: THORACIC SURGERY (CARDIOTHORACIC VASCULAR SURGERY)

## 2023-12-06 PROCEDURE — 71260 CT THORAX DX C+: CPT

## 2023-12-06 PROCEDURE — 85025 COMPLETE CBC W/AUTO DIFF WBC: CPT | Performed by: INTERNAL MEDICINE

## 2023-12-06 PROCEDURE — 80053 COMPREHEN METABOLIC PANEL: CPT | Performed by: INTERNAL MEDICINE

## 2023-12-06 PROCEDURE — 74177 CT ABD & PELVIS W/CONTRAST: CPT

## 2023-12-06 PROCEDURE — 1160F RVW MEDS BY RX/DR IN RCRD: CPT | Performed by: THORACIC SURGERY (CARDIOTHORACIC VASCULAR SURGERY)

## 2023-12-06 PROCEDURE — 1125F AMNT PAIN NOTED PAIN PRSNT: CPT | Performed by: INTERNAL MEDICINE

## 2023-12-06 PROCEDURE — 99214 OFFICE O/P EST MOD 30 MIN: CPT | Performed by: INTERNAL MEDICINE

## 2023-12-06 PROCEDURE — 99202 OFFICE O/P NEW SF 15 MIN: CPT | Performed by: THORACIC SURGERY (CARDIOTHORACIC VASCULAR SURGERY)

## 2023-12-06 RX ADMIN — IOPAMIDOL 100 ML: 612 INJECTION, SOLUTION INTRAVENOUS at 10:58

## 2023-12-06 NOTE — PROGRESS NOTES
Name:  Dexter Flores  :  1970  Date:  2023     REFERRING PHYSICIAN  Leonardo Jackson DO    PRIMARY CARE PROVIDER  Jose F Reynolds PA-C    REASON FOR FOLLOWUP  1. Squamous cell carcinoma lung, right      CHIEF COMPLAINT  Right-sided chest wall pains, shortness of breath and anxiety, all still slowly improving since starting concomitant chemo/XRT.    Dear Farzana Rodolfo,    HISTORY OF PRESENT ILLNESS:   I saw Mr. Flores in follow up today in our medical oncology clinic. As you are aware, he is a pleasant, 53 y.o., white male with a history of hypertension, COPD and lifelong tobacco abuse who first noticed a cough and some worsening shortness of breath in late 2023. As the symptoms progressed, he presented to our ED on 2023, where a CT of the chest identified a mass/postobstructive pneumonia in the right upper lobe of the lung. He was admitted to the [facility] hospitalist service for further evaluation and treatment. Pulmonology performed a diagnostic bronchoscopy on 2023, and the results were consistent with squamous cell carcinoma. In the meantime, as his shortness of breath improved with antibiotics and other supportive care, he was able to be discharged on 2023 with a referral to our clinic (that same day) for further management. At that time, he was agreeable to undergoing an initial staging PET scan, having a powerport placed; and, assuming the PET scan remained consistent with, at worst, locally advanced disease (which was the case), proceeding with definitive treatment with concomitant chemotherapy and localized irradiation.    INTERIM HISTORY:  Mr. Flores returns to clinic today for follow up accompanied by his brother. His pain is still under good control on his current combination of scheduled, long-acting Morphine and prn Percocet. He still has some intermittent nausea, but prn Zofran has still been controlling this. Meanwhile, following powerport placement and CT simulation, he began  concomitant chemotherapy and localized irradiation in late September. He received six (6) cycles of qweekly carboplatin and taxol and all thirty (30) of the planned fractions of XRT between then and late October/early November 2023; and he tolerated this regimen overall very well, with tolerably, increased fatigue as his only noticeable side effect. His breathing has improved, and remains improved, since he started this therapy as well. He does still have to use supplemental O2 (2 liters) at night, and he requests a portable concentrator today. The mild trouble swallowing he was having earlier this Fall now seems to be slowly getting better. He has no other specific complaints.    Past Medical History:   Diagnosis Date    Anxiety     Arthritis     Bulging of cervical intervertebral disc     Cancer     lung    COPD (chronic obstructive pulmonary disease)     Depression     GERD (gastroesophageal reflux disease)     History of transfusion     Hypertension     Kidney stone     Neck pain     Sleep apnea     Thyroid disease        Past Surgical History:   Procedure Laterality Date    ABDOMINAL HERNIA REPAIR      umbilical    BRONCHOSCOPY Bilateral 08/22/2023    Procedure: BRONCHOSCOPY WITH ENDOBRONCHIAL ULTRASOUND;  Surgeon: Savage Mcmanus MD;  Location: [facility];  Service: Pulmonary;  Laterality: Bilateral;    JOINT REPLACEMENT      PORTACATH PLACEMENT Left 9/7/2023    Procedure: INSERTION OF PORTACATH;  Surgeon: Jose F Hewitt MD;  Location: [facility] OR;  Service: General;  Laterality: Left;    TONSILLECTOMY      TOTAL HIP ARTHROPLASTY Bilateral        Social History     Socioeconomic History    Marital status:     Number of children: 2   Tobacco Use    Smoking status: Every Day     Packs/day: 1.00     Years: 30.00     Additional pack years: 0.00     Total pack years: 30.00     Types: Cigarettes     Start date: 1986    Smokeless tobacco: Never   Vaping Use    Vaping Use: Never used   Substance and  Sexual Activity    Alcohol use: Not Currently    Drug use: Not Currently    Sexual activity: Defer       Family History   Problem Relation Age of Onset    Lung cancer Mother     Arthritis Father        Allergies   Allergen Reactions    Bevespi Aerosphere [Glycopyrrolate-Formoterol] Other (See Comments)     Developed blisters around mouth. Not a listed adverse effect of medication so unsure if this is related to inhaler. Discontinued as precaution.       Current Outpatient Medications   Medication Sig Dispense Refill    albuterol (PROVENTIL) (2.5 MG/3ML) 0.083% nebulizer solution Take 2.5 mg by nebulization Every 6 (Six) Hours As Needed for Wheezing.      albuterol sulfate  (90 Base) MCG/ACT inhaler Inhale 2 puffs Every 4 (Four) Hours As Needed for Wheezing. 18 g 11    Ixkrewmpgbicrjy63.5mg/5ml,Aluminum&Vjplliomv290-350-86kj/5ml,LidocaineViscous2%,[id number]/ml 1:1:1:1 Swish in mouth and Swallow 10 mL 4 times daily as needed. 480 mL 1    fluconazole (DIFLUCAN) 100 MG tablet Take 1 tablet by mouth Daily. 7 tablet 0    fluticasone (FLOVENT HFA) 110 MCG/ACT inhaler Inhale 2 puffs 2 (Two) Times a Day. 12 g 11    gabapentin (NEURONTIN) 600 MG tablet Take 800 mg by mouth 3 (Three) Times a Day.      hydroCHLOROthiazide (HYDRODIURIL) 12.5 MG tablet Take 1 tablet by mouth Daily.      levothyroxine (SYNTHROID, LEVOTHROID) 50 MCG tablet Take 1 tablet by mouth Daily.      lidocaine-prilocaine (EMLA) 2.5-2.5 % cream Apply 1 application  topically to the appropriate area as directed Every 2 (Two) Hours As Needed for Mild Pain. Apply to Port-A-Cath site 30 minutes prior to arrival at infusion clinic. 30 g 1    lisinopril (PRINIVIL,ZESTRIL) 10 MG tablet Take 1 tablet by mouth Daily.      LORazepam (ATIVAN) 1 MG tablet Take 1 tablet by mouth Every 8 (Eight) Hours As Needed for Anxiety. 90 tablet 0    megestrol (MEGACE) 40 MG/ML suspension Take 10 mL by mouth Daily. 480 mL 2    montelukast (SINGULAIR) 10 MG tablet Take  1 tablet by mouth Every Night.      Morphine (MS CONTIN) 60 MG 12 hr tablet Take 1 tablet by mouth 2 (Two) Times a Day. 60 tablet 0    naloxone (NARCAN) 4 MG/0.1ML nasal spray Call 911. Don't prime. [place] in 1 nostril for overdose. Repeat in 2-3 minutes in other nostril if no or minimal breathing/responsiveness. 2 each 0    ondansetron (ZOFRAN) 8 MG tablet Take 1 tablet by mouth Every 8 (Eight) Hours As Needed for Nausea or Vomiting. 30 tablet 2    oxyCODONE-acetaminophen (PERCOCET)  MG per tablet Take 1 tablet by mouth Every 6 (Six) Hours As Needed for Moderate Pain. 120 tablet 0    pantoprazole (PROTONIX) 40 MG EC tablet Take 1 tablet by mouth Daily.      prochlorperazine (COMPAZINE) 10 MG tablet Take 1 tablet by mouth Every 6 (Six) Hours As Needed for Nausea or Vomiting. 60 tablet 1    QUEtiapine (SEROquel) 25 MG tablet Take 1 tablet by mouth Every Night.      sennosides-docusate (senna-docusate sodium) 8.6-50 MG per tablet Take 1 tablet by mouth Daily. 30 tablet 2    silver sulfadiazine (SILVADENE, SSD) 1 % cream Apply 1 application  topically to the appropriate area as directed 2 (Two) Times a Day.      silver sulfadiazine (SILVADENE, SSD) 1 % cream Apply 1 application  topically to the appropriate area as directed Daily As Needed for Wound Care. 25 g 0    tiotropium bromide-olodaterol (STIOLTO RESPIMAT) 2.5-2.5 MCG/ACT aerosol solution inhaler Inhale 2 puffs Daily. 4 g 11    traZODone (DESYREL) 100 MG tablet Take 1 tablet by mouth Every Night.      vitamin D (ERGOCALCIFEROL) 1.25 MG (88860 UT) capsule capsule Take 1 capsule by mouth 1 (One) Time Per Week.       No current facility-administered medications for this visit.     REVIEW OF SYSTEMS  CONSTITUTIONAL:  No fever, chills or night sweats. Increased fatigue since starting concomitant chemo/XRT in late September.  EYES:  No blurry vision, diplopia or other vision changes.  ENT:  No hearing loss or nosebleeds.  CARDIOVASCULAR:  No palpitations,  "arrhythmia, syncopal episodes or edema.  PULMONARY:  As per the HPI above.  GASTROINTESTINAL:  No constipation or diarrhea. No abdominal pain. Intermittent, mild nausea.  GENITOURINARY:  No hematuria, kidney stones or frequent urination.  MUSCULOSKELETAL:  No joint or back pains.  INTEGUMENTARY: No rashes or pruritus.  ENDOCRINE:  No excessive thirst or hot flashes.  HEMATOLOGIC:  No history of free bleeding, spontaneous bleeding or clotting.  IMMUNOLOGIC:  No allergies or frequent infections.  NEUROLOGIC: No numbness, tingling, seizures or weakness.  PSYCHIATRIC:  No depression. Some anxiety over his recent diagnosis with cancer, currently still improved.    PHYSICAL EXAMINATION  /87   Pulse 110   Temp 97.5 °F (36.4 °C) (Temporal)   Resp 18   Ht 172.7 cm (68\")   Wt 122 kg (268 lb 12.8 oz)   SpO2 94%   BMI 40.87 kg/m²     Pain Score:  Pain Score    23 1443   PainSc:   6   PainLoc: Chest     PHQ-Score Total:  PHQ-9 Total Score:      ECO  GENERAL:  A well-developed, well-nourished, morbidly obese, white male in no acute distress.  HEENT:  Pupils equally round and reactive to light.  Extraocular muscles intact.  CARDIOVASCULAR:  Regular rate and rhythm.  No murmurs, gallops or rubs.  LUNGS:  Decreased breath sounds on the right, clear on the left.  ABDOMEN:  Soft, nontender, nondistended with positive bowel sounds.  EXTREMITIES:  No clubbing, cyanosis or edema bilaterally.  SKIN:  No rashes or petechiae. Powerport still in place.  NEURO:  Cranial nerves grossly intact. No focal deficits.  PSYCH:  Alert and oriented x3.    The physical exam is again unchanged from recent priors.    LABORATORY  Lab Results   Component Value Date    WBC 13.18 (H) 2023    HGB 12.4 (L) 2023    HCT 40.5 2023    MCV 97.4 (H) 2023     2023    NEUTROABS 8.74 (H) 2023       Lab Results   Component Value Date     2023    K 4.9 2023     2023    CO2 22.9 " 12/06/2023    BUN 11 12/06/2023    CREATININE 0.94 12/06/2023    GLUCOSE 103 (H) 12/06/2023    CALCIUM 9.9 12/06/2023    AST 13 12/06/2023    ALT 9 12/06/2023    ALKPHOS 75 12/06/2023    BILITOT 0.4 12/06/2023    PROTEINTOT 7.7 12/06/2023    ALBUMIN 3.4 (L) 12/06/2023     CBC (12/06/2023): WBCs: 13.18 (ANC: 8740); HgB: 12.4; Hct: 40.5; platelets: 374; MCV: 97.4  CBC (11/07/2023): WBCs: 1.35 (ANC: 810); HgB: 12.2; Hct: 38.5; platelets: 188; MCV: 92.8  CBC (10/24/2023): WBCs: 5.70; HgB: 13.9; Hct: 44.2; platelets: 92.7  CBC (10/17/2023): WBCs: 7.25; HgB: 12.6; Hct: 41.0; platelets: 269; MCV: 93.4  CBC (10/03/2023): WBCs: 10.59; HgB: 12.7; Hct: 41.4; platelets: 389; MCV: 91.8  CBC (09/25/2023): WBCs: 13.95; HgB: 12.9; Hct: 40.5; platelets: 483; MCV: 90.0  CBC (08/24/2023): WBCs: 22.31; HgB: 13.5; Hct: 43.6; platelets: 644    IMAGING  CT angiogram chest (08/19/2023):  Impression: Right upper lobe consolidative and masslike airspace opacity without violating the fissures. Findings are concerning for infection. However, considering the presence of an enlarged right paratracheal lymph node measuring 4.2 cm, underlying mass cannot be ruled out.    NM PET with fusion CT, skull base to mid-thigh (09/12/2023):  Impression:  1) A right upper lobe pulmonary parenchymal mass and/or postobstructive collapse measures about 9.9 cm with hypermetabolism mSUV 15.  2) Subcarinal region 2.3 cm lymph node shows PET hypermetabolism measuring 8.6.  3) Right hilar region hypermetabolism shows mSUV 7.  4) Probable 2 cm right suprahilar clavicular region lymph node shows PET hypermetabolism mSUV 10.2.    MRI brain with and without contrast (09/16/2023):  Impression:  1) No parenchymal mass, hemorrhage or midline shift.  2) Increased signal in the mastoid air cells bilaterally.  3) No parenchymal mass, hemorrhage or midline shift.    CT chest with contrast (11/07/2023, compared to 08/19/2023):  Impression:  1) No new pulmonary nodule  identified.  2) No new lymphadenopathy.  3) Now small left pleural effusion.  4) Right upper lobe posttreatment related change identified.    CT abdomen and pelvis with contrast (11/07/2023, compared to 12/07/2021):  Impression:  1) 2 mm hypoattenuating lesion of the right lobe of liver too small to further characterize and 6-month follow up MRI with contrast may be beneficial.  2) Small left pleural effusion.    PATHOLOGY  Lung, biopsy, right upper lobe (08/22/2023):  Moderately differentiated squamous cell carcinoma. PD-L1 TPS: < 1%.    Bronchial lavage, right upper and middle lobes (08/22/2023): Malignant. Squamous cell carcinoma.    Bronchial wash (08/22/2023): Malignant. Squamous cell carcinoma.    Bronchial brushings, right upper and middle lobes (08/22/2023): Malignant. Squamous cell carcinoma.    RADIATION THERAPY  Radiation Treatments       Active   Reference Points   PTV   Most recent treatment: Dose given: -- (on 11/6/2023)   Total: Dose given: 6,000 cGy   Elapsed Days: 42           Historical   Plans   Rt Lung   Most recent treatment: Dose planned: 200 cGy (fraction 30 on 11/6/2023)   Total: Dose planned: 6,000 cGy (30 fractions)   Elapsed Days: 42                   IMPRESSION AND PLAN  Mr. Flores is a 53 y.o., white male with:  Squamous cell lung carcinoma: Diagnosed in mid-August 2023 with locally advanced (with up to stage IIIC disease given the hypermetabolic, suprahilar lymph node near the clavicle that was seen on the initial staging PET scan) disease involving the right hemithorax. I have had multiple, long discussions with the patient (+/- his niece or brother) since the time of his initial consultation in our clinic (on 08/24/2023) regarding this diagnosis and, in general terms, its prognosis. They remain aware that, with his stage III disease, he was not a good surgical candidate (at least initially). However, he was a good candidate for definitive treatment with concomitant chemotherapy and  localized irradiation. This therapy was/is anticipated to have the best chance of improving his symptoms (of dyspnea and pain), could make his disease operable; and, even if surgery is not subsequently possible, can lead to a longterm remission (if not a classical cure). Following another long discussion regarding the potential risks vs. benefits of this regimen, he was agreeable to proceeding with concomitant, qweekly carboplatin/taxol throughout a ~seven week course of localized XRT. Once a powerport was placed and his CT simulation was completed, he began a regimen consisting of qweekly carboplatin and taxol throughout a planned, thirty-fraction course of localized XRT in late September 2023. He received a total of six (6) cycles of the former and all thirty (30) planned fractions of the latter between then and early November 2023; and he tolerated this regimen overall very well, without any significant side effects (other than mildly increased fatigue and, more recently, radiation-related skin changes on his upper chest). New, baseline, post-chemo/XRT performed earlier today (12/06/2023) are now consistent with post-therapeutic effect only, with no definite evidence of residual/recurrent disease. Since we last saw him in our clinic, he was evaluated by CT surgery for possible, post-chemo/XRT resection, as planned; but, not surprisingly, such a procedure was ultimately not recommended (due to several factors, including the probable stage IIIC disease and his baseline, nighttime supplemental O2 dependence). I therefore had a long discussion with the patient and his brother in clinic today regarding our alternative management options from this point. In short, it was agreed that he would be a good candidate for now proceeding with consolidative immunotherapy (with up to one year's worth of [id number] durvalumab), as doing so has shown an overall improvement in survival that likely outweighs the minimal risk. He was  agreeable, and we will plan to give him the first (of up to a total of twelve planned) in ~two weeks. We will see him back in our clinic in six weeks (mid-January 2024), on the day of the second cycle of durvalumab, with a CBC, CMP and TSH for a symptom/toxicity check.   Pain: Much improved but residual, intermittent symptoms in his right chest wall are secondary to issue #1. Currently still under good control with a combination of scheduled, long-acting MSContin 30 mg q12hr and prn Percocet 10/325 mg q6hr. Continue to monitor.  Anxiety: Exacerbated by his diagnosis with issue #1, but recently better. Continue Ativan 1 mg PO TID prn. Continue to monitor.  Dyspnea: Multifactorial, with issue #1 and COPD both contributing. Recently still improved overall following concomitant chemo/XRT; although he continues to need supplemental O2 at night. A Rx for a portable O2 concentrator was provided today. Continue to monitor.  Transportation issues: The patient and his niece previously reported that their immediate family only has one car between them. This has yet to pose a significant problem with his treatment so far, but we previously referred him to our  for assistance.  The patient and his brother were in agreement with these plans.    It is a pleasure to participate in Mr. Flores's care. Please do not hesitate to call with any questions or concerns that you may have.    A total of 30 minutes were spent coordinating this patient’s care in clinic today; more than 50% of this time was face-to-face with the patient and his brother, reviewing his interim medical history, discussing the results of today's repeat labwork and counseling on the current treatment and followup plan. All questions were answered to their satisfaction.    FOLLOW UP  Rxs for viscous lidocaine and a portable O2 concentrator both provided today. Continue MSContin 30 mg PO q12hr and and Percocet 10/325 mg q6hr prn. Continue Ativan 1 mg TID  prn. With [facility] radiation oncology, as planned. Begin consolidative, qmonthly durvalumab (Imfinzi) in 2 weeks. Return to our clinic in 6 weeks (mid-January 2024), on the day of the 2nd cycle of duravlumab, with a CBC, CMP and TSH.            This document was electronically signed by ROMEO Rizzo MD December 6, 2023 16:51 EST      CC: ABRAN Oglesby MD

## 2023-12-06 NOTE — PROGRESS NOTES
Venipuncture Blood Specimen Collection  Venipuncture performed in right hand  by Perez Hassan MA with good hemostasis. Patient tolerated the procedure well without complications.   12/06/23   Perez Hassan MA

## 2023-12-06 NOTE — PROGRESS NOTES
12/06/2023  Patient Information  Dexter Flores                                                                                          [address]   [place] [address]   1970  'PCP/Referring Physician'  Jose F Reynolds  [phone]  ROMEO Rizzo MD  [phone]  Chief Complaint   Patient presents with    Lung Cancer     Referred by Dr. Rizzo for right lung cancer.        History of Present Illness: 53-year-old  male with a history of hypertension, COPD, active tobacco abuse and obesity (BMI 40.93) who presents with a right upper lobe lung cancer.  The patient initially presented in August with shortness of breath, fever and cough.  The patient was subsequently diagnosed with a squamous cell cancer of the lung from bronchoscopy and has been treated with chemotherapy and radiation.  Initially, the patient denied hemoptysis, weight loss or lymphadenopathy.  With treatment, the patient notes approximately 100 pound weight loss.  The patient currently has some shortness of breath at rest.  He also complains of throat discomfort.      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    Port-A-Cath in place     Past Medical History:   Diagnosis Date    Anxiety     Arthritis     Bulging of cervical intervertebral disc     Cancer     lung    COPD (chronic obstructive pulmonary disease)     Depression     GERD (gastroesophageal reflux disease)     History of transfusion     Hypertension     Kidney stone     Neck pain     Sleep apnea     Thyroid disease      Past Surgical History:   Procedure Laterality Date    ABDOMINAL HERNIA REPAIR      umbilical    BRONCHOSCOPY Bilateral 08/22/2023    Procedure: BRONCHOSCOPY WITH ENDOBRONCHIAL ULTRASOUND;  Surgeon: Savage Mcmanus MD;  Location: [facility];  Service: Pulmonary;  Laterality: Bilateral;    JOINT REPLACEMENT      PORTACATH PLACEMENT Left 9/7/2023    Procedure: INSERTION OF PORTACATH;  Surgeon: Jose F Hewitt  MD Dustin;  Location: [facility] OR;  Service: General;  Laterality: Left;    TONSILLECTOMY      TOTAL HIP ARTHROPLASTY Bilateral        Current Outpatient Medications:     albuterol (PROVENTIL) (2.5 MG/3ML) 0.083% nebulizer solution, Take 2.5 mg by nebulization Every 6 (Six) Hours As Needed for Wheezing., Disp: , Rfl:     albuterol sulfate  (90 Base) MCG/ACT inhaler, Inhale 2 puffs Every 4 (Four) Hours As Needed for Wheezing., Disp: 18 g, Rfl: 11    Ujqsbhkxaeypvga55.5mg/5ml,Aluminum&Ygnhkuhoo566-410-28hr/5ml,LidocaineViscous2%,Yqbkkcua079412dvwb/ml 1:1:1:1, Swish in mouth and Swallow 10 mL 4 times daily as needed., Disp: 480 mL, Rfl: 1    fluconazole (DIFLUCAN) 100 MG tablet, Take 1 tablet by mouth Daily., Disp: 7 tablet, Rfl: 0    fluticasone (FLOVENT HFA) 110 MCG/ACT inhaler, Inhale 2 puffs 2 (Two) Times a Day., Disp: 12 g, Rfl: 11    gabapentin (NEURONTIN) 600 MG tablet, Take 800 mg by mouth 3 (Three) Times a Day., Disp: , Rfl:     hydroCHLOROthiazide (HYDRODIURIL) 12.5 MG tablet, Take 1 tablet by mouth Daily., Disp: , Rfl:     levothyroxine (SYNTHROID, LEVOTHROID) 50 MCG tablet, Take 1 tablet by mouth Daily., Disp: , Rfl:     lidocaine-prilocaine (EMLA) 2.5-2.5 % cream, Apply 1 application  topically to the appropriate area as directed Every 2 (Two) Hours As Needed for Mild Pain. Apply to Port-A-Cath site 30 minutes prior to arrival at infusion clinic., Disp: 30 g, Rfl: 1    LORazepam (ATIVAN) 1 MG tablet, Take 1 tablet by mouth Every 8 (Eight) Hours As Needed for Anxiety., Disp: 90 tablet, Rfl: 0    megestrol (MEGACE) 40 MG/ML suspension, Take 10 mL by mouth Daily., Disp: 480 mL, Rfl: 2    montelukast (SINGULAIR) 10 MG tablet, Take 1 tablet by mouth Every Night., Disp: , Rfl:     Morphine (MS CONTIN) 60 MG 12 hr tablet, Take 1 tablet by mouth 2 (Two) Times a Day., Disp: 60 tablet, Rfl: 0    naloxone (NARCAN) 4 MG/0.1ML nasal spray, Call 911. Don't prime. [place] in 1 nostril for overdose. Repeat in 2-3  minutes in other nostril if no or minimal breathing/responsiveness., Disp: 2 each, Rfl: 0    ondansetron (ZOFRAN) 8 MG tablet, Take 1 tablet by mouth Every 8 (Eight) Hours As Needed for Nausea or Vomiting., Disp: 30 tablet, Rfl: 2    oxyCODONE-acetaminophen (PERCOCET)  MG per tablet, Take 1 tablet by mouth Every 6 (Six) Hours As Needed for Moderate Pain., Disp: 120 tablet, Rfl: 0    pantoprazole (PROTONIX) 40 MG EC tablet, Take 1 tablet by mouth Daily., Disp: , Rfl:     prochlorperazine (COMPAZINE) 10 MG tablet, Take 1 tablet by mouth Every 6 (Six) Hours As Needed for Nausea or Vomiting., Disp: 60 tablet, Rfl: 1    QUEtiapine (SEROquel) 25 MG tablet, Take 1 tablet by mouth Every Night., Disp: , Rfl:     sennosides-docusate (senna-docusate sodium) 8.6-50 MG per tablet, Take 1 tablet by mouth Daily., Disp: 30 tablet, Rfl: 2    silver sulfadiazine (SILVADENE, SSD) 1 % cream, Apply 1 application  topically to the appropriate area as directed 2 (Two) Times a Day., Disp: , Rfl:     silver sulfadiazine (SILVADENE, SSD) 1 % cream, Apply 1 application  topically to the appropriate area as directed Daily As Needed for Wound Care., Disp: 25 g, Rfl: 0    tiotropium bromide-olodaterol (STIOLTO RESPIMAT) 2.5-2.5 MCG/ACT aerosol solution inhaler, Inhale 2 puffs Daily., Disp: 4 g, Rfl: 11    traZODone (DESYREL) 100 MG tablet, Take 1 tablet by mouth Every Night., Disp: , Rfl:     vitamin D (ERGOCALCIFEROL) 1.25 MG (75116 UT) capsule capsule, Take 1 capsule by mouth 1 (One) Time Per Week., Disp: , Rfl:     lisinopril (PRINIVIL,ZESTRIL) 10 MG tablet, Take 1 tablet by mouth Daily. (Patient not taking: Reported on 12/6/2023), Disp: , Rfl:   No current facility-administered medications for this visit.  Allergies   Allergen Reactions    Bevespi Aerosphere [Glycopyrrolate-Formoterol] Other (See Comments)     Developed blisters around mouth. Not a listed adverse effect of medication so unsure if this is related to inhaler.  Discontinued as precaution.     Social History     Socioeconomic History    Marital status:     Number of children: 2   Tobacco Use    Smoking status: Every Day     Packs/day: 1.00     Years: 30.00     Additional pack years: 0.00     Total pack years: 30.00     Types: Cigarettes     Start date: 1986    Smokeless tobacco: Never   Vaping Use    Vaping Use: Never used   Substance and Sexual Activity    Alcohol use: Not Currently    Drug use: Not Currently    Sexual activity: Defer     Family History   Problem Relation Age of Onset    Lung cancer Mother     Arthritis Father      Review of Systems   Constitutional: Negative for chills, decreased appetite, diaphoresis, fever, malaise/fatigue, night sweats, weight gain and weight loss.   HENT:  Positive for hoarse voice.    Eyes:  Negative for blurred vision, double vision and visual disturbance.   Cardiovascular:  Positive for chest pain (across chest) and dyspnea on exertion. Negative for claudication, irregular heartbeat, leg swelling, near-syncope, orthopnea, palpitations, paroxysmal nocturnal dyspnea and syncope.   Respiratory:  Positive for cough, shortness of breath and sputum production. Negative for hemoptysis and wheezing.    Hematologic/Lymphatic: Negative for adenopathy and bleeding problem. Does not bruise/bleed easily.   Skin:  Negative for color change, nail changes, poor wound healing and rash.   Musculoskeletal:  Positive for back pain and joint pain. Negative for falls and muscle cramps.   Gastrointestinal:  Positive for dysphagia and heartburn. Negative for abdominal pain.   Genitourinary:  Negative for flank pain.   Neurological:  Positive for dizziness, light-headedness and numbness (in right arm). Negative for brief paralysis, disturbances in coordination, focal weakness, headaches, loss of balance, paresthesias, sensory change, vertigo and weakness.   Psychiatric/Behavioral:  Positive for depression. Negative for suicidal ideas. The patient  is nervous/anxious.    Allergic/Immunologic: Negative for persistent infections.   Vitals:    12/06/23 1238   BP: 143/100   BP Location: Left arm   Patient Position: Sitting   Pulse: 117   Temp: 97.1 °F (36.2 °C)   SpO2: 98%   Weight: 122 kg (269 lb 3.2 oz)      Physical Exam  Vitals reviewed.   Constitutional:       General: He is not in acute distress.     Appearance: Normal appearance.      Comments:  male who appears stated age and is present with his brother.  The patient has some increased work of breathing.   HENT:      Head: Normocephalic and atraumatic.      Nose: Nose normal.   Eyes:      General: No scleral icterus.  Cardiovascular:      Rate and Rhythm: Normal rate and regular rhythm.      Heart sounds: No murmur heard.     No friction rub. No gallop.   Pulmonary:      Effort: Pulmonary effort is normal. No respiratory distress.      Breath sounds: Normal breath sounds. No rhonchi.   Abdominal:      General: There is no distension.      Palpations: Abdomen is soft. There is no mass.      Tenderness: There is no abdominal tenderness. There is no guarding or rebound.   Musculoskeletal:         General: No deformity.      Cervical back: Neck supple.      Right lower leg: No edema.      Left lower leg: No edema.   Lymphadenopathy:      Cervical: No cervical adenopathy.      Upper Body:      Right upper body: No supraclavicular or axillary adenopathy.      Left upper body: No supraclavicular or axillary adenopathy.   Skin:     General: Skin is warm and dry.      Coloration: Skin is not jaundiced.   Neurological:      Mental Status: He is alert and oriented to person, place, and time.      Gait: Gait normal.   Psychiatric:         Mood and Affect: Mood normal.         Behavior: Behavior normal.         Thought Content: Thought content normal.         Judgment: Judgment normal.         The ROS, past medical history, surgical history, family history, social history, and vitals were reviewed by myself  and corrected as needed.      Labs/Imaging:  -PET/CT performed 9/12/2023, personally reviewed, demonstrates hypermetabolic activity in the right upper lobe measuring 9.9 cm with an SUV of 15.  Subcarinal lymph node measures 2.3 cm with an SUV of 8.6.  Right hilar metabolic activity is present with an SUV of 7.  A 2 cm right supraclavicular lymph node has hypermetabolic activity with an SUV of 10.2.  There does appear to be hypermetabolic activity of the vocal cords and surrounding laryngeal tissue on personal review.  -Biopsy of the right upper lobe performed 8/22/2023, moderately differentiated squamous cell carcinoma.  Lavage of the right upper, middle lobe malignant, FNA of right upper lobe malignant, washings malignant, brushings of the right upper and middle lobes malignant.  -Pulmonary function test performed 9/18/2023, FEV1 1.30 (36% predicted), DLCO 60.2% predicted    Assessment/Plan:  53-year-old  male with a history of hypertension, COPD, active tobacco abuse and obesity (BMI 40.93) who presents with a right upper lobe lung cancer.  I discussed with the patient the importance of smoking cessation in the setting of COPD and lung cancer.  PET scan images revealed possible supraclavicular lymph node involvement which would place this cancer potentially at a T4N3M0, clinical stage IIIc malignancy.  Management would typically be with definitive chemoradiation.  The patient's shortness of breath and FEV1 also preclude him from surgical resection.  I am concerned about the patient's throat pain and hypermetabolic activity at the vocal cords and larynx.  Evaluation with ENT may be warranted to rule out an oral pharyngeal squamous cell cancer as the primary malignancy.  I discussed this case with Dr. Rizzo over the phone who is evaluating the patient again today in the office.  The patient may return to surgical clinic as needed.    Patient Active Problem List   Diagnosis    Squamous cell carcinoma of  bronchus of right lung    Port-A-Cath in place

## 2023-12-06 NOTE — TELEPHONE ENCOUNTER
Caller: ANITA    Relationship: [facility]    Best call back number: [phone]    What is the best time to reach you: ASAP    Who are you requesting to speak with (clinical staff, provider,  specific staff member): CLINICAL    What was the call regarding: CALLER SAYS THEY HAVE THE RX FOR A PORTABLE CONCENTRATOR BUT THEY NEED A QUALIFIER IF HE IS GOING TO BE ON CONTINUOUS, PLEASE CALL TO VERIFY/ADVISE.

## 2023-12-08 ENCOUNTER — TELEPHONE (OUTPATIENT)
Dept: ONCOLOGY | Facility: CLINIC | Age: 53
End: 2023-12-08
Payer: COMMERCIAL

## 2023-12-08 DIAGNOSIS — C34.91 SQUAMOUS CELL CARCINOMA OF BRONCHUS OF RIGHT LUNG: ICD-10-CM

## 2023-12-08 RX ORDER — LORAZEPAM 1 MG/1
1 TABLET ORAL EVERY 8 HOURS PRN
Qty: 90 TABLET | Refills: 0 | Status: SHIPPED | OUTPATIENT
Start: 2023-12-08

## 2023-12-08 NOTE — TELEPHONE ENCOUNTER
Caller: Dexter Flores    Relationship: Self    Best call back number: [phone]      What was the call regarding: PT CALLED WANTED TO HAVE SOMETHING CALLED IN FOR HIS NERVES TO BE ABLE TO DO THE MRI    [place] [address] [address] [facility] [address] - [phone]  - [phone] FX

## 2023-12-08 NOTE — TELEPHONE ENCOUNTER
RN spoke with Dexter and informed him that Dr. Rizzo has refilled ativan prescription and he may take that 30 minutes to an hour prior to MRI to reduce anxiety.

## 2023-12-13 ENCOUNTER — TELEPHONE (OUTPATIENT)
Dept: RADIATION ONCOLOGY | Facility: HOSPITAL | Age: 53
End: 2023-12-13
Payer: COMMERCIAL

## 2023-12-13 NOTE — TELEPHONE ENCOUNTER
Dexter Called and states that has an appt on the 22nd for new treatment in the afternoon. I dont see one for this day. Asking due to pt was wanting to coordinate pts radiation oncology follow up with this day and his flush. Pt is stating having a hard time getting a ride and that is why he was wanting to make it same day. Informed pt radiation oncology office will call back to see what finds out about new treatment and if we can move radiation oncology follow up and flush.

## 2023-12-13 NOTE — TELEPHONE ENCOUNTER
Where pt is wanting to have everything on the same day, and currently not sure if new treatment is approved yet. Is it okay to wait to see if okay to see if approved prior to rescheduling pt for radiation oncology follow up and flush?

## 2023-12-14 DIAGNOSIS — C34.91 SQUAMOUS CELL CARCINOMA LUNG, RIGHT: Primary | ICD-10-CM

## 2023-12-14 RX ORDER — SODIUM CHLORIDE 9 MG/ML
20 INJECTION, SOLUTION INTRAVENOUS ONCE
OUTPATIENT
Start: 2024-01-17

## 2023-12-14 RX ORDER — SODIUM CHLORIDE 9 MG/ML
20 INJECTION, SOLUTION INTRAVENOUS ONCE
OUTPATIENT
Start: 2023-12-20

## 2023-12-20 DIAGNOSIS — R91.8 MASS OF RIGHT LUNG: ICD-10-CM

## 2023-12-20 DIAGNOSIS — C34.91 SQUAMOUS CELL CARCINOMA OF BRONCHUS OF RIGHT LUNG: ICD-10-CM

## 2023-12-20 RX ORDER — MORPHINE SULFATE 60 MG/1
60 TABLET, FILM COATED, EXTENDED RELEASE ORAL 3 TIMES DAILY
Qty: 90 TABLET | Refills: 0 | Status: SHIPPED | OUTPATIENT
Start: 2023-12-20

## 2023-12-20 RX ORDER — OXYCODONE AND ACETAMINOPHEN 10; 325 MG/1; MG/1
1 TABLET ORAL EVERY 6 HOURS PRN
Qty: 120 TABLET | Refills: 0 | Status: SHIPPED | OUTPATIENT
Start: 2023-12-20

## 2023-12-20 NOTE — TELEPHONE ENCOUNTER
"  Caller: MOOKIE    Relationship: [facility]    Best call back number: [phone]    What is the best time to reach you: ANY    Who are you requesting to speak with (clinical staff, provider,  specific staff member): CLINICAL        What was the call regarding: MOOKIE FROM PHARMACY CALLED TO DISCUSS MORPHINE SCRIPT AND SAYS THAT THEY SHOW IT FOR \"60\" MG  FOR A QUANTITY OF 90 AND THEY WOULD MAKE  MG A DAY AND THAT IT KICKED BACK AS A HIGH LEVEL AND THEY CAN'T OVER RIDE SCRIPT. MOOKIE STATES THAT THE SCRIPT FOR PERCOCET WENT THROUGH JUST [facility].     Is it okay if the provider responds through MyChart: NO        "

## 2023-12-20 NOTE — TELEPHONE ENCOUNTER
Caller: Dexter Flores    Relationship: Self    Best call back number: [phone]    Requested Prescriptions:   Requested Prescriptions     Pending Prescriptions Disp Refills    Morphine (MS CONTIN) 60 MG 12 hr tablet 60 tablet 0     Sig: Take 1 tablet by mouth 2 (Two) Times a Day.    oxyCODONE-acetaminophen (PERCOCET)  MG per tablet 120 tablet 0     Sig: Take 1 tablet by mouth Every 6 (Six) Hours As Needed for Moderate Pain.        Pharmacy where request should be sent: [place] [address] - [phone] [facility] [phone] FX     Last office visit with prescribing clinician: 12/6/2023   Last telemedicine visit with prescribing clinician: Visit date not found   Next office visit with prescribing clinician: 1/18/2024     Additional details provided by patient: PATIENT WOULD LIKE TO INCREASE THE MORPHINE AS IT IS NOT WORKING LIKE IT USED TO?  CALL TO ADVISE     Does the patient have less than a 3 day supply:  [x] Yes  [] No    Would you like a call back once the refill request has been completed: [x] Yes [] No    If the office needs to give you a call back, can they leave a voicemail: [x] Yes [] No

## 2023-12-22 ENCOUNTER — INFUSION (OUTPATIENT)
Dept: ONCOLOGY | Facility: HOSPITAL | Age: 53
End: 2023-12-22
Payer: COMMERCIAL

## 2023-12-22 ENCOUNTER — OFFICE VISIT (OUTPATIENT)
Dept: RADIATION ONCOLOGY | Facility: HOSPITAL | Age: 53
End: 2023-12-22
Payer: COMMERCIAL

## 2023-12-22 ENCOUNTER — OFFICE VISIT (OUTPATIENT)
Dept: ONCOLOGY | Facility: CLINIC | Age: 53
End: 2023-12-22
Payer: COMMERCIAL

## 2023-12-22 ENCOUNTER — APPOINTMENT (OUTPATIENT)
Dept: ONCOLOGY | Facility: HOSPITAL | Age: 53
End: 2023-12-22
Payer: COMMERCIAL

## 2023-12-22 VITALS
WEIGHT: 273 LBS | DIASTOLIC BLOOD PRESSURE: 76 MMHG | HEIGHT: 68 IN | BODY MASS INDEX: 41.37 KG/M2 | SYSTOLIC BLOOD PRESSURE: 130 MMHG | HEART RATE: 120 BPM | RESPIRATION RATE: 18 BRPM | TEMPERATURE: 98.2 F | OXYGEN SATURATION: 98 %

## 2023-12-22 VITALS
TEMPERATURE: 98.2 F | WEIGHT: 272.4 LBS | BODY MASS INDEX: 41.42 KG/M2 | RESPIRATION RATE: 18 BRPM | OXYGEN SATURATION: 98 % | DIASTOLIC BLOOD PRESSURE: 76 MMHG | HEART RATE: 120 BPM | SYSTOLIC BLOOD PRESSURE: 130 MMHG

## 2023-12-22 VITALS
BODY MASS INDEX: 41.57 KG/M2 | HEART RATE: 120 BPM | SYSTOLIC BLOOD PRESSURE: 130 MMHG | WEIGHT: 273.37 LBS | DIASTOLIC BLOOD PRESSURE: 76 MMHG | RESPIRATION RATE: 18 BRPM | TEMPERATURE: 98.2 F | OXYGEN SATURATION: 98 %

## 2023-12-22 DIAGNOSIS — C34.91 SQUAMOUS CELL CARCINOMA LUNG, RIGHT: ICD-10-CM

## 2023-12-22 DIAGNOSIS — C34.91 SQUAMOUS CELL CARCINOMA LUNG, RIGHT: Primary | ICD-10-CM

## 2023-12-22 DIAGNOSIS — C34.91 SQUAMOUS CELL CARCINOMA OF BRONCHUS OF RIGHT LUNG: Primary | ICD-10-CM

## 2023-12-22 DIAGNOSIS — Z95.828 PORT-A-CATH IN PLACE: ICD-10-CM

## 2023-12-22 LAB
ALBUMIN SERPL-MCNC: 3.4 G/DL (ref 3.5–5.2)
ALBUMIN/GLOB SERPL: 0.8 G/DL
ALP SERPL-CCNC: 189 U/L (ref 39–117)
ALT SERPL W P-5'-P-CCNC: 22 U/L (ref 1–41)
ANION GAP SERPL CALCULATED.3IONS-SCNC: 10.9 MMOL/L (ref 5–15)
AST SERPL-CCNC: 25 U/L (ref 1–40)
BASOPHILS # BLD AUTO: 0.06 10*3/MM3 (ref 0–0.2)
BASOPHILS NFR BLD AUTO: 0.4 % (ref 0–1.5)
BILIRUB SERPL-MCNC: 0.6 MG/DL (ref 0–1.2)
BUN SERPL-MCNC: 14 MG/DL (ref 6–20)
BUN/CREAT SERPL: 15.1 (ref 7–25)
CALCIUM SPEC-SCNC: 9.4 MG/DL (ref 8.6–10.5)
CHLORIDE SERPL-SCNC: 99 MMOL/L (ref 98–107)
CO2 SERPL-SCNC: 27.1 MMOL/L (ref 22–29)
CREAT SERPL-MCNC: 0.93 MG/DL (ref 0.76–1.27)
DEPRECATED RDW RBC AUTO: 54.3 FL (ref 37–54)
EGFRCR SERPLBLD CKD-EPI 2021: 98.8 ML/MIN/1.73
EOSINOPHIL # BLD AUTO: 0.89 10*3/MM3 (ref 0–0.4)
EOSINOPHIL NFR BLD AUTO: 5.2 % (ref 0.3–6.2)
ERYTHROCYTE [DISTWIDTH] IN BLOOD BY AUTOMATED COUNT: 15.4 % (ref 12.3–15.4)
GLOBULIN UR ELPH-MCNC: 4.2 GM/DL
GLUCOSE SERPL-MCNC: 127 MG/DL (ref 65–99)
HCT VFR BLD AUTO: 34.1 % (ref 37.5–51)
HGB BLD-MCNC: 10.8 G/DL (ref 13–17.7)
IMM GRANULOCYTES # BLD AUTO: 0.12 10*3/MM3 (ref 0–0.05)
IMM GRANULOCYTES NFR BLD AUTO: 0.7 % (ref 0–0.5)
LYMPHOCYTES # BLD AUTO: 0.58 10*3/MM3 (ref 0.7–3.1)
LYMPHOCYTES NFR BLD AUTO: 3.4 % (ref 19.6–45.3)
MCH RBC QN AUTO: 30.2 PG (ref 26.6–33)
MCHC RBC AUTO-ENTMCNC: 31.7 G/DL (ref 31.5–35.7)
MCV RBC AUTO: 95.3 FL (ref 79–97)
MONOCYTES # BLD AUTO: 1.59 10*3/MM3 (ref 0.1–0.9)
MONOCYTES NFR BLD AUTO: 9.4 % (ref 5–12)
NEUTROPHILS NFR BLD AUTO: 13.74 10*3/MM3 (ref 1.7–7)
NEUTROPHILS NFR BLD AUTO: 80.9 % (ref 42.7–76)
NRBC BLD AUTO-RTO: 0 /100 WBC (ref 0–0.2)
PLATELET # BLD AUTO: 452 10*3/MM3 (ref 140–450)
PMV BLD AUTO: 9.5 FL (ref 6–12)
POTASSIUM SERPL-SCNC: 4.8 MMOL/L (ref 3.5–5.2)
PROT SERPL-MCNC: 7.6 G/DL (ref 6–8.5)
RBC # BLD AUTO: 3.58 10*6/MM3 (ref 4.14–5.8)
SODIUM SERPL-SCNC: 137 MMOL/L (ref 136–145)
T4 FREE SERPL-MCNC: 0.57 NG/DL (ref 0.93–1.7)
TSH SERPL DL<=0.05 MIU/L-ACNC: 24.21 UIU/ML (ref 0.27–4.2)
WBC NRBC COR # BLD AUTO: 16.98 10*3/MM3 (ref 3.4–10.8)

## 2023-12-22 PROCEDURE — 25010000002 DURVALUMAB 500 MG/10ML SOLUTION 10 ML VIAL: Performed by: INTERNAL MEDICINE

## 2023-12-22 PROCEDURE — G0463 HOSPITAL OUTPT CLINIC VISIT: HCPCS | Performed by: RADIOLOGY

## 2023-12-22 PROCEDURE — 84439 ASSAY OF FREE THYROXINE: CPT

## 2023-12-22 PROCEDURE — 25810000003 SODIUM CHLORIDE 0.9 % SOLUTION: Performed by: INTERNAL MEDICINE

## 2023-12-22 PROCEDURE — 80050 GENERAL HEALTH PANEL: CPT

## 2023-12-22 PROCEDURE — 25010000002 HEPARIN LOCK FLUSH PER 10 UNITS: Performed by: INTERNAL MEDICINE

## 2023-12-22 PROCEDURE — 96413 CHEMO IV INFUSION 1 HR: CPT

## 2023-12-22 PROCEDURE — 25810000003 SODIUM CHLORIDE 0.9 % SOLUTION 250 ML FLEX CONT: Performed by: INTERNAL MEDICINE

## 2023-12-22 RX ORDER — PROCHLORPERAZINE MALEATE 10 MG
10 TABLET ORAL EVERY 6 HOURS PRN
Qty: 60 TABLET | Refills: 1 | Status: SHIPPED | OUTPATIENT
Start: 2023-12-22

## 2023-12-22 RX ORDER — HEPARIN SODIUM (PORCINE) LOCK FLUSH IV SOLN 100 UNIT/ML 100 UNIT/ML
500 SOLUTION INTRAVENOUS AS NEEDED
OUTPATIENT
Start: 2023-12-22

## 2023-12-22 RX ORDER — SODIUM CHLORIDE 0.9 % (FLUSH) 0.9 %
10 SYRINGE (ML) INJECTION AS NEEDED
Status: DISCONTINUED | OUTPATIENT
Start: 2023-12-22 | End: 2023-12-22 | Stop reason: HOSPADM

## 2023-12-22 RX ORDER — HEPARIN SODIUM (PORCINE) LOCK FLUSH IV SOLN 100 UNIT/ML 100 UNIT/ML
500 SOLUTION INTRAVENOUS AS NEEDED
Status: DISCONTINUED | OUTPATIENT
Start: 2023-12-22 | End: 2023-12-22 | Stop reason: HOSPADM

## 2023-12-22 RX ORDER — ONDANSETRON HYDROCHLORIDE 8 MG/1
8 TABLET, FILM COATED ORAL EVERY 8 HOURS PRN
Qty: 30 TABLET | Refills: 2 | Status: SHIPPED | OUTPATIENT
Start: 2023-12-22

## 2023-12-22 RX ORDER — SODIUM CHLORIDE 0.9 % (FLUSH) 0.9 %
10 SYRINGE (ML) INJECTION AS NEEDED
OUTPATIENT
Start: 2023-12-22

## 2023-12-22 RX ORDER — SODIUM CHLORIDE 9 MG/ML
20 INJECTION, SOLUTION INTRAVENOUS ONCE
Status: COMPLETED | OUTPATIENT
Start: 2023-12-22 | End: 2023-12-22

## 2023-12-22 RX ORDER — FLUCONAZOLE 100 MG/1
100 TABLET ORAL DAILY
Qty: 16 TABLET | Refills: 0 | Status: SHIPPED | OUTPATIENT
Start: 2023-12-22

## 2023-12-22 RX ADMIN — SODIUM CHLORIDE 20 ML/HR: 9 INJECTION, SOLUTION INTRAVENOUS at 10:57

## 2023-12-22 RX ADMIN — Medication 500 UNITS: at 12:17

## 2023-12-22 RX ADMIN — Medication 10 ML: at 12:17

## 2023-12-22 RX ADMIN — SODIUM CHLORIDE 1500 MG: 9 INJECTION, SOLUTION INTRAVENOUS at 10:57

## 2023-12-22 NOTE — PROGRESS NOTES
FOLLOW UP NOTE    PATIENT:                                                      Dexter Flores  MEDICAL RECORD #:                        [id number]  :                                                          1970  COMPLETION DATE:   2023  DIAGNOSIS:     Squamous cell carcinoma of the right lung  CANCER STAGING:     Cancer Staging     Squamous cell carcinoma of the right lung with up to stage IIIc given hyper bowel habit Pirmella biotic, suprahilar lymph nodes near the clavicle that was seen on initial PET scan, disease involving the right hemithorax squamous cell carcinoma of the lung.    BRIEF HISTORY:  Squamous cell carcinoma of the right lung.  Patient is returning for 1 month follow-up after having completed course of concurrent chemoradiation for treatment of his stage III squamous cell carcinoma of the right upper lobe with metastases to the right paratracheal, subcarinal and right hilar lymph nodes.  He received a total of 6000 cGy in 30 fractions.    The patient states that he uses oxygen 2 L at night and during the day as needed.  He states that he is swallowing better but not as much as he would like he denies any blood in his urine or stool he does have a history of arthritis he also states he has some occasional dizziness and migraines.  He is to see medical oncology today and I encouraged him to talk to them about his dizziness.      MEDICATIONS: Medication reconciliation for the patient was reviewed and confirmed in the electronic medical record.    Review of Systems   HENT:   Positive for trouble swallowing.    Respiratory:  Positive for shortness of breath and wheezing.    Gastrointestinal:         Some difficulty swallowing   Neurological:  Positive for dizziness.   All other systems reviewed and are negative.            Physical Exam  Vitals and nursing note reviewed. Exam conducted with a chaperone present.   Constitutional:       Appearance: Normal appearance. He is obese.   HENT:       Head: Normocephalic and atraumatic.      Mouth/Throat:      Comments: Thrush is present.  Eyes:      Extraocular Movements: Extraocular movements intact.      Pupils: Pupils are equal, round, and reactive to light.   Cardiovascular:      Rate and Rhythm: Normal rate and regular rhythm.   Pulmonary:      Breath sounds: Wheezing present.   Neurological:      Mental Status: He is alert.   Psychiatric:         Mood and Affect: Mood normal.         Behavior: Behavior normal.         VITAL SIGNS:   Vitals:    12/22/23 0907   BP: 130/76   Pulse: 120   Resp: 18   Temp: 98.2 °F (36.8 °C)   TempSrc: Temporal   SpO2: 98%   Weight: 124 kg (272 lb 6.4 oz)   PainSc:   5   PainLoc: Neck       Dexter ODALIS Flores reports a pain score of 5.  Given his pain assessment as noted, treatment options were discussed and the following options were decided upon as a follow-up plan to address the patient's pain:        The following portions of the patient's history were reviewed and updated as appropriate: allergies, current medications, past family history, past medical history, past social history, past surgical history and problem list.         Squamous cell carcinoma of bronchus of right lung [C34.91]    IMPRESSION: The patient will be given a boost supplement and will be discussing his dizziness with medical oncology today.  We will order Diflucan for his oral thrush.    RECOMMENDATIONS: We will see him back again in about 4 month                 Kyara Roberts MD

## 2023-12-22 NOTE — PROGRESS NOTES
IMMUNOTHERAPY PREPARATION    Dexter Flores  [id number]  1970    Chief Complaint: immunotherapy education     History of present illness:  Dexter Flores is a 53 y.o. year old male who is here today for immunotherapy preparation and needs assessment. The patient has been diagnosed with lung cancer and is scheduled to begin treatment with Durvalumab today. At present, he is doing well and denies any specific complaints.    Oncology History:    Oncology/Hematology History   Squamous cell carcinoma lung, right   8/19/2023 Initial Diagnosis    Squamous cell carcinoma of bronchus of right lung     9/25/2023 - 10/31/2023 Chemotherapy    OP LUNG PACLitaxel / CARBOplatin AUC=2 (Weekly) + XRT      12/22/2023 -  Chemotherapy    OP LUNG Durvalumab 1500 mg         Past Medical History:   Diagnosis Date    Anxiety     Arthritis     Bulging of cervical intervertebral disc     Cancer     lung    COPD (chronic obstructive pulmonary disease)     Depression     GERD (gastroesophageal reflux disease)     History of transfusion     Hypertension     Kidney stone     Neck pain     Sleep apnea     Thyroid disease        Past Surgical History:   Procedure Laterality Date    ABDOMINAL HERNIA REPAIR      umbilical    BRONCHOSCOPY Bilateral 08/22/2023    Procedure: BRONCHOSCOPY WITH ENDOBRONCHIAL ULTRASOUND;  Surgeon: Savage Mcmanus MD;  [facility];  Service: Pulmonary;  Laterality: Bilateral;    JOINT REPLACEMENT      PORTACATH PLACEMENT Left 9/7/2023    Procedure: INSERTION OF PORTACATH;  Surgeon: Jose F Hewitt MD;  [facility];  Service: General;  Laterality: Left;    TONSILLECTOMY      TOTAL HIP ARTHROPLASTY Bilateral        Family History   Problem Relation Age of Onset    Lung cancer Mother     Arthritis Father        Medications: The current medication list was reviewed and reconciled.     Allergies:  is allergic to bevespi aerosphere [glycopyrrolate-formoterol].    Review of Systems:  A comprehensive 14 point  "review of systems was performed. Significant findings as mentioned above. All other systems reviewed and are negative.        Physical Exam:    Vitals:    12/22/23 1006   BP: 130/76   Pulse: 120   Resp: 18   Temp: 98.2 °F (36.8 °C)   SpO2: 98%     Vitals:    12/22/23 1006   PainSc:   5   PainLoc: Neck         ECOG: (1) Restricted in Physically Strenuous Activity, Ambulatory & Able to Do Work of Light Nature  General: Well developed, well nourished. In no acute distress.  HEENT: Pupils equally round and reactive to light. Extraocular muscles intact.  Cardiovascular: Regular rate and rhythm. No murmurs, rubs or gallops.  Lungs: Clear to auscultation bilaterally.  Abdomen: Soft, nontender, nondistended. Normoactive bowel sounds x 4 quadrants.  Extremities: No clubbing, cyanosis or edema bilaterally.  Skin: Warm, dry, intact.  Neuro: Grossly non-focal exam.  Psych: Alert and oriented x 3.             NEEDS ASSESSMENTS    Genetics  The patient's new diagnosis and family history have been reviewed for genetic counseling needs. A genetic referral is not recommended.     Barriers to care  A barriers form was also completed by the patient today. We discussed services offered by our facility to help him have adequate access to care. The patient was given the name and card for our Oncology Social Worker, Eveline Lawton. Based upon barriers assessment today, the patient will not require a follow-up call from the  to further discuss needs.     Advanced Care Planning  The patient and I discussed advanced care planning, \"Conversations that Matter\".   This service was offered, free of charge, for development of advance directives with a certified ACP facilitator.  The patient does not have an up-to-date advanced directive. This document is not on file with our office. The patient is not interested in an appointment with one of our facilitators to create or update their advanced directives.      Palliative Care  The patient " and I discussed palliative care services. Palliative care is not the same as Hospice care. This is specialized medical care for people living with serious illness with the goal of improving quality of life for the patient and their family. [facility] has partnered with [facility] to offer our patients outpatient palliative care early along with their treatment to assist in coordination of care, symptom management, pain management, and medical decision making.  Oncology criteria for palliative care referral is not met at this time. The patient is not interested in a palliative care consultation.     Additional Referral needs  none      CHEMOTHERAPY EDUCATION    Booklets Given: Chemotherapy and You [x]  Eating Hints [x]    Sexuality/Fertility Books []      Chemotherapy/Biotherapy Education Sheets: (list all that apply)  nausea management, acid reflux management, diarrhea management, Cancer resourse contacts information, skin and mouth care, and vaccination information                                                                                                                                                                 Chemotherapy Regimen:   Treatment Plans       Name Type Plan Dates Plan Provider         Active    OP LUNG Durvalumab 1500 mg ONCOLOGY TREATMENT  12/19/2023 - Present T Villa Rizzo MD                      TOPICS EDUCATION PROVIDED COMMENTS   ANEMIA:  role of RBC, cause, s/s, ways to manage, role of transfusion [x]    THROMBOCYTOPENIA:  role of platelet, cause, s/s, ways to prevent bleeding, things to avoid, when to seek help [x]    NEUTROPENIA:  role of WBC, cause, infection precautions, s/s of infection, when to call MD [x]    NUTRITION & APPETITE CHANGES:  importance of maintaining healthy diet & weight, ways to manage to improve intake, dietary consult, exercise regimen [x]    DIARRHEA:  causes, s/s of dehydration, ways to manage, dietary changes, when to call MD [x]     CONSTIPATION:  causes, ways to manage, dietary changes, when to call MD [x]    NAUSEA & VOMITING:  cause, use of antiemetics, dietary changes, when to call MD [x]    MOUTH SORES:  causes, oral care, ways to manage [x]    ALOPECIA:  cause, ways to manage, resources [x]    INFERTILITY & SEXUALITY:  causes, fertility preservation options, sexuality changes, ways to manage, importance of birth control [x]    NERVOUS SYSTEM CHANGES:  causes, s/s, neuropathies, cognitive changes, ways to manage [x]    PAIN:  causes, ways to manage [x]    SKIN & NAIL CHANGES:  cause, s/s, ways to manage [x]    ORGAN TOXICITIES:  cause, s/s, need for diagnostic tests, labs, when to notify MD [x]    SURVIVORSHIP:  distress, distress assessment, secondary malignancies, early/late effects, follow-up, social issues, social support [x]    HOME CARE:  use of spill kits, storing of PO chemo, how to manage bodily fluids [x]    MISCELLANEOUS:  drug interactions, administration, vesicant, et [x]        Assessment and Plan:    Diagnoses and all orders for this visit:    1. Squamous cell carcinoma lung, right (Primary)    Other orders  -     ondansetron (ZOFRAN) 8 MG tablet; Take 1 tablet by mouth Every 8 (Eight) Hours As Needed for Nausea or Vomiting.  Dispense: 30 tablet; Refill: 2  -     prochlorperazine (COMPAZINE) 10 MG tablet; Take 1 tablet by mouth Every 6 (Six) Hours As Needed for Nausea or Vomiting.  Dispense: 60 tablet; Refill: 1        The patient and I have reviewed their new cancer diagnosis and scheduled treatment plan. Needs assessment was completed including genetics, barriers to care, advanced care planning, and palliative care services. Referrals have been ordered as appropriate based upon our evaluation and patient desires.     Immunotherapy teaching was also completed today as documented above. Adequate time was given to answer all questions to his satisfaction. Patient and family are aware of their care team members and contact  information if they have questions or problems throughout the treatment course. Needs assessments and education has been completed. The patient is adequately prepared to begin treatment as scheduled.     Reviewed with patient education regarding Compazine and Zofran prescriptions sent to pharmacy.       I advised the patient that he can take Tylenol or Ibuprofen as needed for aches/pains related to cancer/treatment. I also advised patient he could use Senakot or Miralax as needed for constipation or Imodium as needed for diarrhea.       I reviewed with the patient the care team members. I also reviewed the option of the acute care visits provided through our oncology office for evaluation and management of symptoms related to treatment. Patient was provided with phone number to call during regular office hours [phone] press #1 and for treatment related questions call [phone] to speak to a nurse. If after hours or on the weekend call [phone] and ask to page the MD on call for [facility] Oncology services.         I spent 60 minutes with Dexter Flores today.  In the office today, more than 50% of this time was spent face-to-face with him  in counseling / coordination of care, reviewing his interim medical history and counseling on the current treatment plan.  All questions were answered to his satisfaction.           Electronically Signed by: ABHIJEET Hidalgo , December 22, 2023 10:40 EST

## 2024-01-01 ENCOUNTER — HOSPITAL ENCOUNTER (EMERGENCY)
Facility: HOSPITAL | Age: 54
End: 2024-02-01
Attending: EMERGENCY MEDICINE
Payer: COMMERCIAL

## 2024-01-01 VITALS — WEIGHT: 268.4 LBS | BODY MASS INDEX: 36.35 KG/M2 | HEIGHT: 72 IN

## 2024-01-01 DIAGNOSIS — C34.90 SQUAMOUS CELL CARCINOMA OF LUNG, UNSPECIFIED LATERALITY: ICD-10-CM

## 2024-01-01 DIAGNOSIS — I46.9 CARDIOPULMONARY ARREST: Primary | ICD-10-CM

## 2024-01-01 PROCEDURE — 94799 UNLISTED PULMONARY SVC/PX: CPT

## 2024-01-01 PROCEDURE — 99285 EMERGENCY DEPT VISIT HI MDM: CPT

## 2024-01-01 PROCEDURE — 92950 HEART/LUNG RESUSCITATION CPR: CPT

## 2024-01-01 PROCEDURE — 31500 INSERT EMERGENCY AIRWAY: CPT

## 2024-01-01 PROCEDURE — 25010000002 EPINEPHRINE 1 MG/10ML SOLUTION PREFILLED SYRINGE: Performed by: EMERGENCY MEDICINE

## 2024-01-01 RX ADMIN — EPINEPHRINE 1 MG: 0.1 INJECTION INTRACARDIAC; INTRAVENOUS at 15:24

## 2024-01-01 RX ADMIN — SODIUM BICARBONATE 50 MEQ: 84 INJECTION INTRAVENOUS at 15:24

## 2024-01-01 RX ADMIN — SODIUM BICARBONATE 50 MEQ: 84 INJECTION INTRAVENOUS at 15:28

## 2024-01-01 RX ADMIN — EPINEPHRINE 1 MG: 0.1 INJECTION INTRACARDIAC; INTRAVENOUS at 15:21

## 2024-01-01 RX ADMIN — EPINEPHRINE 1 MG: 0.1 INJECTION INTRACARDIAC; INTRAVENOUS at 15:30

## 2024-01-01 RX ADMIN — SODIUM BICARBONATE 50 MEQ: 84 INJECTION INTRAVENOUS at 15:21

## 2024-01-04 ENCOUNTER — DOCUMENTATION (OUTPATIENT)
Dept: ONCOLOGY | Facility: HOSPITAL | Age: 54
End: 2024-01-04
Payer: COMMERCIAL

## 2024-01-04 NOTE — PROGRESS NOTES
"Patient is 53 y.o. white female diagnosed with Squamous cell carcinoma of bronchus of right lung diagnosed in mid August 2023.     Patient in clinic on December 22, 2023 beginning new treatment with Durvalumab.    SS received consult per patient's nurse TEO Dixon.    Oncology Distress Level 4-7  Received: 1 week ago  Fartun Ramos RN Taylor, Pamela F, MSW    Ambulatory Referral to ONC Social Work [[id number]]    Electronically signed by: Fartun Ramos RN on 12/22/23 1129 Status: Active   Ordering user: Fartun Ramos RN 12/22/23 1129 Ordering provider: Katerina Fields APRN   Authorized by: Katerina Fields APRN   Frequency:  12/22/23 -     Diagnoses  Squamous cell carcinoma lung, right [C34.91]   Questionnaire    Question Answer   Follow-up needed: Yes       SS has previous experience with patient from following during radiation treatments. SS met patient initially August 8, 2023.    SS contacted patient [phone] this date to update information.    Role of oncology social worker introduced to patient.    Patient verbalizes that he is no longer living with sister and brother. Patient states that he doesn't have a permanent address and is staying with friends.    Patient doesn't utilize any home health or durable medical equipment.     Patient has two children: son Henrique, and daughter Sheeba.    Advance Care Planning:  The patient and I discussed care planning \"Conversations that Matter.\" This service was offered, free of charge, for development of advance directives with a certified ACP facilitator. The patient does not have an up-to-date advance directive. The patient is not interested in an appointment with one of our facilitators to create or update their advanced directives.    Patient completed NCCN Distress Screening and rated his distress a 7 out of 10 with concerns of fatigue, pain, and sleep.    Distress Screening Follow-up    Diagnosis: Squamous cell lung cancer    Location of " Distress Screening: Medical Oncology    Distress Level: 7 (12/22/2023 11:28 AM)    Financial Needs: SSDI (Patient verbalizes having no income and working on social security disability.)  Transportation Needs: gas cards (Patient requesting assistance with transportation travel but doesn't have an physical address at this time. SS completed [place] [employer] and [employer] applications on 8/30/23 and will complete additional applications when patient provides address)  Emotional Needs: emotional suppport/coping strategies (NCCN Distress Screening rating his distress a 7 out of 10. Time spent talking with patient, empathetic listening provided, and reassurance given.    SS offered supportive counseling services but patient declines at this time.)  Hoahaoism Needs: other (comments) (Yazidi not discussed this date.)  Palliative Care: advance care planning (Introduced obtaining an advance directive. Explained to patient that this is a conversation that SS has with all patients. Discussed benefits of having written, legal documents that specify his wishes for medical care. Resources provided for obtaining.)  Practical Needs: Social Work Referral (Ambulatory Referral to ONC Social Work ([phone])   --SS contacted patient to update psychosocial assessment.)    SS provided patient with contact information and encouraged patient to call with any questions, concerns, or needs.      Patient verbalized understanding and is in agreement with resource assistance and plan.     SS will follow and assist as needed.

## 2024-01-09 ENCOUNTER — TELEPHONE (OUTPATIENT)
Dept: ONCOLOGY | Facility: CLINIC | Age: 54
End: 2024-01-09
Payer: COMMERCIAL

## 2024-01-09 NOTE — TELEPHONE ENCOUNTER
Spoke with the patient, he reports that he started feeling weak and increasingly tired yesterday. He states that he thinks he may be getting a kidney infection. Patient states that his last blood pressure reading on his home BP cuff was 81/43, heart rate 116. Consulted with Dr. Rizzo about patients reported symptoms and concerns. Offered the patient an appointment in the morning with the APRN, he will be coming at 9:00 am. Informed patient to recheck his blood pressure throughout the evening. Informed patient that if his blood pressure got any lower that he needed to go to the ED for evaluation. Informed patient to check his blood pressure before taking any pain medications and not to take his pain medication if his systolic BP was under 110 or if his Diastolic BP was under 70. Patient verbalized understanding to above plan.

## 2024-01-09 NOTE — TELEPHONE ENCOUNTER
Caller: KVNG    Relationship to patient: SELF    Best call back number: [phone]    Patient is needing: TO REQUEST CALL FROM NURSE. HE IS NOT FEELING TOO WELL SINCE HIS LAST CHEMO TX. HE IS LETHARGIC AND HAS LOW ENERGY, IS LOOKING PALE TO OTHER PEOPLE, AND IS EXPERIENCING SHARP PAINS ON RIGHT SIDE AROUND THE KIDNEYS AND IS DIZZY.

## 2024-01-10 ENCOUNTER — INFUSION (OUTPATIENT)
Dept: ONCOLOGY | Facility: HOSPITAL | Age: 54
End: 2024-01-10
Payer: COMMERCIAL

## 2024-01-10 ENCOUNTER — OFFICE VISIT (OUTPATIENT)
Dept: ONCOLOGY | Facility: CLINIC | Age: 54
End: 2024-01-10
Payer: COMMERCIAL

## 2024-01-10 ENCOUNTER — HOSPITAL ENCOUNTER (OUTPATIENT)
Dept: RADIATION ONCOLOGY | Facility: HOSPITAL | Age: 54
Discharge: HOME | End: 2024-01-10
Payer: COMMERCIAL

## 2024-01-10 VITALS
HEART RATE: 116 BPM | SYSTOLIC BLOOD PRESSURE: 114 MMHG | TEMPERATURE: 96.9 F | OXYGEN SATURATION: 96 % | RESPIRATION RATE: 18 BRPM | WEIGHT: 266.76 LBS | DIASTOLIC BLOOD PRESSURE: 77 MMHG | BODY MASS INDEX: 40.56 KG/M2

## 2024-01-10 VITALS
OXYGEN SATURATION: 96 % | BODY MASS INDEX: 40.41 KG/M2 | HEART RATE: 116 BPM | TEMPERATURE: 96.9 F | SYSTOLIC BLOOD PRESSURE: 114 MMHG | WEIGHT: 265.8 LBS | DIASTOLIC BLOOD PRESSURE: 77 MMHG | RESPIRATION RATE: 18 BRPM

## 2024-01-10 DIAGNOSIS — R53.83 LETHARGY: ICD-10-CM

## 2024-01-10 DIAGNOSIS — E86.0 DEHYDRATION: ICD-10-CM

## 2024-01-10 DIAGNOSIS — R53.1 GENERALIZED WEAKNESS: ICD-10-CM

## 2024-01-10 DIAGNOSIS — R52 PAIN: ICD-10-CM

## 2024-01-10 DIAGNOSIS — F41.9 ANXIETY: ICD-10-CM

## 2024-01-10 DIAGNOSIS — R30.0 DYSURIA: ICD-10-CM

## 2024-01-10 DIAGNOSIS — R30.0 DYSURIA: Primary | ICD-10-CM

## 2024-01-10 DIAGNOSIS — R53.1 GENERALIZED WEAKNESS: Primary | ICD-10-CM

## 2024-01-10 DIAGNOSIS — Z95.828 PORT-A-CATH IN PLACE: ICD-10-CM

## 2024-01-10 DIAGNOSIS — C34.91 SQUAMOUS CELL CARCINOMA LUNG, RIGHT: ICD-10-CM

## 2024-01-10 DIAGNOSIS — R11.0 NAUSEA: ICD-10-CM

## 2024-01-10 LAB
ALBUMIN SERPL-MCNC: 3.2 G/DL (ref 3.5–5.2)
ALBUMIN/GLOB SERPL: 0.6 G/DL
ALP SERPL-CCNC: 85 U/L (ref 39–117)
ALT SERPL W P-5'-P-CCNC: 7 U/L (ref 1–41)
ANION GAP SERPL CALCULATED.3IONS-SCNC: 9.7 MMOL/L (ref 5–15)
AST SERPL-CCNC: 15 U/L (ref 1–40)
BACTERIA UR QL AUTO: NORMAL /HPF
BASOPHILS # BLD AUTO: 0.06 10*3/MM3 (ref 0–0.2)
BASOPHILS NFR BLD AUTO: 0.3 % (ref 0–1.5)
BILIRUB SERPL-MCNC: 0.4 MG/DL (ref 0–1.2)
BILIRUB UR QL STRIP: NEGATIVE
BUN SERPL-MCNC: 15 MG/DL (ref 6–20)
BUN/CREAT SERPL: 14.4 (ref 7–25)
CALCIUM SPEC-SCNC: 9.5 MG/DL (ref 8.6–10.5)
CHLORIDE SERPL-SCNC: 97 MMOL/L (ref 98–107)
CLARITY UR: CLEAR
CO2 SERPL-SCNC: 26.3 MMOL/L (ref 22–29)
COLOR UR: ABNORMAL
CREAT SERPL-MCNC: 1.04 MG/DL (ref 0.76–1.27)
DEPRECATED RDW RBC AUTO: 51.8 FL (ref 37–54)
EGFRCR SERPLBLD CKD-EPI 2021: 86.4 ML/MIN/1.73
EOSINOPHIL # BLD AUTO: 0.4 10*3/MM3 (ref 0–0.4)
EOSINOPHIL NFR BLD AUTO: 2.3 % (ref 0.3–6.2)
ERYTHROCYTE [DISTWIDTH] IN BLOOD BY AUTOMATED COUNT: 14.8 % (ref 12.3–15.4)
GLOBULIN UR ELPH-MCNC: 5 GM/DL
GLUCOSE SERPL-MCNC: 126 MG/DL (ref 65–99)
GLUCOSE UR STRIP-MCNC: NEGATIVE MG/DL
HCT VFR BLD AUTO: 34.2 % (ref 37.5–51)
HGB BLD-MCNC: 10.7 G/DL (ref 13–17.7)
HGB UR QL STRIP.AUTO: NEGATIVE
HYALINE CASTS UR QL AUTO: NORMAL /LPF
IMM GRANULOCYTES # BLD AUTO: 0.12 10*3/MM3 (ref 0–0.05)
IMM GRANULOCYTES NFR BLD AUTO: 0.7 % (ref 0–0.5)
KETONES UR QL STRIP: ABNORMAL
LEUKOCYTE ESTERASE UR QL STRIP.AUTO: ABNORMAL
LYMPHOCYTES # BLD AUTO: 0.63 10*3/MM3 (ref 0.7–3.1)
LYMPHOCYTES NFR BLD AUTO: 3.6 % (ref 19.6–45.3)
MCH RBC QN AUTO: 29.6 PG (ref 26.6–33)
MCHC RBC AUTO-ENTMCNC: 31.3 G/DL (ref 31.5–35.7)
MCV RBC AUTO: 94.7 FL (ref 79–97)
MONOCYTES # BLD AUTO: 1.28 10*3/MM3 (ref 0.1–0.9)
MONOCYTES NFR BLD AUTO: 7.4 % (ref 5–12)
NEUTROPHILS NFR BLD AUTO: 14.9 10*3/MM3 (ref 1.7–7)
NEUTROPHILS NFR BLD AUTO: 85.7 % (ref 42.7–76)
NITRITE UR QL STRIP: NEGATIVE
NRBC BLD AUTO-RTO: 0 /100 WBC (ref 0–0.2)
PH UR STRIP.AUTO: 6.5 [PH] (ref 5–8)
PLATELET # BLD AUTO: 400 10*3/MM3 (ref 140–450)
PMV BLD AUTO: 9.2 FL (ref 6–12)
POTASSIUM SERPL-SCNC: 4 MMOL/L (ref 3.5–5.2)
PROT SERPL-MCNC: 8.2 G/DL (ref 6–8.5)
PROT UR QL STRIP: ABNORMAL
RBC # BLD AUTO: 3.61 10*6/MM3 (ref 4.14–5.8)
RBC # UR STRIP: NORMAL /HPF
REF LAB TEST METHOD: NORMAL
SODIUM SERPL-SCNC: 133 MMOL/L (ref 136–145)
SP GR UR STRIP: 1.03 (ref 1–1.03)
SQUAMOUS #/AREA URNS HPF: NORMAL /HPF
UROBILINOGEN UR QL STRIP: ABNORMAL
WBC # UR STRIP: NORMAL /HPF
WBC NRBC COR # BLD AUTO: 17.39 10*3/MM3 (ref 3.4–10.8)

## 2024-01-10 PROCEDURE — 25810000003 SODIUM CHLORIDE 0.9 % SOLUTION: Performed by: NURSE PRACTITIONER

## 2024-01-10 PROCEDURE — 25010000002 ONDANSETRON PER 1 MG: Performed by: NURSE PRACTITIONER

## 2024-01-10 PROCEDURE — 25510000001 IOPAMIDOL 61 % SOLUTION: Performed by: NURSE PRACTITIONER

## 2024-01-10 PROCEDURE — 85025 COMPLETE CBC W/AUTO DIFF WBC: CPT | Performed by: NURSE PRACTITIONER

## 2024-01-10 PROCEDURE — 96365 THER/PROPH/DIAG IV INF INIT: CPT

## 2024-01-10 PROCEDURE — 81001 URINALYSIS AUTO W/SCOPE: CPT

## 2024-01-10 PROCEDURE — 96361 HYDRATE IV INFUSION ADD-ON: CPT

## 2024-01-10 PROCEDURE — 96375 TX/PRO/DX INJ NEW DRUG ADDON: CPT

## 2024-01-10 PROCEDURE — 25010000002 HEPARIN LOCK FLUSH PER 10 UNITS: Performed by: INTERNAL MEDICINE

## 2024-01-10 PROCEDURE — 80053 COMPREHEN METABOLIC PANEL: CPT | Performed by: NURSE PRACTITIONER

## 2024-01-10 PROCEDURE — 70470 CT HEAD/BRAIN W/O & W/DYE: CPT

## 2024-01-10 RX ORDER — SODIUM CHLORIDE 0.9 % (FLUSH) 0.9 %
10 SYRINGE (ML) INJECTION AS NEEDED
OUTPATIENT
Start: 2024-01-10

## 2024-01-10 RX ORDER — OXYCODONE AND ACETAMINOPHEN 10; 325 MG/1; MG/1
1 TABLET ORAL ONCE
Status: COMPLETED | OUTPATIENT
Start: 2024-01-10 | End: 2024-01-10

## 2024-01-10 RX ORDER — HEPARIN SODIUM (PORCINE) LOCK FLUSH IV SOLN 100 UNIT/ML 100 UNIT/ML
500 SOLUTION INTRAVENOUS AS NEEDED
OUTPATIENT
Start: 2024-01-10

## 2024-01-10 RX ORDER — HEPARIN SODIUM (PORCINE) LOCK FLUSH IV SOLN 100 UNIT/ML 100 UNIT/ML
500 SOLUTION INTRAVENOUS AS NEEDED
Status: DISCONTINUED | OUTPATIENT
Start: 2024-01-10 | End: 2024-01-10 | Stop reason: HOSPADM

## 2024-01-10 RX ORDER — SODIUM CHLORIDE 0.9 % (FLUSH) 0.9 %
10 SYRINGE (ML) INJECTION AS NEEDED
Status: DISCONTINUED | OUTPATIENT
Start: 2024-01-10 | End: 2024-01-10 | Stop reason: HOSPADM

## 2024-01-10 RX ORDER — DIAZEPAM 10 MG/1
10 TABLET ORAL EVERY 12 HOURS PRN
Qty: 60 TABLET | Refills: 0 | Status: SHIPPED | OUTPATIENT
Start: 2024-01-10

## 2024-01-10 RX ADMIN — IOPAMIDOL 78 ML: 612 INJECTION, SOLUTION INTRAVENOUS at 12:48

## 2024-01-10 RX ADMIN — ONDANSETRON 8 MG: 2 INJECTION INTRAMUSCULAR; INTRAVENOUS at 10:28

## 2024-01-10 RX ADMIN — OXYCODONE AND ACETAMINOPHEN 1 TABLET: 10; 325 TABLET ORAL at 11:50

## 2024-01-10 RX ADMIN — SODIUM CHLORIDE 1000 ML: 9 INJECTION, SOLUTION INTRAVENOUS at 11:39

## 2024-01-10 RX ADMIN — Medication 500 UNITS: at 13:21

## 2024-01-10 RX ADMIN — SODIUM CHLORIDE 1000 ML: 9 INJECTION, SOLUTION INTRAVENOUS at 10:28

## 2024-01-10 RX ADMIN — Medication 10 ML: at 13:21

## 2024-01-10 NOTE — PROGRESS NOTES
Date: 1/10/2024    Patient Name: Dexter Flores   : 1970   ID: [id number]    Diagnosis: Squamous cell carcinoma of the right lung    Treatment History: Please refer to Dr. Rizzo's last office note (23) for full oncology and treatment history. He is being seen today for an acute visit.     Complaint: Generalized weakness, intermittent confusion/lethargy, dysuria, increased anxiety    Interim History:  Mr. Flores currently follows with Dr. Rizzo for the treatment of squamous cell carcinoma of the right lung. He most recently began consolidative immunotherapy with Durvalumab on 23 after the completion of chemotherapy. He presents today for an acute visit. Patient called into office yesterday with reports of hypotension, intermittent confusion and feeling lethargic, as well as generalized weakness that began 2 days ago. He states that his anxiety has been significantly increased despite taking Ativan 1 mg TID. He reports intermittent nausea but denies vomiting, diarrhea. He reports poor appetite but has been trying to supplement with Boost as he is able to tolerate it. He reports poor PO intake. He also reports burning with urination but denies hematuria, frequency. His niece who is with him today states that she feels that he has been very forgetful and is concerned. She states that he is never disoriented but is not himself. He denies fever, chills. He is otherwise without specific complaints.       VS:   /77   Pulse 116   Temp 96.9 °F (36.1 °C) (Temporal)   Resp 18   Wt 121 kg (265 lb 12.8 oz)   SpO2 96%   BMI 40.41 kg/m²      PHQ-Score Total:  PHQ-9 Total Score:      Review of Systems  A comprehensive 14 point review of systems was conducted with patient and positive as per HPI otherwise negative.    Physical Exam:  General/Constitutional: Awake, alert and oriented. No apparent acute distress is noted.  HEENT: Normocephalic, atraumatic. PERRLA, conjunctiva normal. Extraocular movements are  intact.  Neck: No JVD, thyromegaly or cervical lymphadenopathy.  Cardiovascular: S1, S2. Regular rate and rhythm, no murmurs, rubs or gallops.  Respiratory: Pulmonary effort is normal, lungs are clear to auscultation bilaterally. No wheezing, rhonchi or rales. No use of accessory muscles, no retractions.  Abdomen: Soft, non-tender, non-distended with normoactive bowel sounds x 4 quadrants. No palpable hepatosplenomegaly.  Integumentary: Skin warm and dry. No bruising, ecchymosis.  Extremities: No clubbing, cyanosis or edema.  Neurological: Alert and oriented x 3. Grossly non-focal examination.      Labs:    Lab Results   Component Value Date    WBC 17.39 (H) 01/10/2024    HGB 10.7 (L) 01/10/2024    HCT 34.2 (L) 01/10/2024    MCV 94.7 01/10/2024    RDW 14.8 01/10/2024     01/10/2024    NEUTRORELPCT 85.7 (H) 01/10/2024    LYMPHORELPCT 3.6 (L) 01/10/2024    MONORELPCT 7.4 01/10/2024    EOSRELPCT 2.3 01/10/2024    BASORELPCT 0.3 01/10/2024    NEUTROABS 14.90 (H) 01/10/2024    LYMPHSABS 0.63 (L) 01/10/2024     Lab Results   Component Value Date     (L) 01/10/2024    K 4.0 01/10/2024    CO2 26.3 01/10/2024    CL 97 (L) 01/10/2024    BUN 15 01/10/2024    CREATININE 1.04 01/10/2024    GLUCOSE 126 (H) 01/10/2024    CALCIUM 9.5 01/10/2024    ALKPHOS 85 01/10/2024    AST 15 01/10/2024    ALT 7 01/10/2024    BILITOT 0.4 01/10/2024    ALBUMIN 3.2 (L) 01/10/2024    PROTEINTOT 8.2 01/10/2024    MG 2.3 07/11/2022     CT Head With & Without Contrast (01/10/2024 12:48)             Assessment & Plan:  Dexter Flores is a 53 y.o. male with:    1. Squamous cell carcinoma of right lung  - Please refer to Dr. Rizzo's last office note (12/06/23) for full oncology and treatment history. He is being seen today for an acute visit.  - He began consolidative immunotherapy with Durvalumab on 12/22/23. He reports that he tolerated treatment well.  - He will follow up with Dr. Rizzo as scheduled (01/18/24) on day of cycle 2  Durvalumab.    2. Generalized weakness  3. Dehydration  4. Dysuria  5. Lethargy  6. Nausea  - Reports that the above have been ongoing for ~1 week but significantly worse for the past 2 days.  - Reports poor appetite and poor oral fluid intake. He denies vomiting, diarrhea. Reports intermittent nausea.  - Reports dysuria but denies urinary frequency, hesitancy, hematuria.  - Will do CBCD, CMP and urinalysis.  - His niece is very concerned as he has been lethargic as of the past 2 days. Will do CT head w/ and w/o contrast.  - Will also give supportive care with 2 L NS and Zofran 8 mg IV x 1.   - He is also interested in smoking cessation and will refer to smoking cessation program.  - He is advised to follow up if no improvement in symptoms and verbalized understanding.    7. Anxiety  - Currently prescribed Ativan 1 mg q8 hours PRN. Previously had good control of symptoms with this regimen but states that anxiety and panic attacks have been significantly worse over the last several days - one week and he feels that Ativan is no longer helpful.  - Discussed with Dr. Rizzo. Will discontinue Ativan and start Diazepam 10 mg q12 hours PRN for anxiety.  - Discussed the above with patient and is agreeable. He is aware that he should not take Ativan and Valium in combination and both he and his niece verbalize understanding. Rx was sent to the patient's pharmacy.       I spent 30 minutes with Dexter Flores today.  In the office today, more than 50% of this time was spent face-to-face with him  in counseling / coordination of care, reviewing his interim medical history and counseling on the current treatment plan.  All questions were answered to his satisfaction.               Electronically Signed By: ABHIJEET Briones

## 2024-01-17 DIAGNOSIS — C34.91 SQUAMOUS CELL CARCINOMA OF BRONCHUS OF RIGHT LUNG: ICD-10-CM

## 2024-01-17 DIAGNOSIS — R91.8 MASS OF RIGHT LUNG: ICD-10-CM

## 2024-01-17 RX ORDER — MORPHINE SULFATE 60 MG/1
60 TABLET, FILM COATED, EXTENDED RELEASE ORAL 3 TIMES DAILY
Qty: 90 TABLET | Refills: 0 | Status: SHIPPED | OUTPATIENT
Start: 2024-01-17

## 2024-01-17 RX ORDER — OXYCODONE AND ACETAMINOPHEN 10; 325 MG/1; MG/1
1 TABLET ORAL EVERY 6 HOURS PRN
Qty: 120 TABLET | Refills: 0 | Status: SHIPPED | OUTPATIENT
Start: 2024-01-17

## 2024-01-17 NOTE — TELEPHONE ENCOUNTER
Caller: Dexter Flores    Relationship: Self    Best call back number: [phone]    Requested Prescriptions:   Requested Prescriptions     Pending Prescriptions Disp Refills    Morphine (MS CONTIN) 60 MG 12 hr tablet 90 tablet 0     Sig: Take 1 tablet by mouth 3 times a day.    oxyCODONE-acetaminophen (PERCOCET)  MG per tablet 120 tablet 0     Sig: Take 1 tablet by mouth Every 6 (Six) Hours As Needed for Moderate Pain.        Pharmacy where request should be sent:  [facility],[place] [address],  PHONE NUMBER [phone]    Last office visit with prescribing clinician: 12/6/2023   Last telemedicine visit with prescribing clinician: Visit date not found   Next office visit with prescribing clinician: 1/18/2024     Additional details provided by patient: PLEASE SEND TO PHARMACY ASAP DUE TO WEATHER. AND SEND SO HE IS NOT CHARGED ANY COPAY. NOT DUE TILL EITHER THURS OR SAT BUT HIS PHARMACY WILL BE CLOSED.    Does the patient have less than a 3 day supply:  [x] Yes  [] No    Would you like a call back once the refill request has been completed: [x] Yes [] No    If the office needs to give you a call back, can they leave a voicemail: [x] Yes [] No

## 2024-01-18 ENCOUNTER — LAB (OUTPATIENT)
Dept: ONCOLOGY | Facility: CLINIC | Age: 54
End: 2024-01-18
Payer: COMMERCIAL

## 2024-01-18 ENCOUNTER — INFUSION (OUTPATIENT)
Dept: ONCOLOGY | Facility: HOSPITAL | Age: 54
End: 2024-01-18
Payer: COMMERCIAL

## 2024-01-18 ENCOUNTER — OFFICE VISIT (OUTPATIENT)
Dept: ONCOLOGY | Facility: CLINIC | Age: 54
End: 2024-01-18
Payer: COMMERCIAL

## 2024-01-18 VITALS
DIASTOLIC BLOOD PRESSURE: 72 MMHG | TEMPERATURE: 97.3 F | HEART RATE: 98 BPM | OXYGEN SATURATION: 97 % | RESPIRATION RATE: 18 BRPM | SYSTOLIC BLOOD PRESSURE: 108 MMHG

## 2024-01-18 VITALS
RESPIRATION RATE: 18 BRPM | WEIGHT: 268.4 LBS | TEMPERATURE: 97.3 F | OXYGEN SATURATION: 97 % | HEART RATE: 97 BPM | HEIGHT: 68 IN | DIASTOLIC BLOOD PRESSURE: 64 MMHG | SYSTOLIC BLOOD PRESSURE: 100 MMHG | BODY MASS INDEX: 40.68 KG/M2

## 2024-01-18 DIAGNOSIS — Z79.899 LONG-TERM USE OF HIGH-RISK MEDICATION: ICD-10-CM

## 2024-01-18 DIAGNOSIS — C34.91 SQUAMOUS CELL CARCINOMA LUNG, RIGHT: Primary | ICD-10-CM

## 2024-01-18 DIAGNOSIS — C34.91 SQUAMOUS CELL CARCINOMA OF BRONCHUS OF RIGHT LUNG: ICD-10-CM

## 2024-01-18 DIAGNOSIS — R91.8 MASS OF RIGHT LUNG: ICD-10-CM

## 2024-01-18 DIAGNOSIS — Z95.828 PORT-A-CATH IN PLACE: ICD-10-CM

## 2024-01-18 DIAGNOSIS — C34.91 SQUAMOUS CELL CARCINOMA LUNG, RIGHT: ICD-10-CM

## 2024-01-18 LAB
ALBUMIN SERPL-MCNC: 3.7 G/DL (ref 3.5–5.2)
ALBUMIN/GLOB SERPL: 0.8 G/DL
ALP SERPL-CCNC: 66 U/L (ref 39–117)
ALT SERPL W P-5'-P-CCNC: 8 U/L (ref 1–41)
ANION GAP SERPL CALCULATED.3IONS-SCNC: 9.2 MMOL/L (ref 5–15)
AST SERPL-CCNC: 17 U/L (ref 1–40)
BASOPHILS # BLD AUTO: 0.11 10*3/MM3 (ref 0–0.2)
BASOPHILS NFR BLD AUTO: 1 % (ref 0–1.5)
BILIRUB SERPL-MCNC: 0.3 MG/DL (ref 0–1.2)
BUN SERPL-MCNC: 16 MG/DL (ref 6–20)
BUN/CREAT SERPL: 13.4 (ref 7–25)
CALCIUM SPEC-SCNC: 9.4 MG/DL (ref 8.6–10.5)
CHLORIDE SERPL-SCNC: 101 MMOL/L (ref 98–107)
CO2 SERPL-SCNC: 26.8 MMOL/L (ref 22–29)
CREAT SERPL-MCNC: 1.19 MG/DL (ref 0.76–1.27)
DEPRECATED RDW RBC AUTO: 55 FL (ref 37–54)
EGFRCR SERPLBLD CKD-EPI 2021: 73.5 ML/MIN/1.73
EOSINOPHIL # BLD AUTO: 0.76 10*3/MM3 (ref 0–0.4)
EOSINOPHIL NFR BLD AUTO: 6.6 % (ref 0.3–6.2)
ERYTHROCYTE [DISTWIDTH] IN BLOOD BY AUTOMATED COUNT: 15.3 % (ref 12.3–15.4)
GLOBULIN UR ELPH-MCNC: 4.5 GM/DL
GLUCOSE SERPL-MCNC: 96 MG/DL (ref 65–99)
HCT VFR BLD AUTO: 39.9 % (ref 37.5–51)
HGB BLD-MCNC: 11.9 G/DL (ref 13–17.7)
IMM GRANULOCYTES # BLD AUTO: 0.07 10*3/MM3 (ref 0–0.05)
IMM GRANULOCYTES NFR BLD AUTO: 0.6 % (ref 0–0.5)
LYMPHOCYTES # BLD AUTO: 0.91 10*3/MM3 (ref 0.7–3.1)
LYMPHOCYTES NFR BLD AUTO: 7.9 % (ref 19.6–45.3)
MCH RBC QN AUTO: 29.2 PG (ref 26.6–33)
MCHC RBC AUTO-ENTMCNC: 29.8 G/DL (ref 31.5–35.7)
MCV RBC AUTO: 98 FL (ref 79–97)
MONOCYTES # BLD AUTO: 0.9 10*3/MM3 (ref 0.1–0.9)
MONOCYTES NFR BLD AUTO: 7.8 % (ref 5–12)
NEUTROPHILS NFR BLD AUTO: 76.1 % (ref 42.7–76)
NEUTROPHILS NFR BLD AUTO: 8.81 10*3/MM3 (ref 1.7–7)
NRBC BLD AUTO-RTO: 0 /100 WBC (ref 0–0.2)
PLATELET # BLD AUTO: 418 10*3/MM3 (ref 140–450)
PMV BLD AUTO: 8.9 FL (ref 6–12)
POTASSIUM SERPL-SCNC: 4.2 MMOL/L (ref 3.5–5.2)
PROT SERPL-MCNC: 8.2 G/DL (ref 6–8.5)
RBC # BLD AUTO: 4.07 10*6/MM3 (ref 4.14–5.8)
SODIUM SERPL-SCNC: 137 MMOL/L (ref 136–145)
T4 FREE SERPL-MCNC: 0.11 NG/DL (ref 0.93–1.7)
TSH SERPL DL<=0.05 MIU/L-ACNC: 145.3 UIU/ML (ref 0.27–4.2)
WBC NRBC COR # BLD AUTO: 11.56 10*3/MM3 (ref 3.4–10.8)

## 2024-01-18 PROCEDURE — 99214 OFFICE O/P EST MOD 30 MIN: CPT | Performed by: INTERNAL MEDICINE

## 2024-01-18 PROCEDURE — 25010000002 DURVALUMAB 500 MG/10ML SOLUTION 10 ML VIAL: Performed by: INTERNAL MEDICINE

## 2024-01-18 PROCEDURE — 96413 CHEMO IV INFUSION 1 HR: CPT

## 2024-01-18 PROCEDURE — 25810000003 SODIUM CHLORIDE 0.9 % SOLUTION: Performed by: NURSE PRACTITIONER

## 2024-01-18 PROCEDURE — 80050 GENERAL HEALTH PANEL: CPT | Performed by: INTERNAL MEDICINE

## 2024-01-18 PROCEDURE — 96360 HYDRATION IV INFUSION INIT: CPT

## 2024-01-18 PROCEDURE — 25010000002 HEPARIN LOCK FLUSH PER 10 UNITS: Performed by: INTERNAL MEDICINE

## 2024-01-18 PROCEDURE — 84439 ASSAY OF FREE THYROXINE: CPT | Performed by: INTERNAL MEDICINE

## 2024-01-18 PROCEDURE — 1125F AMNT PAIN NOTED PAIN PRSNT: CPT | Performed by: INTERNAL MEDICINE

## 2024-01-18 PROCEDURE — 25810000003 SODIUM CHLORIDE 0.9 % SOLUTION 250 ML FLEX CONT: Performed by: INTERNAL MEDICINE

## 2024-01-18 RX ORDER — NICOTINE 21 MG/24HR
1 PATCH, TRANSDERMAL 24 HOURS TRANSDERMAL EVERY 24 HOURS
Qty: 30 PATCH | Refills: 1 | Status: SHIPPED | OUTPATIENT
Start: 2024-01-18

## 2024-01-18 RX ORDER — SODIUM CHLORIDE 9 MG/ML
20 INJECTION, SOLUTION INTRAVENOUS ONCE
Status: DISCONTINUED | OUTPATIENT
Start: 2024-01-18 | End: 2024-01-18 | Stop reason: HOSPADM

## 2024-01-18 RX ORDER — CIPROFLOXACIN 500 MG/1
500 TABLET, FILM COATED ORAL 2 TIMES DAILY
Qty: 14 TABLET | Refills: 0 | Status: SHIPPED | OUTPATIENT
Start: 2024-01-18 | End: 2024-01-25

## 2024-01-18 RX ORDER — SODIUM CHLORIDE 9 MG/ML
20 INJECTION, SOLUTION INTRAVENOUS ONCE
OUTPATIENT
Start: 2024-02-15

## 2024-01-18 RX ORDER — HEPARIN SODIUM (PORCINE) LOCK FLUSH IV SOLN 100 UNIT/ML 100 UNIT/ML
500 SOLUTION INTRAVENOUS AS NEEDED
OUTPATIENT
Start: 2024-01-18

## 2024-01-18 RX ORDER — SODIUM CHLORIDE 9 MG/ML
20 INJECTION, SOLUTION INTRAVENOUS ONCE
OUTPATIENT
Start: 2024-04-11

## 2024-01-18 RX ORDER — HEPARIN SODIUM (PORCINE) LOCK FLUSH IV SOLN 100 UNIT/ML 100 UNIT/ML
500 SOLUTION INTRAVENOUS AS NEEDED
Status: DISCONTINUED | OUTPATIENT
Start: 2024-01-18 | End: 2024-01-18 | Stop reason: HOSPADM

## 2024-01-18 RX ORDER — SODIUM CHLORIDE 0.9 % (FLUSH) 0.9 %
10 SYRINGE (ML) INJECTION AS NEEDED
Status: DISCONTINUED | OUTPATIENT
Start: 2024-01-18 | End: 2024-01-18 | Stop reason: HOSPADM

## 2024-01-18 RX ORDER — SODIUM CHLORIDE 0.9 % (FLUSH) 0.9 %
10 SYRINGE (ML) INJECTION AS NEEDED
OUTPATIENT
Start: 2024-01-18

## 2024-01-18 RX ORDER — SODIUM CHLORIDE 9 MG/ML
20 INJECTION, SOLUTION INTRAVENOUS ONCE
OUTPATIENT
Start: 2024-03-14

## 2024-01-18 RX ADMIN — Medication 500 UNITS: at 10:40

## 2024-01-18 RX ADMIN — Medication 10 ML: at 10:40

## 2024-01-18 RX ADMIN — SODIUM CHLORIDE 1000 ML: 9 INJECTION, SOLUTION INTRAVENOUS at 09:30

## 2024-01-18 RX ADMIN — SODIUM CHLORIDE 1500 MG: 9 INJECTION, SOLUTION INTRAVENOUS at 09:32

## 2024-01-18 NOTE — PROGRESS NOTES
Name:  Dexter Flores  :  1970  Date:  2024     REFERRING PHYSICIAN  Leonardo Jackson DO    PRIMARY CARE PROVIDER  Jose F Reynolds PA-C    REASON FOR FOLLOWUP  1. Squamous cell carcinoma lung, right      CHIEF COMPLAINT  Right-sided flank pain.    Dear Mr. Reynolds,    HISTORY OF PRESENT ILLNESS:   I saw Mr. Flores in follow up today in our medical oncology clinic. As you are aware, he is a pleasant, 53 y.o., white male with a history of hypertension, COPD and lifelong tobacco abuse who first noticed a cough and some worsening shortness of breath in late 2023. As the symptoms progressed, he presented to our ED on 2023, where a CT of the chest identified a mass/postobstructive pneumonia in the right upper lobe of the lung. He was admitted to the [facility] hospitalist service for further evaluation and treatment. Pulmonology performed a diagnostic bronchoscopy on 2023, and the results were consistent with squamous cell carcinoma. In the meantime, as his shortness of breath improved with antibiotics and other supportive care, he was able to be discharged on 2023 with a referral to our clinic (that same day) for further management. At that time, he was agreeable to undergoing an initial staging PET scan, having a powerport placed; and, assuming the PET scan remained consistent with, at worst, locally advanced disease (which was the case), proceeding with definitive treatment with concomitant chemotherapy and localized irradiation.    INTERIM HISTORY:  Mr. Flores returns to clinic today for follow up accompanied by his niece. The intermittent pains in his chest wall remain under good control on his current combination of scheduled, long-acting Morphine and prn Percocet. He still has some intermittent nausea, but prn Zofran has still been controlling this. Meanwhile, following powerport placement and CT simulation, he began concomitant chemotherapy and localized irradiation in late September. He  received six (6) cycles of qweekly carboplatin and taxol and all thirty (30) of the planned fractions of XRT between then and late October/early November 2023; and he tolerated this regimen overall very well, with tolerably, increased fatigue as his only noticeable side effect. He was subsequently agreeable to transitioning to consolidative treatment with (a total of one year's worth of) qmonthly durvalumab. He received the first of twelve planned, qmonthly cycles of this new regimen on 12/20/2023; and he reports today that he tolerated it very well also. Meanwhile, his breathing has improved, and remains improved, since he started treatment. He does still have to use supplemental O2 (2 liters) at night, and he was previously given a Rx for a portable concentrator. He was recently having some worsening anxiety, but this has been improved since his prn, TID Ativan was replaced with prn, BID Valium earlier this month. His only new complaint today is that he has been having some different kinds of intermittent pain in his right flank, which he is concerned is either related to an infection or a stone. He is also ready to try to quit smoking and requests both a Rx for nicotine patches and a referral to our clinical pharmacist's smoking cessation clinic today. He has no other specific complaints.    Past Medical History:   Diagnosis Date    Anxiety     Arthritis     Bulging of cervical intervertebral disc     Cancer     lung    COPD (chronic obstructive pulmonary disease)     Depression     GERD (gastroesophageal reflux disease)     History of transfusion     Hypertension     Kidney stone     Neck pain     Sleep apnea     Thyroid disease        Past Surgical History:   Procedure Laterality Date    ABDOMINAL HERNIA REPAIR      umbilical    BRONCHOSCOPY Bilateral 08/22/2023    Procedure: BRONCHOSCOPY WITH ENDOBRONCHIAL ULTRASOUND;  Surgeon: Savage Mcmanus MD;  Location: [facility];  Service: Pulmonary;  Laterality:  Bilateral;    JOINT REPLACEMENT      PORTACATH PLACEMENT Left 9/7/2023    Procedure: INSERTION OF PORTACATH;  Surgeon: Jose F Hewitt MD;  Location: [facility];  Service: General;  Laterality: Left;    TONSILLECTOMY      TOTAL HIP ARTHROPLASTY Bilateral        Social History     Socioeconomic History    Marital status:     Number of children: 2   Tobacco Use    Smoking status: Every Day     Packs/day: 1.00     Years: 30.00     Additional pack years: 0.00     Total pack years: 30.00     Types: Cigarettes     Start date: 1986    Smokeless tobacco: Never   Vaping Use    Vaping Use: Never used   Substance and Sexual Activity    Alcohol use: Not Currently    Drug use: Not Currently    Sexual activity: Defer       Family History   Problem Relation Age of Onset    Lung cancer Mother     Arthritis Father        Allergies   Allergen Reactions    Bevespi Aerosphere [Glycopyrrolate-Formoterol] Other (See Comments)     Developed blisters around mouth. Not a listed adverse effect of medication so unsure if this is related to inhaler. Discontinued as precaution.       Current Outpatient Medications   Medication Sig Dispense Refill    albuterol (PROVENTIL) (2.5 MG/3ML) 0.083% nebulizer solution Take 2.5 mg by nebulization Every 6 (Six) Hours As Needed for Wheezing.      albuterol sulfate  (90 Base) MCG/ACT inhaler Inhale 2 puffs Every 4 (Four) Hours As Needed for Wheezing. 18 g 11    diazePAM (VALIUM) 10 MG tablet Take 1 tablet by mouth Every 12 (Twelve) Hours As Needed for Anxiety. 60 tablet 0    Lcraquadxyzcczb15.5mg/5ml,Aluminum&Qzwwrdlcp741-316-20sh/5ml,LidocaineViscous2%,[id number]/ml 1:1:1:1 Swish in mouth and Swallow 10 mL 4 times daily as needed. 480 mL 1    fluconazole (DIFLUCAN) 100 MG tablet Take 1 tablet by mouth Daily. Take 2 Tablets on Day 1. Then 1 Tablet Daily for 14 Days. 16 tablet 0    fluticasone (FLOVENT HFA) 110 MCG/ACT inhaler Inhale 2 puffs 2 (Two) Times a Day. 12 g 11     gabapentin (NEURONTIN) 600 MG tablet Take 800 mg by mouth 3 (Three) Times a Day.      hydroCHLOROthiazide (HYDRODIURIL) 12.5 MG tablet Take 1 tablet by mouth Daily.      levothyroxine (SYNTHROID, LEVOTHROID) 50 MCG tablet Take 1 tablet by mouth Daily.      lidocaine-prilocaine (EMLA) 2.5-2.5 % cream Apply 1 application  topically to the appropriate area as directed Every 2 (Two) Hours As Needed for Mild Pain. Apply to Port-A-Cath site 30 minutes prior to arrival at infusion clinic. 30 g 1    lisinopril (PRINIVIL,ZESTRIL) 10 MG tablet Take 1 tablet by mouth Daily.      megestrol (MEGACE) 40 MG/ML suspension Take 10 mL by mouth Daily. 480 mL 2    montelukast (SINGULAIR) 10 MG tablet Take 1 tablet by mouth Every Night.      Morphine (MS CONTIN) 60 MG 12 hr tablet Take 1 tablet by mouth 3 times a day. 90 tablet 0    naloxone (NARCAN) 4 MG/0.1ML nasal spray Call 911. Don't prime. Bryan in 1 nostril for overdose. Repeat in 2-3 minutes in other nostril if no or minimal breathing/responsiveness. 2 each 0    ondansetron (ZOFRAN) 8 MG tablet Take 1 tablet by mouth Every 8 (Eight) Hours As Needed for Nausea or Vomiting. 30 tablet 2    oxyCODONE-acetaminophen (PERCOCET)  MG per tablet Take 1 tablet by mouth Every 6 (Six) Hours As Needed for Moderate Pain. 120 tablet 0    pantoprazole (PROTONIX) 40 MG EC tablet Take 1 tablet by mouth Daily.      prochlorperazine (COMPAZINE) 10 MG tablet Take 1 tablet by mouth Every 6 (Six) Hours As Needed for Nausea or Vomiting. 60 tablet 1    QUEtiapine (SEROquel) 25 MG tablet Take 1 tablet by mouth Every Night.      sennosides-docusate (senna-docusate sodium) 8.6-50 MG per tablet Take 1 tablet by mouth Daily. 30 tablet 2    silver sulfadiazine (SILVADENE, SSD) 1 % cream Apply 1 application  topically to the appropriate area as directed 2 (Two) Times a Day.      silver sulfadiazine (SILVADENE, SSD) 1 % cream Apply 1 application  topically to the appropriate area as directed Daily As  "Needed for Wound Care. 25 g 0    tiotropium bromide-olodaterol (STIOLTO RESPIMAT) 2.5-2.5 MCG/ACT aerosol solution inhaler Inhale 2 puffs Daily. 4 g 11    traZODone (DESYREL) 100 MG tablet Take 1 tablet by mouth Every Night.      vitamin D (ERGOCALCIFEROL) 1.25 MG (23430 UT) capsule capsule Take 1 capsule by mouth 1 (One) Time Per Week.      ciprofloxacin (CIPRO) 500 MG tablet Take 1 tablet by mouth 2 (Two) Times a Day for 7 days. 14 tablet 0    nicotine (NICODERM CQ) 21 MG/24HR patch Place 1 patch on the skin as directed by provider Daily. 30 patch 1     No current facility-administered medications for this visit.     REVIEW OF SYSTEMS  CONSTITUTIONAL:  No fever, chills or night sweats. Increased fatigue since starting concomitant chemo/XRT in late September.  EYES:  No blurry vision, diplopia or other vision changes.  ENT:  No hearing loss or nosebleeds.  CARDIOVASCULAR:  No palpitations, arrhythmia, syncopal episodes or edema.  PULMONARY:  As per the HPI above.  GASTROINTESTINAL:  No constipation or diarrhea. No abdominal pain. Intermittent, mild nausea.  GENITOURINARY:  No hematuria, kidney stones or frequent urination.  MUSCULOSKELETAL:  As per the HPI above.  INTEGUMENTARY: No rashes or pruritus.  ENDOCRINE:  No excessive thirst or hot flashes.  HEMATOLOGIC:  No history of free bleeding, spontaneous bleeding or clotting.  IMMUNOLOGIC:  No allergies or frequent infections.  NEUROLOGIC: No numbness, tingling, seizures or weakness.  PSYCHIATRIC:  No depression. Anxiety, as per the HPI above.    PHYSICAL EXAMINATION  /64   Pulse 97   Temp 97.3 °F (36.3 °C) (Temporal)   Resp 18   Ht 172.7 cm (68\")   Wt 122 kg (268 lb 6.4 oz)   SpO2 97%   BMI 40.81 kg/m²     Pain Score:  Pain Score    24 0825   PainSc:   6   PainLoc: Chest     PHQ-Score Total:  PHQ-9 Total Score:      ECO  GENERAL:  A well-developed, well-nourished, morbidly obese, white male in no acute distress.  HEENT:  Pupils equally round " and reactive to light.  Extraocular muscles intact.  CARDIOVASCULAR:  Regular rate and rhythm.  No murmurs, gallops or rubs.  LUNGS:  Decreased breath sounds on the right, clear on the left.  ABDOMEN:  Soft, nontender, nondistended with positive bowel sounds.  EXTREMITIES:  No clubbing, cyanosis or edema bilaterally.  SKIN:  No rashes or petechiae. Powerport still in place.  NEURO:  Cranial nerves grossly intact. No focal deficits.  PSYCH:  Alert and oriented x3.    The physical exam is yet again unchanged from recent priors.    LABORATORY  Lab Results   Component Value Date    WBC 11.56 (H) 01/18/2024    HGB 11.9 (L) 01/18/2024    HCT 39.9 01/18/2024    MCV 98.0 (H) 01/18/2024     01/18/2024    NEUTROABS 8.81 (H) 01/18/2024       Lab Results   Component Value Date     01/18/2024    K 4.2 01/18/2024     01/18/2024    CO2 26.8 01/18/2024    BUN 16 01/18/2024    CREATININE 1.19 01/18/2024    GLUCOSE 96 01/18/2024    CALCIUM 9.4 01/18/2024    AST 17 01/18/2024    ALT 8 01/18/2024    ALKPHOS 66 01/18/2024    BILITOT 0.3 01/18/2024    PROTEINTOT 8.2 01/18/2024    ALBUMIN 3.7 01/18/2024     CBC (01/18/2024): WBCs: 11.56 (ANC: 8810); HgB: 11.9; Hct: 39.9; platelets: 418; MCV: 98.0  CBC (01/10/2024): WBCs: 17.39 (ANC: 78655); HgB: 10.7; Hct: 34.2; platelets: 400; MCV: 94.7  CBC (12/06/2023): WBCs: 13.18 (ANC: 8740); HgB: 12.4; Hct: 40.5; platelets: 374; MCV: 97.4  CBC (11/07/2023): WBCs: 1.35 (ANC: 810); HgB: 12.2; Hct: 38.5; platelets: 188; MCV: 92.8  CBC (10/24/2023): WBCs: 5.70; HgB: 13.9; Hct: 44.2; platelets: 92.7  CBC (10/17/2023): WBCs: 7.25; HgB: 12.6; Hct: 41.0; platelets: 269; MCV: 93.4  CBC (10/03/2023): WBCs: 10.59; HgB: 12.7; Hct: 41.4; platelets: 389; MCV: 91.8  CBC (09/25/2023): WBCs: 13.95; HgB: 12.9; Hct: 40.5; platelets: 483; MCV: 90.0  CBC (08/24/2023): WBCs: 22.31; HgB: 13.5; Hct: 43.6; platelets: 644    IMAGING  CT angiogram chest (08/19/2023):  Impression: Right upper lobe  consolidative and masslike airspace opacity without violating the fissures. Findings are concerning for infection. However, considering the presence of an enlarged right paratracheal lymph node measuring 4.2 cm, underlying mass cannot be ruled out.    NM PET with fusion CT, skull base to mid-thigh (09/12/2023):  Impression:  1) A right upper lobe pulmonary parenchymal mass and/or postobstructive collapse measures about 9.9 cm with hypermetabolism mSUV 15.  2) Subcarinal region 2.3 cm lymph node shows PET hypermetabolism measuring 8.6.  3) Right hilar region hypermetabolism shows mSUV 7.  4) Probable 2 cm right suprahilar clavicular region lymph node shows PET hypermetabolism mSUV 10.2.    MRI brain with and without contrast (09/16/2023):  Impression:  1) No parenchymal mass, hemorrhage or midline shift.  2) Increased signal in the mastoid air cells bilaterally.  3) No parenchymal mass, hemorrhage or midline shift.    CT chest with contrast (11/07/2023, compared to 08/19/2023):  Impression:  1) No new pulmonary nodule identified.  2) No new lymphadenopathy.  3) Now small left pleural effusion.  4) Right upper lobe posttreatment related change identified.    CT abdomen and pelvis with contrast (11/07/2023, compared to 12/07/2021):  Impression:  1) 2 mm hypoattenuating lesion of the right lobe of liver too small to further characterize and 6-month follow up MRI with contrast may be beneficial.  2) Small left pleural effusion.    CT chest with contrast (12/06/2023, compared to 08/19/2023):  Impression:  1) No new pulmonary nodule identified.  2) No new lymphadenopathy.  3) Now small left pleural effusion.  4) Right upper lobe posttreatment related change identified.    CT abdomen and pelvis with contrast (12/06/2023, compared to 08/19/2023):  Impression:  1) 2 mm hypoattenuating lesion of the right lobe of liver too small to further characterize and six month follow up MRI with contrast may be beneficial.  2) Small left  pleural effusion.    CT head with and without contrast (01/10/2024):  Impression: No evidence of metastatic disease to the brain.    PATHOLOGY  Lung, biopsy, right upper lobe (08/22/2023):  Moderately differentiated squamous cell carcinoma. PD-L1 TPS: < 1%.    Bronchial lavage, right upper and middle lobes (08/22/2023): Malignant. Squamous cell carcinoma.    Bronchial wash (08/22/2023): Malignant. Squamous cell carcinoma.    Bronchial brushings, right upper and middle lobes (08/22/2023): Malignant. Squamous cell carcinoma.    RADIATION THERAPY  Radiation Treatments       Active   No active radiation treatments to show.     Historical   Plans   Rt Lung   Most recent treatment: Dose planned: 200 cGy (fraction 30 on 11/6/2023)   Total: Dose planned: 6,000 cGy (30 fractions)   Elapsed Days: 42      Reference Points   PTV   Most recent treatment: Dose given: -- (on 11/6/2023)   Total: Dose given: 6,000 cGy   Elapsed Days: 42                   IMPRESSION AND PLAN  Mr. Flores is a 53 y.o., white male with:  Squamous cell lung carcinoma: Diagnosed in mid-August 2023 with locally advanced (with up to stage IIIC disease given the hypermetabolic, suprahilar lymph node near the clavicle that was seen on the initial staging PET scan) disease involving the right hemithorax. I have had multiple, long discussions with the patient (+/- his niece or brother) since the time of his initial consultation in our clinic (on 08/24/2023) regarding this diagnosis and, in general terms, its prognosis. They remain aware that, with his stage III disease, he was not a good surgical candidate. However, he was a good candidate for definitive treatment with concomitant chemotherapy and localized irradiation. This therapy was anticipated to have the best chance of improving his symptoms (of dyspnea and pain), had a chance of making his disease operable (this ultimately was not the case); and, even if surgery is not subsequently possible, could lead to  a longterm remission (if not a classical cure). Following another long discussion regarding the potential risks vs. benefits of this regimen, he was agreeable to proceeding with concomitant, qweekly carboplatin/taxol throughout a ~seven week course of localized XRT. Once a powerport was placed and his CT simulation was completed, he began a regimen consisting of qweekly carboplatin and taxol throughout a planned, thirty-fraction course of localized XRT in late September 2023. He received a total of six (6) cycles of the former and all thirty (30) planned fractions of the latter between then and early November 2023; and he tolerated this regimen overall very well, without any significant side effects (other than mildly increased fatigue and, more recently, radiation-related skin changes on his upper chest). New, baseline, post-chemo/XRT performed on 12/06/2023 (and summarized above) were consistent with post-therapeutic effect only, with no definite evidence of residual/recurrent disease. He was subsequently evaluated by CT surgery for possible, post-chemo/XRT resection, as planned; but, not surprisingly, such a procedure was ultimately not recommended (due to several factors, including the probable stage IIIC disease and his baseline, nighttime supplemental O2 dependence). I therefore had a long discussion with the patient and his brother in clinic in early December 2023 regarding our alternative management options from that point. In short, it was agreed that he would be a good candidate for proceeding with consolidative immunotherapy (with up to one year's worth of qmonthly durvalumab), as doing so has shown an overall improvement in survival that likely outweighs the minimal risk. He was agreeable, and he received the first of twelve planned cycles of durvalumab on 12/20/2023. He reports today that he tolerated this initial cycle well. We will proceed with this current treatment plan (second cycle of  consolidative, qmonthly durvalumab today). We will see him back in our clinic in two months (mid-March), on the day of the fourth cycle of durvalumab, with a CBC, CMP, TSH and repeat CTs of the chest, abdomen and pelvis with contrast for another symptom/toxicity check.   Pain: Chronic symptoms in his chest wall remain stable compared to when he was first diagnosed with issue #1; and they remain under good control with a combination of scheduled, long-acting MSContin 30 mg q12hr and prn Percocet 10/325 mg q6hr. More recent symptoms in his right flank (for the past month or two) are currently not as well controlled. The most recent repeat CT scans (performed on 12/06/2023 and summarized above) showed no specific findings in this area. Kidney stones may be contributing (he does report a history of them). A Rx for Cipro 500 mg BID x seven days was provided today. Continue to monitor.  Anxiety: Exacerbated by his diagnosis with issue #1, and recently worsened. Now reimproved once his Ativan 1 mg PO TID prn was replaced with Valium 10 mg BID prn in early 2024. Continue to monitor.  Dyspnea: Multifactorial, with issue #1 and COPD both contributing. Recently still improved overall following concomitant chemo/XRT; although he continues to need supplemental O2 at night. A Rx for a portable O2 concentrator was previously provided. Continue to monitor.  Transportation issues: The patient and his niece previously reported that their immediate family only has one car between them. This has yet to pose a significant problem with his treatment so far, but we previously referred him to our  for assistance.  Tobacco abuse: He has been repeatedly counseled on the importance of immediate, complete and permanent cessation of any and all tobacco products. He expressed a strong interest in quitting. Per his request, both a referral to our clinical pharmacist's smoking cessation clinic (due to issue #5, the best time for an  initial consultation with him will be in ~one month, on the day of the third cycle of consolidative durvalumab) and a Rx for nicotine patches were provided today.  The patient and his niece were in agreement with these plans.    It is a pleasure to participate in Mr. Flores's care. Please do not hesitate to call with any questions or concerns that you may have.    A total of 30 minutes were spent coordinating this patient’s care in clinic today; more than 50% of this time was face-to-face with the patient and his niece, reviewing his interim medical history and counseling on the current treatment and followup plan. All questions were answered to their satisfaction.    FOLLOW UP  Continue MSContin 30 mg PO q12hr and and Percocet 10/325 mg q6hr prn. Continue Valium 10 mg BID prn. Rxs for both Cipro 500 mg BID x 7 days and nicotine patches provided today. Referral placed to our clinical pharmacist's smoking cessation clinic (due to ongoing transportation issues, his initial appointment with the PharmD needs to be in 1 month, in mid-February, on the day of the 3rd cycle of durvalumab). With [facility] radiation oncology, as planned. Proceed with consolidative, qmonthly durvalumab (Imfinzi), as planned (2nd cycle today). Return to our clinic in 2 months (mid-March), on the day of the 4th cycle of duravlumab, with a CBC, CMP, TSH and CTs of the chest, abdomen and pelvis with contrast.          This document was electronically signed by ROMEO Rizzo MD January 18, 2024 15:19 EST      CC: ABRAN Oglesby MD Scotty Reams, JamD

## 2024-01-18 NOTE — PROGRESS NOTES
Venipuncture Blood Specimen Collection  Venipuncture performed in right hand by Perez Hassan MA with good hemostasis. Patient tolerated the procedure well without complications.   01/18/24   Perez Hassan MA

## 2024-01-18 NOTE — TELEPHONE ENCOUNTER
Caller: Dexter Flores    Relationship: Self    Best call back number: [phone]    What is the best time to reach you: ANYTIME    Who are you requesting to speak with (clinical staff, provider,  specific staff member): CLINICAL     Do you know the name of the person who called: DEXTER    What was the call regarding: PATIENT SPOKE WITH [employer] AND HIS INSURANCE WONT PAY FOR THE Morphine (MS CONTIN) 60 MG 12 hr tablet, IT HAS HAS A BIG CO PAY AND HE DOES NOT HAVE ANY MONEY. NEEDS PRIOR AUTH, CALL TO ADVISE     Is it okay if the provider responds through MyChart:

## 2024-01-22 ENCOUNTER — TELEPHONE (OUTPATIENT)
Dept: ONCOLOGY | Facility: CLINIC | Age: 54
End: 2024-01-22
Payer: COMMERCIAL

## 2024-01-22 NOTE — TELEPHONE ENCOUNTER
RN called patient and informed him the CT was ordered with oral contrast, and to drink 1 bottle the night before and 1 bottle the morning of. RN also informed patient that the PA was submitted on his morphine and the office is waiting for the approval. RN to call patient back once the morphine is approved. No further questions or concerns at this time.

## 2024-01-22 NOTE — TELEPHONE ENCOUNTER
Caller: Dexter Flores    Relationship: Self    Best call back number: [phone]    What is the best time to reach you: ANYTIME    Who are you requesting to speak with (clinical staff, provider,  specific staff member): CLINICAL    What was the call regarding: PATIENT NEEDS CLARIFICATION ON THE CONTRAST FOR HIS 3/15 CT SCAN. HE WOULD RATHER HAVE IV CONTRAST IF POSSIBLE.     PATIENT ALSO STATED THAT HIS MORPHINE NEEDED PRIOR AUTHORIZATION AND APPROVAL FOR MORPHINE, PERCOCET, AND VALIUM TO BE FILLED AT THE SAME TIME.      PLEASE CALL TO ADVISE.

## 2024-01-23 DIAGNOSIS — C34.91 SQUAMOUS CELL CARCINOMA OF BRONCHUS OF RIGHT LUNG: ICD-10-CM

## 2024-01-23 RX ORDER — MORPHINE SULFATE 60 MG/1
60 TABLET, FILM COATED, EXTENDED RELEASE ORAL 3 TIMES DAILY
Qty: 90 TABLET | Refills: 0 | Status: SHIPPED | OUTPATIENT
Start: 2024-01-23

## 2024-01-23 NOTE — TELEPHONE ENCOUNTER
Caller: [facility] - [place], [place] - [address] - [phone] [facility] [phone] FX    Relationship: Pharmacy    Best call back number: [phone]    What is the best time to reach you: ANYTIME     Who are you requesting to speak with (clinical staff, provider,  specific staff member): CLINICAL     Do you know the name of the person who called: TOM     What was the call regarding: TOM CALLING ON THE MORPHINE SCRIPT, INSURANCE IS SAYING IT EXCEEDS THE AMOUNT INSURANCE  WILL NOT PAY, AND ITS NOT TELLING HIM TO COMPLETE PRIOR AUTH REQUEST.     CALL TO ADVISE     Is it okay if the provider responds through MyChart:

## 2024-01-23 NOTE — TELEPHONE ENCOUNTER
Caller: Dexter Flores    Relationship: Self    Best call back number: [phone]    Requested Prescriptions:   Requested Prescriptions     Pending Prescriptions Disp Refills    Morphine (MS CONTIN) 60 MG 12 hr tablet 90 tablet 0     Sig: Take 1 tablet by mouth 3 times a day.        Pharmacy where request should be sent: [facility] [address] - [phone]  - [phone] FX     Last office visit with prescribing clinician: 1/18/2024   Last telemedicine visit with prescribing clinician: Visit date not found   Next office visit with prescribing clinician: 3/14/2024     Additional details provided by patient: PT STATES THIS WAS CALLED INTO [place] AND THEY DONT HAVE IT AVAILABLE AND PATIENT NEEDS IT CALLED IN TO [facility]       Does the patient have less than a 3 day supply:  [x] Yes  [] No    Kahlil Canada Rep   01/23/24 11:05 EST

## 2024-02-01 NOTE — ED NOTES
Spoke with Leighann Villa at [facility]. She advised patient is candidate for corne donation and she will call back after family has had time to be notified. Case number [id number]

## 2024-02-01 NOTE — ED NOTES
Called [facility] to ask if they could get pts corneas since the pts family called Pramod Boyd to come retreive the pt before the pt was cleared by [facility].

## 2024-02-01 NOTE — ED NOTES
Pramod Boyd came and retrieved patient at this time. Pt departed with  home on stretcher at this time.

## 2024-02-01 NOTE — ED PROVIDER NOTES
Subjective   History of Present Illness  53-year-old white male presents in cardiac arrest.  Patient has a history of squamous cell carcinoma of the lung, currently undergoing immunotherapy.  Family states that this morning he was noted to be sleeping on the couch and snoring.  When family member left the room for about an hour to take care of animals and returned, the patient was not breathing, and he was not able to awaken the patient.  EMS was called.  Family started CPR.  EMS reports that the patient initially was in asystole, then had episodes of atrial fibrillation and was defibrillated x 3.  He had gotten multiple rounds of epinephrine.  On arrival here the patient was again in asystole.      Review of Systems   Unable to perform ROS: Patient unresponsive       Past Medical History:   Diagnosis Date    Anxiety     Arthritis     Bulging of cervical intervertebral disc     Cancer     lung    COPD (chronic obstructive pulmonary disease)     Depression     GERD (gastroesophageal reflux disease)     History of transfusion     Hypertension     Kidney stone     Neck pain     Sleep apnea     Thyroid disease        Allergies   Allergen Reactions    Bevespi Aerosphere [Glycopyrrolate-Formoterol] Other (See Comments)     Developed blisters around mouth. Not a listed adverse effect of medication so unsure if this is related to inhaler. Discontinued as precaution.       Past Surgical History:   Procedure Laterality Date    ABDOMINAL HERNIA REPAIR      umbilical    BRONCHOSCOPY Bilateral 08/22/2023    Procedure: BRONCHOSCOPY WITH ENDOBRONCHIAL ULTRASOUND;  Surgeon: Savage Mcmanus MD;  Location: [facility] OR;  Service: Pulmonary;  Laterality: Bilateral;    JOINT REPLACEMENT      PORTACATH PLACEMENT Left 9/7/2023    Procedure: INSERTION OF PORTACATH;  Surgeon: Jose F Hewitt MD;  Location: [facility] OR;  Service: General;  Laterality: Left;    TONSILLECTOMY      TOTAL HIP ARTHROPLASTY Bilateral        Family History    Problem Relation Age of Onset    Lung cancer Mother     Arthritis Father        Social History     Socioeconomic History    Marital status:     Number of children: 2   Tobacco Use    Smoking status: Every Day     Packs/day: 1.00     Years: 30.00     Additional pack years: 0.00     Total pack years: 30.00     Types: Cigarettes     Start date: 1986    Smokeless tobacco: Never   Vaping Use    Vaping Use: Never used   Substance and Sexual Activity    Alcohol use: Not Currently    Drug use: Not Currently    Sexual activity: Defer           Objective   Physical Exam  Vitals and nursing note reviewed. Exam conducted with a chaperone present.   Constitutional:       Comments: Severe central cyanosis of the head, neck.   HENT:      Head: Normocephalic and atraumatic.      Mouth/Throat:      Mouth: Mucous membranes are moist.      Pharynx: Oropharynx is clear.      Comments: I-gel airway in place.  Vomitus noted throughout the oropharynx.  Cardiovascular:      Comments: Asystole  Pulmonary:      Comments: Apnea  Abdominal:      General: There is distension.   Skin:     General: Skin is cool.      Coloration: Skin is cyanotic.   Neurological:      Comments: Pupils fixed and dilated.         Intubation    Date/Time: 2/1/2024 4:11 PM    Performed by: Gil Randall MD  Authorized by: Gil Randall MD    Consent:     Consent obtained:  Emergent situation  Pre-procedure details:     Indications: cardio/pulmonary arrest      Patient status:  Unresponsive    Look externally: facial hair      Mouth opening - incisor distance:  3 or more finger widths    Mallampati score:  IV    Obstruction: none      Neck mobility: normal      Pharmacologic strategy: none      Induction agents:  None    Paralytics:  None  Procedure details:     Preoxygenation:  Bag valve mask (And I-gel airway)    CPR in progress: yes      Number of attempts:  2  Successful intubation attempt details:     Intubation method:  Oral    Intubation technique:  video assisted      Laryngoscope blade:  Hypercurved    Tube size (mm):  8.0    Tube type:  Cuffed    Tube visualized through cords: yes    First unsuccessful intubation attempt details:     Intubation method:  Oral    Intubation technique:  Direct    Laryngoscope blade:  Mac 4    Bougie used: no      Ventilation between 1st and 2nd attempt: yes with mask    Placement assessment:     ETT at teeth/gumline (cm):  23    Tube secured with:  ETT magana    Breath sounds:  Equal and absent over the epigastrium    Placement verification: chest rise, colorimetric ETCO2 and equal breath sounds    Post-procedure details:     Complications comment:  None             ED Course                                             Medical Decision Making  Problems Addressed:  Cardiopulmonary arrest: complicated acute illness or injury  Squamous cell carcinoma of lung, unspecified laterality: complicated acute illness or injury    Risk  Prescription drug management.        Final diagnoses:   Cardiopulmonary arrest   Squamous cell carcinoma of lung, unspecified laterality       ED Disposition  ED Disposition       ED Disposition       Condition   --    Comment   --               No follow-up provider specified.       Medication List      No changes were made to your prescriptions during this visit.            Gil Randall MD  [phone]

## (undated) DEVICE — GOWN,REINF,POLY,ECL,PP SLV,XL: Brand: MEDLINE

## (undated) DEVICE — SUCTION CANISTER, 1500CC, RIGID: Brand: DEROYAL

## (undated) DEVICE — BRUSH CYTO MULTI APPL

## (undated) DEVICE — Device

## (undated) DEVICE — UNDERGLV SURG BIOGEL INDICAT PF 8 GRN

## (undated) DEVICE — INTENDED FOR TISSUE SEPARATION, AND OTHER PROCEDURES THAT REQUIRE A SHARP SURGICAL BLADE TO PUNCTURE OR CUT.: Brand: BARD-PARKER ® CARBON RIB-BACK BLADES

## (undated) DEVICE — SINGLE USE SUCTION VALVE MAJ-209: Brand: SINGLE USE SUCTION VALVE (STERILE)

## (undated) DEVICE — PATIENT RETURN ELECTRODE, SINGLE-USE, CONTACT QUALITY MONITORING, ADULT, WITH 9FT CORD, FOR PATIENTS WEIGING OVER 33LBS. (15KG): Brand: MEGADYNE

## (undated) DEVICE — TRAP,MUCUS SPECIMEN,40CC: Brand: MEDLINE

## (undated) DEVICE — ELECTRD NDL MEGADYNE EZCLEAN NOSE 7CM

## (undated) DEVICE — SUT PROLN 3/0 8832H

## (undated) DEVICE — SUT MNCRYL PLS ANTIB UD 4/0 PS2 18IN

## (undated) DEVICE — SUT VIC 3/0 SH 27IN J416H

## (undated) DEVICE — CVR PROB ULTRASND CIVFLEX GEN/PURP TELESCP/FOLD 5.5X58IN LF

## (undated) DEVICE — SYR LUERLOK 30CC

## (undated) DEVICE — TBG PENCL TELESCP MEGADYNE SMOKE EVAC 10FT

## (undated) DEVICE — APPL CHLORAPREP HI/LITE 26ML ORNG

## (undated) DEVICE — SYR LUER SLPTP 50ML

## (undated) DEVICE — NDL HYPO ECLPS SFTY 18G 1 1/2IN

## (undated) DEVICE — TUBING, SUCTION, 1/4" X 20', STRAIGHT: Brand: MEDLINE INDUSTRIES, INC.

## (undated) DEVICE — NDL HYPO ECLPS SFTY 22G 1 1/2IN

## (undated) DEVICE — DRAPE,T,LAPARO,TRANS,STERILE: Brand: MEDLINE

## (undated) DEVICE — FRCP BX RADJAW4 PULM WO NDL STD1.8X2 100

## (undated) DEVICE — HOLDER: Brand: DEROYAL

## (undated) DEVICE — PK BASIC 70

## (undated) DEVICE — GLV SURG PREMIERPRO MIC LTX PF SZ7.5 BRN

## (undated) DEVICE — TUBING, SUCTION, 3/16" X 6', STRAIGHT: Brand: MEDLINE

## (undated) DEVICE — SINGLE USE BIOPSY VALVE MAJ-210: Brand: SINGLE USE BIOPSY VALVE (STERILE)

## (undated) DEVICE — ADAPT SWVL FIBROPTIC BRONCH

## (undated) DEVICE — DRP C/ARM W/BAND W/CLIPS 41X74IN

## (undated) DEVICE — Device: Brand: SINGLE USE ASPIRATION NEEDLE NA-U401SX